# Patient Record
Sex: MALE | Race: WHITE | NOT HISPANIC OR LATINO | Employment: OTHER | URBAN - METROPOLITAN AREA
[De-identification: names, ages, dates, MRNs, and addresses within clinical notes are randomized per-mention and may not be internally consistent; named-entity substitution may affect disease eponyms.]

---

## 2017-01-03 ENCOUNTER — HOSPITAL ENCOUNTER (OUTPATIENT)
Dept: CARDIAC REHAB | Facility: CLINIC | Age: 82
End: 2017-01-03
Attending: FAMILY MEDICINE
Payer: COMMERCIAL

## 2017-01-03 VITALS — HEIGHT: 69 IN | BODY MASS INDEX: 27.91 KG/M2 | WEIGHT: 188.4 LBS

## 2017-01-03 PROCEDURE — 40000575 ZZH STATISTIC OP CARDIAC VISIT #2

## 2017-01-03 PROCEDURE — 93797 PHYS/QHP OP CAR RHAB WO ECG: CPT

## 2017-01-03 PROCEDURE — 93798 PHYS/QHP OP CAR RHAB W/ECG: CPT

## 2017-01-03 PROCEDURE — 40000116 ZZH STATISTIC OP CR VISIT

## 2017-01-03 ASSESSMENT — 6 MINUTE WALK TEST (6MWT)
GENDER SELECTION: MALE
MALE CALC: 1379.97
PREDICTED: 1388.38
TOTAL DISTANCE WALKED (FT): 1015
TOTAL DISTANCE WALKED (FT): 1015
FEMALE CALC: 1099.37
GENDER SELECTION: MALE

## 2017-01-03 NOTE — PROGRESS NOTES
01/03/17 1100   Session   Session Initial Evaluation and Exercise Prescription   Certified through this date 02/01/17   Korey Solitario Pearson  87 year old  I have established, reviewed and made necessary changes to the individualized treatment plan and exercise prescription for this patient.    Physician Name (printed): ________________________   Date: _______  Time: ______    Physician Signature: ___________________________________________    Cardiac Rehab Assessment   Cardiac Rehab Assessment 1/3/17 Patient presents for initial evaluation s/p DESx2. Patient had felt burning in chest when he was admitted to the hospital. EF of >70%. Patient reports that he's been to the ER since DC. He believed to have a hematoma in the incision site. He was reassured that the incision appears to be WNLs. Patient still experiences a little soreness, but tolerated 6MWT today. Patient is hesitant to attend CR due to insurance plan. Patient may have a $30 copay when he attends. Patient plans to attend 6 sessions and then assess how he's feeling.     The patient's history and clinical status including hemodynamics and ECG were evaluated.  The patient was assessed to be stable and appropriate to begin exercise.   The patient's functional capacity and exercise prescription were determined by the completion of the 6 minute walk test.  See results below.  The patient was oriented to the program.  Risk factor profile was completed. Goals and objectives were discussed. CV response was WNL. No symptoms, complaints or pain were reported. Good prognosis for reaching above goals. Skilled therapy is necessary in order to monitor CV response to exercise, to provide education on risk factors and behavior change counseling needed to achieve patient's goals.  Plan to progress to 30-40 minutes of exercise prior to discharge from cardiac rehab.  Initial THR of 20-30 beats above RHR; Effort rating of 4-6.  Initiate muscle conditioning as  appropriate.  Provide risk factor education and behavior change counseling.    General Information   Treatment Diagnosis Stent   Date of Treatment Diagnosis 12/22/16   Significant Past CV History None   Comorbidities None   Other Medical History Hypertension    Past Medical History   Diagnosis Date     Anxiety disorder      Hypothyroid      Peripheral neuropathy (H) 2012     Diverticulosis of colon      Erythrocytosis      Hyperlipidemia      Hypertension      Hearing loss      Vitamin D deficiency      Cancer (H) skin cancer both ears      Lead up symptoms burning in chest    Hospital Location [facility]   Hospital Discharge Date 12/24/16   Signs and Symptoms Post Hospital Discharge None   Outpatient Cardiac Rehab Start Date 01/03/17   Primary Physician Dr. Liberty Calhoun   Primary Physician Follow Up Advised to schedule appointment   Surgeon NA   Surgeon Follow Up NA   Cardiologist Dr. Patel   Cardiologist Follow Up Completed   Ejection Fraction >70%   Risk Stratification Low   Summary of Cath Report   Summary of Cath Report Available   Date Performed 12/22/16   Left Main 20-30%   LAD 75-80%   LCX 30%   RCA <20%   Cath Report Comments 1/3/17 ALFRED to proximal LAD, ALFRED to mid LAD   Living and Work Status    Living Arrangements and Social Status house   Support System Live with an adult   Return to Employment Retired   Occupation auto services   Preventative Medications   CMS recommended medications Ace inhibitors;Antiplatelets;Beta Blocker;Lipid Lowering   Falls Screen   Have you fallen two or more times in the past year? No   Have you fallen and had an injury in the past year? No   Pain   Patient Currently in Pain Denies   Physical Assessments   Incisions WNL   Edema None   Right Lung Sounds not assessed   Left Lung Sounds not assessed   Limitations No limitations   Individualized Treatment Plan   Monitored Sessions Scheduled 6   Monitored Sessions Attended 1   Oxygen   Supplemental Oxygen needed No   Nutrition Management  "- Weight Management   Assessment Initial Assessment   Age 87   Weight 85.458 kg (188 lb 6.4 oz)   Height 1.74 m (5' 8.5\")   BMI (Calculated) 28.29   Initial Rate Your Plate Score. Dietary tool to assess eating patterns. Scores range from 24 to 72. The higher the score the healthier the eating pattern. 52   Nutrition Management - Lipids   Lipids Labs Available   Date 12/23/16   Total Cholesterol 172   Triglycerides 107   HDL 37      Prescribed Lipid Medication Yes   Statin Intensity High Intensity   Nutrition Management - Diabetes   Diabetes No   Nutrition Management Summary   Dietary Recommendations Low Fat;Low Cholesterol;Low Sodium   Stages of Change for Diet Compliance Pre-Contemplation   Interventions Planned Attend Nutrition Education Class(es);Instruct on Label Reading   Nutrition Summary Comments 1/3/17 Patient is trying to watch portion sizes, but is not interested in changing the content of food.   Psychosocial Management   Psychosocial Assessment Initial   Is there history of clinical depression or increased risk of depression? No previous history   Current Level of Stress per Patient Report Mild   Current Coping Skills Has Positive Support System;Is on Medication for Depression/Anxiety   Initial Patient Health Questionnaire -9 Score (PHQ-9) for depression. 5-9 Minimal symptoms, 10-14 Minor depression, 15-19 Major depression, moderately severe, > 20 Major depression, severe  5   Initial [facility] Survey score.  Quality of Life:   If total score > 25 review individual areas where patient rated a 4 or 5.  Consider patients current medical condition and what role that plays on the score.   Adjust treatment protocol to improve areas of concern.  Consider the following:  PHQ9 score, DASI, and re-assessment within the next 30 days to assist with developing treatments.  20   Stages of Change Maintenance   Patient Goal No   Psychosocial Comments 1/3/17 PT is on medication for anxiety, which he feels is " beneficial   Psychosocial Target Outcome Identify absence or presence of depression using valid screening tool;Maximize coping skills;Develop positive support system   Other Core Components - Hypertension   History of or Diagnosis of Hypertension Yes   Currently taking Anti-Hypertensives Yes;Beta blocker;Ace Inhibitor   Other Core Components - Tobacco   History of Tobacco Use Yes   Quit Date or Planned Quit Date 01/01/56   Tobacco Use Status Former (Quit > 6 mo ago)   Tobacco Habit Cigarettes   Tobacco Use per Day (average) 1 PPD   Years of Tobacco Use 10   Stages of Change Maintenance   Other Core Components Summary   Interventions Planned None   Activity/Exercise History   Activity/Exercise Assessment Initial   Activity/Exercise Status prior to event? Sedentary   Number of Days Currently participating in Moderate Physical Activity? 0   Number of Days Currently performing  Aerobic Exercise (including rehab)? 0   Number of Minutes per Session Currently of Aerobic Exercise (average)? 0   Current Stage of Change (Physical Activity) Preparation   Current Stage of Change (Aerobic Exercise) Preparation   Patient Goals Goal #1;Goal #2   Goal #1 Description PT will tolerate walking 15 minutes at a brisk pace without feeling chest pain or fatigue.    Goal #1 Target Date 02/02/17   Goal #2 Description PT will attend CR 2 times per week to gain confidence to safely exercise on his own outside of rehab.    Goal #2 Target Date 02/02/17   Exercise Assessment   6 Minute Walk Predicted - Gender Selection Male   6 Minute Walk Predicted (Male) 1379.97   6 Minute Walk Predicted (Female) 1099.37   Initial 6 Minute Walk Distance (Feet) 1015 ft   Resting HR 52 bpm   Exercise HR 79 bpm   Post Exercise HR 54 bpm   Resting /64 mmHg   Exercise /68 mmHg   Post Exercise /70 mmHg   Effort Rating 6   Current MET Level 2.5   MET Level Goal 3-3.5   ECG Rhythm Sinus bradycardia   Ectopy PVCs   Current Symptoms Denies symptoms    Limitations/Restrictions None   Exercise Prescription   Mode Treadmill;Nustep;Weights   Duration/Time Intermittent bouts;15-30 min   Frequency 2 days/week   THR (85% of age predicted max HR) 113.05   OMNI Effort Rating (0-10 Scale) 4-6/10   Progression Continuous bouts;Progress peak intensity by 1/4 MET per week;Total exercise time of 30-45 minutes   Recommended Home Exercise   Type of Exercise Walking   Frequency (days per week) 2-3   Duration (minutes per session) Intermittent   Effort Rating Recommended 4-6/10   Current Home Exercise   Type of Exercise None   Frequency (days per week) 0   Duration (minutes per session) 0   Follow-up/On-going Support   Provider follow-up needed on the following No follow-up needed   Learning Assessment   Learner Patient   Primary Language English   Preferred Learning Style Listening;Reading   Barriers to Learning Hearing   Patient Education   Education recommended Anatomy and Physiology of the Heart;Blood Pressure;Exercise Principles;Nutrition

## 2017-01-05 ENCOUNTER — HOSPITAL ENCOUNTER (OUTPATIENT)
Dept: CARDIAC REHAB | Facility: CLINIC | Age: 82
End: 2017-01-05
Payer: COMMERCIAL

## 2017-01-05 PROCEDURE — 93798 PHYS/QHP OP CAR RHAB W/ECG: CPT | Performed by: OCCUPATIONAL THERAPIST

## 2017-01-05 PROCEDURE — 40000116 ZZH STATISTIC OP CR VISIT: Performed by: OCCUPATIONAL THERAPIST

## 2017-01-10 ENCOUNTER — OFFICE VISIT (OUTPATIENT)
Dept: FAMILY MEDICINE | Facility: CLINIC | Age: 82
End: 2017-01-10
Payer: COMMERCIAL

## 2017-01-10 VITALS
OXYGEN SATURATION: 94 % | HEART RATE: 104 BPM | DIASTOLIC BLOOD PRESSURE: 76 MMHG | SYSTOLIC BLOOD PRESSURE: 114 MMHG | BODY MASS INDEX: 28.47 KG/M2 | RESPIRATION RATE: 15 BRPM | TEMPERATURE: 96.9 F | WEIGHT: 190 LBS

## 2017-01-10 DIAGNOSIS — G25.3 RESTLESS LEGS SYNDROME WITH NOCTURNAL MYOCLONUS: Chronic | ICD-10-CM

## 2017-01-10 DIAGNOSIS — G47.00 INSOMNIA, UNSPECIFIED TYPE: Primary | Chronic | ICD-10-CM

## 2017-01-10 DIAGNOSIS — G25.81 RESTLESS LEGS SYNDROME WITH NOCTURNAL MYOCLONUS: Chronic | ICD-10-CM

## 2017-01-10 PROBLEM — I73.00 RAYNAUD'S DISEASE WITHOUT GANGRENE: Status: ACTIVE | Noted: 2017-01-10

## 2017-01-10 PROCEDURE — 99214 OFFICE O/P EST MOD 30 MIN: CPT | Performed by: PHYSICIAN ASSISTANT

## 2017-01-10 RX ORDER — TRAZODONE HYDROCHLORIDE 50 MG/1
50 TABLET, FILM COATED ORAL
Qty: 30 TABLET | Refills: 3 | Status: SHIPPED | OUTPATIENT
Start: 2017-01-10 | End: 2017-01-28

## 2017-01-10 NOTE — MR AVS SNAPSHOT
"              After Visit Summary   1/10/2017    Korey Pearson    MRN: [id number]           Patient Information     Date Of Birth          8/31/1929        Visit Information        Provider Department      1/10/2017 10:30 AM Liberty Calhoun PA-C [facility]        Today's Diagnoses     Insomnia, unspecified type    -  1     Restless legs syndrome with nocturnal myoclonus           Care Instructions    Ten basic rules for a good night's sleep   Sleep only as much as you need to feel rested and then get out of bed   Keep a regular sleep schedule   Avoid forcing sleep   Exercise regularly for at least 20 minutes, preferably 4 to 5 hours before bedtime   Avoid caffeinated beverages after lunch   Avoid alcohol near bedtime: no \"night cap\"   Avoid smoking, especially in the evening   Do not go to bed hungry   Adjust bedroom environment   Deal with your worries before bedtime     Melatonin:    Take melatonin dose of 0.3 mg at or soon before bedtime. If  no improvement after a week, double the dose to 0.6 mg.    Stop taking after a few weeks as continuing to take nightly will decrease effectiveness.  Then restart after 1-2 weeks at the initial dose.        Follow-ups after your visit        Your next 10 appointments already scheduled     Jan 12, 2017  1:00 PM   Treatment 60 with Sh Cardiac Rehab 1   [facility] ([facility])    [address]  [facility] [address]   [phone]            Jan 17, 2017  1:00 PM   Treatment 60 with Sh Cardiac Rehab 1   [facility] ([facility])    [address]  [facility] [address]   [phone]            Jan 19, 2017  1:00 PM   Treatment 60 with Sh Cardiac Rehab 1   [facility] ([facility])    [address]  [facility] [address]   [phone]            Jan 24, 2017  1:00 PM "   Treatment 60 with Sh Cardiac Rehab 1   [facility] ([facility])    [address]  [place] [address]   [phone]            Jan 26, 2017  1:00 PM   Treatment 60 with Sh Cardiac Rehab 1   [facility] ([facility])    [address]  [facility] [address]   [phone]            Mar 09, 2017  8:50 AM   LAB with [facility]   [facility] ([facility])    [address]  [facility] [address] [phone]           Patient must bring picture ID.  Patient should be prepared to give a urine specimen  Please do not eat 10-12 hours before your appointment if you are coming in fasting for labs on lipids, cholesterol, or glucose (sugar).  Pregnant women should follow their Care Team instructions. Water with medications is okay. Do not drink coffee or other fluids.   If you have concerns about taking  your medications, please ask at office or if scheduling via [employer], send a message by clicking on Secure Messaging, Message Your Care Team.            Mar 09, 2017  9:45 AM   Return Visit with Ramirez Patel MD   [facility] ([facility])    [address] [place] [address] [address]  [facility] [address] [phone]              Who to contact     If you have questions or need follow up information about today's clinic visit or your schedule please contact [facility] directly at [phone].  Normal or non-critical lab and imaging results will be communicated to you by MyChart, letter or phone within 4 business days after the clinic has received the results. If you do not hear from us within 7 days, please contact the clinic through MyChart or phone. If you have a critical or abnormal lab result, we will notify you by phone as soon as possible.  Submit refill requests through  "MyChart or call your pharmacy and they will forward the refill request to us. Please allow 3 business days for your refill to be completed.          Additional Information About Your Visit        MyChart Information     [employer] lets you send messages to your doctor, view your test results, renew your prescriptions, schedule appointments and more. To sign up, go to [email or web address] . Click on \"Log in\" on the left side of the screen, which will take you to the Welcome page. Then click on \"Sign up Now\" on the right side of the page.     You will be asked to enter the access code listed below, as well as some personal information. Please follow the directions to create your username and password.     Your access code is: [email or web address]  Expires: 3/22/2017  8:41 AM     Your access code will  in 90 days. If you need help or a new code, please call your [facility] clinic or [phone].        Care EveryWhere ID     This is your Care EveryWhere ID. This could be used by other organizations to access your [facility] medical records  [id number]        Your Vitals Were     Pulse Temperature Respirations Pulse Oximetry          104 96.9  F (36.1  C) (Tympanic) 15 94%         Blood Pressure from Last 3 Encounters:   01/10/17 114/76   16 138/70   16 167/91    Weight from Last 3 Encounters:   01/10/17 190 lb (86.183 kg)   17 188 lb 6.4 oz (85.458 kg)   16 193 lb 6.4 oz (87.726 kg)              Today, you had the following     No orders found for display         Today's Medication Changes          These changes are accurate as of: 1/10/17 12:03 PM.  If you have any questions, ask your nurse or doctor.               Start taking these medicines.        Dose/Directions    traZODone 50 MG tablet   Commonly known as:  DESYREL   Used for:  Insomnia, unspecified type   Started by:  Liberty Calhoun PA-C        Dose:  50 mg   Take 1 tablet (50 mg) by mouth nightly as needed for sleep "   Quantity:  30 tablet   Refills:  3            Where to get your medicines      These medications were sent to [place] [id number] - [place], [place] - [address]  [address] [facility] [address]     Phone:  [phone]    - traZODone 50 MG tablet             Primary Care Provider Office Phone # Fax #    Liberty Calhoun PA-C [phone]       [facility] [address]  [facility] [address]        Thank you!     Thank you for choosing [facility]  for your care. Our goal is always to provide you with excellent care. Hearing back from our patients is one way we can continue to improve our services. Please take a few minutes to complete the written survey that you may receive in the mail after your visit with us. Thank you!             Your Updated Medication List - Protect others around you: Learn how to safely use, store and throw away your medicines at www.disposemymeds.org.          This list is accurate as of: 1/10/17 12:03 PM.  Always use your most recent med list.                   Brand Name Dispense Instructions for use    aspirin 81 MG tablet      Take 81 mg by mouth daily       atorvastatin 20 MG tablet    LIPITOR    30 tablet    Take 1 tablet (20 mg) by mouth At Bedtime       camphor-eucalyptus-menthol 4.73-1.2-2.6 % Oint ointment      Apply 0.5 g topically daily as needed for cough       diazepam 5 MG tablet    VALIUM    90 tablet    TAKE ONE TABLET BY MOUTH EVERY 8 HOURS AS NEEDED FOR ANXIETY       levothyroxine 75 MCG tablet    SYNTHROID/LEVOTHROID    90 tablet    Take 1 tablet (75 mcg) by mouth daily       lisinopril 10 MG tablet    PRINIVIL/ZESTRIL    30 tablet    Take 1 tablet (10 mg) by mouth daily       metoprolol 25 MG 24 hr tablet    TOPROL-XL    60 tablet    Take 1 tablet (25 mg) by mouth 2 times daily       NIGHTTIME SLEEP AID 50 MG Caps   Generic drug:  DiphenhydrAMINE HCl (Sleep)      30 capsule    Take 1 capsule by mouth as needed       polyethylene glycol powder    MIRALAX/GLYCOLAX     Take 17 g by mouth daily as needed for constipation       prune juice Liqd      Take 5 mLs by mouth daily       ticagrelor 90 MG tablet    BRILINTA    60 tablet    Take 1 tablet (90 mg) by mouth every 12 hours       traZODone 50 MG tablet    DESYREL    30 tablet    Take 1 tablet (50 mg) by mouth nightly as needed for sleep

## 2017-01-10 NOTE — NURSING NOTE
"Chief Complaint   Patient presents with     Hospital F/U     Insomnia       Initial /76 mmHg  Pulse 104  Temp(Src) 96.9  F (36.1  C) (Tympanic)  Resp 15  Wt 190 lb (86.183 kg)  SpO2 94% Estimated body mass index is 28.47 kg/(m^2) as calculated from the following:    Height as of 1/3/17: 5' 8.5\" (1.74 m).    Weight as of this encounter: 190 lb (86.183 kg).  BP completed using cuff size: regular    "

## 2017-01-10 NOTE — PATIENT INSTRUCTIONS
"Ten basic rules for a good night's sleep   Sleep only as much as you need to feel rested and then get out of bed   Keep a regular sleep schedule   Avoid forcing sleep   Exercise regularly for at least 20 minutes, preferably 4 to 5 hours before bedtime   Avoid caffeinated beverages after lunch   Avoid alcohol near bedtime: no \"night cap\"   Avoid smoking, especially in the evening   Do not go to bed hungry   Adjust bedroom environment   Deal with your worries before bedtime     Melatonin:    Take melatonin dose of 0.3 mg at or soon before bedtime. If  no improvement after a week, double the dose to 0.6 mg.    Stop taking after a few weeks as continuing to take nightly will decrease effectiveness.  Then restart after 1-2 weeks at the initial dose.  "

## 2017-01-10 NOTE — PROGRESS NOTES
SUBJECTIVE:                                                    Korey Pearson is a 87 year old male who presents to clinic today for the following health issues:    Hospital Follow-up Visit:  Hospital/Nursing Home/IP Rehab Facility: [facility]  Date of Admission: 12/20/16  Date of Discharge: 12/24/16  Reason(s) for Admission: pain in chest, burning pain center of chest            Problems taking medications regularly:  None       Medication changes since discharge: brilenta, metoprolol, atorvastatin and lisinopril (refill)       Problems adhering to non-medication therapy:  None    Summary of hospitalization:  [facility] discharge summary reviewed  Diagnostic Tests/Treatments reviewed.  Follow up needed: none  Other Healthcare Providers Involved in Patient s Care:         Specialist appointment - Already saw cardiology  Update since discharge: improved.     Post Discharge Medication Reconciliation: discharge medications reconciled, continue medications without change.  Plan of care communicated with patient     Coding guidelines for this visit:  Type of Medical   Decision Making Face-to-Face Visit       within 7 Days of discharge Face-to-Face Visit        within 14 days of discharge   Moderate Complexity [id number]   High Complexity [id number]        Insomnia     Onset: years, becoming worse    Description:   Time to fall asleep (sleep latency): more than 2 hours  Middle of night awakening:  YES  Early morning awakening:  YES    Progression of Symptoms:  worsening    Accompanying Signs & Symptoms:  Daytime sleepiness/napping: YES  Excessive snoring/apnea: no  Restless legs: YES  Frequent urination: no  Chronic pain:  no   History:  Prior Insomnia: no    Precipitating factors:   New stressful situation: no  Caffeine intake: YES- coffee  OTC decongestants: no  Any new medications: YES- recent change from medications    Alleviating factors:  Self medicating (alcohol, etc.):  no        Therapies Tried and outcome: benadryl (generic version)  Additional complaints: None    HPI additional notes: Clifford presents today with   Chief Complaint   Patient presents with     Hospital F/U     Insomnia     Other     poss. raynauds? Patient's fingers are white and cold, mild numbness-unable to get a very good O2 level due to this   Ongoing problems with sleep.  Referred to see sleep specialist last Jan and did not go.  No improvement with sonata, ambien, ativan.  Currently on valium for anxiety.      Doing well since hospital stay.  Has seen cardiology and has had 2 sessions of cardiac rehab.  Canceled his session today due to the weather but will see them in 2 days.  Unsure if he should still restrict his lifting to less than 10 pounds.     ROS:  C: Negative for fever, chills, recent change in weight  Skin: Negative for worrisome rashes or lesions  ENT: Negative for ear, mouth and throat problems  Resp: Negative for significant cough or SOB  CV: Negative for chest pain or peripheral edema  GI: Negative for nausea, abdominal pain, heartburn, or change in bowel habits  MS: Negative for significant arthralgias or myalgias  NEURO: POSITIVE for rls   PSYCHIATRIC: POSITIVE for anxiety or insomnia. Negative for depressed mood  ROS otherwise negative.    Chart Review:  History   Smoking status     Former Smoker -- 1.50 packs/day for 10 years     Types: Cigarettes     Quit date: 01/01/1957   Smokeless tobacco     Never Used     PHQ-9 SCORE 11/22/2013   Total Score 19     Patient Active Problem List   Diagnosis     Generalized anxiety disorder     Peripheral neuropathy (H)     Acquired hypothyroidism     Hearing loss     Vitamin D deficiency     Hyperlipidemia LDL goal <130 on a statin     Essential hypertension with goal blood pressure less than 140/90     Restless legs syndrome with nocturnal myoclonus     Degenerative arthritis of left knee     Gastroesophageal reflux disease without esophagitis     Flatulence,  eructation, and gas pain     Dizziness     Constipation     Abdominal pain, generalized     Diverticulosis of large intestine without hemorrhage     ACP (advance care planning)     Insomnia     Chest pain     Raynaud's disease without gangrene     Past Surgical History   Procedure Laterality Date     Appendectomy  1941     Cataract iol, rt/lt  1993     Hernia repair       left inguinal hernia 1960's     Abdomen surgery  appy, hernia     Problem list, Medication list, Allergies, Medical/Social/Surg hx reviewed in [facility], updated as appropriate.   OBJECTIVE:                                                    /76 mmHg  Pulse 104  Temp(Src) 96.9  F (36.1  C) (Tympanic)  Resp 15  Wt 190 lb (86.183 kg)  SpO2 94%  Body mass index is 28.47 kg/(m^2).  GENERAL: healthy, alert, in no acute distress  RESP: lungs clear to auscultation - no rales, no rhonchi, no wheezes  CV: regular rate and rhythm, normal S1 S2. No peripheral edema.  Fingers pale and cool to palpation.  SKIN: no suspicious lesions, no rashes  PSYCH:Mental Status Exam  Behavior: cooperative and agitated  Speech: rapid tangential  Mood: anxious  Affect: Appropriate/mood-congruent  Thought Processes: Goal directed  Thought Content: no evidence of suicidal or homicidal ideation and no overt psychosis  Insight: Adequate  Judgment: Fair      Diagnostic test results: none      ASSESSMENT/PLAN:                                                          ICD-10-CM    1. Insomnia, unspecified type G47.00 traZODone (DESYREL) 50 MG tablet   2. Restless legs syndrome with nocturnal myoclonus G25.81     G25.3    Clifford has longstanding history of insomnia and RLS.  He has tried and failed several mediations to treat this, including mirapex which he wanted to try today.  Per chart, he was on this in 2013 with no improvement.  Has also been on many sleep aids without help.  Recommended follow up with sleep specialist but he is unsure what they will be able to do.  Will try  trazodone as he does not seem to have been on that in the past.  Discussed stopping benadryl while on it because that can sometimes make RLS worse.  Will start with 1 pill before bed, increase to 2 after 5-7 days if he does not notice an improvement.  Will not take with his valium.    He has had no episodes of chest pain since his angioplasty.  He will continue cardiac rehab and discuss with them if he is able to increase his activity level as he is unsure how fast he can push himself.    Discussed he likely has raynaud's but it would not have been due to his recent procedure and the medications used for it are also used for blood pressure and as he was just started on a new blood pressure medication, it would not be advisable at this time.    Please see patient instructions for treatment details.    Follow up as needed.    Liberty Calhoun PA-C  [facility]

## 2017-01-17 ENCOUNTER — HOSPITAL ENCOUNTER (OUTPATIENT)
Dept: CARDIAC REHAB | Facility: CLINIC | Age: 82
End: 2017-01-17
Payer: COMMERCIAL

## 2017-01-17 PROCEDURE — 40000116 ZZH STATISTIC OP CR VISIT

## 2017-01-17 PROCEDURE — 93798 PHYS/QHP OP CAR RHAB W/ECG: CPT

## 2017-01-19 ENCOUNTER — HOSPITAL ENCOUNTER (OUTPATIENT)
Dept: CARDIAC REHAB | Facility: CLINIC | Age: 82
End: 2017-01-19
Payer: COMMERCIAL

## 2017-01-19 ENCOUNTER — TELEPHONE (OUTPATIENT)
Dept: CARDIOLOGY | Facility: CLINIC | Age: 82
End: 2017-01-19

## 2017-01-19 DIAGNOSIS — R07.9 CHEST PAIN: Primary | ICD-10-CM

## 2017-01-19 DIAGNOSIS — I20.9 ISCHEMIC CHEST PAIN (H): Primary | ICD-10-CM

## 2017-01-19 PROCEDURE — 93798 PHYS/QHP OP CAR RHAB W/ECG: CPT | Performed by: OCCUPATIONAL THERAPIST

## 2017-01-19 PROCEDURE — 40000116 ZZH STATISTIC OP CR VISIT: Performed by: OCCUPATIONAL THERAPIST

## 2017-01-19 RX ORDER — ISOSORBIDE MONONITRATE 30 MG/1
15 TABLET, EXTENDED RELEASE ORAL DAILY
Qty: 30 TABLET | Refills: 1 | Status: SHIPPED | OUTPATIENT
Start: 2017-01-19 | End: 2017-01-23

## 2017-01-19 NOTE — TELEPHONE ENCOUNTER
Discussed with Juan R from cardiac rehab. Patient has some exertional chest burning reminiscent of before his latest PCI. Reviewed last cardiac catheterization and there is a diagonal that is subtotaled and couldn't be intervened on and the mid-LAD stent is also underexpanded due to technical difficulty.    Would like patient to start IMDUR 15 mg daily in the AM and see Bronwyn sometime in the next 3-5 days.

## 2017-01-19 NOTE — TELEPHONE ENCOUNTER
Spoke to patient about Dr. Patel's recommendation below. Pt verbalized understanding. Imdur prescription sent to pharmacy of choice. Pt has OV with Bronwyn VALDEZ 1/23/17.

## 2017-01-19 NOTE — TELEPHONE ENCOUNTER
Juan R from cardiac rehab called stating patient was having chest burning similar to in December when he had stents placed. Dr. Patel has spoken with cardiac rehab as well.   Will route to Dr. Patel was his recommendations

## 2017-01-23 ENCOUNTER — OFFICE VISIT (OUTPATIENT)
Dept: CARDIOLOGY | Facility: CLINIC | Age: 82
End: 2017-01-23
Payer: COMMERCIAL

## 2017-01-23 VITALS
BODY MASS INDEX: 28.44 KG/M2 | HEIGHT: 69 IN | WEIGHT: 192 LBS | HEART RATE: 64 BPM | DIASTOLIC BLOOD PRESSURE: 52 MMHG | SYSTOLIC BLOOD PRESSURE: 110 MMHG

## 2017-01-23 DIAGNOSIS — I20.9 ISCHEMIC CHEST PAIN (H): ICD-10-CM

## 2017-01-23 DIAGNOSIS — I25.118 CORONARY ARTERY DISEASE OF NATIVE ARTERY OF NATIVE HEART WITH STABLE ANGINA PECTORIS (H): Primary | ICD-10-CM

## 2017-01-23 PROCEDURE — 99214 OFFICE O/P EST MOD 30 MIN: CPT | Performed by: NURSE PRACTITIONER

## 2017-01-23 RX ORDER — ISOSORBIDE MONONITRATE 30 MG/1
30 TABLET, EXTENDED RELEASE ORAL DAILY
Qty: 30 TABLET | Refills: 1 | Status: SHIPPED | OUTPATIENT
Start: 2017-01-23 | End: 2017-06-19

## 2017-01-23 NOTE — MR AVS SNAPSHOT
After Visit Summary   1/23/2017    Korey Pearson    MRN: [id number]           Patient Information     Date Of Birth          8/31/1929        Visit Information        Provider Department      1/23/2017 1:50 PM Bronwyn Lee APRN CNP [facility] AT [place]        Today's Diagnoses     Coronary artery disease of native artery of native heart with stable angina pectoris (H)    -  1     Ischemic chest pain (H)           Care Instructions    Increase Imdur to 30 mg daily (1 tablet daily)  Continue Cardiac Rehab  Call [phone] if recurrent chest discomfort  Will pursue angiogram in the future if symptoms persist.        Follow-ups after your visit        Your next 10 appointments already scheduled     Jan 24, 2017  1:00 PM   Treatment 60 with Sh Cardiac Rehab 1   [facility] ([facility])    [address]  [facility] [address]   [phone]            Jan 26, 2017  1:00 PM   Treatment 60 with Sh Cardiac Rehab 1   [facility] ([facility])    [address]  [facility] [address]   [phone]            Mar 09, 2017  8:50 AM   LAB with [facility]   [facility] ([facility])    [address]  [facility] [address]   [phone]           Patient must bring picture ID.  Patient should be prepared to give a urine specimen  Please do not eat 10-12 hours before your appointment if you are coming in fasting for labs on lipids, cholesterol, or glucose (sugar).  Pregnant women should follow their Care Team instructions. Water with medications is okay. Do not drink coffee or other fluids.   If you have concerns about taking  your medications, please ask at office or if scheduling via Zyngenia, send a message by clicking on Secure Messaging, Message Your Care Team.            Mar 09, 2017  9:45 AM  "  Return Visit with Ramirez Patel MD   [facility] [place] ([facility])    [address] [place] [id number] [phone]              Who to contact     If you have questions or need follow up information about today's clinic visit or your schedule please contact [facility] [place] directly at [phone].  Normal or non-critical lab and imaging results will be communicated to you by Surveypalhart, letter or phone within 4 business days after the clinic has received the results. If you do not hear from us within 7 days, please contact the clinic through Surveypalhart or phone. If you have a critical or abnormal lab result, we will notify you by phone as soon as possible.  Submit refill requests through CrowdRise or call your pharmacy and they will forward the refill request to us. Please allow 3 business days for your refill to be completed.          Additional Information About Your Visit        SurveypalharBATTERIES & BANDS Information     CrowdRise lets you send messages to your doctor, view your test results, renew your prescriptions, schedule appointments and more. To sign up, go to [email or web address] . Click on \"Log in\" on the left side of the screen, which will take you to the Welcome page. Then click on \"Sign up Now\" on the right side of the page.     You will be asked to enter the access code listed below, as well as some personal information. Please follow the directions to create your username and password.     Your access code is: [email or web address]  Expires: 3/22/2017  8:41 AM     Your access code will  in 90 days. If you need help or a new code, please call your [facility] clinic or [phone].        Care EveryWhere ID     This is your Care EveryWhere ID. This could be used by other organizations to access your [facility] medical records  [id number]        Your Vitals Were     Pulse Height BMI (Body Mass Index)             64 1.74 " "m (5' 8.5\") 28.77 kg/m2          Blood Pressure from Last 3 Encounters:   01/23/17 110/52   01/10/17 114/76   12/28/16 138/70    Weight from Last 3 Encounters:   01/23/17 87.091 kg (192 lb)   01/10/17 86.183 kg (190 lb)   01/03/17 85.458 kg (188 lb 6.4 oz)              Today, you had the following     No orders found for display         Today's Medication Changes          These changes are accurate as of: 1/23/17  2:33 PM.  If you have any questions, ask your nurse or doctor.               These medicines have changed or have updated prescriptions.        Dose/Directions    isosorbide mononitrate 30 MG 24 hr tablet   Commonly known as:  IMDUR   This may have changed:  how much to take   Used for:  Coronary artery disease of native artery of native heart with stable angina pectoris (H), Ischemic chest pain (H)   Changed by:  Bronwyn Lee, RADAMES CNP        Dose:  30 mg   Take 1 tablet (30 mg) by mouth daily In the morning   Quantity:  30 tablet   Refills:  1            Where to get your medicines      These medications were sent to [facility] [address] [place] [address] - [address] [facility]  [address] [facility] [address]     Phone:  [phone]    - isosorbide mononitrate 30 MG 24 hr tablet             Primary Care Provider Office Phone # Fax #    Liberty Calhoun PA-C [phone]       [facility] [address] [facility] [address]  [facility] [address]        Thank you!     Thank you for choosing [facility] [place]  for your care. Our goal is always to provide you with excellent care. Hearing back from our patients is one way we can continue to improve our services. Please take a few minutes to complete the written survey that you may receive in the mail after your visit with us. Thank you!             Your Updated Medication List - Protect others around you: Learn how to safely use, store and throw away your medicines at " www.disposemymeds.org.          This list is accurate as of: 1/23/17  2:33 PM.  Always use your most recent med list.                   Brand Name Dispense Instructions for use    aspirin 81 MG tablet      Take 81 mg by mouth daily       atorvastatin 20 MG tablet    LIPITOR    30 tablet    Take 1 tablet (20 mg) by mouth At Bedtime       camphor-eucalyptus-menthol 4.73-1.2-2.6 % Oint ointment      Apply 0.5 g topically daily as needed for cough       diazepam 5 MG tablet    VALIUM    90 tablet    TAKE ONE TABLET BY MOUTH EVERY 8 HOURS AS NEEDED FOR ANXIETY       isosorbide mononitrate 30 MG 24 hr tablet    IMDUR    30 tablet    Take 1 tablet (30 mg) by mouth daily In the morning       levothyroxine 75 MCG tablet    SYNTHROID/LEVOTHROID    90 tablet    Take 1 tablet (75 mcg) by mouth daily       lisinopril 10 MG tablet    PRINIVIL/ZESTRIL    30 tablet    Take 1 tablet (10 mg) by mouth daily       metoprolol 25 MG 24 hr tablet    TOPROL-XL    60 tablet    Take 1 tablet (25 mg) by mouth 2 times daily       NIGHTTIME SLEEP AID 50 MG Caps   Generic drug:  DiphenhydrAMINE HCl (Sleep)     30 capsule    Take 1 capsule by mouth as needed       polyethylene glycol powder    MIRALAX/GLYCOLAX     Take 17 g by mouth daily as needed for constipation       prune juice Liqd      Take 5 mLs by mouth daily       ticagrelor 90 MG tablet    BRILINTA    60 tablet    Take 1 tablet (90 mg) by mouth every 12 hours       traZODone 50 MG tablet    DESYREL    30 tablet    Take 1 tablet (50 mg) by mouth nightly as needed for sleep

## 2017-01-23 NOTE — PROGRESS NOTES
Cardiology Clinic Progress Note  Korey Pearson MRN# [id number]   YOB: 1929 Age: 87 year old     Reason for visit: Chest pain           Assessment and Plan:   1. Coronary artery disease with recurrent chest discomfort    S/p PCI and ALFRED x 2 to LAD. Residual subtotal occlusion of D1.    DAPT for 1 year uninterrupted with Brilinta and ASA    Increase Imdur to 30 mg daily    Continue cardiac rehab    If chest pain recurs then proceed to coronary angiography      2. Hyperlipidemia, LDL goal <70    Continue Lipitor 20 mg daily    Fasting lipids in March      3. Hypertension    Controlled on current medical therapy                   History of Presenting Illness:    Korey Pearson is a very pleasant 87 year old patient of Dr. Patel who presents today for with complaints of chest pain at cardiac rehab. He was started on Imdur 15 mg daily and scheduled for an office visit for further evaluation. He was accompanies by his wife and daughter. He also has a past medical history of hyperlipidemia, hypertension, GERD and hypothyroidism.     The patient presented to the emergency department with complaints of exertional chest burning increased over the past month.  His EKG was not suggestive of ischemia at rest.  He also had serial troponins that were negative ×3.  He underwent a stress echocardiogram that was limited due to severe chest discomfort and borderline EKG changes with a baseline abnormal EKG. Due to the nature of symptoms and his risk factors, it was elected to proceed for coronary angiography.    And underwent coronary angiogram on 12/20/2016 by a right common femoral artery approach.  The LAD had severe disease with an 80% stenosis in the proximal LAD, followed by diffuse disease with a 75% stenosis in the mid LAD.  There was also a subtotal occlusion of the first diagonal.  There was minimal residual disease elsewhere.  An orbital arthrectomy of the proximal and mid LAD lesions was  completed.  He also had successful deployment of drug-eluting stent in the proximal and mid LAD.  The patient was discharged on dual antiplatelet therapy with Brilinta and aspirin.  His prior to admission lisinopril was continued and Lipitor and Toprol-XL were added.    The patient reported that during cardiac rehab he had central to lower chest burning similar to his presentation prior to his LAD stenting. The pain would come on with stairs and with less activity than previously. Dr. Patel spoke to the the Juan R at cardiac rehab and recommended he start Imdur 15 mg daily and return to clinic for an evaluation. He had contracted a virus and was in bed all day Friday and Saturday. His symptoms have improved and took his first dose of Imdur yesterday. He did get a recurrence of his chest discomfort despite the Imdur.     Risks and benefits of left heart catheterization and coronary angiogram were discussed with the patient in detail. Including death, MI, stroke, hematoma, possible urgent bypass surgery for failed PCI, use of stents, increased risk of bleed on thienopyridine agents while on anticoagulants, possible peripheral vascular complications use of FFR in clinical-decision making, arrhythmia, and alternative of medical therapy alone associated with left heart catheterization, left ventriculography, coronary angiography, and possible percutaneous coronary intervention. The risks discussed include risk of heart attack or risk bleeding requiring surgery or transfusion of less than 1%. The risk of stroke or needing emergency surgery is less than 1 in 500. We discussed that contrast is used during the procedure which can potentially damage the kidney. Additionally we discussed the risk of contrast induced allergic reaction, renal dysfunction, and vascular complications. Pros and cons of bare metal vs drug eluting stents was discussed. Patient understands and wishes to proceed with it.          This note was completed  "in part using Dragon voice recognition software. Although reviewed after completion, some word and grammatical errors may occur       Review of Systems:   Review of Systems:  Skin:  Negative     Eyes:  Positive for visual blurring  ENT:  Negative    Respiratory:  Positive for cough  Cardiovascular:    fatigue;Positive for  Gastroenterology: Positive for heartburn;constipation  Genitourinary:  Negative    Musculoskeletal:  Negative    Neurologic:  Positive for headaches;migraine headaches;involuntary movement  Psychiatric:  Positive for sleep disturbances  Heme/Lymph/Imm:  Negative    Endocrine:  Positive for thyroid disorder              Physical Exam:     Vitals: /52 mmHg  Pulse 64  Ht 1.74 m (5' 8.5\")  Wt 87.091 kg (192 lb)  BMI 28.77 kg/m2  Constitutional:  cooperative, alert and oriented, well developed, well nourished, in no acute distress   Hard of hearing    Skin:  warm and dry to the touch, no apparent skin lesions or masses noted        Head:  normocephalic, no masses or lesions        Eyes:  pupils equal and round        ENT:  no pallor or cyanosis, dentition good        Chest:  clear to auscultation;normal symmetry        Cardiac: regular rhythm;normal S1 and S2;no murmurs, gallops or rubs detected                  Abdomen:  abdomen soft        Extremities and Back:  no edema        Neurological:  affect appropriate, oriented to time, person and place                 Medications:     Current Outpatient Prescriptions   Medication Sig Dispense Refill     isosorbide mononitrate (IMDUR) 30 MG 24 hr tablet Take 1 tablet (30 mg) by mouth daily In the morning 30 tablet 1     traZODone (DESYREL) 50 MG tablet Take 1 tablet (50 mg) by mouth nightly as needed for sleep 30 tablet 3     ticagrelor (BRILINTA) 90 MG tablet Take 1 tablet (90 mg) by mouth every 12 hours 60 tablet 11     metoprolol (TOPROL-XL) 25 MG 24 hr tablet Take 1 tablet (25 mg) by mouth 2 times daily 60 tablet 11     lisinopril " (PRINIVIL/ZESTRIL) 10 MG tablet Take 1 tablet (10 mg) by mouth daily 30 tablet 11     atorvastatin (LIPITOR) 20 MG tablet Take 1 tablet (20 mg) by mouth At Bedtime 30 tablet 11     polyethylene glycol (MIRALAX/GLYCOLAX) powder Take 17 g by mouth daily as needed for constipation       camphor-eucalyptus-menthol (VICKS VAPORUB) 4.73-1.2-2.6 % OINT ointment Apply 0.5 g topically daily as needed for cough       diazepam (VALIUM) 5 MG tablet TAKE ONE TABLET BY MOUTH EVERY 8 HOURS AS NEEDED FOR ANXIETY 90 tablet 1     levothyroxine (SYNTHROID, LEVOTHROID) 75 MCG tablet Take 1 tablet (75 mcg) by mouth daily 90 tablet 3     prune juice LIQD Take 5 mLs by mouth daily        aspirin 81 MG tablet Take 81 mg by mouth daily        [DISCONTINUED] isosorbide mononitrate (IMDUR) 30 MG 24 hr tablet Take 0.5 tablets (15 mg) by mouth daily In the morning 30 tablet 1     DiphenhydrAMINE HCl, Sleep, (NIGHTTIME SLEEP AID) 50 MG CAPS Take 1 capsule by mouth as needed  30 capsule            Family History   Problem Relation Age of Onset     CEREBROVASCULAR DISEASE Mother      HEART DISEASE Father      DIABETES No family hx of      Coronary Artery Disease No family hx of      Hypertension No family hx of      Hyperlipidemia No family hx of      Breast Cancer No family hx of      Colon Cancer No family hx of      Prostate Cancer No family hx of      Other Cancer No family hx of      Depression No family hx of      Anxiety Disorder No family hx of      MENTAL ILLNESS No family hx of      Substance Abuse No family hx of      Anesthesia Reaction No family hx of      Asthma No family hx of      OSTEOPOROSIS No family hx of      Genetic Disorder No family hx of      Thyroid Disease No family hx of      Obesity No family hx of      Unknown/Adopted No family hx of        Social History     Social History     Marital Status:      Spouse Name: N/A     Number of Children: N/A     Years of Education: N/A     Occupational History     Not on file.      Social History Main Topics     Smoking status: Former Smoker -- 1.50 packs/day for 10 years     Types: Cigarettes     Quit date: 01/01/1957     Smokeless tobacco: Never Used     Alcohol Use: 0.0 oz/week     0 Standard drinks or equivalent per week      Comment: social     Drug Use: No     Sexual Activity: No     Other Topics Concern     Parent/Sibling W/ Cabg, Mi Or Angioplasty Before 65f 55m? Yes     Special Diet No     Exercise No     Social History Narrative            Past Medical History:     Past Medical History   Diagnosis Date     Anxiety disorder      Hypothyroid      Peripheral neuropathy (H) 2012     Diverticulosis of colon      Erythrocytosis      Hyperlipidemia      Hypertension      Hearing loss      Vitamin D deficiency      Cancer (H) skin cancer both ears              Past Surgical History:     Past Surgical History   Procedure Laterality Date     Appendectomy  1941     Cataract iol, rt/lt  1993     Hernia repair       left inguinal hernia 1960's     Abdomen surgery  appy, hernia              Allergies:   Ropinirole       Data:   All laboratory data reviewed:    LAST CHOLESTEROL:  CHOL      172   12/23/2016  HDL       37   12/23/2016  LDL      114   12/23/2016  LDL    140.0   7/25/2012  TRIG      107   12/23/2016  CHOLHDLRATIO      4.4   8/6/2014    LAST BMP:  NA      134   12/24/2016   POTASSIUM      4.2   12/24/2016  CHLORIDE      101   12/24/2016  OMAR      8.7   12/24/2016  CO2       27   12/24/2016  BUN       14   12/24/2016  BUN     13.3   8/25/2012  CR     0.80   12/24/2016  GLC       94   12/24/2016    LAST CBC:  WBC      8.1   12/27/2016  RBC     4.48   12/27/2016  HGB     14.0   12/27/2016  HCT     38.7   12/27/2016  MCV       86   12/27/2016  MCH     31.3   12/27/2016  MCHC     36.2   12/27/2016  RDW     13.2   12/27/2016  PLT      238   12/27/2016      REDD DESOUZA, APRN, CNP

## 2017-01-23 NOTE — PATIENT INSTRUCTIONS
Increase Imdur to 30 mg daily (1 tablet daily)  Continue Cardiac Rehab  Call [phone] if recurrent chest discomfort  Will pursue angiogram in the future if symptoms persist.

## 2017-01-23 NOTE — Clinical Note
1/23/2017    Liberty Calhoun PA-C  [facility]   [address]  [facility] [address]    RE: Korey Pearson       Dear Colleague,    I had the pleasure of seeing Korey Pearson in the [facility].    Cardiology Clinic Progress Note  Korey Pearson MRN# [id number]   YOB: 1929 Age: 87 year old     Reason for visit: Chest pain           Assessment and Plan:   1. Coronary artery disease with recurrent chest discomfort    S/p PCI and ALFRED x 2 to LAD. Residual subtotal occlusion of D1.    DAPT for 1 year uninterrupted with Brilinta and ASA    Increase Imdur to 30 mg daily    Continue cardiac rehab    If chest pain recurs then proceed to coronary angiography      2. Hyperlipidemia, LDL goal <70    Continue Lipitor 20 mg daily    Fasting lipids in March      3. Hypertension    Controlled on current medical therapy                   History of Presenting Illness:    Korey Pearson is a very pleasant 87 year old patient of Dr. Patel who presents today for with complaints of chest pain at cardiac rehab. He was started on Imdur 15 mg daily and scheduled for an office visit for further evaluation. He was accompanies by his wife and daughter. He also has a past medical history of hyperlipidemia, hypertension, GERD and hypothyroidism.     The patient presented to the emergency department with complaints of exertional chest burning increased over the past month.  His EKG was not suggestive of ischemia at rest.  He also had serial troponins that were negative ×3.  He underwent a stress echocardiogram that was limited due to severe chest discomfort and borderline EKG changes with a baseline abnormal EKG. Due to the nature of symptoms and his risk factors, it was elected to proceed for coronary angiography.    And underwent coronary angiogram on 12/20/2016 by a right common femoral artery approach.  The LAD had  severe disease with an 80% stenosis in the proximal LAD, followed by diffuse disease with a 75% stenosis in the mid LAD.  There was also a subtotal occlusion of the first diagonal.  There was minimal residual disease elsewhere.  An orbital arthrectomy of the proximal and mid LAD lesions was completed.  He also had successful deployment of drug-eluting stent in the proximal and mid LAD.  The patient was discharged on dual antiplatelet therapy with Brilinta and aspirin.  His prior to admission lisinopril was continued and Lipitor and Toprol-XL were added.    The patient reported that during cardiac rehab he had central to lower chest burning similar to his presentation prior to his LAD stenting. The pain would come on with stairs and with less activity than previously. Dr. Patel spoke to the the Juan R at cardiac rehab and recommended he start Imdur 15 mg daily and return to clinic for an evaluation. He had contracted a virus and was in bed all day Friday and Saturday. His symptoms have improved and took his first dose of Imdur yesterday. He did get a recurrence of his chest discomfort despite the Imdur.     Risks and benefits of left heart catheterization and coronary angiogram were discussed with the patient in detail. Including death, MI, stroke, hematoma, possible urgent bypass surgery for failed PCI, use of stents, increased risk of bleed on thienopyridine agents while on anticoagulants, possible peripheral vascular complications use of FFR in clinical-decision making, arrhythmia, and alternative of medical therapy alone associated with left heart catheterization, left ventriculography, coronary angiography, and possible percutaneous coronary intervention. The risks discussed include risk of heart attack or risk bleeding requiring surgery or transfusion of less than 1%. The risk of stroke or needing emergency surgery is less than 1 in 500. We discussed that contrast is used during the procedure which can potentially  "damage the kidney. Additionally we discussed the risk of contrast induced allergic reaction, renal dysfunction, and vascular complications. Pros and cons of bare metal vs drug eluting stents was discussed. Patient understands and wishes to proceed with it.          This note was completed in part using Dragon voice recognition software. Although reviewed after completion, some word and grammatical errors may occur       Review of Systems:   Review of Systems:  Skin:  Negative     Eyes:  Positive for visual blurring  ENT:  Negative    Respiratory:  Positive for cough  Cardiovascular:    fatigue;Positive for  Gastroenterology: Positive for heartburn;constipation  Genitourinary:  Negative    Musculoskeletal:  Negative    Neurologic:  Positive for headaches;migraine headaches;involuntary movement  Psychiatric:  Positive for sleep disturbances  Heme/Lymph/Imm:  Negative    Endocrine:  Positive for thyroid disorder              Physical Exam:     Vitals: /52 mmHg  Pulse 64  Ht 1.74 m (5' 8.5\")  Wt 87.091 kg (192 lb)  BMI 28.77 kg/m2  Constitutional:  cooperative, alert and oriented, well developed, well nourished, in no acute distress   Hard of hearing    Skin:  warm and dry to the touch, no apparent skin lesions or masses noted        Head:  normocephalic, no masses or lesions        Eyes:  pupils equal and round        ENT:  no pallor or cyanosis, dentition good        Chest:  clear to auscultation;normal symmetry        Cardiac: regular rhythm;normal S1 and S2;no murmurs, gallops or rubs detected                  Abdomen:  abdomen soft        Extremities and Back:  no edema        Neurological:  affect appropriate, oriented to time, person and place                 Medications:     Current Outpatient Prescriptions   Medication Sig Dispense Refill     isosorbide mononitrate (IMDUR) 30 MG 24 hr tablet Take 1 tablet (30 mg) by mouth daily In the morning 30 tablet 1     traZODone (DESYREL) 50 MG tablet Take 1 " tablet (50 mg) by mouth nightly as needed for sleep 30 tablet 3     ticagrelor (BRILINTA) 90 MG tablet Take 1 tablet (90 mg) by mouth every 12 hours 60 tablet 11     metoprolol (TOPROL-XL) 25 MG 24 hr tablet Take 1 tablet (25 mg) by mouth 2 times daily 60 tablet 11     lisinopril (PRINIVIL/ZESTRIL) 10 MG tablet Take 1 tablet (10 mg) by mouth daily 30 tablet 11     atorvastatin (LIPITOR) 20 MG tablet Take 1 tablet (20 mg) by mouth At Bedtime 30 tablet 11     polyethylene glycol (MIRALAX/GLYCOLAX) powder Take 17 g by mouth daily as needed for constipation       camphor-eucalyptus-menthol (VICKS VAPORUB) 4.73-1.2-2.6 % OINT ointment Apply 0.5 g topically daily as needed for cough       diazepam (VALIUM) 5 MG tablet TAKE ONE TABLET BY MOUTH EVERY 8 HOURS AS NEEDED FOR ANXIETY 90 tablet 1     levothyroxine (SYNTHROID, LEVOTHROID) 75 MCG tablet Take 1 tablet (75 mcg) by mouth daily 90 tablet 3     prune juice LIQD Take 5 mLs by mouth daily        aspirin 81 MG tablet Take 81 mg by mouth daily        [DISCONTINUED] isosorbide mononitrate (IMDUR) 30 MG 24 hr tablet Take 0.5 tablets (15 mg) by mouth daily In the morning 30 tablet 1     DiphenhydrAMINE HCl, Sleep, (NIGHTTIME SLEEP AID) 50 MG CAPS Take 1 capsule by mouth as needed  30 capsule            Family History   Problem Relation Age of Onset     CEREBROVASCULAR DISEASE Mother      HEART DISEASE Father      DIABETES No family hx of      Coronary Artery Disease No family hx of      Hypertension No family hx of      Hyperlipidemia No family hx of      Breast Cancer No family hx of      Colon Cancer No family hx of      Prostate Cancer No family hx of      Other Cancer No family hx of      Depression No family hx of      Anxiety Disorder No family hx of      MENTAL ILLNESS No family hx of      Substance Abuse No family hx of      Anesthesia Reaction No family hx of      Asthma No family hx of      OSTEOPOROSIS No family hx of      Genetic Disorder No family hx of       Thyroid Disease No family hx of      Obesity No family hx of      Unknown/Adopted No family hx of        Social History     Social History     Marital Status:      Spouse Name: N/A     Number of Children: N/A     Years of Education: N/A     Occupational History     Not on file.     Social History Main Topics     Smoking status: Former Smoker -- 1.50 packs/day for 10 years     Types: Cigarettes     Quit date: 01/01/1957     Smokeless tobacco: Never Used     Alcohol Use: 0.0 oz/week     0 Standard drinks or equivalent per week      Comment: social     Drug Use: No     Sexual Activity: No     Other Topics Concern     Parent/Sibling W/ Cabg, Mi Or Angioplasty Before 65f 55m? Yes     Special Diet No     Exercise No     Social History Narrative            Past Medical History:     Past Medical History   Diagnosis Date     Anxiety disorder      Hypothyroid      Peripheral neuropathy (H) 2012     Diverticulosis of colon      Erythrocytosis      Hyperlipidemia      Hypertension      Hearing loss      Vitamin D deficiency      Cancer (H) skin cancer both ears              Past Surgical History:     Past Surgical History   Procedure Laterality Date     Appendectomy  1941     Cataract iol, rt/lt  1993     Hernia repair       left inguinal hernia 1960's     Abdomen surgery  appy, hernia              Allergies:   Ropinirole       Data:   All laboratory data reviewed:    LAST CHOLESTEROL:  CHOL      172   12/23/2016  HDL       37   12/23/2016  LDL      114   12/23/2016  LDL    140.0   7/25/2012  TRIG      107   12/23/2016  CHOLHDLRATIO      4.4   8/6/2014    LAST BMP:  NA      134   12/24/2016   POTASSIUM      4.2   12/24/2016  CHLORIDE      101   12/24/2016  OMAR      8.7   12/24/2016  CO2       27   12/24/2016  BUN       14   12/24/2016  BUN     13.3   8/25/2012  CR     0.80   12/24/2016  GLC       94   12/24/2016    LAST CBC:  WBC      8.1   12/27/2016  RBC     4.48   12/27/2016  HGB     14.0   12/27/2016  HCT     38.7    12/27/2016  MCV       86   12/27/2016  MCH     31.3   12/27/2016  MCHC     36.2   12/27/2016  RDW     13.2   12/27/2016  PLT      238   12/27/2016    Thank you for allowing me to participate in the care of your patient.    Sincerely,     RADAMES HELTON [facility]

## 2017-01-24 ENCOUNTER — TRANSFERRED RECORDS (OUTPATIENT)
Dept: HEALTH INFORMATION MANAGEMENT | Facility: CLINIC | Age: 82
End: 2017-01-24

## 2017-01-24 ENCOUNTER — OFFICE VISIT (OUTPATIENT)
Dept: FAMILY MEDICINE | Facility: CLINIC | Age: 82
End: 2017-01-24
Payer: COMMERCIAL

## 2017-01-24 VITALS
RESPIRATION RATE: 14 BRPM | OXYGEN SATURATION: 97 % | BODY MASS INDEX: 29.51 KG/M2 | DIASTOLIC BLOOD PRESSURE: 72 MMHG | HEART RATE: 69 BPM | SYSTOLIC BLOOD PRESSURE: 122 MMHG | TEMPERATURE: 98.5 F | WEIGHT: 197 LBS

## 2017-01-24 DIAGNOSIS — H53.9 VISION CHANGES: Primary | ICD-10-CM

## 2017-01-24 PROCEDURE — 99213 OFFICE O/P EST LOW 20 MIN: CPT | Performed by: PHYSICIAN ASSISTANT

## 2017-01-24 NOTE — NURSING NOTE
"Chief Complaint   Patient presents with     Eye Problem       Initial /72 mmHg  Pulse 69  Temp(Src) 98.5  F (36.9  C) (Tympanic)  Resp 14  Wt 197 lb (89.359 kg)  SpO2 97% Estimated body mass index is 29.51 kg/(m^2) as calculated from the following:    Height as of 1/23/17: 5' 8.5\" (1.74 m).    Weight as of this encounter: 197 lb (89.359 kg).  BP completed using cuff size: regular    "

## 2017-01-24 NOTE — MR AVS SNAPSHOT
After Visit Summary   1/24/2017    Korey Pearson    MRN: [id number]           Patient Information     Date Of Birth          8/31/1929        Visit Information        Provider Department      1/24/2017 8:10 AM Liberty Calhoun PA-C [facility]        Today's Diagnoses     Vision changes    -  1       Care Instructions      Retinal Tear  The eye is filled with a gel ( vitreous ) that supports its shape. The retina is the light-sensitive tissue at the back of your eye. It records visual images and sends them to your brain so you can see. Behind the retina is a thin layer of blood vessels that send oxygen to the retina.  With age, the vitreous contracts,  from the retinal tissue. When the vitreous separates it causes  floaters  to appear gradually. (Floaters are small dots or strings that seem to be moving across your field of vision.) Floaters are harmless.  Sometimes, when the vitreous pulls away from the retina it can cause a tear in the retina. If this happens, you will experience a sudden onset of many floaters, which may occur with flashes of light. A retinal tear is painless, but is a serious condition. If not treated, most retinal tears will progress to retinal detachment within days or weeks. Retinal detachment is also painless, but it causes vision loss which is permanent.  Eye surgery is necessary to treat a retinal tear and prevent it from progressing to a retinal detachment. The methods commonly used are laser surgery or freezing. They may be done as outpatient procedures. Healing takes about two weeks.  Home care     Avoid contact sports or strenuous activity before you are treated.    If you have had a detached retina and your vision is reduced, your lifestyle will be affected. Depending on how much vision you have lost you may no longer be able to do some things. If this happens, making some of the following changes may  "help:    Increase the amount of light in your home. This will make it easier for you to see.    Make your home safer by identifying hazards that could cause you to trip and fall.    Ask your family and friends for help.     Talk with other people who have reduced vision. Members of support groups and online forums may have advice that s helpful to you.    Use eye protection when doing activities that may injure your eye (such as using power tools).  Follow-up care  Follow up with your health care provider, or as advised.  When to seek medical advice  Call your health care provider right away if you experience any of these symptoms of a retinal tear:    Sudden onset of new floaters    Flashing lights, usually in the peripheral vision  Call your health care provider right away if you experience any of these symptoms of a retinal detachment:    Sudden blurriness and/or waviness in your vision    Sudden onset of a \"shadow\" or \"curtain\" moving across part of your visual field.    9398-3763 [employer]. [address] [place] [address]. All rights reserved. This information is not intended as a substitute for professional medical care. Always follow your healthcare professional's instructions.              Follow-ups after your visit        Additional Services     OPHTHALMOLOGY ADULT REFERRAL       Your provider has referred you to: N: [facility] [place] [phone] [email or web address]  - Concern for possible partial retinal detachment.  Pt has been seeing blue shapes since Sunday which are different from floaters he has had intertrigo he past.    Please be aware that coverage of these services is subject to the terms and limitations of your health insurance plan.  Call member services at your health plan with any benefit or coverage questions.      Please bring the following with you to your appointment:    (1) Any X-Rays, CTs or MRIs which have been " performed.  Contact the facility where they were done to arrange for  prior to your scheduled appointment.    (2) List of current medications  (3) This referral request   (4) Any documents/labs given to you for this referral                  Your next 10 appointments already scheduled     Mar 09, 2017  8:50 AM   LAB with [facility]   [facility]    [address] [place], [place] [id number]  [facility] [address]   [phone]           Patient must bring picture ID.  Patient should be prepared to give a urine specimen  Please do not eat 10-12 hours before your appointment if you are coming in fasting for labs on lipids, cholesterol, or glucose (sugar).  Pregnant women should follow their Care Team instructions. Water with medications is okay. Do not drink coffee or other fluids.   If you have concerns about taking  your medications, please ask at office or if scheduling via "[employer] send a message by clicking on Secure Messaging, Message Your Care Team.            Mar 09, 2017  9:45 AM   Return Visit with Ramirez Patel MD   [facility]    [address] [place] [id number]   [phone]              Who to contact     If you have questions or need follow up information about today's clinic visit or your schedule please contact [facility] directly at [phone].  Normal or non-critical lab and imaging results will be communicated to you by MyChart, letter or phone within 4 business days after the clinic has received the results. If you do not hear from us within 7 days, please contact the clinic through MyChart or phone. If you have a critical or abnormal lab result, we will notify you by phone as soon as possible.  Submit refill requests through "[employer] or call your pharmacy and they will forward the refill request to us. Please allow 3  "business days for your refill to be completed.          Additional Information About Your Visit        MyChart Information     [employer] lets you send messages to your doctor, view your test results, renew your prescriptions, schedule appointments and more. To sign up, go to [email or web address] . Click on \"Log in\" on the left side of the screen, which will take you to the Welcome page. Then click on \"Sign up Now\" on the right side of the page.     You will be asked to enter the access code listed below, as well as some personal information. Please follow the directions to create your username and password.     Your access code is: [email or web address]  Expires: 3/22/2017  8:41 AM     Your access code will  in 90 days. If you need help or a new code, please call your [place] clinic or [phone].        Care EveryWhere ID     This is your [facility] EveryWhere ID. This could be used by other organizations to access your [place] medical records  [id number]        Your Vitals Were     Pulse Temperature Respirations Pulse Oximetry          69 98.5  F (36.9  C) (Tympanic) 14 97%         Blood Pressure from Last 3 Encounters:   17 122/72   17 110/52   01/10/17 114/76    Weight from Last 3 Encounters:   17 197 lb (89.359 kg)   17 192 lb (87.091 kg)   01/10/17 190 lb (86.183 kg)              We Performed the Following     OPHTHALMOLOGY ADULT REFERRAL        Primary Care Provider Office Phone # Fax #    Liberty Calhoun PA-C [phone]       [facility] [address]  [facility] [address]        Thank you!     Thank you for choosing [facility]  for your care. Our goal is always to provide you with excellent care. Hearing back from our patients is one way we can continue to improve our services. Please take a few minutes to complete the written survey that you may receive in the mail after your visit with us. Thank " you!             Your Updated Medication List - Protect others around you: Learn how to safely use, store and throw away your medicines at www.disposemymeds.org.          This list is accurate as of: 1/24/17 10:43 AM.  Always use your most recent med list.                   Brand Name Dispense Instructions for use    aspirin 81 MG tablet      Take 81 mg by mouth daily       atorvastatin 20 MG tablet    LIPITOR    30 tablet    Take 1 tablet (20 mg) by mouth At Bedtime       camphor-eucalyptus-menthol 4.73-1.2-2.6 % Oint ointment      Apply 0.5 g topically daily as needed for cough       diazepam 5 MG tablet    VALIUM    90 tablet    TAKE ONE TABLET BY MOUTH EVERY 8 HOURS AS NEEDED FOR ANXIETY       isosorbide mononitrate 30 MG 24 hr tablet    IMDUR    30 tablet    Take 1 tablet (30 mg) by mouth daily In the morning       levothyroxine 75 MCG tablet    SYNTHROID/LEVOTHROID    90 tablet    Take 1 tablet (75 mcg) by mouth daily       lisinopril 10 MG tablet    PRINIVIL/ZESTRIL    30 tablet    Take 1 tablet (10 mg) by mouth daily       metoprolol 25 MG 24 hr tablet    TOPROL-XL    60 tablet    Take 1 tablet (25 mg) by mouth 2 times daily       NIGHTTIME SLEEP AID 50 MG Caps   Generic drug:  DiphenhydrAMINE HCl (Sleep)     30 capsule    Take 1 capsule by mouth as needed       polyethylene glycol powder    MIRALAX/GLYCOLAX     Take 17 g by mouth daily as needed for constipation       prune juice Liqd      Take 5 mLs by mouth daily       ticagrelor 90 MG tablet    BRILINTA    60 tablet    Take 1 tablet (90 mg) by mouth every 12 hours       traZODone 50 MG tablet    DESYREL    30 tablet    Take 1 tablet (50 mg) by mouth nightly as needed for sleep

## 2017-01-24 NOTE — PROGRESS NOTES
SUBJECTIVE:                                                    Korey Pearson is a 87 year old male who presents to clinic today for the following health issues:    Eye(s) Problem    Duration: Manohar morning    Description:  Location: bilateral  Pain: YES  Redness: no  Discharge: no    Accompanying signs and symptoms: patient states that he sees a blue light, moves when he moves his eyes, and if he coughs the blue light and headache become worse    History (Trauma, foreign body exposure,): None    Precipitating or alleviating factors (contact use): None    Therapies tried and outcome: None     Additional complaints: None    HPI additional notes: Clifford presents today with   Chief Complaint   Patient presents with     Eye Problem   Blue shadow on the left side of both eyes.  Has had floaters in the past but this but this looks different.  Has never had a history of this in the past.  Has a history of visual auras but these are different.  Has some eye pain but unsure how to describe it.  The blue in his vision has not been getting worse since it started.  He can see it through it, nothing has been black like a curtain fall in the back of the eye so he can't see.    Has also had a bad a cough which makes a temporary pain change in his eyes.  Has also been feeling a little dizzy and nauseous since this started but he has also been sick.  When sitting for a prolonged period of time, can sometimes have numbness in both of his posterior thighs, left worse than right.       ROS:  C: Negative for fever, chills, recent change in weight  Skin: Negative for worrisome rashes or lesions  Eyes:Positive for blurry vision and blue shapes in eye.  ENT: Negative for ear, mouth and throat problems  Resp: POSITIVE for cough non-productive without SOB and wheezing  CV: Negative for chest pain or peripheral edema  GI:POSITIVE for nausea  MS: Negative for significant arthralgias or myalgias  NEURO: POSITIVE for  dizziness/lightheadedness  P: Negative for changes in mood or affect  ROS otherwise negative.    Chart Review:  History   Smoking status     Former Smoker -- 1.50 packs/day for 10 years     Types: Cigarettes     Quit date: 01/01/1957   Smokeless tobacco     Never Used     PHQ-9 SCORE 11/22/2013   Total Score 19     Patient Active Problem List   Diagnosis     Generalized anxiety disorder     Peripheral neuropathy (H)     Acquired hypothyroidism     Hearing loss     Vitamin D deficiency     Hyperlipidemia LDL goal <130 on a statin     Essential hypertension with goal blood pressure less than 140/90     Restless legs syndrome with nocturnal myoclonus     Degenerative arthritis of left knee     Gastroesophageal reflux disease without esophagitis     Flatulence, eructation, and gas pain     Dizziness     Constipation     Abdominal pain, generalized     Diverticulosis of large intestine without hemorrhage     ACP (advance care planning)     Insomnia     Chest pain     Raynaud's disease without gangrene     Past Surgical History   Procedure Laterality Date     Appendectomy  1941     Cataract iol, rt/lt  1993     Hernia repair       left inguinal hernia 1960's     Abdomen surgery  appy, hernia     Problem list, Medication list, Allergies, Medical/Social/Surg hx reviewed in [employer], updated as appropriate.   OBJECTIVE:                                                    /72 mmHg  Pulse 69  Temp(Src) 98.5  F (36.9  C) (Tympanic)  Resp 14  Wt 197 lb (89.359 kg)  SpO2 97%  Body mass index is 29.51 kg/(m^2).  GENERAL: healthy, alert, in no acute distress  EYES: Grossly normal to inspection, EOMI, PERRL. Normal fundoscopic exam.  HENT: Mucous mebranes moist.  RESP: lungs clear to auscultation - no rales, no rhonchi, no wheezes  CV: regular rate and rhythm, normal S1 S2. No peripheral edema.  SKIN: no suspicious lesions, no rashes  PSYCH: Alert and oriented times 3;  Able to articulate logical thoughts. Affect is  normal.    Diagnostic test results: none      ASSESSMENT/PLAN:                                                          ICD-10-CM    1. Vision changes H53.9 OPHTHALMOLOGY ADULT REFERRAL     Concern for retinal tear although does not appear to have a detachment based on symptoms.  No abnormality seen on fundoscopic exam.  Recommend follow up with ophthalmology today or tomorrow.  Referral provided.  Explained to pt to let the clinic know he needs to get in in the the next 48 hours and follow up in ED if symptoms worsen. No sign of infection.    Please see patient instructions for treatment details.    Follow up with specialist as referred.    Liberty Calhoun PA-C  [facility]

## 2017-01-24 NOTE — PATIENT INSTRUCTIONS
Retinal Tear  The eye is filled with a gel ( vitreous ) that supports its shape. The retina is the light-sensitive tissue at the back of your eye. It records visual images and sends them to your brain so you can see. Behind the retina is a thin layer of blood vessels that send oxygen to the retina.  With age, the vitreous contracts,  from the retinal tissue. When the vitreous separates it causes  floaters  to appear gradually. (Floaters are small dots or strings that seem to be moving across your field of vision.) Floaters are harmless.  Sometimes, when the vitreous pulls away from the retina it can cause a tear in the retina. If this happens, you will experience a sudden onset of many floaters, which may occur with flashes of light. A retinal tear is painless, but is a serious condition. If not treated, most retinal tears will progress to retinal detachment within days or weeks. Retinal detachment is also painless, but it causes vision loss which is permanent.  Eye surgery is necessary to treat a retinal tear and prevent it from progressing to a retinal detachment. The methods commonly used are laser surgery or freezing. They may be done as outpatient procedures. Healing takes about two weeks.  Home care     Avoid contact sports or strenuous activity before you are treated.    If you have had a detached retina and your vision is reduced, your lifestyle will be affected. Depending on how much vision you have lost you may no longer be able to do some things. If this happens, making some of the following changes may help:    Increase the amount of light in your home. This will make it easier for you to see.    Make your home safer by identifying hazards that could cause you to trip and fall.    Ask your family and friends for help.     Talk with other people who have reduced vision. Members of support groups and online forums may have advice that s helpful to you.    Use eye protection when doing  "activities that may injure your eye (such as using power tools).  Follow-up care  Follow up with your health care provider, or as advised.  When to seek medical advice  Call your health care provider right away if you experience any of these symptoms of a retinal tear:    Sudden onset of new floaters    Flashing lights, usually in the peripheral vision  Call your health care provider right away if you experience any of these symptoms of a retinal detachment:    Sudden blurriness and/or waviness in your vision    Sudden onset of a \"shadow\" or \"curtain\" moving across part of your visual field.    8104-8055 [employer]. [address] [place] [address], [place] [address]. All rights reserved. This information is not intended as a substitute for professional medical care. Always follow your healthcare professional's instructions.        "

## 2017-01-28 ENCOUNTER — OFFICE VISIT (OUTPATIENT)
Dept: FAMILY MEDICINE | Facility: CLINIC | Age: 82
End: 2017-01-28
Payer: COMMERCIAL

## 2017-01-28 VITALS
WEIGHT: 192 LBS | TEMPERATURE: 97.8 F | SYSTOLIC BLOOD PRESSURE: 120 MMHG | OXYGEN SATURATION: 95 % | DIASTOLIC BLOOD PRESSURE: 60 MMHG | RESPIRATION RATE: 18 BRPM | HEART RATE: 61 BPM | BODY MASS INDEX: 28.77 KG/M2

## 2017-01-28 DIAGNOSIS — J20.9 ACUTE BRONCHITIS, UNSPECIFIED ORGANISM: Primary | ICD-10-CM

## 2017-01-28 PROCEDURE — 99213 OFFICE O/P EST LOW 20 MIN: CPT | Performed by: FAMILY MEDICINE

## 2017-01-28 RX ORDER — CODEINE PHOSPHATE AND GUAIFENESIN 10; 100 MG/5ML; MG/5ML
1 SOLUTION ORAL EVERY 4 HOURS PRN
Qty: 120 ML | Refills: 0 | Status: SHIPPED | OUTPATIENT
Start: 2017-01-28 | End: 2017-03-09

## 2017-01-28 RX ORDER — AZITHROMYCIN 250 MG/1
TABLET, FILM COATED ORAL
Qty: 6 TABLET | Refills: 0 | Status: SHIPPED | OUTPATIENT
Start: 2017-01-28 | End: 2017-02-03

## 2017-01-28 NOTE — PATIENT INSTRUCTIONS
Symptomatic treatment.  Will use saline gargles, tylenol and/or advil. Suck on  lozenges as needed. Push fluids. Salt water nasal spray as needed.  Use expectorant such as Mucinex or Robitussin for cough

## 2017-01-28 NOTE — NURSING NOTE
"Chief Complaint   Patient presents with     Cough       Initial /60 mmHg  Pulse 61  Temp(Src) 97.8  F (36.6  C)  Resp 18  Wt 192 lb (87.091 kg)  SpO2 95% Estimated body mass index is 28.77 kg/(m^2) as calculated from the following:    Height as of 1/23/17: 5' 8.5\" (1.74 m).    Weight as of this encounter: 192 lb (87.091 kg).  BP completed using cuff size: soco Quiroga CMA      "

## 2017-01-28 NOTE — MR AVS SNAPSHOT
After Visit Summary   1/28/2017    Korey Pearson    MRN: [id number]           Patient Information     Date Of Birth          8/31/1929        Visit Information        Provider Department      1/28/2017 9:15 AM Torito Ferguson MD [facility]        Today's Diagnoses     Acute bronchitis, unspecified organism    -  1       Care Instructions    Symptomatic treatment.  Will use saline gargles, tylenol and/or advil. Suck on  lozenges as needed. Push fluids. Salt water nasal spray as needed.  Use expectorant such as Mucinex or Robitussin for cough        Follow-ups after your visit        Your next 10 appointments already scheduled     Jan 28, 2017  9:15 AM   [facility]   [facility]    [address] [facility] [address]  [facility] [address]   [phone]            Mar 09, 2017  8:50 AM   LAB with G[facility]   [facility] ([facility])    [address] [place], [place] [address]  [facility] [id number] [phone]           Patient must bring picture ID.  Patient should be prepared to give a urine specimen  Please do not eat 10-12 hours before your appointment if you are coming in fasting for labs on lipids, cholesterol, or glucose (sugar).  Pregnant women should follow their Care Team instructions. Water with medications is okay. Do not drink coffee or other fluids.   If you have concerns about taking  your medications, please ask at office or if scheduling via [email or web address], send a message by clicking on Secure Messaging, Message Your Care Team.            Mar 09, 2017  9:45 AM   Return Visit with Ramirez Patel MD   [facility] ([facility])    [address] [place], [place] [address]  [facility] [id number] [phone]              Who to contact     If you have questions or need follow up  "information about today's clinic visit or your schedule please contact [facility] directly at [phone].  Normal or non-critical lab and imaging results will be communicated to you by MyChart, letter or phone within 4 business days after the clinic has received the results. If you do not hear from us within 7 days, please contact the clinic through [employer] or phone. If you have a critical or abnormal lab result, we will notify you by phone as soon as possible.  Submit refill requests through [employer] or call your pharmacy and they will forward the refill request to us. Please allow 3 business days for your refill to be completed.          Additional Information About Your Visit        [employer] Active Information     [employer] lets you send messages to your doctor, view your test results, renew your prescriptions, schedule appointments and more. To sign up, go to [email or web address] . Click on \"Log in\" on the left side of the screen, which will take you to the Welcome page. Then click on \"Sign up Now\" on the right side of the page.     You will be asked to enter the access code listed below, as well as some personal information. Please follow the directions to create your username and password.     Your access code is: [email or web address]  Expires: 3/22/2017  8:41 AM     Your access code will  in 90 days. If you need help or a new code, please call your [place] clinic or [phone].        Care EveryWhere ID     This is your Care EveryWhere ID. This could be used by other organizations to access your [place] medical records  [id number]        Your Vitals Were     Pulse Temperature Respirations Pulse Oximetry          61 97.8  F (36.6  C) 18 95%         Blood Pressure from Last 3 Encounters:   17 120/60   17 122/72   17 110/52    Weight from Last 3 Encounters:   17 192 lb (87.091 kg)   17 197 lb (89.359 kg)   17 192 lb (87.091 kg)              Today, " you had the following     No orders found for display         Today's Medication Changes          These changes are accurate as of: 1/28/17  9:13 AM.  If you have any questions, ask your nurse or doctor.               Start taking these medicines.        Dose/Directions    azithromycin 250 MG tablet   Commonly known as:  ZITHROMAX   Used for:  Acute bronchitis, unspecified organism   Started by:  Torito Ferguson MD        Two tablets first day, then one tablet daily for four days.   Quantity:  6 tablet   Refills:  0       guaiFENesin-codeine 100-10 MG/5ML Soln solution   Commonly known as:  ROBITUSSIN AC   Used for:  Acute bronchitis, unspecified organism   Started by:  Torito Ferguson MD        Dose:  1 tsp.   Take 5 mLs by mouth every 4 hours as needed for cough   Quantity:  120 mL   Refills:  0            Where to get your medicines      These medications were sent to [facility] [address] [place] [address] [address] [facility]  [address] [facility] [address]     Phone:  [phone]    - azithromycin 250 MG tablet      Some of these will need a paper prescription and others can be bought over the counter.  Ask your nurse if you have questions.     Bring a paper prescription for each of these medications    - guaiFENesin-codeine 100-10 MG/5ML Soln solution             Primary Care Provider Office Phone # Fax #    Liberty Calhoun PA-C [phone]       [facility] [address] [facility] [address]  [facility] [address]        Thank you!     Thank you for choosing [facility]  for your care. Our goal is always to provide you with excellent care. Hearing back from our patients is one way we can continue to improve our services. Please take a few minutes to complete the written survey that you may receive in the mail after your visit with us. Thank you!             Your Updated Medication List - Protect others around you: Learn how  to safely use, store and throw away your medicines at www.disposemymeds.org.          This list is accurate as of: 1/28/17  9:13 AM.  Always use your most recent med list.                   Brand Name Dispense Instructions for use    aspirin 81 MG tablet      Take 81 mg by mouth daily       atorvastatin 20 MG tablet    LIPITOR    30 tablet    Take 1 tablet (20 mg) by mouth At Bedtime       azithromycin 250 MG tablet    ZITHROMAX    6 tablet    Two tablets first day, then one tablet daily for four days.       camphor-eucalyptus-menthol 4.73-1.2-2.6 % Oint ointment      Apply 0.5 g topically daily as needed for cough       diazepam 5 MG tablet    VALIUM    90 tablet    TAKE ONE TABLET BY MOUTH EVERY 8 HOURS AS NEEDED FOR ANXIETY       guaiFENesin-codeine 100-10 MG/5ML Soln solution    ROBITUSSIN AC    120 mL    Take 5 mLs by mouth every 4 hours as needed for cough       isosorbide mononitrate 30 MG 24 hr tablet    IMDUR    30 tablet    Take 1 tablet (30 mg) by mouth daily In the morning       levothyroxine 75 MCG tablet    SYNTHROID/LEVOTHROID    90 tablet    Take 1 tablet (75 mcg) by mouth daily       lisinopril 10 MG tablet    PRINIVIL/ZESTRIL    30 tablet    Take 1 tablet (10 mg) by mouth daily       metoprolol 25 MG 24 hr tablet    TOPROL-XL    60 tablet    Take 1 tablet (25 mg) by mouth 2 times daily       NIGHTTIME SLEEP AID 50 MG Caps   Generic drug:  DiphenhydrAMINE HCl (Sleep)     30 capsule    Take 1 capsule by mouth as needed       polyethylene glycol powder    MIRALAX/GLYCOLAX     Take 17 g by mouth daily as needed for constipation       prune juice Liqd      Take 5 mLs by mouth daily       ticagrelor 90 MG tablet    BRILINTA    60 tablet    Take 1 tablet (90 mg) by mouth every 12 hours

## 2017-01-28 NOTE — PROGRESS NOTES
SUBJECTIVE:                                                    Korey Pearson is a 87 year old male who presents to clinic today for the following health issues:    There are no preventive care reminders to display for this patient.  Health Maintenance reviewed at today's visit patient asked to schedule/complete:   None, Health Maintenance up to date.      Pt here with his wife  RESPIRATORY SYMPTOMS      Duration: about a week    Description  cough    Severity: moderate    Accompanying signs and symptoms: None    History (predisposing factors):  none    Precipitating or alleviating factors: None    Therapies tried and outcome:  rest and fluids   Harsh cough, worse at night.  Not sleeping well        Problem list and histories reviewed & adjusted, as indicated.  Additional history: as documented    Problem list, Medication list, Allergies, and Medical/Social/Surgical histories reviewed in EPIC and updated as appropriate.    ROS:  CONSTITUTIONAL:NEGATIVE for fever, chills, change in weight  ENT/MOUTH: POSITIVE for nasal congestion and postnasal drainage  RESP:POSITIVE for cough-productive    OBJECTIVE:                                                    /60 mmHg  Pulse 61  Temp(Src) 97.8  F (36.6  C)  Resp 18  Wt 192 lb (87.091 kg)  SpO2 95%  Body mass index is 28.77 kg/(m^2).  GENERAL APPEARANCE: healthy, alert and no distress  HENT: ear canals and TM's normal and nose and mouth without ulcers or lesions  RESP: rhonchi R mid posterior and R lower posterior and expiratory wheezes R mid posterior and R lower posterior         ASSESSMENT/PLAN:                                                        ICD-10-CM    1. Acute bronchitis, unspecified organism J20.9 azithromycin (ZITHROMAX) 250 MG tablet     guaiFENesin-codeine (ROBITUSSIN AC) 100-10 MG/5ML SOLN solution       Follow up with Provider - as needed if not improving   Patient Instructions   Symptomatic treatment.  Will use saline gargles,  tylenol and/or advil. Suck on  lozenges as needed. Push fluids. Salt water nasal spray as needed.  Use expectorant such as Mucinex or Robitussin for cough        Torito Ferguson MD  [facility]

## 2017-02-03 ENCOUNTER — TELEPHONE (OUTPATIENT)
Dept: FAMILY MEDICINE | Facility: CLINIC | Age: 82
End: 2017-02-03

## 2017-02-03 ENCOUNTER — OFFICE VISIT (OUTPATIENT)
Dept: FAMILY MEDICINE | Facility: CLINIC | Age: 82
End: 2017-02-03
Payer: COMMERCIAL

## 2017-02-03 VITALS
DIASTOLIC BLOOD PRESSURE: 60 MMHG | SYSTOLIC BLOOD PRESSURE: 100 MMHG | TEMPERATURE: 97.2 F | HEIGHT: 69 IN | OXYGEN SATURATION: 95 % | BODY MASS INDEX: 28.44 KG/M2 | HEART RATE: 70 BPM | WEIGHT: 192 LBS

## 2017-02-03 DIAGNOSIS — G25.3 RESTLESS LEGS SYNDROME WITH NOCTURNAL MYOCLONUS: Chronic | ICD-10-CM

## 2017-02-03 DIAGNOSIS — G25.81 RESTLESS LEGS SYNDROME WITH NOCTURNAL MYOCLONUS: Chronic | ICD-10-CM

## 2017-02-03 DIAGNOSIS — J22 LOWER RESPIRATORY INFECTION: Primary | ICD-10-CM

## 2017-02-03 PROCEDURE — 99213 OFFICE O/P EST LOW 20 MIN: CPT | Performed by: FAMILY MEDICINE

## 2017-02-03 RX ORDER — PREDNISONE 20 MG/1
20 TABLET ORAL DAILY
Qty: 5 TABLET | Refills: 0 | Status: SHIPPED | OUTPATIENT
Start: 2017-02-03 | End: 2017-03-09

## 2017-02-03 RX ORDER — GABAPENTIN 300 MG/1
600 CAPSULE ORAL AT BEDTIME
Qty: 60 CAPSULE | Refills: 0 | Status: SHIPPED | OUTPATIENT
Start: 2017-02-03 | End: 2017-03-09

## 2017-02-03 NOTE — MR AVS SNAPSHOT
After Visit Summary   2/3/2017    Korey Pearson    MRN: [id number]           Patient Information     Date Of Birth          8/31/1929        Visit Information        Provider Department      2/3/2017 1:15 PM Torito Ferguson MD [facility]        Today's Diagnoses     Lower respiratory infection    -  1     Restless legs syndrome with nocturnal myoclonus           Care Instructions    Symptomatic treatment.  Will use saline gargles, tylenol and/or advil. Suck on  lozenges as needed. Push fluids. Salt water nasal spray as needed.        Follow-ups after your visit        Your next 10 appointments already scheduled     Mar 09, 2017  8:50 AM   LAB with [facility]   [facility] ([facility])    [address] [place] [id number] [phone]           Patient must bring picture ID.  Patient should be prepared to give a urine specimen  Please do not eat 10-12 hours before your appointment if you are coming in fasting for labs on lipids, cholesterol, or glucose (sugar).  Pregnant women should follow their Care Team instructions. Water with medications is okay. Do not drink coffee or other fluids.   If you have concerns about taking  your medications, please ask at office or if scheduling via Music[employer], send a message by clicking on Secure Messaging, Message Your Care Team.            Mar 09, 2017  9:45 AM   Return Visit with Ramirez Patel MD   [facility] ([facility])    [address] [place] [id number] [phone]              Who to contact     If you have questions or need follow up information about today's clinic visit or your schedule please contact [facility] directly at [phone].  Normal or non-critical lab and imaging results will be communicated to you by MyChart, letter or phone  "within 4 business days after the clinic has received the results. If you do not hear from us within 7 days, please contact the clinic through [employer]guest or phone. If you have a critical or abnormal lab result, we will notify you by phone as soon as possible.  Submit refill requests through Topguest or call your pharmacy and they will forward the refill request to us. Please allow 3 business days for your refill to be completed.          Additional Information About Your Visit        Topguest Information     [employer] gives you secure access to your electronic health record. If you see a primary care provider, you can also send messages to your care team and make appointments. If you have questions, please call your primary care clinic.  If you do not have a primary care provider, please call [phone] and they will assist you.        Care EveryWhere ID     This is your Care EveryWhere ID. This could be used by other organizations to access your [place] medical records  [id number]        Your Vitals Were     Pulse Temperature Height BMI (Body Mass Index) Pulse Oximetry       70 97.2  F (36.2  C) (Tympanic) 5' 8.5\" (1.74 m) 28.77 kg/m2 95%        Blood Pressure from Last 3 Encounters:   02/03/17 100/60   01/28/17 120/60   01/24/17 122/72    Weight from Last 3 Encounters:   02/03/17 192 lb (87.091 kg)   01/28/17 192 lb (87.091 kg)   01/24/17 197 lb (89.359 kg)              Today, you had the following     No orders found for display         Today's Medication Changes          These changes are accurate as of: 2/3/17  1:20 PM.  If you have any questions, ask your nurse or doctor.               Start taking these medicines.        Dose/Directions    gabapentin 300 MG capsule   Commonly known as:  NEURONTIN   Used for:  Restless legs syndrome with nocturnal myoclonus   Started by:  Torito Ferguson MD        Dose:  600 mg   Take 2 capsules (600 mg) by mouth At Bedtime   Quantity:  60 capsule   Refills:  0       predniSONE " 20 MG tablet   Commonly known as:  DELTASONE   Used for:  Lower respiratory infection   Started by:  Torito Ferguson MD        Dose:  20 mg   Take 1 tablet (20 mg) by mouth daily   Quantity:  5 tablet   Refills:  0            Where to get your medicines      These medications were sent to [facility] - [place], [place] - [address] [facility]  [address] [facility], [facility] [address]     Phone:  [phone]    - gabapentin 300 MG capsule      Call your pharmacy to confirm that your medication is ready for pickup. It may take up to 24 hours for them to receive the prescription. If the prescription is not ready within 3 business days, please contact your clinic or your provider.     We will let you know when these medications are ready. If you don't hear back within 3 business days, please contact us.    - predniSONE 20 MG tablet             Primary Care Provider Office Phone # Fax #    Liberty Calhoun PA-C [phone]       [facility] [address] [facility] [address]  [facility] [address]        Thank you!     Thank you for choosing [facility]  for your care. Our goal is always to provide you with excellent care. Hearing back from our patients is one way we can continue to improve our services. Please take a few minutes to complete the written survey that you may receive in the mail after your visit with us. Thank you!             Your Updated Medication List - Protect others around you: Learn how to safely use, store and throw away your medicines at www.disposemymeds.org.          This list is accurate as of: 2/3/17  1:20 PM.  Always use your most recent med list.                   Brand Name Dispense Instructions for use    aspirin 81 MG tablet      Take 81 mg by mouth daily       atorvastatin 20 MG tablet    LIPITOR    30 tablet    Take 1 tablet (20 mg) by mouth At Bedtime       camphor-eucalyptus-menthol 4.73-1.2-2.6 % Oint  ointment      Apply 0.5 g topically daily as needed for cough       diazepam 5 MG tablet    VALIUM    90 tablet    TAKE ONE TABLET BY MOUTH EVERY 8 HOURS AS NEEDED FOR ANXIETY       gabapentin 300 MG capsule    NEURONTIN    60 capsule    Take 2 capsules (600 mg) by mouth At Bedtime       guaiFENesin-codeine 100-10 MG/5ML Soln solution    ROBITUSSIN AC    120 mL    Take 5 mLs by mouth every 4 hours as needed for cough       isosorbide mononitrate 30 MG 24 hr tablet    IMDUR    30 tablet    Take 1 tablet (30 mg) by mouth daily In the morning       levothyroxine 75 MCG tablet    SYNTHROID/LEVOTHROID    90 tablet    Take 1 tablet (75 mcg) by mouth daily       lisinopril 10 MG tablet    PRINIVIL/ZESTRIL    30 tablet    Take 1 tablet (10 mg) by mouth daily       metoprolol 25 MG 24 hr tablet    TOPROL-XL    60 tablet    Take 1 tablet (25 mg) by mouth 2 times daily       NIGHTTIME SLEEP AID 50 MG Caps   Generic drug:  DiphenhydrAMINE HCl (Sleep)     30 capsule    Take 1 capsule by mouth as needed       polyethylene glycol powder    MIRALAX/GLYCOLAX     Take 17 g by mouth daily as needed for constipation       predniSONE 20 MG tablet    DELTASONE    5 tablet    Take 1 tablet (20 mg) by mouth daily       prune juice Liqd      Take 5 mLs by mouth daily       ticagrelor 90 MG tablet    BRILINTA    60 tablet    Take 1 tablet (90 mg) by mouth every 12 hours

## 2017-02-03 NOTE — NURSING NOTE
"Chief Complaint   Patient presents with     Cough       Initial /60 mmHg  Pulse 70  Temp(Src) 97.2  F (36.2  C) (Tympanic)  Ht 5' 8.5\" (1.74 m)  Wt 192 lb (87.091 kg)  BMI 28.77 kg/m2  SpO2 95% Estimated body mass index is 28.77 kg/(m^2) as calculated from the following:    Height as of this encounter: 5' 8.5\" (1.74 m).    Weight as of this encounter: 192 lb (87.091 kg).  BP completed using cuff size: large    There are no preventive care reminders to display for this patient.      Ivet Lacey MA 2/03/17          "

## 2017-02-03 NOTE — PATIENT INSTRUCTIONS
Symptomatic treatment.  Will use saline gargles, tylenol and/or advil. Suck on  lozenges as needed. Push fluids. Salt water nasal spray as needed.

## 2017-02-03 NOTE — TELEPHONE ENCOUNTER
Joseluis from [employer] called, patient's wife is at the pharmacy.   She thought the patient was supposed to get an Rx for Abx.   According to OV prednisone and symptomatic treatment was ordered only.   Patient's wife to call the clinic with questions.

## 2017-02-03 NOTE — PROGRESS NOTES
"  SUBJECTIVE:                                                    Korey Pearson is a 87 year old male who presents to clinic today for the following health issues:    Pt here with his wife  RESPIRATORY SYMPTOMS      Duration: Since last visit     Description  Cough, nasal congestion, headache.     Severity: mild    Accompanying signs and symptoms: None     History (predisposing factors):  none    Precipitating or alleviating factors: None    Therapies tried and outcome:  Robitussin AC, helped sometimes but sometimes it didn't. Was seen for this on  128/2017. Having a hard time sleeping at night.      Patient would also like to talk about restless leg at bed time. This is causing patient  To not be able to sleep at night as well.   Pt had been on Gabapentin in past but not for a while  Reacted to Ropinirole when that was tried         Problem list and histories reviewed & adjusted, as indicated.  Additional history: as documented    Problem list, Medication list, Allergies, and Medical/Social/Surgical histories reviewed in EPIC and updated as appropriate.    ROS:  CONSTITUTIONAL:NEGATIVE for fever, chills, change in weight  ENT/MOUTH: NEGATIVE for ear, mouth and throat problems and POSITIVE for postnasal drainage and rhinorrhea-clear  RESP:POSITIVE for cough-non productive  NEURO: NEGATIVE for weakness, dizziness or paresthesias and POSITIVE for restless legs at night    OBJECTIVE:                                                    /60 mmHg  Pulse 70  Temp(Src) 97.2  F (36.2  C) (Tympanic)  Ht 5' 8.5\" (1.74 m)  Wt 192 lb (87.091 kg)  BMI 28.77 kg/m2  SpO2 95%  Body mass index is 28.77 kg/(m^2).  GENERAL APPEARANCE: healthy, alert and no distress  HENT: ear canals and TM's normal and nose and mouth without ulcers or lesions  RESP: lungs clear to auscultation - no rales, rhonchi or wheezes and prolonged expiratory phase  CV: regular rates and rhythm, normal S1 S2, no S3 or S4 and no murmur, click " or rub  NEURO: Normal strength and tone, mentation intact and speech normal         ASSESSMENT/PLAN:                                                        ICD-10-CM    1. Lower respiratory infection J22 predniSONE (DELTASONE) 20 MG tablet   2. Restless legs syndrome with nocturnal myoclonus G25.81 gabapentin (NEURONTIN) 300 MG capsule    G25.3        Follow up with Provider - as needed if not improving   Patient Instructions   Symptomatic treatment.  Will use saline gargles, tylenol and/or advil. Suck on  lozenges as needed. Push fluids. Salt water nasal spray as needed.        Torito Ferguson MD  [facility]

## 2017-02-21 NOTE — PROGRESS NOTES
Cardiac Rehab Discharge Summary    Reason for discharge:    Patient/family request discontinuation of services.    Progress towards goals:  Goals not met.  Barriers to achieving goals:   discharge from facility.    Recommendation(s):    Continued therapy is recommended.  Rationale/Recommendations:  Pt. cancelled all appointments due to an eye problem he was having. Pt. plans to return after his eye issue is solved..

## 2017-03-06 DIAGNOSIS — F41.1 GENERALIZED ANXIETY DISORDER: Chronic | ICD-10-CM

## 2017-03-07 DIAGNOSIS — E78.5 HYPERLIPIDEMIA: Primary | ICD-10-CM

## 2017-03-07 RX ORDER — DIAZEPAM 5 MG
TABLET ORAL
Qty: 90 TABLET | Refills: 3 | Status: SHIPPED | OUTPATIENT
Start: 2017-03-07 | End: 2017-11-14

## 2017-03-07 NOTE — TELEPHONE ENCOUNTER
diazepam (VALIUM) 5 MG   Last Written Prescription Date:  8/4/16  Last Fill Quantity: 90,   # refills: 1  Last Office Visit with [facility], [facility] or [facility] prescribing provider: 2/3/17  Future Office visit:    Next 5 appointments (look out 90 days)     Mar 09, 2017  9:45 AM CST   Return Visit with Ramirez Patel MD   [facility] ([facility]    [address] [place] [id number] [phone]                   Routing refill request to provider for review/approval because:  Drug not on the [facility], [facility] or [facility] refill protocol or controlled substance

## 2017-03-09 ENCOUNTER — OFFICE VISIT (OUTPATIENT)
Dept: CARDIOLOGY | Facility: CLINIC | Age: 82
End: 2017-03-09
Payer: COMMERCIAL

## 2017-03-09 VITALS
HEIGHT: 69 IN | SYSTOLIC BLOOD PRESSURE: 120 MMHG | BODY MASS INDEX: 28.23 KG/M2 | WEIGHT: 190.6 LBS | DIASTOLIC BLOOD PRESSURE: 60 MMHG | HEART RATE: 60 BPM

## 2017-03-09 DIAGNOSIS — I20.0 UNSTABLE ANGINA (H): ICD-10-CM

## 2017-03-09 DIAGNOSIS — G25.81 RESTLESS LEG SYNDROME: ICD-10-CM

## 2017-03-09 DIAGNOSIS — I25.10 CORONARY ARTERY DISEASE DUE TO CALCIFIED CORONARY LESION: Primary | ICD-10-CM

## 2017-03-09 DIAGNOSIS — E78.5 HYPERLIPIDEMIA: ICD-10-CM

## 2017-03-09 DIAGNOSIS — I25.84 CORONARY ARTERY DISEASE DUE TO CALCIFIED CORONARY LESION: Primary | ICD-10-CM

## 2017-03-09 LAB
CHOLEST SERPL-MCNC: 115 MG/DL
HDLC SERPL-MCNC: 38 MG/DL
LDLC SERPL CALC-MCNC: 59 MG/DL
NONHDLC SERPL-MCNC: 77 MG/DL
TRIGL SERPL-MCNC: 92 MG/DL

## 2017-03-09 PROCEDURE — 36415 COLL VENOUS BLD VENIPUNCTURE: CPT | Performed by: NURSE PRACTITIONER

## 2017-03-09 PROCEDURE — 80061 LIPID PANEL: CPT | Performed by: NURSE PRACTITIONER

## 2017-03-09 PROCEDURE — 99213 OFFICE O/P EST LOW 20 MIN: CPT | Performed by: INTERNAL MEDICINE

## 2017-03-09 NOTE — PROGRESS NOTES
"Cardiology Progress Note          Assessment and Plan:     1. Coronary artery disease status post PCI of the LAD, some residual disease and stable angina that is not normally present under regular exertion for him.    Continue Imdur 15 mg daily and dual antiplatelet therapy.    Follow-up in six months, patient prefers to follow with me directly    This note was transcribed using electronic voice recognition software and there may be typographical errors present.                Interval History:   The patient is a very pleasant 87 year old whom I've seen previously in vascular cardiology.  He would like to follow-up with me as his cardiologist.  He had PCI of the LAD after atherectomy in December 2016.  He remains on dual antiplatelet therapy and statin.  He had some chest burning with exertion that has been pretty stable currently.  He does not exert himself to a high degree in the 15 mg of Imdur has been sufficient.                       Review of Systems:   Review of Systems:  Skin:  Negative     Eyes:  Positive for visual blurring;glasses  ENT:  Positive for hearing loss (pt wears hearing aids)  Respiratory:  Negative    Cardiovascular:    Positive for;fatigue  Gastroenterology: Positive for heartburn  Genitourinary:  Negative    Musculoskeletal:  Positive for arthritis  Neurologic:  Negative    Psychiatric:  Positive for sleep disturbances (restless legs)  Heme/Lymph/Imm:  Negative    Endocrine:  Positive for thyroid disorder              Physical Exam:     Vitals: /60  Pulse 60  Ht 1.74 m (5' 8.5\")  Wt 86.5 kg (190 lb 9.6 oz)  BMI 28.56 kg/m2  Constitutional:  cooperative, alert and oriented, well developed, well nourished, in no acute distress   Hard of hearing    Skin:  warm and dry to the touch, no apparent skin lesions or masses noted        Head:  normocephalic, no masses or lesions        Eyes:  pupils equal and round        ENT:  no pallor or cyanosis, dentition good        Neck:        no " JVD    Chest:  clear to auscultation;normal symmetry        Cardiac: regular rhythm;normal S1 and S2;no murmurs, gallops or rubs detected                  Abdomen:      benign    Extremities and Back:  no edema        Neurological:  affect appropriate, oriented to time, person and place;no gross motor deficits                 Medications:     Current Outpatient Prescriptions   Medication Sig Dispense Refill     diazepam (VALIUM) 5 MG tablet TAKE ONE TABLET BY MOUTH EVERY 8 HOURS AS NEEDED FOR ANXIETY 90 tablet 3     isosorbide mononitrate (IMDUR) 30 MG 24 hr tablet Take 1 tablet (30 mg) by mouth daily In the morning (Patient taking differently: Take 15 mg by mouth daily In the morning) 30 tablet 1     ticagrelor (BRILINTA) 90 MG tablet Take 1 tablet (90 mg) by mouth every 12 hours 60 tablet 11     metoprolol (TOPROL-XL) 25 MG 24 hr tablet Take 1 tablet (25 mg) by mouth 2 times daily 60 tablet 11     lisinopril (PRINIVIL/ZESTRIL) 10 MG tablet Take 1 tablet (10 mg) by mouth daily 30 tablet 11     atorvastatin (LIPITOR) 20 MG tablet Take 1 tablet (20 mg) by mouth At Bedtime 30 tablet 11     DiphenhydrAMINE HCl, Sleep, (NIGHTTIME SLEEP AID) 50 MG CAPS Take 1 capsule by mouth as needed  30 capsule      polyethylene glycol (MIRALAX/GLYCOLAX) powder Take 17 g by mouth daily as needed for constipation       levothyroxine (SYNTHROID, LEVOTHROID) 75 MCG tablet Take 1 tablet (75 mcg) by mouth daily 90 tablet 3     prune juice LIQD Take 5 mLs by mouth daily        aspirin 81 MG tablet Take 81 mg by mouth daily                   Data:   All laboratory data reviewed  Results for orders placed or performed in visit on 03/09/17 (from the past 24 hour(s))   Lipid panel reflex to direct LDL   Result Value Ref Range    Cholesterol 115 <200 mg/dL    Triglycerides 92 <150 mg/dL    HDL Cholesterol 38 (L) >39 mg/dL    LDL Cholesterol Calculated 59 <100 mg/dL    Non HDL Cholesterol 77 <130 mg/dL       All laboratory data reviewed  Lab  Results   Component Value Date    CHOL 115 03/09/2017     Lab Results   Component Value Date    HDL 38 03/09/2017     Lab Results   Component Value Date    LDL 59 03/09/2017    .0 07/25/2012     Lab Results   Component Value Date    TRIG 92 03/09/2017     Lab Results   Component Value Date    CHOLHDLRATIO 4.4 08/06/2014     TSH   Date Value Ref Range Status   06/13/2016 2.36 0.40 - 5.00 mU/L Final     Last Basic Metabolic Panel:  Lab Results   Component Value Date     12/24/2016      Lab Results   Component Value Date    POTASSIUM 4.2 12/24/2016     Lab Results   Component Value Date    CHLORIDE 101 12/24/2016     Lab Results   Component Value Date    OMAR 8.7 12/24/2016     Lab Results   Component Value Date    CO2 27 12/24/2016     Lab Results   Component Value Date    BUN 14 12/24/2016    BUN 13.3 08/25/2012     Lab Results   Component Value Date    CR 0.80 12/24/2016     Lab Results   Component Value Date    GLC 94 12/24/2016     Lab Results   Component Value Date    WBC 8.1 12/27/2016     Lab Results   Component Value Date    RBC 4.48 12/27/2016     Lab Results   Component Value Date    HGB 14.0 12/27/2016     Lab Results   Component Value Date    HCT 38.7 12/27/2016     Lab Results   Component Value Date    MCV 86 12/27/2016     Lab Results   Component Value Date    MCH 31.3 12/27/2016     Lab Results   Component Value Date    MCHC 36.2 12/27/2016     Lab Results   Component Value Date    RDW 13.2 12/27/2016     Lab Results   Component Value Date     12/27/2016

## 2017-03-09 NOTE — MR AVS SNAPSHOT
After Visit Summary   3/9/2017    Korey Pearson    MRN: [id number]           Patient Information     Date Of Birth          8/31/1929        Visit Information        Provider Department      3/9/2017 9:45 AM Ramirez Patel MD [facility]        Today's Diagnoses     Coronary artery disease due to calcified coronary lesion    -  1    Unstable angina (H)        Restless leg syndrome          Care Instructions    Try a folded blanket underneath the legs when sleeping and see if that helps the restless legs. Please let me know if the chest burning gets worse in the future.    For now, please stay on the ISOSORBIDE and the BRILINTA.    If you notice that chest burning again, especially with exercise, I would like to know. We may adjust medications or send you for another angiogram. But if the discomfort comes on when you normally wouldn't get it, something may be different and we would like to check it out.    See you in 6 months or so.        Follow-ups after your visit        Additional Services     Follow-Up with Cardiologist                 Future tests that were ordered for you today     Open Future Orders        Priority Expected Expires Ordered    Follow-Up with Cardiologist Routine 9/5/2017 3/9/2018 3/9/2017            Who to contact     If you have questions or need follow up information about today's clinic visit or your schedule please contact [facility] directly at [phone].  Normal or non-critical lab and imaging results will be communicated to you by MyChart, letter or phone within 4 business days after the clinic has received the results. If you do not hear from us within 7 days, please contact the clinic through MyChart or phone. If you have a critical or abnormal lab result, we will notify you by phone as soon as possible.  Submit refill requests through "[employer] or call your pharmacy and  "they will forward the refill request to us. Please allow 3 business days for your refill to be completed.          Additional Information About Your Visit        [employer] Information     [employer] gives you secure access to your electronic health record. If you see a primary care provider, you can also send messages to your care team and make appointments. If you have questions, please call your primary care clinic.  If you do not have a primary care provider, please call [phone] and they will assist you.        Care EveryWhere ID     This is your Care EveryWhere ID. This could be used by other organizations to access your [facility] medical records  [id number]        Your Vitals Were     Pulse Height BMI (Body Mass Index)             60 1.74 m (5' 8.5\") 28.56 kg/m2          Blood Pressure from Last 3 Encounters:   03/09/17 120/60   02/03/17 100/60   01/28/17 120/60    Weight from Last 3 Encounters:   03/09/17 86.5 kg (190 lb 9.6 oz)   02/03/17 87.1 kg (192 lb)   01/28/17 87.1 kg (192 lb)              We Performed the Following     Follow-Up with Cardiologist          Today's Medication Changes          These changes are accurate as of: 3/9/17 10:20 AM.  If you have any questions, ask your nurse or doctor.               These medicines have changed or have updated prescriptions.        Dose/Directions    isosorbide mononitrate 30 MG 24 hr tablet   Commonly known as:  IMDUR   This may have changed:    - how much to take  - additional instructions   Used for:  Coronary artery disease of native artery of native heart with stable angina pectoris (H), Ischemic chest pain (H)        Dose:  30 mg   Take 1 tablet (30 mg) by mouth daily In the morning   Quantity:  30 tablet   Refills:  1         Stop taking these medicines if you haven't already. Please contact your care team if you have questions.     camphor-eucalyptus-menthol 4.73-1.2-2.6 % Oint ointment   Stopped by:  Ramirez Patel MD           gabapentin 300 MG " capsule   Commonly known as:  NEURONTIN   Stopped by:  Ramirez Patel MD           guaiFENesin-codeine 100-10 MG/5ML Soln solution   Commonly known as:  ROBITUSSIN AC   Stopped by:  Ramirez Patel MD           predniSONE 20 MG tablet   Commonly known as:  DELTASONE   Stopped by:  Ramirez Patel MD                    Primary Care Provider Office Phone # Fax #    Liberty Hope Calhoun PA-C [phone]       [facility] [address]  [facility] [address]        Thank you!     Thank you for choosing [facility] [place]  for your care. Our goal is always to provide you with excellent care. Hearing back from our patients is one way we can continue to improve our services. Please take a few minutes to complete the written survey that you may receive in the mail after your visit with us. Thank you!             Your Updated Medication List - Protect others around you: Learn how to safely use, store and throw away your medicines at www.disposemymeds.org.          This list is accurate as of: 3/9/17 10:20 AM.  Always use your most recent med list.                   Brand Name Dispense Instructions for use    aspirin 81 MG tablet      Take 81 mg by mouth daily       atorvastatin 20 MG tablet    LIPITOR    30 tablet    Take 1 tablet (20 mg) by mouth At Bedtime       diazepam 5 MG tablet    VALIUM    90 tablet    TAKE ONE TABLET BY MOUTH EVERY 8 HOURS AS NEEDED FOR ANXIETY       isosorbide mononitrate 30 MG 24 hr tablet    IMDUR    30 tablet    Take 1 tablet (30 mg) by mouth daily In the morning       levothyroxine 75 MCG tablet    SYNTHROID/LEVOTHROID    90 tablet    Take 1 tablet (75 mcg) by mouth daily       lisinopril 10 MG tablet    PRINIVIL/ZESTRIL    30 tablet    Take 1 tablet (10 mg) by mouth daily       metoprolol 25 MG 24 hr tablet    TOPROL-XL    60 tablet    Take 1 tablet (25 mg) by mouth 2 times daily       NIGHTTIME  SLEEP AID 50 MG Caps   Generic drug:  DiphenhydrAMINE HCl (Sleep)     30 capsule    Take 1 capsule by mouth as needed       polyethylene glycol powder    MIRALAX/GLYCOLAX     Take 17 g by mouth daily as needed for constipation       prune juice Liqd      Take 5 mLs by mouth daily       ticagrelor 90 MG tablet    BRILINTA    60 tablet    Take 1 tablet (90 mg) by mouth every 12 hours

## 2017-03-09 NOTE — LETTER
"3/9/2017    Liberty Calhoun PA-C  [facility]  [address]  [place] [address]    RE: Korey Pearson       Dear Colleague,    I had the pleasure of seeing Korey Pearson in the [facility].    Cardiology Progress Note          Assessment and Plan:     1. Coronary artery disease status post PCI of the LAD, some residual disease and stable angina that is not normally present under regular exertion for him.    Continue Imdur 15 mg daily and dual antiplatelet therapy.    Follow-up in six months, patient prefers to follow with me directly    This note was transcribed using electronic voice recognition software and there may be typographical errors present.                Interval History:   The patient is a very pleasant 87 year old whom I've seen previously in vascular cardiology.  He would like to follow-up with me as his cardiologist.  He had PCI of the LAD after atherectomy in December 2016.  He remains on dual antiplatelet therapy and statin.  He had some chest burning with exertion that has been pretty stable currently.  He does not exert himself to a high degree in the 15 mg of Imdur has been sufficient.                       Review of Systems:   Review of Systems:  Skin:  Negative     Eyes:  Positive for visual blurring;glasses  ENT:  Positive for hearing loss (pt wears hearing aids)  Respiratory:  Negative    Cardiovascular:    Positive for;fatigue  Gastroenterology: Positive for heartburn  Genitourinary:  Negative    Musculoskeletal:  Positive for arthritis  Neurologic:  Negative    Psychiatric:  Positive for sleep disturbances (restless legs)  Heme/Lymph/Imm:  Negative    Endocrine:  Positive for thyroid disorder              Physical Exam:     Vitals: /60  Pulse 60  Ht 1.74 m (5' 8.5\")  Wt 86.5 kg (190 lb 9.6 oz)  BMI 28.56 kg/m2  Constitutional:  cooperative, alert and oriented, well developed, well " nourished, in no acute distress   Hard of hearing    Skin:  warm and dry to the touch, no apparent skin lesions or masses noted        Head:  normocephalic, no masses or lesions        Eyes:  pupils equal and round        ENT:  no pallor or cyanosis, dentition good        Neck:        no JVD    Chest:  clear to auscultation;normal symmetry        Cardiac: regular rhythm;normal S1 and S2;no murmurs, gallops or rubs detected                  Abdomen:      benign    Extremities and Back:  no edema        Neurological:  affect appropriate, oriented to time, person and place;no gross motor deficits                 Medications:     Current Outpatient Prescriptions   Medication Sig Dispense Refill     diazepam (VALIUM) 5 MG tablet TAKE ONE TABLET BY MOUTH EVERY 8 HOURS AS NEEDED FOR ANXIETY 90 tablet 3     isosorbide mononitrate (IMDUR) 30 MG 24 hr tablet Take 1 tablet (30 mg) by mouth daily In the morning (Patient taking differently: Take 15 mg by mouth daily In the morning) 30 tablet 1     ticagrelor (BRILINTA) 90 MG tablet Take 1 tablet (90 mg) by mouth every 12 hours 60 tablet 11     metoprolol (TOPROL-XL) 25 MG 24 hr tablet Take 1 tablet (25 mg) by mouth 2 times daily 60 tablet 11     lisinopril (PRINIVIL/ZESTRIL) 10 MG tablet Take 1 tablet (10 mg) by mouth daily 30 tablet 11     atorvastatin (LIPITOR) 20 MG tablet Take 1 tablet (20 mg) by mouth At Bedtime 30 tablet 11     DiphenhydrAMINE HCl, Sleep, (NIGHTTIME SLEEP AID) 50 MG CAPS Take 1 capsule by mouth as needed  30 capsule      polyethylene glycol (MIRALAX/GLYCOLAX) powder Take 17 g by mouth daily as needed for constipation       levothyroxine (SYNTHROID, LEVOTHROID) 75 MCG tablet Take 1 tablet (75 mcg) by mouth daily 90 tablet 3     prune juice LIQD Take 5 mLs by mouth daily        aspirin 81 MG tablet Take 81 mg by mouth daily                   Data:   All laboratory data reviewed  Results for orders placed or performed in visit on 03/09/17 (from the past 24  hour(s))   Lipid panel reflex to direct LDL   Result Value Ref Range    Cholesterol 115 <200 mg/dL    Triglycerides 92 <150 mg/dL    HDL Cholesterol 38 (L) >39 mg/dL    LDL Cholesterol Calculated 59 <100 mg/dL    Non HDL Cholesterol 77 <130 mg/dL       All laboratory data reviewed  Lab Results   Component Value Date    CHOL 115 03/09/2017     Lab Results   Component Value Date    HDL 38 03/09/2017     Lab Results   Component Value Date    LDL 59 03/09/2017    .0 07/25/2012     Lab Results   Component Value Date    TRIG 92 03/09/2017     Lab Results   Component Value Date    CHOLHDLRATIO 4.4 08/06/2014     TSH   Date Value Ref Range Status   06/13/2016 2.36 0.40 - 5.00 mU/L Final     Last Basic Metabolic Panel:  Lab Results   Component Value Date     12/24/2016      Lab Results   Component Value Date    POTASSIUM 4.2 12/24/2016     Lab Results   Component Value Date    CHLORIDE 101 12/24/2016     Lab Results   Component Value Date    OMAR 8.7 12/24/2016     Lab Results   Component Value Date    CO2 27 12/24/2016     Lab Results   Component Value Date    BUN 14 12/24/2016    BUN 13.3 08/25/2012     Lab Results   Component Value Date    CR 0.80 12/24/2016     Lab Results   Component Value Date    GLC 94 12/24/2016     Lab Results   Component Value Date    WBC 8.1 12/27/2016     Lab Results   Component Value Date    RBC 4.48 12/27/2016     Lab Results   Component Value Date    HGB 14.0 12/27/2016     Lab Results   Component Value Date    HCT 38.7 12/27/2016     Lab Results   Component Value Date    MCV 86 12/27/2016     Lab Results   Component Value Date    MCH 31.3 12/27/2016     Lab Results   Component Value Date    MCHC 36.2 12/27/2016     Lab Results   Component Value Date    RDW 13.2 12/27/2016     Lab Results   Component Value Date     12/27/2016     Thank you for allowing me to participate in the care of your patient.    Sincerely,     Ramirez Patel MD     [facility]  [facility]

## 2017-03-09 NOTE — PATIENT INSTRUCTIONS
Try a folded blanket underneath the legs when sleeping and see if that helps the restless legs. Please let me know if the chest burning gets worse in the future.    For now, please stay on the ISOSORBIDE and the BRILINTA.    If you notice that chest burning again, especially with exercise, I would like to know. We may adjust medications or send you for another angiogram. But if the discomfort comes on when you normally wouldn't get it, something may be different and we would like to check it out.    See you in 6 months or so.

## 2017-03-13 ENCOUNTER — TELEPHONE (OUTPATIENT)
Dept: CARDIOLOGY | Facility: CLINIC | Age: 82
End: 2017-03-13

## 2017-06-19 DIAGNOSIS — I25.118 CORONARY ARTERY DISEASE OF NATIVE ARTERY OF NATIVE HEART WITH STABLE ANGINA PECTORIS (H): ICD-10-CM

## 2017-06-19 DIAGNOSIS — I20.9 ISCHEMIC CHEST PAIN (H): ICD-10-CM

## 2017-06-19 RX ORDER — ISOSORBIDE MONONITRATE 30 MG/1
30 TABLET, EXTENDED RELEASE ORAL DAILY
Qty: 30 TABLET | Refills: 11 | Status: SHIPPED | OUTPATIENT
Start: 2017-06-19 | End: 2017-08-10

## 2017-06-29 DIAGNOSIS — I10 BENIGN ESSENTIAL HYPERTENSION: ICD-10-CM

## 2017-06-29 RX ORDER — LISINOPRIL 10 MG/1
10 TABLET ORAL DAILY
Qty: 90 TABLET | Refills: 0 | Status: SHIPPED | OUTPATIENT
Start: 2017-06-29 | End: 2017-08-10

## 2017-08-10 ENCOUNTER — OFFICE VISIT (OUTPATIENT)
Dept: CARDIOLOGY | Facility: CLINIC | Age: 82
End: 2017-08-10
Attending: INTERNAL MEDICINE
Payer: COMMERCIAL

## 2017-08-10 VITALS
HEART RATE: 64 BPM | WEIGHT: 190.9 LBS | SYSTOLIC BLOOD PRESSURE: 133 MMHG | HEIGHT: 69 IN | BODY MASS INDEX: 28.27 KG/M2 | DIASTOLIC BLOOD PRESSURE: 64 MMHG

## 2017-08-10 DIAGNOSIS — I25.84 CORONARY ARTERY DISEASE DUE TO CALCIFIED CORONARY LESION: Primary | ICD-10-CM

## 2017-08-10 DIAGNOSIS — E78.5 HYPERLIPIDEMIA LDL GOAL <130: Chronic | ICD-10-CM

## 2017-08-10 DIAGNOSIS — I25.10 CORONARY ARTERY DISEASE DUE TO CALCIFIED CORONARY LESION: Primary | ICD-10-CM

## 2017-08-10 DIAGNOSIS — I20.0 INTERMEDIATE CORONARY SYNDROME (H): ICD-10-CM

## 2017-08-10 DIAGNOSIS — I10 ESSENTIAL HYPERTENSION WITH GOAL BLOOD PRESSURE LESS THAN 140/90: Chronic | ICD-10-CM

## 2017-08-10 DIAGNOSIS — I10 BENIGN ESSENTIAL HYPERTENSION: ICD-10-CM

## 2017-08-10 DIAGNOSIS — I25.118 CORONARY ARTERY DISEASE OF NATIVE ARTERY OF NATIVE HEART WITH STABLE ANGINA PECTORIS (H): ICD-10-CM

## 2017-08-10 PROCEDURE — 99215 OFFICE O/P EST HI 40 MIN: CPT | Performed by: INTERNAL MEDICINE

## 2017-08-10 RX ORDER — LISINOPRIL 10 MG/1
10 TABLET ORAL DAILY
Qty: 90 TABLET | Refills: 3 | Status: SHIPPED | OUTPATIENT
Start: 2017-08-10 | End: 2018-08-21

## 2017-08-10 RX ORDER — ISOSORBIDE MONONITRATE 60 MG/1
60 TABLET, EXTENDED RELEASE ORAL DAILY
Qty: 90 TABLET | Refills: 3 | Status: SHIPPED | OUTPATIENT
Start: 2017-08-10 | End: 2018-02-28

## 2017-08-10 NOTE — LETTER
8/10/2017    Liberty Calhoun PA-C  [address]  [facility] [address]    RE: Korey Pearson       Dear Colleague,    I had the pleasure of seeing Koreyraymond Jerez Lili in the [facility].    Cardiology Progress Note          Assessment and Plan:     1. Increasing angina despite escalating nitrate dose    Patient has history of PCI to the LAD in December 2016 with occluded diagonal with collateralization.  We discussed that his increasing nitrate requirement is concerning for new obstructive blockage, but could be decreased collateral flow.  I recommended that he have definition of his coronary anatomy with cardiac catheterization.    The patient would like to wait one or two weeks to see if further increasing the nitrate will help his symptoms.  I discussed that this may be a band aid patch given the increase in symptoms recently and that we are likely to still need angiography or percutaneous revascularization in the future.  We compromised and he will schedule his angiogram for 1-2 weeks in the future, and come to the emergency department if abrupt escalation in symptoms or resting symptoms.      This note was transcribed using electronic voice recognition software and there may be typographical errors present.                Interval History:   The patient is a very pleasant 87 year old whom I've seen previously.  He had PCI to the LAD in December 2016 when he presented with chest burning.  He has totally occluded diagonal with collateral flow.  We have been escalating the isosorbide mononitrate dose.  Previously, he had stable angina with 15 mg, but recently it has broken through even 30 mg daily.  I can see on his face, he is concerned that there is further obstruction but he is reluctant to have another angiogram.  We discussed that with his escalating symptoms, I'm concerned for a new significant blockage.  He currently gets chest  "discomfort when he walks, especially after eating likely due to increased blood flow demands.  He also has stopped him from mowing the lawn and doing other exertion.  He does not have any rest pain.                       Review of Systems:   Review of Systems:  Skin:  Positive for     Eyes:  Positive for glasses  ENT:  Positive for hearing loss  Respiratory:  Negative    Cardiovascular:  Negative;chest pain;palpitations;syncope or near-syncope;cyanosis;edema;lightheadedness;dizziness Positive for;fatigue  Gastroenterology: Positive for abdominal pain  Genitourinary:  Negative    Musculoskeletal:  Positive for arthritis  Neurologic:  Negative    Psychiatric:  Positive for sleep disturbances;anxiety  Heme/Lymph/Imm:  Negative    Endocrine:  Positive for thyroid disorder              Physical Exam:     Vitals: /64 (BP Location: Right arm, Cuff Size: Adult Regular)  Pulse 64  Ht 1.74 m (5' 8.5\")  Wt 86.6 kg (190 lb 14.4 oz)  BMI 28.6 kg/m2  Constitutional:  cooperative, alert and oriented, well developed, well nourished, in no acute distress   Hard of hearing    Skin:  warm and dry to the touch, no apparent skin lesions or masses noted        Head:  normocephalic, no masses or lesions        Eyes:  pupils equal and round        ENT:  no pallor or cyanosis, dentition good        Neck:        no JVD    Chest:  clear to auscultation;normal symmetry        Cardiac: regular rhythm;normal S1 and S2;no murmurs, gallops or rubs detected                  Abdomen:      benign    Extremities and Back:  no edema;no deformities, clubbing, cyanosis, erythema observed        Neurological:  affect appropriate, oriented to time, person and place;no gross motor deficits                 Medications:     Current Outpatient Prescriptions   Medication Sig Dispense Refill     isosorbide mononitrate (IMDUR) 60 MG 24 hr tablet Take 1 tablet (60 mg) by mouth daily In the morning 90 tablet 3     lisinopril (PRINIVIL/ZESTRIL) 10 MG " tablet Take 1 tablet (10 mg) by mouth daily 90 tablet 3     diazepam (VALIUM) 5 MG tablet TAKE ONE TABLET BY MOUTH EVERY 8 HOURS AS NEEDED FOR ANXIETY 90 tablet 3     ticagrelor (BRILINTA) 90 MG tablet Take 1 tablet (90 mg) by mouth every 12 hours 60 tablet 11     metoprolol (TOPROL-XL) 25 MG 24 hr tablet Take 1 tablet (25 mg) by mouth 2 times daily 60 tablet 11     atorvastatin (LIPITOR) 20 MG tablet Take 1 tablet (20 mg) by mouth At Bedtime 30 tablet 11     DiphenhydrAMINE HCl, Sleep, (NIGHTTIME SLEEP AID) 50 MG CAPS Take 1 capsule by mouth as needed  30 capsule      polyethylene glycol (MIRALAX/GLYCOLAX) powder Take 17 g by mouth daily as needed for constipation       levothyroxine (SYNTHROID, LEVOTHROID) 75 MCG tablet Take 1 tablet (75 mcg) by mouth daily 90 tablet 3     prune juice LIQD Take 5 mLs by mouth daily        aspirin 81 MG tablet Take 81 mg by mouth daily        [DISCONTINUED] lisinopril (PRINIVIL/ZESTRIL) 10 MG tablet Take 1 tablet (10 mg) by mouth daily 90 tablet 0     [DISCONTINUED] isosorbide mononitrate (IMDUR) 30 MG 24 hr tablet Take 1 tablet (30 mg) by mouth daily In the morning 30 tablet 11                Data:   All laboratory data reviewed  No results found for this or any previous visit (from the past 24 hour(s)).    All laboratory data reviewed  Lab Results   Component Value Date    CHOL 115 03/09/2017     Lab Results   Component Value Date    HDL 38 03/09/2017     Lab Results   Component Value Date    LDL 59 03/09/2017     Lab Results   Component Value Date    TRIG 92 03/09/2017     Lab Results   Component Value Date    CHOLHDLRATIO 4.4 08/06/2014     TSH   Date Value Ref Range Status   06/13/2016 2.36 0.40 - 5.00 mU/L Final     Last Basic Metabolic Panel:  Lab Results   Component Value Date     12/24/2016      Lab Results   Component Value Date    POTASSIUM 4.2 12/24/2016     Lab Results   Component Value Date    CHLORIDE 101 12/24/2016     Lab Results   Component Value Date    OMAR  8.7 12/24/2016     Lab Results   Component Value Date    CO2 27 12/24/2016     Lab Results   Component Value Date    BUN 14 12/24/2016     Lab Results   Component Value Date    CR 0.80 12/24/2016     Lab Results   Component Value Date    GLC 94 12/24/2016     Lab Results   Component Value Date    WBC 8.1 12/27/2016     Lab Results   Component Value Date    RBC 4.48 12/27/2016     Lab Results   Component Value Date    HGB 14.0 12/27/2016     Lab Results   Component Value Date    HCT 38.7 12/27/2016     Lab Results   Component Value Date    MCV 86 12/27/2016     Lab Results   Component Value Date    MCH 31.3 12/27/2016     Lab Results   Component Value Date    MCHC 36.2 12/27/2016     Lab Results   Component Value Date    RDW 13.2 12/27/2016     Lab Results   Component Value Date     12/27/2016     Thank you for allowing me to participate in the care of your patient.    Sincerely,     Ramirez Patel MD     [facility]

## 2017-08-10 NOTE — PROGRESS NOTES
Cardiology Progress Note          Assessment and Plan:     1. Increasing angina despite escalating nitrate dose    Patient has history of PCI to the LAD in December 2016 with occluded diagonal with collateralization.  We discussed that his increasing nitrate requirement is concerning for new obstructive blockage, but could be decreased collateral flow.  I recommended that he have definition of his coronary anatomy with cardiac catheterization.    The patient would like to wait one or two weeks to see if further increasing the nitrate will help his symptoms.  I discussed that this may be a band aid patch given the increase in symptoms recently and that we are likely to still need angiography or percutaneous revascularization in the future.  We compromised and he will schedule his angiogram for 1-2 weeks in the future, and come to the emergency department if abrupt escalation in symptoms or resting symptoms.      This note was transcribed using electronic voice recognition software and there may be typographical errors present.                Interval History:   The patient is a very pleasant 87 year old whom I've seen previously.  He had PCI to the LAD in December 2016 when he presented with chest burning.  He has totally occluded diagonal with collateral flow.  We have been escalating the isosorbide mononitrate dose.  Previously, he had stable angina with 15 mg, but recently it has broken through even 30 mg daily.  I can see on his face, he is concerned that there is further obstruction but he is reluctant to have another angiogram.  We discussed that with his escalating symptoms, I'm concerned for a new significant blockage.  He currently gets chest discomfort when he walks, especially after eating likely due to increased blood flow demands.  He also has stopped him from mowing the lawn and doing other exertion.  He does not have any rest pain.                       Review of Systems:   Review of Systems:  Skin:   "Positive for     Eyes:  Positive for glasses  ENT:  Positive for hearing loss  Respiratory:  Negative    Cardiovascular:  Negative;chest pain;palpitations;syncope or near-syncope;cyanosis;edema;lightheadedness;dizziness Positive for;fatigue  Gastroenterology: Positive for abdominal pain  Genitourinary:  Negative    Musculoskeletal:  Positive for arthritis  Neurologic:  Negative    Psychiatric:  Positive for sleep disturbances;anxiety  Heme/Lymph/Imm:  Negative    Endocrine:  Positive for thyroid disorder              Physical Exam:     Vitals: /64 (BP Location: Right arm, Cuff Size: Adult Regular)  Pulse 64  Ht 1.74 m (5' 8.5\")  Wt 86.6 kg (190 lb 14.4 oz)  BMI 28.6 kg/m2  Constitutional:  cooperative, alert and oriented, well developed, well nourished, in no acute distress   Hard of hearing    Skin:  warm and dry to the touch, no apparent skin lesions or masses noted        Head:  normocephalic, no masses or lesions        Eyes:  pupils equal and round        ENT:  no pallor or cyanosis, dentition good        Neck:        no JVD    Chest:  clear to auscultation;normal symmetry        Cardiac: regular rhythm;normal S1 and S2;no murmurs, gallops or rubs detected                  Abdomen:      benign    Extremities and Back:  no edema;no deformities, clubbing, cyanosis, erythema observed        Neurological:  affect appropriate, oriented to time, person and place;no gross motor deficits                 Medications:     Current Outpatient Prescriptions   Medication Sig Dispense Refill     isosorbide mononitrate (IMDUR) 60 MG 24 hr tablet Take 1 tablet (60 mg) by mouth daily In the morning 90 tablet 3     lisinopril (PRINIVIL/ZESTRIL) 10 MG tablet Take 1 tablet (10 mg) by mouth daily 90 tablet 3     diazepam (VALIUM) 5 MG tablet TAKE ONE TABLET BY MOUTH EVERY 8 HOURS AS NEEDED FOR ANXIETY 90 tablet 3     ticagrelor (BRILINTA) 90 MG tablet Take 1 tablet (90 mg) by mouth every 12 hours 60 tablet 11     " metoprolol (TOPROL-XL) 25 MG 24 hr tablet Take 1 tablet (25 mg) by mouth 2 times daily 60 tablet 11     atorvastatin (LIPITOR) 20 MG tablet Take 1 tablet (20 mg) by mouth At Bedtime 30 tablet 11     DiphenhydrAMINE HCl, Sleep, (NIGHTTIME SLEEP AID) 50 MG CAPS Take 1 capsule by mouth as needed  30 capsule      polyethylene glycol (MIRALAX/GLYCOLAX) powder Take 17 g by mouth daily as needed for constipation       levothyroxine (SYNTHROID, LEVOTHROID) 75 MCG tablet Take 1 tablet (75 mcg) by mouth daily 90 tablet 3     prune juice LIQD Take 5 mLs by mouth daily        aspirin 81 MG tablet Take 81 mg by mouth daily        [DISCONTINUED] lisinopril (PRINIVIL/ZESTRIL) 10 MG tablet Take 1 tablet (10 mg) by mouth daily 90 tablet 0     [DISCONTINUED] isosorbide mononitrate (IMDUR) 30 MG 24 hr tablet Take 1 tablet (30 mg) by mouth daily In the morning 30 tablet 11                Data:   All laboratory data reviewed  No results found for this or any previous visit (from the past 24 hour(s)).    All laboratory data reviewed  Lab Results   Component Value Date    CHOL 115 03/09/2017     Lab Results   Component Value Date    HDL 38 03/09/2017     Lab Results   Component Value Date    LDL 59 03/09/2017     Lab Results   Component Value Date    TRIG 92 03/09/2017     Lab Results   Component Value Date    CHOLHDLRATIO 4.4 08/06/2014     TSH   Date Value Ref Range Status   06/13/2016 2.36 0.40 - 5.00 mU/L Final     Last Basic Metabolic Panel:  Lab Results   Component Value Date     12/24/2016      Lab Results   Component Value Date    POTASSIUM 4.2 12/24/2016     Lab Results   Component Value Date    CHLORIDE 101 12/24/2016     Lab Results   Component Value Date    OMAR 8.7 12/24/2016     Lab Results   Component Value Date    CO2 27 12/24/2016     Lab Results   Component Value Date    BUN 14 12/24/2016     Lab Results   Component Value Date    CR 0.80 12/24/2016     Lab Results   Component Value Date    GLC 94 12/24/2016     Lab  Results   Component Value Date    WBC 8.1 12/27/2016     Lab Results   Component Value Date    RBC 4.48 12/27/2016     Lab Results   Component Value Date    HGB 14.0 12/27/2016     Lab Results   Component Value Date    HCT 38.7 12/27/2016     Lab Results   Component Value Date    MCV 86 12/27/2016     Lab Results   Component Value Date    MCH 31.3 12/27/2016     Lab Results   Component Value Date    MCHC 36.2 12/27/2016     Lab Results   Component Value Date    RDW 13.2 12/27/2016     Lab Results   Component Value Date     12/27/2016

## 2017-08-10 NOTE — MR AVS SNAPSHOT
After Visit Summary   8/10/2017    Korey Pearson    MRN: [id number]           Patient Information     Date Of Birth          8/31/1929        Visit Information        Provider Department      8/10/2017 7:45 AM Ramirez Patel MD [facility]        Today's Diagnoses     Coronary artery disease due to calcified coronary lesion    -  1    Hyperlipidemia LDL goal <130 on a statin        Essential hypertension with goal blood pressure less than 140/90        Coronary artery disease of native artery of native heart with stable angina pectoris (H)        Benign essential hypertension        Intermediate coronary syndrome (H)           Follow-ups after your visit        Additional Services     Follow-Up with Cardiologist                 Future tests that were ordered for you today     Open Future Orders        Priority Expected Expires Ordered    Follow-Up with Cardiologist Routine 11/8/2017 8/10/2018 8/10/2017    Cardiac Cath: Coronary Angiography Adult Order Routine 8/17/2017 8/10/2018 8/10/2017            Who to contact     If you have questions or need follow up information about today's clinic visit or your schedule please contact [facility] directly at [phone].  Normal or non-critical lab and imaging results will be communicated to you by Direct Dermatologyhart, letter or phone within 4 business days after the clinic has received the results. If you do not hear from us within 7 days, please contact the clinic through [email or web address] or phone. If you have a critical or abnormal lab result, we will notify you by phone as soon as possible.  Submit refill requests through [employer] or call your pharmacy and they will forward the refill request to us. Please allow 3 business days for your refill to be completed.          Additional Information About Your Visit        Direct Dermatology[facility] Information     [employer] gives you secure access to your  "electronic health record. If you see a primary care provider, you can also send messages to your care team and make appointments. If you have questions, please call your primary care clinic.  If you do not have a primary care provider, please call [phone] and they will assist you.        Care EveryWhere ID     This is your Care EveryWhere ID. This could be used by other organizations to access your [facility] medical records  [id number]        Your Vitals Were     Pulse Height BMI (Body Mass Index)             64 1.74 m (5' 8.5\") 28.6 kg/m2          Blood Pressure from Last 3 Encounters:   08/10/17 133/64   03/09/17 120/60   02/03/17 100/60    Weight from Last 3 Encounters:   08/10/17 86.6 kg (190 lb 14.4 oz)   03/09/17 86.5 kg (190 lb 9.6 oz)   02/03/17 87.1 kg (192 lb)              We Performed the Following     Follow-Up with Cardiologist          Today's Medication Changes          These changes are accurate as of: 8/10/17  8:30 AM.  If you have any questions, ask your nurse or doctor.               These medicines have changed or have updated prescriptions.        Dose/Directions    isosorbide mononitrate 60 MG 24 hr tablet   Commonly known as:  IMDUR   This may have changed:    - medication strength  - how much to take   Used for:  Coronary artery disease of native artery of native heart with stable angina pectoris (H)   Changed by:  Ramirez Patel MD        Dose:  60 mg   Take 1 tablet (60 mg) by mouth daily In the morning   Quantity:  90 tablet   Refills:  3            Where to get your medicines      These medications were sent to [facility] [address] [place] [address] [address]  [address], [facility] [address]     Phone:  [phone]     isosorbide mononitrate 60 MG 24 hr tablet    lisinopril 10 MG tablet                Primary Care Provider Office Phone # Fax #    Libetry Calhoun PA-C [phone]       [address]  [facility] [address]        Equal Access to Services     ROBY VIERA : Hadii aicha katz janetzaki Cathyali, waaxda luqadaha, qaybta kaalmaitalia stanton. So [facility] [phone].    ATENCIÓN: Si habla español, tiene a albarran disposición servicios gratuitos de asistencia lingüística. Taraame al [phone].    We comply with applicable federal civil rights laws and [place] laws. We do not discriminate on the basis of race, color, national origin, age, disability sex, sexual orientation or gender identity.            Thank you!     Thank you for choosing [facility] [place]  for your care. Our goal is always to provide you with excellent care. Hearing back from our patients is one way we can continue to improve our services. Please take a few minutes to complete the written survey that you may receive in the mail after your visit with us. Thank you!             Your Updated Medication List - Protect others around you: Learn how to safely use, store and throw away your medicines at www.disposemymeds.org.          This list is accurate as of: 8/10/17  8:30 AM.  Always use your most recent med list.                   Brand Name Dispense Instructions for use Diagnosis    aspirin 81 MG tablet      Take 81 mg by mouth daily        atorvastatin 20 MG tablet    LIPITOR    30 tablet    Take 1 tablet (20 mg) by mouth At Bedtime    Unstable angina (H), Hyperlipidemia LDL goal <130       diazepam 5 MG tablet    VALIUM    90 tablet    TAKE ONE TABLET BY MOUTH EVERY 8 HOURS AS NEEDED FOR ANXIETY    Generalized anxiety disorder       isosorbide mononitrate 60 MG 24 hr tablet    IMDUR    90 tablet    Take 1 tablet (60 mg) by mouth daily In the morning    Coronary artery disease of native artery of native heart with stable angina pectoris (H)       levothyroxine 75 MCG tablet    SYNTHROID/LEVOTHROID    90 tablet    Take 1 tablet (75 mcg) by mouth daily    Acquired  hypothyroidism       lisinopril 10 MG tablet    PRINIVIL/ZESTRIL    90 tablet    Take 1 tablet (10 mg) by mouth daily    Benign essential hypertension       metoprolol 25 MG 24 hr tablet    TOPROL-XL    60 tablet    Take 1 tablet (25 mg) by mouth 2 times daily    Unstable angina (H)       NIGHTTIME SLEEP AID 50 MG Caps   Generic drug:  DiphenhydrAMINE HCl (Sleep)     30 capsule    Take 1 capsule by mouth as needed    Insomnia       polyethylene glycol powder    MIRALAX/GLYCOLAX     Take 17 g by mouth daily as needed for constipation        prune juice Liqd      Take 5 mLs by mouth daily        ticagrelor 90 MG tablet    BRILINTA    60 tablet    Take 1 tablet (90 mg) by mouth every 12 hours    Unstable angina (H)

## 2017-08-18 ENCOUNTER — TELEPHONE (OUTPATIENT)
Dept: CARDIOLOGY | Facility: CLINIC | Age: 82
End: 2017-08-18

## 2017-08-18 DIAGNOSIS — I25.10 CAD (CORONARY ARTERY DISEASE): Primary | ICD-10-CM

## 2017-08-18 RX ORDER — ASPIRIN 81 MG/1
81 TABLET ORAL DAILY
Status: CANCELLED | OUTPATIENT
Start: 2017-08-18

## 2017-08-18 RX ORDER — SODIUM CHLORIDE 9 MG/ML
INJECTION, SOLUTION INTRAVENOUS CONTINUOUS
Status: CANCELLED | OUTPATIENT
Start: 2017-08-18

## 2017-08-18 RX ORDER — LIDOCAINE 40 MG/G
CREAM TOPICAL
Status: CANCELLED | OUTPATIENT
Start: 2017-08-18

## 2017-08-18 RX ORDER — POTASSIUM CHLORIDE 1500 MG/1
20 TABLET, EXTENDED RELEASE ORAL
Status: CANCELLED | OUTPATIENT
Start: 2017-08-18

## 2017-08-18 NOTE — TELEPHONE ENCOUNTER
Called patient with Pre cath instructions:     Contrast allergy: no  Anticoagulation: no   Metformin: no  Oral DM meds: no  Insulin: no  Diuretic: no  Use of phosphodiesterase type 5 inhibitor: no  Aspirin: 81 mg day. Pt aware to take Sunday and Monday before coming.    Pt informed to be NPO at midnight  Renal issues no  Pt has transportation and 24 hours post procedure monitoring set up.   Pt aware of no driving for 24 hours post procedure.     Pt aware of arrival time and location. Pt verbalized understanding of instructions.

## 2017-08-18 NOTE — TELEPHONE ENCOUNTER
Called patient to markus pre-cath orders however he did not answer. Left a message to call team 4 with the direct number.

## 2017-08-21 ENCOUNTER — HOSPITAL ENCOUNTER (OUTPATIENT)
Facility: CLINIC | Age: 82
Discharge: HOME | End: 2017-08-21
Attending: INTERNAL MEDICINE | Admitting: INTERNAL MEDICINE
Payer: COMMERCIAL

## 2017-08-21 ENCOUNTER — APPOINTMENT (OUTPATIENT)
Dept: CARDIOLOGY | Facility: CLINIC | Age: 82
End: 2017-08-21
Attending: INTERNAL MEDICINE
Payer: COMMERCIAL

## 2017-08-21 VITALS
TEMPERATURE: 97.9 F | HEART RATE: 56 BPM | OXYGEN SATURATION: 97 % | DIASTOLIC BLOOD PRESSURE: 71 MMHG | SYSTOLIC BLOOD PRESSURE: 130 MMHG | RESPIRATION RATE: 18 BRPM

## 2017-08-21 DIAGNOSIS — E78.5 HYPERLIPIDEMIA LDL GOAL <130: Chronic | ICD-10-CM

## 2017-08-21 DIAGNOSIS — I25.84 CORONARY ARTERY DISEASE DUE TO CALCIFIED CORONARY LESION: ICD-10-CM

## 2017-08-21 DIAGNOSIS — I10 ESSENTIAL HYPERTENSION WITH GOAL BLOOD PRESSURE LESS THAN 140/90: Chronic | ICD-10-CM

## 2017-08-21 DIAGNOSIS — I20.0 INTERMEDIATE CORONARY SYNDROME (H): ICD-10-CM

## 2017-08-21 DIAGNOSIS — I25.118 CORONARY ARTERY DISEASE OF NATIVE ARTERY OF NATIVE HEART WITH STABLE ANGINA PECTORIS (H): ICD-10-CM

## 2017-08-21 DIAGNOSIS — Z98.61 POSTSURGICAL PERCUTANEOUS TRANSLUMINAL CORONARY ANGIOPLASTY STATUS: ICD-10-CM

## 2017-08-21 DIAGNOSIS — I10 BENIGN ESSENTIAL HYPERTENSION: ICD-10-CM

## 2017-08-21 DIAGNOSIS — I25.10 CORONARY ARTERY DISEASE DUE TO CALCIFIED CORONARY LESION: ICD-10-CM

## 2017-08-21 DIAGNOSIS — Z98.61 STATUS POST CORONARY ANGIOPLASTY: ICD-10-CM

## 2017-08-21 LAB
ANION GAP SERPL CALCULATED.3IONS-SCNC: 7 MMOL/L (ref 3–14)
APTT PPP: 34 SEC (ref 22–37)
BUN SERPL-MCNC: 13 MG/DL (ref 7–30)
CALCIUM SERPL-MCNC: 8.8 MG/DL (ref 8.5–10.1)
CHLORIDE SERPL-SCNC: 99 MMOL/L (ref 94–109)
CO2 SERPL-SCNC: 25 MMOL/L (ref 20–32)
CREAT SERPL-MCNC: 0.82 MG/DL (ref 0.66–1.25)
ERYTHROCYTE [DISTWIDTH] IN BLOOD BY AUTOMATED COUNT: 13.9 % (ref 10–15)
GFR SERPL CREATININE-BSD FRML MDRD: 89 ML/MIN/1.7M2
GLUCOSE SERPL-MCNC: 100 MG/DL (ref 70–99)
HCT VFR BLD AUTO: 39.3 % (ref 40–53)
HGB BLD-MCNC: 14.1 G/DL (ref 13.3–17.7)
INR PPP: 0.95 (ref 0.86–1.14)
KCT BLD-ACNC: 248 SEC (ref 105–167)
KCT BLD-ACNC: 291 SEC (ref 105–167)
MCH RBC QN AUTO: 31.1 PG (ref 26.5–33)
MCHC RBC AUTO-ENTMCNC: 35.9 G/DL (ref 31.5–36.5)
MCV RBC AUTO: 87 FL (ref 78–100)
PLATELET # BLD AUTO: 214 10E9/L (ref 150–450)
POTASSIUM SERPL-SCNC: 4.3 MMOL/L (ref 3.4–5.3)
RBC # BLD AUTO: 4.53 10E12/L (ref 4.4–5.9)
SODIUM SERPL-SCNC: 131 MMOL/L (ref 133–144)
WBC # BLD AUTO: 7.9 10E9/L (ref 4–11)

## 2017-08-21 PROCEDURE — 93005 ELECTROCARDIOGRAM TRACING: CPT

## 2017-08-21 PROCEDURE — 40000065 ZZH STATISTIC EKG NON-CHARGEABLE

## 2017-08-21 PROCEDURE — 25000128 H RX IP 250 OP 636: Performed by: INTERNAL MEDICINE

## 2017-08-21 PROCEDURE — 27210795 ZZH PAD DEFIB QUICK CR4

## 2017-08-21 PROCEDURE — 27210946 ZZH KIT HC TOTES DISP CR8

## 2017-08-21 PROCEDURE — 80048 BASIC METABOLIC PNL TOTAL CA: CPT | Performed by: INTERNAL MEDICINE

## 2017-08-21 PROCEDURE — B2111ZZ FLUOROSCOPY OF MULTIPLE CORONARY ARTERIES USING LOW OSMOLAR CONTRAST: ICD-10-PCS | Performed by: INTERNAL MEDICINE

## 2017-08-21 PROCEDURE — 85730 THROMBOPLASTIN TIME PARTIAL: CPT | Performed by: INTERNAL MEDICINE

## 2017-08-21 PROCEDURE — 93454 CORONARY ARTERY ANGIO S&I: CPT | Mod: 26 | Performed by: INTERNAL MEDICINE

## 2017-08-21 PROCEDURE — C1887 CATHETER, GUIDING: HCPCS

## 2017-08-21 PROCEDURE — C1725 CATH, TRANSLUMIN NON-LASER: HCPCS

## 2017-08-21 PROCEDURE — 27211089 ZZH KIT ACIST INJECTOR CR3

## 2017-08-21 PROCEDURE — 92924 PRQ TRLUML C ATHRC 1 ART&/BR: CPT | Mod: LD | Performed by: INTERNAL MEDICINE

## 2017-08-21 PROCEDURE — 93010 ELECTROCARDIOGRAM REPORT: CPT | Performed by: INTERNAL MEDICINE

## 2017-08-21 PROCEDURE — 40000852 ZZH STATISTIC HEART CATH LAB OR EP LAB

## 2017-08-21 PROCEDURE — 92920 PRQ TRLUML C ANGIOP 1ART&/BR: CPT | Mod: LD

## 2017-08-21 PROCEDURE — 27210759 ZZH DEVICE INFLATION CR6

## 2017-08-21 PROCEDURE — C1753 CATH, INTRAVAS ULTRASOUND: HCPCS

## 2017-08-21 PROCEDURE — 99153 MOD SED SAME PHYS/QHP EA: CPT

## 2017-08-21 PROCEDURE — 40000235 ZZH STATISTIC TELEMETRY

## 2017-08-21 PROCEDURE — C1885 CATH, TRANSLUMIN ANGIO LASER: HCPCS

## 2017-08-21 PROCEDURE — 99152 MOD SED SAME PHYS/QHP 5/>YRS: CPT | Performed by: INTERNAL MEDICINE

## 2017-08-21 PROCEDURE — 85347 COAGULATION TIME ACTIVATED: CPT

## 2017-08-21 PROCEDURE — 92978 ENDOLUMINL IVUS OCT C 1ST: CPT

## 2017-08-21 PROCEDURE — C1760 CLOSURE DEV, VASC: HCPCS

## 2017-08-21 PROCEDURE — C1769 GUIDE WIRE: HCPCS

## 2017-08-21 PROCEDURE — 25000125 ZZHC RX 250: Performed by: INTERNAL MEDICINE

## 2017-08-21 PROCEDURE — 93454 CORONARY ARTERY ANGIO S&I: CPT | Mod: XU

## 2017-08-21 PROCEDURE — 02703ZZ DILATION OF CORONARY ARTERY, ONE ARTERY, PERCUTANEOUS APPROACH: ICD-10-PCS | Performed by: INTERNAL MEDICINE

## 2017-08-21 PROCEDURE — 92978 ENDOLUMINL IVUS OCT C 1ST: CPT | Mod: 26 | Performed by: INTERNAL MEDICINE

## 2017-08-21 PROCEDURE — 85610 PROTHROMBIN TIME: CPT | Performed by: INTERNAL MEDICINE

## 2017-08-21 PROCEDURE — 27210787 ZZH MANIFOLD CR2

## 2017-08-21 PROCEDURE — 92924 PRQ TRLUML C ATHRC 1 ART&/BR: CPT

## 2017-08-21 PROCEDURE — 85027 COMPLETE CBC AUTOMATED: CPT | Performed by: INTERNAL MEDICINE

## 2017-08-21 PROCEDURE — B240ZZ3 ULTRASONOGRAPHY OF SINGLE CORONARY ARTERY, INTRAVASCULAR: ICD-10-PCS | Performed by: INTERNAL MEDICINE

## 2017-08-21 PROCEDURE — 99152 MOD SED SAME PHYS/QHP 5/>YRS: CPT

## 2017-08-21 RX ORDER — LIDOCAINE HYDROCHLORIDE 10 MG/ML
1-10 INJECTION, SOLUTION EPIDURAL; INFILTRATION; INTRACAUDAL; PERINEURAL
Status: COMPLETED | OUTPATIENT
Start: 2017-08-21 | End: 2017-08-21

## 2017-08-21 RX ORDER — NICARDIPINE HYDROCHLORIDE 2.5 MG/ML
100 INJECTION INTRAVENOUS
Status: DISCONTINUED | OUTPATIENT
Start: 2017-08-21 | End: 2017-08-21 | Stop reason: HOSPADM

## 2017-08-21 RX ORDER — ONDANSETRON 2 MG/ML
4 INJECTION INTRAMUSCULAR; INTRAVENOUS EVERY 4 HOURS PRN
Status: DISCONTINUED | OUTPATIENT
Start: 2017-08-21 | End: 2017-08-21 | Stop reason: HOSPADM

## 2017-08-21 RX ORDER — NITROGLYCERIN 5 MG/ML
100-200 VIAL (ML) INTRAVENOUS
Status: DISCONTINUED | OUTPATIENT
Start: 2017-08-21 | End: 2017-08-21 | Stop reason: HOSPADM

## 2017-08-21 RX ORDER — NITROGLYCERIN 0.4 MG/1
0.4 TABLET SUBLINGUAL EVERY 5 MIN PRN
Status: DISCONTINUED | OUTPATIENT
Start: 2017-08-21 | End: 2017-08-21 | Stop reason: HOSPADM

## 2017-08-21 RX ORDER — PROTAMINE SULFATE 10 MG/ML
25-100 INJECTION, SOLUTION INTRAVENOUS EVERY 5 MIN PRN
Status: DISCONTINUED | OUTPATIENT
Start: 2017-08-21 | End: 2017-08-21 | Stop reason: HOSPADM

## 2017-08-21 RX ORDER — NALOXONE HYDROCHLORIDE 0.4 MG/ML
0.4 INJECTION, SOLUTION INTRAMUSCULAR; INTRAVENOUS; SUBCUTANEOUS EVERY 5 MIN PRN
Status: DISCONTINUED | OUTPATIENT
Start: 2017-08-21 | End: 2017-08-21 | Stop reason: HOSPADM

## 2017-08-21 RX ORDER — DIPHENHYDRAMINE HYDROCHLORIDE 50 MG/ML
25-50 INJECTION INTRAMUSCULAR; INTRAVENOUS
Status: DISCONTINUED | OUTPATIENT
Start: 2017-08-21 | End: 2017-08-21 | Stop reason: HOSPADM

## 2017-08-21 RX ORDER — SODIUM CHLORIDE 9 MG/ML
INJECTION, SOLUTION INTRAVENOUS CONTINUOUS
Status: DISCONTINUED | OUTPATIENT
Start: 2017-08-21 | End: 2017-08-21 | Stop reason: HOSPADM

## 2017-08-21 RX ORDER — POTASSIUM CHLORIDE 7.45 MG/ML
10 INJECTION INTRAVENOUS
Status: DISCONTINUED | OUTPATIENT
Start: 2017-08-21 | End: 2017-08-21 | Stop reason: HOSPADM

## 2017-08-21 RX ORDER — DOBUTAMINE HYDROCHLORIDE 200 MG/100ML
2-20 INJECTION INTRAVENOUS CONTINUOUS PRN
Status: DISCONTINUED | OUTPATIENT
Start: 2017-08-21 | End: 2017-08-21 | Stop reason: HOSPADM

## 2017-08-21 RX ORDER — BUPIVACAINE HYDROCHLORIDE 2.5 MG/ML
1-10 INJECTION, SOLUTION EPIDURAL; INFILTRATION; INTRACAUDAL
Status: DISCONTINUED | OUTPATIENT
Start: 2017-08-21 | End: 2017-08-21 | Stop reason: HOSPADM

## 2017-08-21 RX ORDER — ACETAMINOPHEN 325 MG/1
325-650 TABLET ORAL EVERY 4 HOURS PRN
Status: DISCONTINUED | OUTPATIENT
Start: 2017-08-21 | End: 2017-08-21 | Stop reason: HOSPADM

## 2017-08-21 RX ORDER — METHYLPREDNISOLONE SODIUM SUCCINATE 125 MG/2ML
125 INJECTION, POWDER, LYOPHILIZED, FOR SOLUTION INTRAMUSCULAR; INTRAVENOUS
Status: DISCONTINUED | OUTPATIENT
Start: 2017-08-21 | End: 2017-08-21 | Stop reason: HOSPADM

## 2017-08-21 RX ORDER — FUROSEMIDE 10 MG/ML
20-100 INJECTION INTRAMUSCULAR; INTRAVENOUS
Status: DISCONTINUED | OUTPATIENT
Start: 2017-08-21 | End: 2017-08-21 | Stop reason: HOSPADM

## 2017-08-21 RX ORDER — CLOPIDOGREL BISULFATE 75 MG/1
75 TABLET ORAL
Status: DISCONTINUED | OUTPATIENT
Start: 2017-08-21 | End: 2017-08-21 | Stop reason: HOSPADM

## 2017-08-21 RX ORDER — NALOXONE HYDROCHLORIDE 0.4 MG/ML
.2-.4 INJECTION, SOLUTION INTRAMUSCULAR; INTRAVENOUS; SUBCUTANEOUS
Status: DISCONTINUED | OUTPATIENT
Start: 2017-08-21 | End: 2017-08-21 | Stop reason: HOSPADM

## 2017-08-21 RX ORDER — FENTANYL CITRATE 50 UG/ML
25-50 INJECTION, SOLUTION INTRAMUSCULAR; INTRAVENOUS
Status: DISCONTINUED | OUTPATIENT
Start: 2017-08-21 | End: 2017-08-21 | Stop reason: HOSPADM

## 2017-08-21 RX ORDER — CLOPIDOGREL BISULFATE 75 MG/1
300-600 TABLET ORAL
Status: DISCONTINUED | OUTPATIENT
Start: 2017-08-21 | End: 2017-08-21 | Stop reason: HOSPADM

## 2017-08-21 RX ORDER — HYDRALAZINE HYDROCHLORIDE 20 MG/ML
10-20 INJECTION INTRAMUSCULAR; INTRAVENOUS
Status: DISCONTINUED | OUTPATIENT
Start: 2017-08-21 | End: 2017-08-21 | Stop reason: HOSPADM

## 2017-08-21 RX ORDER — LIDOCAINE 40 MG/G
CREAM TOPICAL
Status: DISCONTINUED | OUTPATIENT
Start: 2017-08-21 | End: 2017-08-21 | Stop reason: HOSPADM

## 2017-08-21 RX ORDER — HEPARIN SODIUM 1000 [USP'U]/ML
1000-10000 INJECTION, SOLUTION INTRAVENOUS; SUBCUTANEOUS EVERY 5 MIN PRN
Status: DISCONTINUED | OUTPATIENT
Start: 2017-08-21 | End: 2017-08-21 | Stop reason: HOSPADM

## 2017-08-21 RX ORDER — ASPIRIN 81 MG/1
81 TABLET ORAL DAILY
Status: DISCONTINUED | OUTPATIENT
Start: 2017-08-21 | End: 2017-08-21 | Stop reason: HOSPADM

## 2017-08-21 RX ORDER — LORAZEPAM 2 MG/ML
.5-2 INJECTION INTRAMUSCULAR EVERY 4 HOURS PRN
Status: DISCONTINUED | OUTPATIENT
Start: 2017-08-21 | End: 2017-08-21 | Stop reason: HOSPADM

## 2017-08-21 RX ORDER — VERAPAMIL HYDROCHLORIDE 2.5 MG/ML
1-2.5 INJECTION, SOLUTION INTRAVENOUS
Status: DISCONTINUED | OUTPATIENT
Start: 2017-08-21 | End: 2017-08-21 | Stop reason: HOSPADM

## 2017-08-21 RX ORDER — PRASUGREL 10 MG/1
10-60 TABLET, FILM COATED ORAL
Status: DISCONTINUED | OUTPATIENT
Start: 2017-08-21 | End: 2017-08-21 | Stop reason: HOSPADM

## 2017-08-21 RX ORDER — IOPAMIDOL 755 MG/ML
195 INJECTION, SOLUTION INTRAVASCULAR ONCE
Status: COMPLETED | OUTPATIENT
Start: 2017-08-21 | End: 2017-08-21

## 2017-08-21 RX ORDER — ASPIRIN 325 MG
325 TABLET ORAL
Status: DISCONTINUED | OUTPATIENT
Start: 2017-08-21 | End: 2017-08-21 | Stop reason: HOSPADM

## 2017-08-21 RX ORDER — ATROPINE SULFATE 0.1 MG/ML
0.5 INJECTION INTRAVENOUS EVERY 5 MIN PRN
Status: DISCONTINUED | OUTPATIENT
Start: 2017-08-21 | End: 2017-08-21 | Stop reason: HOSPADM

## 2017-08-21 RX ORDER — NALOXONE HYDROCHLORIDE 0.4 MG/ML
.1-.4 INJECTION, SOLUTION INTRAMUSCULAR; INTRAVENOUS; SUBCUTANEOUS
Status: DISCONTINUED | OUTPATIENT
Start: 2017-08-21 | End: 2017-08-21 | Stop reason: HOSPADM

## 2017-08-21 RX ORDER — PROMETHAZINE HYDROCHLORIDE 25 MG/ML
6.25-25 INJECTION, SOLUTION INTRAMUSCULAR; INTRAVENOUS EVERY 4 HOURS PRN
Status: DISCONTINUED | OUTPATIENT
Start: 2017-08-21 | End: 2017-08-21 | Stop reason: HOSPADM

## 2017-08-21 RX ORDER — LIDOCAINE HYDROCHLORIDE 10 MG/ML
30 INJECTION, SOLUTION EPIDURAL; INFILTRATION; INTRACAUDAL; PERINEURAL
Status: DISCONTINUED | OUTPATIENT
Start: 2017-08-21 | End: 2017-08-21 | Stop reason: HOSPADM

## 2017-08-21 RX ORDER — MORPHINE SULFATE 2 MG/ML
1-2 INJECTION, SOLUTION INTRAMUSCULAR; INTRAVENOUS EVERY 5 MIN PRN
Status: DISCONTINUED | OUTPATIENT
Start: 2017-08-21 | End: 2017-08-21 | Stop reason: HOSPADM

## 2017-08-21 RX ORDER — ATROPINE SULFATE 0.1 MG/ML
.5-1 INJECTION INTRAVENOUS
Status: DISCONTINUED | OUTPATIENT
Start: 2017-08-21 | End: 2017-08-21 | Stop reason: HOSPADM

## 2017-08-21 RX ORDER — NITROGLYCERIN 20 MG/100ML
.07-2 INJECTION INTRAVENOUS CONTINUOUS PRN
Status: DISCONTINUED | OUTPATIENT
Start: 2017-08-21 | End: 2017-08-21 | Stop reason: HOSPADM

## 2017-08-21 RX ORDER — POTASSIUM CHLORIDE 1500 MG/1
20 TABLET, EXTENDED RELEASE ORAL
Status: DISCONTINUED | OUTPATIENT
Start: 2017-08-21 | End: 2017-08-21 | Stop reason: HOSPADM

## 2017-08-21 RX ORDER — METOPROLOL TARTRATE 1 MG/ML
5 INJECTION, SOLUTION INTRAVENOUS EVERY 5 MIN PRN
Status: DISCONTINUED | OUTPATIENT
Start: 2017-08-21 | End: 2017-08-21 | Stop reason: HOSPADM

## 2017-08-21 RX ORDER — NIFEDIPINE 10 MG/1
10 CAPSULE ORAL
Status: DISCONTINUED | OUTPATIENT
Start: 2017-08-21 | End: 2017-08-21 | Stop reason: HOSPADM

## 2017-08-21 RX ORDER — PROTAMINE SULFATE 10 MG/ML
1-5 INJECTION, SOLUTION INTRAVENOUS
Status: DISCONTINUED | OUTPATIENT
Start: 2017-08-21 | End: 2017-08-21 | Stop reason: HOSPADM

## 2017-08-21 RX ORDER — DOPAMINE HYDROCHLORIDE 160 MG/100ML
2-20 INJECTION, SOLUTION INTRAVENOUS CONTINUOUS PRN
Status: DISCONTINUED | OUTPATIENT
Start: 2017-08-21 | End: 2017-08-21 | Stop reason: HOSPADM

## 2017-08-21 RX ORDER — SODIUM NITROPRUSSIDE 25 MG/ML
100-200 INJECTION INTRAVENOUS
Status: DISCONTINUED | OUTPATIENT
Start: 2017-08-21 | End: 2017-08-21 | Stop reason: HOSPADM

## 2017-08-21 RX ORDER — ENALAPRILAT 1.25 MG/ML
1.25-2.5 INJECTION INTRAVENOUS
Status: DISCONTINUED | OUTPATIENT
Start: 2017-08-21 | End: 2017-08-21 | Stop reason: HOSPADM

## 2017-08-21 RX ORDER — EPTIFIBATIDE 2 MG/ML
180 INJECTION, SOLUTION INTRAVENOUS EVERY 10 MIN PRN
Status: DISCONTINUED | OUTPATIENT
Start: 2017-08-21 | End: 2017-08-21 | Stop reason: HOSPADM

## 2017-08-21 RX ORDER — NITROGLYCERIN 5 MG/ML
100-500 VIAL (ML) INTRAVENOUS
Status: DISCONTINUED | OUTPATIENT
Start: 2017-08-21 | End: 2017-08-21 | Stop reason: HOSPADM

## 2017-08-21 RX ORDER — HYDROCODONE BITARTRATE AND ACETAMINOPHEN 5; 325 MG/1; MG/1
1-2 TABLET ORAL EVERY 4 HOURS PRN
Status: DISCONTINUED | OUTPATIENT
Start: 2017-08-21 | End: 2017-08-21 | Stop reason: HOSPADM

## 2017-08-21 RX ORDER — FLUMAZENIL 0.1 MG/ML
0.2 INJECTION, SOLUTION INTRAVENOUS
Status: DISCONTINUED | OUTPATIENT
Start: 2017-08-21 | End: 2017-08-21 | Stop reason: HOSPADM

## 2017-08-21 RX ORDER — POTASSIUM CHLORIDE 29.8 MG/ML
20 INJECTION INTRAVENOUS
Status: DISCONTINUED | OUTPATIENT
Start: 2017-08-21 | End: 2017-08-21 | Stop reason: HOSPADM

## 2017-08-21 RX ORDER — ADENOSINE 3 MG/ML
12-12000 INJECTION, SOLUTION INTRAVENOUS
Status: DISCONTINUED | OUTPATIENT
Start: 2017-08-21 | End: 2017-08-21 | Stop reason: HOSPADM

## 2017-08-21 RX ORDER — DEXTROSE MONOHYDRATE 25 G/50ML
12.5-5 INJECTION, SOLUTION INTRAVENOUS EVERY 30 MIN PRN
Status: DISCONTINUED | OUTPATIENT
Start: 2017-08-21 | End: 2017-08-21 | Stop reason: HOSPADM

## 2017-08-21 RX ORDER — ASPIRIN 81 MG/1
81-324 TABLET, CHEWABLE ORAL
Status: DISCONTINUED | OUTPATIENT
Start: 2017-08-21 | End: 2017-08-21 | Stop reason: HOSPADM

## 2017-08-21 RX ORDER — PHENYLEPHRINE HCL IN 0.9% NACL 1 MG/10 ML
20-100 SYRINGE (ML) INTRAVENOUS
Status: DISCONTINUED | OUTPATIENT
Start: 2017-08-21 | End: 2017-08-21 | Stop reason: HOSPADM

## 2017-08-21 RX ADMIN — NITROGLYCERIN 150 MCG: 5 INJECTION, SOLUTION INTRAVENOUS at 12:45

## 2017-08-21 RX ADMIN — FENTANYL CITRATE 175 MCG: 50 INJECTION, SOLUTION INTRAMUSCULAR; INTRAVENOUS at 12:49

## 2017-08-21 RX ADMIN — SODIUM CHLORIDE: 9 INJECTION, SOLUTION INTRAVENOUS at 08:18

## 2017-08-21 RX ADMIN — Medication 7700 UNITS: at 12:13

## 2017-08-21 RX ADMIN — MIDAZOLAM HYDROCHLORIDE 3.5 MG: 1 INJECTION, SOLUTION INTRAMUSCULAR; INTRAVENOUS at 12:48

## 2017-08-21 RX ADMIN — IOPAMIDOL 195 ML: 755 INJECTION, SOLUTION INTRAVASCULAR at 13:15

## 2017-08-21 RX ADMIN — NITROGLYCERIN 250 MCG: 5 INJECTION, SOLUTION INTRAVENOUS at 12:14

## 2017-08-21 RX ADMIN — LIDOCAINE HYDROCHLORIDE 80 MG: 10 INJECTION, SOLUTION EPIDURAL; INFILTRATION; INTRACAUDAL; PERINEURAL at 12:02

## 2017-08-21 RX ADMIN — NITROGLYCERIN 100 MCG: 5 INJECTION, SOLUTION INTRAVENOUS at 12:16

## 2017-08-21 NOTE — DISCHARGE INSTRUCTIONS
Cardiac Angioplasty Discharge Instructions - Femoral    After you go home:      Have an adult stay with you until tomorrow.    Drink extra fluids for 2 days.    You may resume your normal diet.    No smoking       For 24 hours - due to the sedation you received:    Relax and take it easy.    Do NOT make any important or legal decisions.    Do NOT drive or operate machines at home or at work.    Do NOT drink alcohol.    Care of Groin Puncture Site:      For the first 24 hrs - check the puncture site every 1-2 hours while awake.    For 2 days, when you cough, sneeze, laugh or move your bowels, hold your hand over the puncture site and press firmly.    Remove the bandaid after 24 hours. If there is minor oozing, apply another bandaid and remove it after 12 hours.    It is normal to have a small bruise or pea size lump at the site.    You may shower tomorrow. Do NOT take a bath, or use a hot tub or pool for at least 3 days. Do NOT scrub the site. Do not use lotion or powder near the puncture site.     Activity:            For 2 days:    No stooping or squatting    Do NOT do any heavy activity such as exercise, lifting, or straining.     No housework, yard work or any activity that make you sweat    Do NOT lift more than 10 pounds    Bleeding:      If you start bleeding from the site in your groin, lie down flat and press firmly on/above the site for 10 minutes.     Once bleeding stops, lay flat for 2 hours.     Call [facility] as soon as you can.       Call 911 right away if you have heavy bleeding or bleeding that does not stop.      Medicines:      If you are taking antiplatelet medications such as Plavix, Brilinta or Effient, do not stop taking it until you talk to your cardiologist.        Take your medications, including blood thinners, unless your provider tells you not to.     If you have stopped any medicines, check with your provider about when to restart them.    Follow Up Appointments:      Follow up with [facility]  Heart Nurse Practitioner at [facility] of patient preference in 7-10 days.    Cardiac Rehab will contact you for follow up care.    Call the clinic if:      You have increased pain or a large or growing hard lump around the site.    The site is red, swollen, hot or tender.    Blood or fluid is draining from the site.    You have chills or a fever greater than 101 F (38 C).    Your leg turns feels numb, cool or changes color.    You have hives, a rash or unusual itching.    New pain in the back or belly that you cannot control with Tylenol.    Any questions or concerns.          [facility] [place]:    [phone] [facility] (7 days a week)

## 2017-08-21 NOTE — PROGRESS NOTES
0843 Pt denies pain. Wife here with him,  and to stay with him after discharge. Explained procedure and discussed bedrest. Reviewed contrast discharge instruction with pt and wife. Pt took his aspirin this am. Pt cut his imdur 60 mg in half, said the full dose was making him feel dizzy and terrible, so has been cutting it in half and taking 30mg for about 4 days. The increased dose of 60 mg did not help or change his chest pain per pt. Pt is on brillinta.  0904 labs good. Pt quit smoking long ago. Pt has UAD book at home. Pt given mediteranean diet sheet.   1435 VSS. Right groin site soft, no hematoma. At 1415 small spot of blood on angioseal drsg. Pressure held to site 5-10 min. Site marked. Reviewed and given contrast discharge instruction, angioseal brochure, and avs discharge instructions. Hands on with pt and wife on checking groin and how and where to hold pressure. Copies to pt and wife. indicates he understands. Pt has had meal and water.  1539 VSS. Denies pain. Marked blood spot on drsg unchanged. Up to walk at 1520, tolerated well, no dizziness. Voided in restroom. Walked king. Right groin site soft, no hematoma and marked blood spot unchanged after walking as well. Iv d/c'd. Discharged to wife and dgtr via wheel chair.

## 2017-08-21 NOTE — PLAN OF CARE
Pt returned from procedure, resting comfortably with head and RLE flat. R groin site soft, intact with angioseal, dry gauze and tegaderm. Pt reports baseline neuropathy in BLE, skin cool, pulses present. Tolerating juice. Family at bedside.

## 2017-08-21 NOTE — IP AVS SNAPSHOT
MRN:[id number]                      After Visit Summary   8/21/2017    Korey Pearson    MRN: [id number]           Visit Information        Department      8/21/2017  7:23 AM [facility]          Review of your medicines      CONTINUE these medicines which have NOT CHANGED        Dose / Directions    aspirin 81 MG tablet        Dose:  81 mg   Take 81 mg by mouth daily   Refills:  0       atorvastatin 20 MG tablet   Commonly known as:  LIPITOR   Used for:  Unstable angina (H), Hyperlipidemia LDL goal <130        Dose:  20 mg   Take 1 tablet (20 mg) by mouth At Bedtime   Quantity:  30 tablet   Refills:  11       diazepam 5 MG tablet   Commonly known as:  VALIUM   Used for:  Generalized anxiety disorder        TAKE ONE TABLET BY MOUTH EVERY 8 HOURS AS NEEDED FOR ANXIETY   Quantity:  90 tablet   Refills:  3       isosorbide mononitrate 60 MG 24 hr tablet   Commonly known as:  IMDUR   Used for:  Coronary artery disease of native artery of native heart with stable angina pectoris (H)        Dose:  60 mg   Take 1 tablet (60 mg) by mouth daily In the morning   Quantity:  90 tablet   Refills:  3       levothyroxine 75 MCG tablet   Commonly known as:  SYNTHROID/LEVOTHROID   Used for:  Acquired hypothyroidism        Dose:  75 mcg   Take 1 tablet (75 mcg) by mouth daily   Quantity:  90 tablet   Refills:  3       lisinopril 10 MG tablet   Commonly known as:  PRINIVIL/ZESTRIL   Used for:  Benign essential hypertension        Dose:  10 mg   Take 1 tablet (10 mg) by mouth daily   Quantity:  90 tablet   Refills:  3       metoprolol 25 MG 24 hr tablet   Commonly known as:  TOPROL-XL   Used for:  Unstable angina (H)        Dose:  25 mg   Take 1 tablet (25 mg) by mouth 2 times daily   Quantity:  60 tablet   Refills:  11       NIGHTTIME SLEEP AID 50 MG Caps   Used for:  Insomnia   Generic drug:  DiphenhydrAMINE HCl (Sleep)        Dose:  1 capsule   Take 1 capsule by mouth as needed    Quantity:  30 capsule   Refills:  0       polyethylene glycol powder   Commonly known as:  MIRALAX/GLYCOLAX        Dose:  17 g   Take 17 g by mouth daily as needed for constipation   Refills:  0       prune juice Liqd        Dose:  5 mL   Take 5 mLs by mouth daily   Refills:  0       ticagrelor 90 MG tablet   Commonly known as:  BRILINTA   Used for:  Unstable angina (H)        Dose:  90 mg   Take 1 tablet (90 mg) by mouth every 12 hours   Quantity:  60 tablet   Refills:  11                Protect others around you: Learn how to safely use, store and throw away your medicines at www.disposemymeds.org.         Follow-ups after your visit        Additional Services     CARDIAC REHAB REFERRAL       Please be aware that coverage of these services is subject to the terms and limitations of your health insurance plan.  Call member services at your health plan with any benefit or coverage questions.     Order is sent electronically to central rehab scheduling. Call [phone] if you haven't been contacted regarding these appointments within 2 business days of discharge.                  Your next 10 appointments already scheduled     Aug 28, 2017  8:10 AM CDT   Return Visit with RADAMES Burleson CNP   [facility] [place] ([facility])    [address] [place] [id number] [phone]              Future tests that were ordered for you     Echocardiogram       Administration of IV contrast will be tailored to this examination per the appropriate written protocol listed in the Echocardiography department Protocol Book, or by the supervising Cardiologist. This may result in an order change.    Use of contrast is at the discretion of the supervising Cardiologist.                   Care Instructions        After Care Instructions     Discharge Instructions - IF on Metformin (Glucophage or Glucovance) or Metformin containing medications       IF on  Metformin (Glucophage or Glucovance) or Metformin containing medications , schedule a Basic Metabolic Panel at [facility] or Primary Clinic in 48 - 72 hours post procedure and PRIOR TO resuming the Metformin or Metformin containing medications.  Hold Metformin (Glucophage or Glucovance) or Metformin containing medications until after the Basic Metabolic Panel on the 2nd or 3rd day following the procedure.  May resume after blood draw is complete.                  Further instructions from your care team       Cardiac Angioplasty Discharge Instructions - Femoral    After you go home:      Have an adult stay with you until tomorrow.    Drink extra fluids for 2 days.    You may resume your normal diet.    No smoking       For 24 hours - due to the sedation you received:    Relax and take it easy.    Do NOT make any important or legal decisions.    Do NOT drive or operate machines at home or at work.    Do NOT drink alcohol.    Care of Groin Puncture Site:      For the first 24 hrs - check the puncture site every 1-2 hours while awake.    For 2 days, when you cough, sneeze, laugh or move your bowels, hold your hand over the puncture site and press firmly.    Remove the bandaid after 24 hours. If there is minor oozing, apply another bandaid and remove it after 12 hours.    It is normal to have a small bruise or pea size lump at the site.    You may shower tomorrow. Do NOT take a bath, or use a hot tub or pool for at least 3 days. Do NOT scrub the site. Do not use lotion or powder near the puncture site.     Activity:            For 2 days:    No stooping or squatting    Do NOT do any heavy activity such as exercise, lifting, or straining.     No housework, yard work or any activity that make you sweat    Do NOT lift more than 10 pounds    Bleeding:      If you start bleeding from the site in your groin, lie down flat and press firmly on/above the site for 10 minutes.     Once bleeding stops, lay flat for 2 hours.     Call  [facility] as soon as you can.       Call 911 right away if you have heavy bleeding or bleeding that does not stop.      Medicines:      If you are taking antiplatelet medications such as Plavix, Brilinta or Effient, do not stop taking it until you talk to your cardiologist.        Take your medications, including blood thinners, unless your provider tells you not to.     If you have stopped any medicines, check with your provider about when to restart them.    Follow Up Appointments:      Follow up with [facility] Nurse Practitioner at [facility] of patient preference in 7-10 days.    Cardiac Rehab will contact you for follow up care.    Call the clinic if:      You have increased pain or a large or growing hard lump around the site.    The site is red, swollen, hot or tender.    Blood or fluid is draining from the site.    You have chills or a fever greater than 101 F (38 C).    Your leg turns feels numb, cool or changes color.    You have hives, a rash or unusual itching.    New pain in the back or belly that you cannot control with Tylenol.    Any questions or concerns.          [facility] [place]:    [phone] [facility] (7 days a week)         Additional Information About Your Visit        [employer] Information     [employer] gives you secure access to your electronic health record. If you see a primary care provider, you can also send messages to your care team and make appointments. If you have questions, please call your primary care clinic.  If you do not have a primary care provider, please call [phone] and they will assist you.        Care EveryWhere ID     This is your Care EveryWhere ID. This could be used by other organizations to access your [facility] medical records  [id number]        Your Vitals Were     Blood Pressure Pulse Temperature Respirations Pulse Oximetry       109/64 56 97.9  F (36.6  C) (Oral) 16 98%        Primary Care Provider Office Phone # Fax #     Liberty Calhoun PA-C [phone]      Equal Access to Services     ROBY VIERA : Hadii aad ku hadtoyinzaki Johannyct, wanighatda luqlatriceha, qajoseta kaalmada aftab, italia maria rocioelyse rosario. So [facility] [phone].    ATENCIÓN: Si habla español, tiene a albarran disposición servicios gratuitos de asistencia lingüística. Llame al [phone].    We comply with applicable federal civil rights laws and [place] laws. We do not discriminate on the basis of race, color, national origin, age, disability sex, sexual orientation or gender identity.            Thank you!     Thank you for choosing [facility] for your care. Our goal is always to provide you with excellent care. Hearing back from our patients is one way we can continue to improve our services. Please take a few minutes to complete the written survey that you may receive in the mail after you visit with us. Thank you!             Medication List: This is a list of all your medications and when to take them. Check marks below indicate your daily home schedule. Keep this list as a reference.      Medications           Morning Afternoon Evening Bedtime As Needed    aspirin 81 MG tablet   Take 81 mg by mouth daily                                atorvastatin 20 MG tablet   Commonly known as:  LIPITOR   Take 1 tablet (20 mg) by mouth At Bedtime                                diazepam 5 MG tablet   Commonly known as:  VALIUM   TAKE ONE TABLET BY MOUTH EVERY 8 HOURS AS NEEDED FOR ANXIETY                                isosorbide mononitrate 60 MG 24 hr tablet   Commonly known as:  IMDUR   Take 1 tablet (60 mg) by mouth daily In the morning                                levothyroxine 75 MCG tablet   Commonly known as:  SYNTHROID/LEVOTHROID   Take 1 tablet (75 mcg) by mouth daily                                lisinopril 10 MG tablet   Commonly known as:  PRINIVIL/ZESTRIL   Take 1 tablet (10 mg) by mouth daily                                 metoprolol 25 MG 24 hr tablet   Commonly known as:  TOPROL-XL   Take 1 tablet (25 mg) by mouth 2 times daily                                NIGHTTIME SLEEP AID 50 MG Caps   Take 1 capsule by mouth as needed   Generic drug:  DiphenhydrAMINE HCl (Sleep)                                polyethylene glycol powder   Commonly known as:  MIRALAX/GLYCOLAX   Take 17 g by mouth daily as needed for constipation                                prune juice Liqd   Take 5 mLs by mouth daily                                ticagrelor 90 MG tablet   Commonly known as:  BRILINTA   Take 1 tablet (90 mg) by mouth every 12 hours

## 2017-08-21 NOTE — IP AVS SNAPSHOT
[place] [address]    [place] [address]    Phone:  [phone]                                       After Visit Summary   8/21/2017    Korey Pearson    MRN: [id number]           After Visit Summary Signature Page     I have received my discharge instructions, and my questions have been answered. I have discussed any challenges I see with this plan with the nurse or doctor.    ..........................................................................................................................................  Patient/Patient Representative Signature      ..........................................................................................................................................  Patient Representative Print Name and Relationship to Patient    ..................................................               ................................................  Date                                            Time    ..........................................................................................................................................  Reviewed by Signature/Title    ...................................................              ..............................................  Date                                                            Time

## 2017-08-22 ENCOUNTER — DOCUMENTATION ONLY (OUTPATIENT)
Dept: CARDIOLOGY | Facility: CLINIC | Age: 82
End: 2017-08-22

## 2017-08-22 NOTE — PROGRESS NOTES
Follow-up phone call morning after procedure. No new swelling or bleeding at site, no chest pain or discomfort.  Will continue using Brillinta. Aware of follow-up appt.

## 2017-08-23 LAB
INTERPRETATION ECG - MUSE: NORMAL
INTERPRETATION ECG - MUSE: NORMAL

## 2017-08-25 ENCOUNTER — HOSPITAL ENCOUNTER (OUTPATIENT)
Dept: CARDIOLOGY | Facility: CLINIC | Age: 82
Discharge: HOME | End: 2017-08-25
Attending: INTERNAL MEDICINE | Admitting: INTERNAL MEDICINE
Payer: COMMERCIAL

## 2017-08-25 DIAGNOSIS — I25.10 CORONARY ARTERY DISEASE DUE TO CALCIFIED CORONARY LESION: ICD-10-CM

## 2017-08-25 DIAGNOSIS — I25.84 CORONARY ARTERY DISEASE DUE TO CALCIFIED CORONARY LESION: ICD-10-CM

## 2017-08-25 PROCEDURE — 93306 TTE W/DOPPLER COMPLETE: CPT | Mod: 26 | Performed by: INTERNAL MEDICINE

## 2017-08-25 PROCEDURE — 93306 TTE W/DOPPLER COMPLETE: CPT

## 2017-08-28 ENCOUNTER — PRE VISIT (OUTPATIENT)
Dept: CARDIOLOGY | Facility: CLINIC | Age: 82
End: 2017-08-28

## 2017-08-28 ENCOUNTER — OFFICE VISIT (OUTPATIENT)
Dept: CARDIOLOGY | Facility: CLINIC | Age: 82
End: 2017-08-28
Payer: COMMERCIAL

## 2017-08-28 VITALS
BODY MASS INDEX: 28.51 KG/M2 | HEIGHT: 69 IN | WEIGHT: 192.5 LBS | SYSTOLIC BLOOD PRESSURE: 134 MMHG | HEART RATE: 64 BPM | DIASTOLIC BLOOD PRESSURE: 76 MMHG

## 2017-08-28 DIAGNOSIS — E78.5 HYPERLIPIDEMIA LDL GOAL <130: Primary | Chronic | ICD-10-CM

## 2017-08-28 DIAGNOSIS — I10 ESSENTIAL HYPERTENSION WITH GOAL BLOOD PRESSURE LESS THAN 140/90: Chronic | ICD-10-CM

## 2017-08-28 PROCEDURE — 99214 OFFICE O/P EST MOD 30 MIN: CPT | Performed by: NURSE PRACTITIONER

## 2017-08-28 NOTE — LETTER
8/28/2017    Liberty Calhoun PA-C  [address]  [facility] [address]    RE: Korey Jerezannie       Dear Colleague,    I had the pleasure of seeing Korey Pearson in the [facility].    Korey Pearson is an 87-year-old gentleman presenting in clinic today for a post angiogram followup visit.  He has a history of PCI to the LAD in 12/2016.  At that time he was found to have an occluded diagonal with collateralization.  He presented to see Dr. Patel recently, and he was having increased chest discomfort with exertion.  There was concern that this was significant, and he was sent for a coronary angiogram.      This was performed by Dr. 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  This was treated with a laser angioplasty.  He recommended that the patient be continued on Brilinta for additional 6-12 months to be watched closely for restenosis.      He also had an echocardiogram performed on 08/25 showing normal LV size and function with an ejection fraction of 60%.  RV was normal.  There was 1+ aortic regurgitation and a mildly dilated ascending aorta.      In clinic today, Mr. Pearson tells me he has been feeling well.  He has had complete resolution of his chest discomfort symptoms.  He has not yet started cardiac rehab, but is contemplating doing so.  He has been walking and has had no exertional discomfort with that.      In clinic today, blood pressure is 134/76.  Heart rate is 64 and regular.      PHYSICAL EXAMINATION:   GENERAL:  A well-appearing elderly gentleman in no acute distress.   HEENT:  Normocephalic, atraumatic.   LUNGS:  Clear.   CARDIAC:  S1 and S2 with a regular rate and rhythm.  No murmurs, rubs or gallops.   ABDOMEN:  Soft.   EXTREMITIES:  Lower extremities are free of any edema.  Right femoral site is clean, dry and intact with a moderate amount of bruising and a small hematoma.       Outpatient Encounter Prescriptions as of 8/28/2017   Medication Sig Dispense Refill     isosorbide mononitrate (IMDUR) 60 MG 24 hr tablet Take 1 tablet (60 mg) by mouth daily In the morning (Patient taking differently: Take 60 mg by mouth daily Taking 30mg   In the morning) 90 tablet 3     lisinopril (PRINIVIL/ZESTRIL) 10 MG tablet Take 1 tablet (10 mg) by mouth daily 90 tablet 3     diazepam (VALIUM) 5 MG tablet TAKE ONE TABLET BY MOUTH EVERY 8 HOURS AS NEEDED FOR ANXIETY 90 tablet 3     ticagrelor (BRILINTA) 90 MG tablet Take 1 tablet (90 mg) by mouth every 12 hours 60 tablet 11     metoprolol (TOPROL-XL) 25 MG 24 hr tablet Take 1 tablet (25 mg) by mouth 2 times daily 60 tablet 11     atorvastatin (LIPITOR) 20 MG tablet Take 1 tablet (20 mg) by mouth At Bedtime 30 tablet 11     DiphenhydrAMINE HCl, Sleep, (NIGHTTIME SLEEP AID) 50 MG CAPS Take 1 capsule by mouth as needed  30 capsule      polyethylene glycol (MIRALAX/GLYCOLAX) powder Take 17 g by mouth daily as needed for constipation       [DISCONTINUED] levothyroxine (SYNTHROID, LEVOTHROID) 75 MCG tablet Take 1 tablet (75 mcg) by mouth daily 90 tablet 3     prune juice LIQD Take 5 mLs by mouth daily        aspirin 81 MG tablet Take 81 mg by mouth daily        No facility-administered encounter medications on file as of 8/28/2017.      IMPRESSION AND PLAN:  Mr. Pearson is an 87-year-old gentleman who had a PCI to the LAD in 12/2016.  He was seen recently for escalating exertional-type symptoms.  He also had a collateral-dependent diagonal.  He was treated with an escalating dose of isosorbide, but did not have any resolution to his symptoms.  He was taken back to the Cath Lab, where it was found that he had a restenotic lesion in his LAD.  This was treated with an angioplasty.  Please see Dr. Perdomo's note for his full report and details.  At this point in time, he is on a good medication regimen, including isosorbide, lisinopril, Brilinta, Toprol, atorvastatin  and aspirin.  He is much improved.  Echocardiogram showed normal left ventricular ejection fraction.  He will come back to see Dr. Patel in November as previously ordered.     Again, thank you for allowing me to participate in the care of your patient.      Sincerely,    Bernadine Zuniga NP, APRN [facility]

## 2017-08-28 NOTE — PROGRESS NOTES
HISTORY OF PRESENT ILLNESS:  Korey Pearson is an 87-year-old gentleman presenting in clinic today for a post angiogram followup visit.  He has a history of PCI to the LAD in 12/2016.  At that time he was found to have an occluded diagonal with collateralization.  He presented to see Dr. Patel recently, and he was having increased chest discomfort with exertion.  There was concern that this was significant, and he was sent for a coronary angiogram.      This was performed by Dr. 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  This was treated with a laser angioplasty.  He recommended that the patient be continued on Brilinta for additional 6-12 months to be watched closely for restenosis.      He also had an echocardiogram performed on 08/25 showing normal LV size and function with an ejection fraction of 60%.  RV was normal.  There was 1+ aortic regurgitation and a mildly dilated ascending aorta.      In clinic today, Mr. Pearson tells me he has been feeling well.  He has had complete resolution of his chest discomfort symptoms.  He has not yet started cardiac rehab, but is contemplating doing so.  He has been walking and has had no exertional discomfort with that.      In clinic today, blood pressure is 134/76.  Heart rate is 64 and regular.      PHYSICAL EXAMINATION:   GENERAL:  A well-appearing elderly gentleman in no acute distress.   HEENT:  Normocephalic, atraumatic.   LUNGS:  Clear.   CARDIAC:  S1 and S2 with a regular rate and rhythm.  No murmurs, rubs or gallops.   ABDOMEN:  Soft.   EXTREMITIES:  Lower extremities are free of any edema.  Right femoral site is clean, dry and intact with a moderate amount of bruising and a small hematoma.      IMPRESSION AND PLAN:  Mr. Pearson is an 87-year-old gentleman who had a PCI to the LAD in 12/2016.  He was seen recently for escalating exertional-type symptoms.  He also had a collateral-dependent diagonal.  He was treated with an  escalating dose of isosorbide, but did not have any resolution to his symptoms.  He was taken back to the Cath Lab, where it was found that he had a restenotic lesion in his LAD.  This was treated with an angioplasty.  Please see Dr. Perdomo's note for his full report and details.  At this point in time, he is on a good medication regimen, including isosorbide, lisinopril, Brilinta, Toprol, atorvastatin and aspirin.  He is much improved.  Echocardiogram showed normal left ventricular ejection fraction.  He will come back to see Dr. Patel in November as previously ordered.         RADAMES WILSON, ZENA             D: 2017 14:49   T: 2017 17:24   MT: ivy      Name:     PAOLA JI   MRN:      [id number]        Account:      [id number]   :      1929           Service Date: 2017      Document: [id number]

## 2017-08-28 NOTE — PROGRESS NOTES
HPI and Plan:   See dictation    No orders of the defined types were placed in this encounter.      No orders of the defined types were placed in this encounter.      There are no discontinued medications.      Encounter Diagnoses   Name Primary?     Chest pain Yes     Hyperlipidemia LDL goal <130 on a statin      Essential hypertension with goal blood pressure less than 140/90        CURRENT MEDICATIONS:  Current Outpatient Prescriptions   Medication Sig Dispense Refill     isosorbide mononitrate (IMDUR) 60 MG 24 hr tablet Take 1 tablet (60 mg) by mouth daily In the morning (Patient taking differently: Take 60 mg by mouth daily Taking 30mg   In the morning) 90 tablet 3     lisinopril (PRINIVIL/ZESTRIL) 10 MG tablet Take 1 tablet (10 mg) by mouth daily 90 tablet 3     diazepam (VALIUM) 5 MG tablet TAKE ONE TABLET BY MOUTH EVERY 8 HOURS AS NEEDED FOR ANXIETY 90 tablet 3     ticagrelor (BRILINTA) 90 MG tablet Take 1 tablet (90 mg) by mouth every 12 hours 60 tablet 11     metoprolol (TOPROL-XL) 25 MG 24 hr tablet Take 1 tablet (25 mg) by mouth 2 times daily 60 tablet 11     atorvastatin (LIPITOR) 20 MG tablet Take 1 tablet (20 mg) by mouth At Bedtime 30 tablet 11     DiphenhydrAMINE HCl, Sleep, (NIGHTTIME SLEEP AID) 50 MG CAPS Take 1 capsule by mouth as needed  30 capsule      polyethylene glycol (MIRALAX/GLYCOLAX) powder Take 17 g by mouth daily as needed for constipation       levothyroxine (SYNTHROID, LEVOTHROID) 75 MCG tablet Take 1 tablet (75 mcg) by mouth daily 90 tablet 3     prune juice LIQD Take 5 mLs by mouth daily        aspirin 81 MG tablet Take 81 mg by mouth daily          ALLERGIES     Allergies   Allergen Reactions     Ropinirole Anxiety     funny thoughts       PAST MEDICAL HISTORY:  Past Medical History:   Diagnosis Date     Anxiety disorder      Cancer (H) skin cancer both ears     Diverticulosis of colon      Erythrocytosis      Hearing loss      Heart disease      Hyperlipidemia      Hypertension       Hypothyroid      Peripheral neuropathy (H) 2012     Restless leg syndrome      Vitamin D deficiency        PAST SURGICAL HISTORY:  Past Surgical History:   Procedure Laterality Date     ABDOMEN SURGERY  appy, hernia     ANGIOGRAM  08/21/2017     APPENDECTOMY  1941     CARDIAC SURGERY      2 stents  dec 2016     CATARACT IOL, RT/LT  1993     CORONARY ANGIOGRAPHY ADULT ORDER       HERNIA REPAIR      left inguinal hernia 1960's       FAMILY HISTORY:  Family History   Problem Relation Age of Onset     CEREBROVASCULAR DISEASE Mother      HEART DISEASE Father      DIABETES No family hx of      Coronary Artery Disease No family hx of      Hypertension No family hx of      Hyperlipidemia No family hx of      Breast Cancer No family hx of      Colon Cancer No family hx of      Prostate Cancer No family hx of      Other Cancer No family hx of      Depression No family hx of      Anxiety Disorder No family hx of      MENTAL ILLNESS No family hx of      Substance Abuse No family hx of      Anesthesia Reaction No family hx of      Asthma No family hx of      OSTEOPOROSIS No family hx of      Genetic Disorder No family hx of      Thyroid Disease No family hx of      Obesity No family hx of      Unknown/Adopted No family hx of        SOCIAL HISTORY:  Social History     Social History     Marital status:      Spouse name: N/A     Number of children: N/A     Years of education: N/A     Social History Main Topics     Smoking status: Former Smoker     Packs/day: 1.50     Years: 10.00     Types: Cigarettes     Quit date: 1/1/1957     Smokeless tobacco: Never Used     Alcohol use 0.0 oz/week     0 Standard drinks or equivalent per week      Comment: social     Drug use: No     Sexual activity: No     Other Topics Concern     Parent/Sibling W/ Cabg, Mi Or Angioplasty Before 65f 55m? Yes     Special Diet No     Exercise No     Social History Narrative       Review of Systems:  Skin:  Negative       Eyes:  Positive for glasses   "  ENT:  Negative      Respiratory:  Negative       Cardiovascular:    Positive for;lightheadedness;fatigue    Gastroenterology: Negative      Genitourinary:  not assessed      Musculoskeletal:  Positive for   balance off  Neurologic:  Negative      Psychiatric:  Positive for sleep disturbances    Heme/Lymph/Imm:  Negative      Endocrine:  Negative        Physical Exam:  Vitals: /76  Pulse 64  Ht 1.74 m (5' 8.5\")  Wt 87.3 kg (192 lb 8 oz)  BMI 28.84 kg/m2    Constitutional:           Skin:           Head:           Eyes:           ENT:           Neck:           Chest:             Cardiac:                    Abdomen:           Vascular:                                          Extremities and Back:                 Neurological:                 CC  No referring provider defined for this encounter.              "

## 2017-08-28 NOTE — MR AVS SNAPSHOT
"              After Visit Summary   8/28/2017    Korey Pearson    MRN: [id number]           Patient Information     Date Of Birth          8/31/1929        Visit Information        Provider Department      8/28/2017 1:50 PM Bernadine Zuniga APRN CNP [facility] [place]        Today's Diagnoses     Hyperlipidemia LDL goal <130 on a statin    -  1    Essential hypertension with goal blood pressure less than 140/90           Follow-ups after your visit        Who to contact     If you have questions or need follow up information about today's clinic visit or your schedule please contact [facility] [place] directly at [phone].  Normal or non-critical lab and imaging results will be communicated to you by Dial2Dohart, letter or phone within 4 business days after the clinic has received the results. If you do not hear from us within 7 days, please contact the clinic through Dial2Dohart or phone. If you have a critical or abnormal lab result, we will notify you by phone as soon as possible.  Submit refill requests through [employer] or call your pharmacy and they will forward the refill request to us. Please allow 3 business days for your refill to be completed.          Additional Information About Your Visit        MyChart Information     [employer] gives you secure access to your electronic health record. If you see a primary care provider, you can also send messages to your care team and make appointments. If you have questions, please call your primary care clinic.  If you do not have a primary care provider, please call [phone] and they will assist you.        Care EveryWhere ID     This is your Care EveryWhere ID. This could be used by other organizations to access your [place] medical records  [id number]        Your Vitals Were     Pulse Height BMI (Body Mass Index)             64 1.74 m (5' 8.5\") 28.84 kg/m2          Blood " Pressure from Last 3 Encounters:   08/28/17 134/76   08/21/17 130/71   08/10/17 133/64    Weight from Last 3 Encounters:   08/28/17 87.3 kg (192 lb 8 oz)   08/10/17 86.6 kg (190 lb 14.4 oz)   03/09/17 86.5 kg (190 lb 9.6 oz)              Today, you had the following     No orders found for display         Today's Medication Changes          These changes are accurate as of: 8/28/17  2:49 PM.  If you have any questions, ask your nurse or doctor.               These medicines have changed or have updated prescriptions.        Dose/Directions    isosorbide mononitrate 60 MG 24 hr tablet   Commonly known as:  IMDUR   This may have changed:  additional instructions   Used for:  Coronary artery disease of native artery of native heart with stable angina pectoris (H)        Dose:  60 mg   Take 1 tablet (60 mg) by mouth daily In the morning   Quantity:  90 tablet   Refills:  3                Primary Care Provider Office Phone # Fax #    Liberty Calhoun PA-C [phone]       [address]  [facility] [address]        Equal Access to Services     [facility]ANDRA [facility] AH: Hadii aad ku hadasho Sojodieali, waaxda luqadaha, qaybta kaalmada adeegyada, italia rosario. So [facility] [phone].    ATENCIÓN: Si habla español, tiene a albarran disposición servicios gratuitos de asistencia lingüística. Llame al [phone].    We comply with applicable federal civil rights laws and [place] laws. We do not discriminate on the basis of race, color, national origin, age, disability sex, sexual orientation or gender identity.            Thank you!     Thank you for choosing [facility] [place]  for your care. Our goal is always to provide you with excellent care. Hearing back from our patients is one way we can continue to improve our services. Please take a few minutes to complete the written survey that you may receive in the mail after your visit with us.  Thank you!             Your Updated Medication List - Protect others around you: Learn how to safely use, store and throw away your medicines at www.disposemymeds.org.          This list is accurate as of: 8/28/17  2:49 PM.  Always use your most recent med list.                   Brand Name Dispense Instructions for use Diagnosis    aspirin 81 MG tablet      Take 81 mg by mouth daily        atorvastatin 20 MG tablet    LIPITOR    30 tablet    Take 1 tablet (20 mg) by mouth At Bedtime    Unstable angina (H), Hyperlipidemia LDL goal <130       diazepam 5 MG tablet    VALIUM    90 tablet    TAKE ONE TABLET BY MOUTH EVERY 8 HOURS AS NEEDED FOR ANXIETY    Generalized anxiety disorder       isosorbide mononitrate 60 MG 24 hr tablet    IMDUR    90 tablet    Take 1 tablet (60 mg) by mouth daily In the morning    Coronary artery disease of native artery of native heart with stable angina pectoris (H)       levothyroxine 75 MCG tablet    SYNTHROID/LEVOTHROID    90 tablet    Take 1 tablet (75 mcg) by mouth daily    Acquired hypothyroidism       lisinopril 10 MG tablet    PRINIVIL/ZESTRIL    90 tablet    Take 1 tablet (10 mg) by mouth daily    Benign essential hypertension       metoprolol 25 MG 24 hr tablet    TOPROL-XL    60 tablet    Take 1 tablet (25 mg) by mouth 2 times daily    Unstable angina (H)       NIGHTTIME SLEEP AID 50 MG Caps   Generic drug:  DiphenhydrAMINE HCl (Sleep)     30 capsule    Take 1 capsule by mouth as needed    Insomnia       polyethylene glycol powder    MIRALAX/GLYCOLAX     Take 17 g by mouth daily as needed for constipation        prune juice Liqd      Take 5 mLs by mouth daily        ticagrelor 90 MG tablet    BRILINTA    60 tablet    Take 1 tablet (90 mg) by mouth every 12 hours    Unstable angina (H)

## 2017-09-07 DIAGNOSIS — E03.9 ACQUIRED HYPOTHYROIDISM: ICD-10-CM

## 2017-09-07 RX ORDER — LEVOTHYROXINE SODIUM 75 UG/1
TABLET ORAL
Qty: 30 TABLET | Refills: 0 | Status: SHIPPED | OUTPATIENT
Start: 2017-09-07 | End: 2017-10-02

## 2017-09-07 NOTE — TELEPHONE ENCOUNTER
Levothyroxine 75 mcg     Last Written Prescription Date: 6/13/16  Last Quantity: 90, # refills: 3  Last Office Visit with G, P or [facility] prescribing provider: 2/3/17        TSH   Date Value Ref Range Status   06/13/2016 2.36 0.40 - 5.00 mU/L Final

## 2017-10-02 ENCOUNTER — OFFICE VISIT (OUTPATIENT)
Dept: FAMILY MEDICINE | Facility: CLINIC | Age: 82
End: 2017-10-02
Payer: COMMERCIAL

## 2017-10-02 VITALS
WEIGHT: 188 LBS | DIASTOLIC BLOOD PRESSURE: 70 MMHG | RESPIRATION RATE: 16 BRPM | TEMPERATURE: 97 F | SYSTOLIC BLOOD PRESSURE: 130 MMHG | BODY MASS INDEX: 28.17 KG/M2 | HEART RATE: 58 BPM

## 2017-10-02 DIAGNOSIS — F51.01 PRIMARY INSOMNIA: Primary | Chronic | ICD-10-CM

## 2017-10-02 DIAGNOSIS — F41.1 GENERALIZED ANXIETY DISORDER: Chronic | ICD-10-CM

## 2017-10-02 DIAGNOSIS — G25.81 RESTLESS LEGS SYNDROME WITH NOCTURNAL MYOCLONUS: Chronic | ICD-10-CM

## 2017-10-02 DIAGNOSIS — G25.3 RESTLESS LEGS SYNDROME WITH NOCTURNAL MYOCLONUS: Chronic | ICD-10-CM

## 2017-10-02 DIAGNOSIS — E03.9 ACQUIRED HYPOTHYROIDISM: ICD-10-CM

## 2017-10-02 PROCEDURE — 84443 ASSAY THYROID STIM HORMONE: CPT | Performed by: FAMILY MEDICINE

## 2017-10-02 PROCEDURE — 36415 COLL VENOUS BLD VENIPUNCTURE: CPT | Performed by: FAMILY MEDICINE

## 2017-10-02 PROCEDURE — 84439 ASSAY OF FREE THYROXINE: CPT | Performed by: FAMILY MEDICINE

## 2017-10-02 PROCEDURE — 99214 OFFICE O/P EST MOD 30 MIN: CPT | Performed by: FAMILY MEDICINE

## 2017-10-02 RX ORDER — LEVOTHYROXINE SODIUM 75 UG/1
75 TABLET ORAL DAILY
Qty: 90 TABLET | Refills: 1 | Status: SHIPPED | OUTPATIENT
Start: 2017-10-02 | End: 2018-04-06

## 2017-10-02 RX ORDER — LANOLIN ALCOHOL/MO/W.PET/CERES
3 CREAM (GRAM) TOPICAL
COMMUNITY
Start: 2017-10-02 | End: 2017-11-10

## 2017-10-02 NOTE — PROGRESS NOTES
SUBJECTIVE:   Korey Pearson is a 88 year old male who presents to clinic today for the following health issues:      Restless Leg Syndrome      Duration: 8 years    Description (location/character/radiation): nightly    Intensity:  severe    Accompanying signs and symptoms: interferes with sleep    History (similar episodes/previous evaluation): seen neurologist 4 times in the past 5 years    Precipitating or alleviating factors: None    Therapies tried and outcome: gabapentin, diazepam          Insomnia      Duration: 8 years    Description  Frequency of insomnia:  nightly  Time to fall asleep: Sometimes he just sleeps in fits and starts.    Middle of night awakening:  occasionally  Early morning awakening:  occasionally    Accompanying signs and symptoms:  none    History  Similar episodes in past:  YES  Previous evaluation/sleep study:  no     Precipitating or alleviating factors:  New stressful situation: no   Caffeine intake after lunchtime: no   OTC decongestants: no   Any new medications: no     Therapies tried and outcome: The patient has tried Ambien which gave him weird dreams, Sonata which did not work, Benadryl which initially did work but then stopped working and diazepam which hasn't worked at all.as documented        Problem list and histories reviewed & adjusted, as indicated.  Additional history: as documented    Patient Active Problem List   Diagnosis     Generalized anxiety disorder     Peripheral neuropathy (H)     Acquired hypothyroidism     Hearing loss     Vitamin D deficiency     Hyperlipidemia LDL goal <130 on a statin     Essential hypertension with goal blood pressure less than 140/90     Restless legs syndrome with nocturnal myoclonus     Degenerative arthritis of left knee     Gastroesophageal reflux disease without esophagitis     Flatulence, eructation, and gas pain     Dizziness     Constipation     Abdominal pain, generalized     Diverticulosis of large intestine  without hemorrhage     ACP (advance care planning)     Insomnia     Chest pain     Raynaud's disease without gangrene     Past Surgical History:   Procedure Laterality Date     ABDOMEN SURGERY  appy, hernia     ANGIOGRAM  08/21/2017     APPENDECTOMY  1941     CARDIAC SURGERY      2 stents  dec 2016     CATARACT IOL, RT/LT  1993     CORONARY ANGIOGRAPHY ADULT ORDER       HERNIA REPAIR      left inguinal hernia 1960's       Social History   Substance Use Topics     Smoking status: Former Smoker     Packs/day: 1.50     Years: 10.00     Types: Cigarettes     Quit date: 1/1/1957     Smokeless tobacco: Never Used     Alcohol use 0.0 oz/week     0 Standard drinks or equivalent per week      Comment: social     Family History   Problem Relation Age of Onset     CEREBROVASCULAR DISEASE Mother      HEART DISEASE Father      DIABETES No family hx of      Coronary Artery Disease No family hx of      Hypertension No family hx of      Hyperlipidemia No family hx of      Breast Cancer No family hx of      Colon Cancer No family hx of      Prostate Cancer No family hx of      Other Cancer No family hx of      Depression No family hx of      Anxiety Disorder No family hx of      MENTAL ILLNESS No family hx of      Substance Abuse No family hx of      Anesthesia Reaction No family hx of      Asthma No family hx of      OSTEOPOROSIS No family hx of      Genetic Disorder No family hx of      Thyroid Disease No family hx of      Obesity No family hx of      Unknown/Adopted No family hx of              Reviewed and updated as needed [facility]  Meds       Reviewed and updated as needed this visit by Provider         ROS:  Constitutional, HEENT, cardiovascular, pulmonary, gi and gu systems are negative, except as otherwise noted.  MUSCULOSKELETAL: NEGATIVE for significant arthralgias or myalgia and POSITIVE  for restless legs  NEURO: NEGATIVE for weakness, dizziness or paresthesias  PSYCHIATRIC: NEGATIVE for  changes in mood or affect and POSITIVE for anxiety and insomnia       OBJECTIVE:                                                    /70  Pulse 58  Temp 97  F (36.1  C) (Tympanic)  Resp 16  Wt 188 lb (85.3 kg)  BMI 28.17 kg/m2  Body mass index is 28.17 kg/(m^2).  GENERAL APPEARANCE: healthy, alert and no distress         ASSESSMENT/PLAN:                                                        ICD-10-CM    1. Primary insomnia F51.01 melatonin 3 MG tablet   2. Acquired hypothyroidism E03.9 levothyroxine (SYNTHROID/LEVOTHROID) 75 MCG tablet     TSH     T4, free   3. Restless legs syndrome with nocturnal myoclonus G25.81     G25.3    4. Generalized anxiety disorder F41.1        Patient Instructions   The patient will try melatonin 3 mg at bedtime to start.  We also discussed potentially going up to 5 mg if the 3 does not work.  He will let us know.  We will check his thyroid levels today and I refilled his Synthroid for 90 days with 1 refill.      Shantanu Armas MD  [facility]

## 2017-10-02 NOTE — NURSING NOTE
"Chief Complaint   Patient presents with     Sleep Problem       Initial /70  Pulse 58  Temp 97  F (36.1  C) (Tympanic)  Resp 16  Wt 188 lb (85.3 kg)  BMI 28.17 kg/m2 Estimated body mass index is 28.17 kg/(m^2) as calculated from the following:    Height as of 8/28/17: 5' 8.5\" (1.74 m).    Weight as of this encounter: 188 lb (85.3 kg).  Medication Reconciliation: complete     Kimberly Copeland CMA      "

## 2017-10-02 NOTE — MR AVS SNAPSHOT
After Visit Summary   10/2/2017    Korey Pearson    MRN: [id number]           Patient Information     Date Of Birth          8/31/1929        Visit Information        Provider Department      10/2/2017 2:30 PM Shantanu Armas MD [facility]        Today's Diagnoses     Primary insomnia    -  1       Follow-ups after your visit        Your next 10 appointments already scheduled     Nov 10, 2017  1:45 PM CST   Return Visit with Ramirez Patel MD   [facility] [place] [facility]    [address] [place] [id number] [phone]              Who to contact     If you have questions or need follow up information about today's clinic visit or your schedule please contact [facility] directly at [phone].  Normal or non-critical lab and imaging results will be communicated to you by MyChart, letter or phone within 4 business days after the clinic has received the results. If you do not hear from us within 7 days, please contact the clinic through MyChart or phone. If you have a critical or abnormal lab result, we will notify you by phone as soon as possible.  Submit refill requests through [employer] or call your pharmacy and they will forward the refill request to us. Please allow 3 business days for your refill to be completed.          Additional Information About Your Visit        MyChart Information     [employer] gives you secure access to your electronic health record. If you see a primary care provider, you can also send messages to your care team and make appointments. If you have questions, please call your primary care clinic.  If you do not have a primary care provider, please call [phone] and they will assist you.        Care EveryWhere ID     This is your Care EveryWhere ID. This could be used by other organizations to access your [facility]  records  [id number]        Your Vitals Were     Pulse Temperature Respirations BMI (Body Mass Index)          58 97  F (36.1  C) (Tympanic) 16 28.17 kg/m2         Blood Pressure from Last 3 Encounters:   10/02/17 130/70   08/28/17 134/76   08/21/17 130/71    Weight from Last 3 Encounters:   10/02/17 188 lb (85.3 kg)   08/28/17 192 lb 8 oz (87.3 kg)   08/10/17 190 lb 14.4 oz (86.6 kg)              Today, you had the following     No orders found for display         Today's Medication Changes          These changes are accurate as of: 10/2/17  2:40 PM.  If you have any questions, ask your nurse or doctor.               Start taking these medicines.        Dose/Directions    melatonin 3 MG tablet   Used for:  Primary insomnia        Dose:  3 mg   Take 1 tablet (3 mg) by mouth nightly as needed for sleep   Refills:  0         These medicines have changed or have updated prescriptions.        Dose/Directions    isosorbide mononitrate 60 MG 24 hr tablet   Commonly known as:  IMDUR   This may have changed:  additional instructions   Used for:  Coronary artery disease of native artery of native heart with stable angina pectoris (H)        Dose:  60 mg   Take 1 tablet (60 mg) by mouth daily In the morning   Quantity:  90 tablet   Refills:  3            Where to get your medicines      Some of these will need a paper prescription and others can be bought over the counter.  Ask your nurse if you have questions.     You don't need a prescription for these medications     melatonin 3 MG tablet                Primary Care Provider Office Phone # Fax #    Liberty Calhoun PA-C [phone] [address] [facility] [address]  [facility] [address]        Equal Access to Services     [facility] Center[facility]OREY : Hadii aicha Sharpe, wanighatda luqadaha, qaybta kaalmada aftab, italia rosario. So [facility] [phone].    ATENCIÓN: Si habla español, tiene a albarran disposición servicios gratuitos  de asistencia lingüística. Kirill al [phone].    We comply with applicable federal civil rights laws and [place] laws. We do not discriminate on the basis of race, color, national origin, age, disability, sex, sexual orientation, or gender identity.            Thank you!     Thank you for choosing [facility]  for your care. Our goal is always to provide you with excellent care. Hearing back from our patients is one way we can continue to improve our services. Please take a few minutes to complete the written survey that you may receive in the mail after your visit with us. Thank you!             Your Updated Medication List - Protect others around you: Learn how to safely use, store and throw away your medicines at www.disposemymeds.org.          This list is accurate as of: 10/2/17  2:40 PM.  Always use your most recent med list.                   Brand Name Dispense Instructions for use Diagnosis    aspirin 81 MG tablet      Take 81 mg by mouth daily        atorvastatin 20 MG tablet    LIPITOR    30 tablet    Take 1 tablet (20 mg) by mouth At Bedtime    Unstable angina (H), Hyperlipidemia LDL goal <130       diazepam 5 MG tablet    VALIUM    90 tablet    TAKE ONE TABLET BY MOUTH EVERY 8 HOURS AS NEEDED FOR ANXIETY    Generalized anxiety disorder       isosorbide mononitrate 60 MG 24 hr tablet    IMDUR    90 tablet    Take 1 tablet (60 mg) by mouth daily In the morning    Coronary artery disease of native artery of native heart with stable angina pectoris (H)       levothyroxine 75 MCG tablet    SYNTHROID/LEVOTHROID    30 tablet    TAKE ONE TABLET BY MOUTH ONCE DAILY    Acquired hypothyroidism       lisinopril 10 MG tablet    PRINIVIL/ZESTRIL    90 tablet    Take 1 tablet (10 mg) by mouth daily    Benign essential hypertension       melatonin 3 MG tablet      Take 1 tablet (3 mg) by mouth nightly as needed for sleep    Primary insomnia       metoprolol 25 MG 24 hr tablet     TOPROL-XL    60 tablet    Take 1 tablet (25 mg) by mouth 2 times daily    Unstable angina (H)       NIGHTTIME SLEEP AID 50 MG Caps   Generic drug:  DiphenhydrAMINE HCl (Sleep)     30 capsule    Take 1 capsule by mouth as needed    Insomnia       polyethylene glycol powder    MIRALAX/GLYCOLAX     Take 17 g by mouth daily as needed for constipation        prune juice Liqd      Take 5 mLs by mouth daily        ticagrelor 90 MG tablet    BRILINTA    60 tablet    Take 1 tablet (90 mg) by mouth every 12 hours    Unstable angina (H)

## 2017-10-03 LAB
T4 FREE SERPL-MCNC: 1.14 NG/DL (ref 0.76–1.46)
TSH SERPL DL<=0.005 MIU/L-ACNC: 2.65 MU/L (ref 0.4–4)

## 2017-10-03 NOTE — PROGRESS NOTES
Dear Korey,    Your tests were all normal. A copy of your tests are available in My Chart.    We should repeat the tests in one year.    Glad to see you at your appointment.  If you have any questions feel free to call.      Sincerely,      ALISIA Brown.

## 2017-11-10 ENCOUNTER — OFFICE VISIT (OUTPATIENT)
Dept: CARDIOLOGY | Facility: CLINIC | Age: 82
End: 2017-11-10
Attending: INTERNAL MEDICINE
Payer: COMMERCIAL

## 2017-11-10 VITALS
DIASTOLIC BLOOD PRESSURE: 68 MMHG | WEIGHT: 191 LBS | HEIGHT: 69 IN | SYSTOLIC BLOOD PRESSURE: 118 MMHG | HEART RATE: 66 BPM | BODY MASS INDEX: 28.29 KG/M2

## 2017-11-10 DIAGNOSIS — I20.0 INTERMEDIATE CORONARY SYNDROME (H): ICD-10-CM

## 2017-11-10 DIAGNOSIS — I10 ESSENTIAL HYPERTENSION WITH GOAL BLOOD PRESSURE LESS THAN 140/90: Chronic | ICD-10-CM

## 2017-11-10 DIAGNOSIS — I10 BENIGN ESSENTIAL HYPERTENSION: ICD-10-CM

## 2017-11-10 DIAGNOSIS — I25.84 CORONARY ARTERY DISEASE DUE TO CALCIFIED CORONARY LESION: ICD-10-CM

## 2017-11-10 DIAGNOSIS — I25.118 CORONARY ARTERY DISEASE OF NATIVE ARTERY OF NATIVE HEART WITH STABLE ANGINA PECTORIS (H): ICD-10-CM

## 2017-11-10 DIAGNOSIS — E78.5 HYPERLIPIDEMIA LDL GOAL <130: Chronic | ICD-10-CM

## 2017-11-10 DIAGNOSIS — I25.10 CORONARY ARTERY DISEASE DUE TO CALCIFIED CORONARY LESION: ICD-10-CM

## 2017-11-10 PROCEDURE — 99214 OFFICE O/P EST MOD 30 MIN: CPT | Performed by: INTERNAL MEDICINE

## 2017-11-10 NOTE — LETTER
11/10/2017    Liberty Calhoun PA-C  [address]  [facility] [address]    RE: Korey Pearson       Dear Colleague,    I had the pleasure of seeing Korey Solitario [facility] in the [facility].    Cardiology Progress Note          Assessment and Plan:     1. Restless legs, possible venous hypertension contributing    Could consider lower extremity venous competency testing.  In the interim, try magnesium and elevation prior to bedtime.      2. Coronary artery disease status post PCI of the LAD in December 2016 and treatment of his restenotic lesion in August 2017    Likely stay on Brilinta until August 2018, but could change to clopidogrel if cost prohibitive    Could consider streamlining his antihypertensives when he comes back to see me in four months.  Could discontinue the Imdur and increase his lisinopril needed.    Follow-up in four months with either myself or my nurse practitioner    This note was transcribed using electronic voice recognition software and there may be typographical errors present.                Interval History:   The patient is a very pleasant 88 year old who appears much younger than his age.  He has coronary artery disease with PCI to the LAD in December 2016 and restenosis in his previous stent on 8/2017 treated with laser atherectomy.  The patient's chest discomfort completely resolved.  He feels good with normal LV function on follow-up echocardiogram.  The patient currently does not have many complaints.  He wonders how long to stay on the Brilinta.  He has some intermittent dyspnea that may be attributable to the medication.  The Brilinta is expensive for him in the [facility].  His wife is also wondering if they can streamline some of his medications.  He is only taking 30 of the Imdur currently.  He complains that his restless legs are his main symptom.  These symptoms prevents good sleep for him.  They improve  "when he walks around.                       Review of Systems:   Review of Systems:  Skin:  Negative     Eyes:  Positive for glasses  ENT:  Positive for hearing loss  Respiratory:  Positive for dyspnea on exertion  Cardiovascular:    Positive for;lightheadedness (when looking up)  Gastroenterology: Negative    Genitourinary:  not assessed    Musculoskeletal:  Positive for arthritis  Neurologic:  Negative    Psychiatric:  Negative    Heme/Lymph/Imm:  Negative    Endocrine:  Positive for thyroid disorder              Physical Exam:     Vitals: /68  Pulse 66  Ht 1.74 m (5' 8.5\")  Wt 86.6 kg (191 lb)  BMI 28.62 kg/m2  Constitutional:  cooperative, alert and oriented, well developed, well nourished, in no acute distress   Hard of hearing    Skin:  warm and dry to the touch, no apparent skin lesions or masses noted        Head:  normocephalic, no masses or lesions        Eyes:  pupils equal and round        ENT:  no pallor or cyanosis, dentition good        Neck:        no JVD    Chest:  clear to auscultation;normal symmetry        Cardiac: regular rhythm;normal S1 and S2;no murmurs, gallops or rubs detected                  Abdomen:      benign    Extremities and Back:  no edema;no deformities, clubbing, cyanosis, erythema observed        Neurological:  no gross motor deficits;affect appropriate                 Medications:     Current Outpatient Prescriptions   Medication Sig Dispense Refill     levothyroxine (SYNTHROID/LEVOTHROID) 75 MCG tablet Take 1 tablet (75 mcg) by mouth daily 90 tablet 1     isosorbide mononitrate (IMDUR) 60 MG 24 hr tablet Take 1 tablet (60 mg) by mouth daily In the morning (Patient taking differently: Take 60 mg by mouth daily Taking 30mg   In the morning) 90 tablet 3     lisinopril (PRINIVIL/ZESTRIL) 10 MG tablet Take 1 tablet (10 mg) by mouth daily 90 tablet 3     ticagrelor (BRILINTA) 90 MG tablet Take 1 tablet (90 mg) by mouth every 12 hours 60 tablet 11     metoprolol (TOPROL-XL) " 25 MG 24 hr tablet Take 1 tablet (25 mg) by mouth 2 times daily 60 tablet 11     atorvastatin (LIPITOR) 20 MG tablet Take 1 tablet (20 mg) by mouth At Bedtime 30 tablet 11     polyethylene glycol (MIRALAX/GLYCOLAX) powder Take 17 g by mouth daily as needed for constipation       prune juice LIQD Take 5 mLs by mouth daily        aspirin 81 MG tablet Take 81 mg by mouth daily        diazepam (VALIUM) 5 MG tablet TAKE ONE TABLET BY MOUTH EVERY 8 HOURS AS NEEDED FOR ANXIETY 90 tablet 3     DiphenhydrAMINE HCl, Sleep, (NIGHTTIME SLEEP AID) 50 MG CAPS Take 1 capsule by mouth as needed  30 capsule                 Data:   All laboratory data reviewed  No results found for this or any previous visit (from the past 24 hour(s)).    All laboratory data reviewed  Lab Results   Component Value Date    CHOL 115 03/09/2017     Lab Results   Component Value Date    HDL 38 03/09/2017     Lab Results   Component Value Date    LDL 59 03/09/2017     Lab Results   Component Value Date    TRIG 92 03/09/2017     Lab Results   Component Value Date    CHOLHDLRATIO 4.4 08/06/2014     TSH   Date Value Ref Range Status   10/02/2017 2.65 0.40 - 4.00 mU/L Final     Last Basic Metabolic Panel:  Lab Results   Component Value Date     08/21/2017      Lab Results   Component Value Date    POTASSIUM 4.3 08/21/2017     Lab Results   Component Value Date    CHLORIDE 99 08/21/2017     Lab Results   Component Value Date    OMAR 8.8 08/21/2017     Lab Results   Component Value Date    CO2 25 08/21/2017     Lab Results   Component Value Date    BUN 13 08/21/2017     Lab Results   Component Value Date    CR 0.82 08/21/2017     Lab Results   Component Value Date     08/21/2017     Lab Results   Component Value Date    WBC 7.9 08/21/2017     Lab Results   Component Value Date    RBC 4.53 08/21/2017     Lab Results   Component Value Date    HGB 14.1 08/21/2017     Lab Results   Component Value Date    HCT 39.3 08/21/2017     Lab Results   Component  Value Date    MCV 87 08/21/2017     Lab Results   Component Value Date    MCH 31.1 08/21/2017     Lab Results   Component Value Date    MCHC 35.9 08/21/2017     Lab Results   Component Value Date    RDW 13.9 08/21/2017     Lab Results   Component Value Date     08/21/2017     Thank you for allowing me to participate in the care of your patient.    Sincerely,     Ramirez Patel MD     [facility]

## 2017-11-10 NOTE — PROGRESS NOTES
Cardiology Progress Note          Assessment and Plan:     1. Restless legs, possible venous hypertension contributing    Could consider lower extremity venous competency testing.  In the interim, try magnesium and elevation prior to bedtime.      2. Coronary artery disease status post PCI of the LAD in December 2016 and treatment of his restenotic lesion in August 2017    Likely stay on Brilinta until August 2018, but could change to clopidogrel if cost prohibitive    Could consider streamlining his antihypertensives when he comes back to see me in four months.  Could discontinue the Imdur and increase his lisinopril needed.    Follow-up in four months with either myself or my nurse practitioner    This note was transcribed using electronic voice recognition software and there may be typographical errors present.                Interval History:   The patient is a very pleasant 88 year old who appears much younger than his age.  He has coronary artery disease with PCI to the LAD in December 2016 and restenosis in his previous stent on 8/2017 treated with laser atherectomy.  The patient's chest discomfort completely resolved.  He feels good with normal LV function on follow-up echocardiogram.  The patient currently does not have many complaints.  He wonders how long to stay on the Brilinta.  He has some intermittent dyspnea that may be attributable to the medication.  The Brilinta is expensive for him in the [facility].  His wife is also wondering if they can streamline some of his medications.  He is only taking 30 of the Imdur currently.  He complains that his restless legs are his main symptom.  These symptoms prevents good sleep for him.  They improve when he walks around.                       Review of Systems:   Review of Systems:  Skin:  Negative     Eyes:  Positive for glasses  ENT:  Positive for hearing loss  Respiratory:  Positive for dyspnea on exertion  Cardiovascular:    Positive for;lightheadedness  "(when looking up)  Gastroenterology: Negative    Genitourinary:  not assessed    Musculoskeletal:  Positive for arthritis  Neurologic:  Negative    Psychiatric:  Negative    Heme/Lymph/Imm:  Negative    Endocrine:  Positive for thyroid disorder              Physical Exam:     Vitals: /68  Pulse 66  Ht 1.74 m (5' 8.5\")  Wt 86.6 kg (191 lb)  BMI 28.62 kg/m2  Constitutional:  cooperative, alert and oriented, well developed, well nourished, in no acute distress   Hard of hearing    Skin:  warm and dry to the touch, no apparent skin lesions or masses noted        Head:  normocephalic, no masses or lesions        Eyes:  pupils equal and round        ENT:  no pallor or cyanosis, dentition good        Neck:        no JVD    Chest:  clear to auscultation;normal symmetry        Cardiac: regular rhythm;normal S1 and S2;no murmurs, gallops or rubs detected                  Abdomen:      benign    Extremities and Back:  no edema;no deformities, clubbing, cyanosis, erythema observed        Neurological:  no gross motor deficits;affect appropriate                 Medications:     Current Outpatient Prescriptions   Medication Sig Dispense Refill     levothyroxine (SYNTHROID/LEVOTHROID) 75 MCG tablet Take 1 tablet (75 mcg) by mouth daily 90 tablet 1     isosorbide mononitrate (IMDUR) 60 MG 24 hr tablet Take 1 tablet (60 mg) by mouth daily In the morning (Patient taking differently: Take 60 mg by mouth daily Taking 30mg   In the morning) 90 tablet 3     lisinopril (PRINIVIL/ZESTRIL) 10 MG tablet Take 1 tablet (10 mg) by mouth daily 90 tablet 3     ticagrelor (BRILINTA) 90 MG tablet Take 1 tablet (90 mg) by mouth every 12 hours 60 tablet 11     metoprolol (TOPROL-XL) 25 MG 24 hr tablet Take 1 tablet (25 mg) by mouth 2 times daily 60 tablet 11     atorvastatin (LIPITOR) 20 MG tablet Take 1 tablet (20 mg) by mouth At Bedtime 30 tablet 11     polyethylene glycol (MIRALAX/GLYCOLAX) powder Take 17 g by mouth daily as needed for " constipation       prune juice LIQD Take 5 mLs by mouth daily        aspirin 81 MG tablet Take 81 mg by mouth daily        diazepam (VALIUM) 5 MG tablet TAKE ONE TABLET BY MOUTH EVERY 8 HOURS AS NEEDED FOR ANXIETY 90 tablet 3     DiphenhydrAMINE HCl, Sleep, (NIGHTTIME SLEEP AID) 50 MG CAPS Take 1 capsule by mouth as needed  30 capsule                 Data:   All laboratory data reviewed  No results found for this or any previous visit (from the past 24 hour(s)).    All laboratory data reviewed  Lab Results   Component Value Date    CHOL 115 03/09/2017     Lab Results   Component Value Date    HDL 38 03/09/2017     Lab Results   Component Value Date    LDL 59 03/09/2017     Lab Results   Component Value Date    TRIG 92 03/09/2017     Lab Results   Component Value Date    CHOLHDLRATIO 4.4 08/06/2014     TSH   Date Value Ref Range Status   10/02/2017 2.65 0.40 - 4.00 mU/L Final     Last Basic Metabolic Panel:  Lab Results   Component Value Date     08/21/2017      Lab Results   Component Value Date    POTASSIUM 4.3 08/21/2017     Lab Results   Component Value Date    CHLORIDE 99 08/21/2017     Lab Results   Component Value Date    OMAR 8.8 08/21/2017     Lab Results   Component Value Date    CO2 25 08/21/2017     Lab Results   Component Value Date    BUN 13 08/21/2017     Lab Results   Component Value Date    CR 0.82 08/21/2017     Lab Results   Component Value Date     08/21/2017     Lab Results   Component Value Date    WBC 7.9 08/21/2017     Lab Results   Component Value Date    RBC 4.53 08/21/2017     Lab Results   Component Value Date    HGB 14.1 08/21/2017     Lab Results   Component Value Date    HCT 39.3 08/21/2017     Lab Results   Component Value Date    MCV 87 08/21/2017     Lab Results   Component Value Date    MCH 31.1 08/21/2017     Lab Results   Component Value Date    MCHC 35.9 08/21/2017     Lab Results   Component Value Date    RDW 13.9 08/21/2017     Lab Results   Component Value Date      08/21/2017

## 2017-11-10 NOTE — MR AVS SNAPSHOT
After Visit Summary   11/10/2017    Korey Pearson    MRN: [id number]           Patient Information     Date Of Birth          8/31/1929        Visit Information        Provider Department      11/10/2017 1:45 PM Ramirez Patel MD [facility]        Today's Diagnoses     Coronary artery disease due to calcified coronary lesion        Hyperlipidemia LDL goal <130 on a statin        Essential hypertension with goal blood pressure less than 140/90        Coronary artery disease of native artery of native heart with stable angina pectoris (H)        Benign essential hypertension        Intermediate coronary syndrome (H)           Follow-ups after your visit        Additional Services     Follow-Up with Cardiologist                 Future tests that were ordered for you today     Open Future Orders        Priority Expected Expires Ordered    Follow-Up with Cardiologist Routine 2/8/2018 11/10/2018 11/10/2017            Who to contact     If you have questions or need follow up information about today's clinic visit or your schedule please contact [facility] directly at [phone].  Normal or non-critical lab and imaging results will be communicated to you by [employer], letter or phone within 4 business days after the clinic has received the results. If you do not hear from us within 7 days, please contact the clinic through Hote[employer] or phone. If you have a critical or abnormal lab result, we will notify you by phone as soon as possible.  Submit refill requests through [employer] or call your pharmacy and they will forward the refill request to us. Please allow 3 business days for your refill to be completed.          Additional Information About Your Visit        PlayCanv[employer]art Information     [employer] gives you secure access to your electronic health record. If you see a primary care provider, you can also send messages to  "your care team and make appointments. If you have questions, please call your primary care clinic.  If you do not have a primary care provider, please call [phone] and they will assist you.        Care EveryWhere ID     This is your Care EveryWhere ID. This could be used by other organizations to access your [place] medical records  [id number]        Your Vitals Were     Pulse Height BMI (Body Mass Index)             66 1.74 m (5' 8.5\") 28.62 kg/m2          Blood Pressure from Last 3 Encounters:   11/10/17 118/68   10/02/17 130/70   08/28/17 134/76    Weight from Last 3 Encounters:   11/10/17 86.6 kg (191 lb)   10/02/17 85.3 kg (188 lb)   08/28/17 87.3 kg (192 lb 8 oz)              We Performed the Following     Follow-Up with Cardiologist          Today's Medication Changes          These changes are accurate as of: 11/10/17  2:23 PM.  If you have any questions, ask your nurse or doctor.               These medicines have changed or have updated prescriptions.        Dose/Directions    isosorbide mononitrate 60 MG 24 hr tablet   Commonly known as:  IMDUR   This may have changed:  additional instructions   Used for:  Coronary artery disease of native artery of native heart with stable angina pectoris (H)        Dose:  60 mg   Take 1 tablet (60 mg) by mouth daily In the morning   Quantity:  90 tablet   Refills:  3                Primary Care Provider Office Phone # Fax #    Liberty Calhoun PA-C [phone]       [address] [address]        Equal Access to Services     [facility] AH: Hadii aad ku hadasho Soomaali, waaxda luqadaha, qaybta kaalmada aftab, italia rosario. So [facility] [phone].    ATENCIÓN: Si habla español, tiene a albarran disposición servicios gratuitos de asistencia lingüística. Llame al [phone].    We comply with applicable federal civil rights laws and [place] laws. We do not discriminate on the basis of " race, color, national origin, age, disability, sex, sexual orientation, or gender identity.            Thank you!     Thank you for choosing [facility]  for your care. Our goal is always to provide you with excellent care. Hearing back from our patients is one way we can continue to improve our services. Please take a few minutes to complete the written survey that you may receive in the mail after your visit with us. Thank you!             Your Updated Medication List - Protect others around you: Learn how to safely use, store and throw away your medicines at www.disposemymeds.org.          This list is accurate as of: 11/10/17  2:23 PM.  Always use your most recent med list.                   Brand Name Dispense Instructions for use Diagnosis    aspirin 81 MG tablet      Take 81 mg by mouth daily        atorvastatin 20 MG tablet    LIPITOR    30 tablet    Take 1 tablet (20 mg) by mouth At Bedtime    Unstable angina (H), Hyperlipidemia LDL goal <130       diazepam 5 MG tablet    VALIUM    90 tablet    TAKE ONE TABLET BY MOUTH EVERY 8 HOURS AS NEEDED FOR ANXIETY    Generalized anxiety disorder       isosorbide mononitrate 60 MG 24 hr tablet    IMDUR    90 tablet    Take 1 tablet (60 mg) by mouth daily In the morning    Coronary artery disease of native artery of native heart with stable angina pectoris (H)       levothyroxine 75 MCG tablet    SYNTHROID/LEVOTHROID    90 tablet    Take 1 tablet (75 mcg) by mouth daily    Acquired hypothyroidism       lisinopril 10 MG tablet    PRINIVIL/ZESTRIL    90 tablet    Take 1 tablet (10 mg) by mouth daily    Benign essential hypertension       metoprolol 25 MG 24 hr tablet    TOPROL-XL    60 tablet    Take 1 tablet (25 mg) by mouth 2 times daily    Unstable angina (H)       NIGHTTIME SLEEP AID 50 MG Caps   Generic drug:  DiphenhydrAMINE HCl (Sleep)     30 capsule    Take 1 capsule by mouth as needed    Insomnia       polyethylene glycol  powder    MIRALAX/GLYCOLAX     Take 17 g by mouth daily as needed for constipation        prune juice Liqd      Take 5 mLs by mouth daily        ticagrelor 90 MG tablet    BRILINTA    60 tablet    Take 1 tablet (90 mg) by mouth every 12 hours    Unstable angina (H)

## 2017-11-14 DIAGNOSIS — F41.1 GENERALIZED ANXIETY DISORDER: Chronic | ICD-10-CM

## 2017-11-15 RX ORDER — DIAZEPAM 5 MG
TABLET ORAL
Qty: 90 TABLET | Refills: 3 | Status: SHIPPED | OUTPATIENT
Start: 2017-11-15 | End: 2018-05-22

## 2017-11-15 NOTE — TELEPHONE ENCOUNTER
Controlled Substance Refill Request for Valium    last OV 10-2-17 with Dr. Shantanu Armas ; last OV with listed PCP 1-24-17    Last refill 3-7-17 #90 with 3 rfs    Problem List Complete:  Yes    On valium prn for anxiety and insomnia  Patient is followed by ALEJANDRA BRAY for ongoing prescription of anxiety medication.  All refills should be approved by this provider, or covering partner.    Medication(s): Valium 5 mg.   Maximum quantity per month: 90 per 3 months  Clinic visit frequency required: Q 6 months     Controlled substance agreement on file: No    Last [facility] website verification:11-15-17 no concerns [email or web address]

## 2018-01-19 DIAGNOSIS — E78.5 HYPERLIPIDEMIA LDL GOAL <130: ICD-10-CM

## 2018-01-19 DIAGNOSIS — I20.0 UNSTABLE ANGINA (H): ICD-10-CM

## 2018-01-19 RX ORDER — ATORVASTATIN CALCIUM 20 MG/1
20 TABLET, FILM COATED ORAL AT BEDTIME
Qty: 90 TABLET | Refills: 3 | Status: SHIPPED | OUTPATIENT
Start: 2018-01-19 | End: 2018-01-20

## 2018-01-19 RX ORDER — METOPROLOL SUCCINATE 25 MG/1
25 TABLET, EXTENDED RELEASE ORAL 2 TIMES DAILY
Qty: 180 TABLET | Refills: 3 | Status: SHIPPED | OUTPATIENT
Start: 2018-01-19 | End: 2018-02-28

## 2018-01-20 DIAGNOSIS — I20.0 UNSTABLE ANGINA (H): ICD-10-CM

## 2018-01-20 DIAGNOSIS — E78.5 HYPERLIPIDEMIA LDL GOAL <130: ICD-10-CM

## 2018-01-20 NOTE — TELEPHONE ENCOUNTER
"Requested Prescriptions   Pending Prescriptions Disp Refills     atorvastatin (LIPITOR) 20 MG tablet  Last Written Prescription Date:  01/19/2018  Last Fill Quantity: 90,  # refills: 3   Last Office Visit with [facility], [facility] or [facility] prescribing provider:  10/02/2017   Future Office Visit:    Next 5 appointments (look out 90 days)     Feb 28, 2018  3:45 PM CST   Return Visit with Ramirez Patel MD   [facility]    [address] [place] [id number] [phone]                  90 tablet 3     Sig: Take 1 tablet (20 mg) by mouth At Bedtime    Statins Protocol Passed    1/20/2018 10:09 AM       Passed - LDL on file in past 12 months    Recent Labs   Lab Test  03/09/17   0839   LDL  59            Passed - No abnormal creatine kinase in past 12 months    No lab results found.         Passed - Recent or future visit with authorizing provider    Patient had office visit in the last year or has a visit in the next 30 days with authorizing provider.  See \"Patient Info\" tab in inbasket, or \"Choose Columns\" in Meds & Orders section of the refill encounter.            Passed - Patient is age 18 or older          "

## 2018-01-22 RX ORDER — ATORVASTATIN CALCIUM 20 MG/1
20 TABLET, FILM COATED ORAL AT BEDTIME
Qty: 90 TABLET | Refills: 0 | Status: SHIPPED | OUTPATIENT
Start: 2018-01-22 | End: 2019-02-25

## 2018-01-22 NOTE — TELEPHONE ENCOUNTER
Medication is being filled for 1 time refill only due to:  patient needs labs in March, will see cards in February   Marcie Kelly RN- Triage [employer]

## 2018-02-13 ENCOUNTER — TRANSFERRED RECORDS (OUTPATIENT)
Dept: HEALTH INFORMATION MANAGEMENT | Facility: CLINIC | Age: 83
End: 2018-02-13

## 2018-02-13 ENCOUNTER — TELEPHONE (OUTPATIENT)
Dept: CARDIOLOGY | Facility: CLINIC | Age: 83
End: 2018-02-13

## 2018-02-13 DIAGNOSIS — I25.118 CORONARY ARTERY DISEASE OF NATIVE ARTERY OF NATIVE HEART WITH STABLE ANGINA PECTORIS (H): ICD-10-CM

## 2018-02-13 NOTE — TELEPHONE ENCOUNTER
Pts wife called stating that patients BP have been running low and they are wondering if he is on too much medication. Pt stated that he was not feeling dizzy but extremely tired. Pt does have OV with Dr. Patel 2/28/18     BP today before meds 125/60, HR 58   2/12/18 after meds 97/51, HR 58  2/11/18 after meds 88/39. HR 57       OV 11/10/17 with Dr. Patel   2. Coronary artery disease status post PCI of the LAD in December 2016 and treatment of his restenotic lesion in August 2017    Likely stay on Brilinta until August 2018, but could change to clopidogrel if cost prohibitive    Could consider streamlining his antihypertensives when he comes back to see me in four months.  Could discontinue the Imdur and increase his lisinopril needed.    Will route to Bronwyn VALDEZ as Dr. Patel is out of the clinic to see if she would recommend any changes prior to OV 2/28/18

## 2018-02-13 NOTE — TELEPHONE ENCOUNTER
Spoke to patients wife and informed her of Bronwyn's recommendations below. Pt was taking 30 mg of Imdur daily but will have pt hold Imdur and follow up at OV 2/28/18. No further questions or concerns.

## 2018-02-28 ENCOUNTER — OFFICE VISIT (OUTPATIENT)
Dept: CARDIOLOGY | Facility: CLINIC | Age: 83
End: 2018-02-28
Attending: INTERNAL MEDICINE
Payer: COMMERCIAL

## 2018-02-28 VITALS
HEIGHT: 69 IN | HEART RATE: 63 BPM | DIASTOLIC BLOOD PRESSURE: 67 MMHG | BODY MASS INDEX: 29.04 KG/M2 | WEIGHT: 196.1 LBS | SYSTOLIC BLOOD PRESSURE: 128 MMHG

## 2018-02-28 DIAGNOSIS — I20.0 UNSTABLE ANGINA (H): ICD-10-CM

## 2018-02-28 DIAGNOSIS — I25.84 CORONARY ARTERY DISEASE DUE TO CALCIFIED CORONARY LESION: ICD-10-CM

## 2018-02-28 DIAGNOSIS — E78.5 HYPERLIPIDEMIA LDL GOAL <130: Chronic | ICD-10-CM

## 2018-02-28 DIAGNOSIS — I10 ESSENTIAL HYPERTENSION WITH GOAL BLOOD PRESSURE LESS THAN 140/90: Chronic | ICD-10-CM

## 2018-02-28 DIAGNOSIS — I25.10 CORONARY ARTERY DISEASE DUE TO CALCIFIED CORONARY LESION: ICD-10-CM

## 2018-02-28 DIAGNOSIS — I20.0 INTERMEDIATE CORONARY SYNDROME (H): ICD-10-CM

## 2018-02-28 DIAGNOSIS — I25.118 CORONARY ARTERY DISEASE OF NATIVE ARTERY OF NATIVE HEART WITH STABLE ANGINA PECTORIS (H): ICD-10-CM

## 2018-02-28 DIAGNOSIS — I10 BENIGN ESSENTIAL HYPERTENSION: ICD-10-CM

## 2018-02-28 PROCEDURE — 99214 OFFICE O/P EST MOD 30 MIN: CPT | Performed by: INTERNAL MEDICINE

## 2018-02-28 RX ORDER — CLOPIDOGREL BISULFATE 75 MG/1
75 TABLET ORAL DAILY
Qty: 90 TABLET | Refills: 3 | Status: SHIPPED | OUTPATIENT
Start: 2018-02-28 | End: 2018-08-21

## 2018-02-28 RX ORDER — METOPROLOL SUCCINATE 25 MG/1
12.5 TABLET, EXTENDED RELEASE ORAL 2 TIMES DAILY
Qty: 45 TABLET | Refills: 3 | Status: SHIPPED | OUTPATIENT
Start: 2018-02-28 | End: 2018-03-02

## 2018-02-28 RX ORDER — ISOSORBIDE MONONITRATE 30 MG/1
30 TABLET, EXTENDED RELEASE ORAL DAILY
Qty: 90 TABLET | Refills: 3 | Status: SHIPPED | OUTPATIENT
Start: 2018-02-28 | End: 2018-08-08

## 2018-02-28 NOTE — PATIENT INSTRUCTIONS
1. You may stop the Brilinta (expensive one) and change it to CLOPIDOGREL which doesn't cause shortness of breath and is only once per day and a lot cheaper.    2. I think the METOPROLOL is causing the white hands and lightheadedness when standing, so we will reduce the dose to just 12.5mg once per day. That's half a pill.    3. I sent in a new, smaller dose of the ISOSORBIDE so you don't have to cut that one in half in the future. Your new dose is a 30mg pill so you don't have to cut a 60mg pill in half.    Please send us a [employer] message in 2 weeks or so and let us know how you are feeling.    Come back in 2 months to see Bronwyn and adjust medications if needed.

## 2018-02-28 NOTE — MR AVS SNAPSHOT
After Visit Summary   2/28/2018    Korey Pearson    MRN: [id number]           Patient Information     Date Of Birth          8/31/1929        Visit Information        Provider Department      2/28/2018 3:45 PM Ramirez Patel MD [facility]        Today's Diagnoses     Coronary artery disease due to calcified coronary lesion        Hyperlipidemia LDL goal <130 on a statin        Essential hypertension with goal blood pressure less than 140/90        Coronary artery disease of native artery of native heart with stable angina pectoris (H)        Benign essential hypertension        Intermediate coronary syndrome (H)        Unstable angina (H)          Care Instructions    1. You may stop the Brilinta (expensive one) and change it to CLOPIDOGREL which doesn't cause shortness of breath and is only once per day and a lot cheaper.    2. I think the METOPROLOL is causing the white hands and lightheadedness when standing, so we will reduce the dose to just 12.5mg once per day. That's half a pill.    3. I sent in a new, smaller dose of the ISOSORBIDE so you don't have to cut that one in half in the future. Your new dose is a 30mg pill so you don't have to cut a 60mg pill in half.    Please send us a Foody message in 2 weeks or so and let us know how you are feeling.    Come back in 2 months to see Bronwyn and adjust medications if needed.              Follow-ups after your visit        Additional Services     Follow-Up with Cardiac Advanced Practice Provider                 Future tests that were ordered for you today     Open Future Orders        Priority Expected Expires Ordered    Follow-Up with Cardiac Advanced Practice Provider Routine 4/30/2018 2/28/2019 2/28/2018            Who to contact     If you have questions or need follow up information about today's clinic visit or your schedule please contact [facility]   MARTHA  "directly at [phone].  Normal or non-critical lab and imaging results will be communicated to you by BubbleGabhart, letter or phone within 4 business days after the clinic has received the results. If you do not hear from us within 7 days, please contact the clinic through [employer] or phone. If you have a critical or abnormal lab result, we will notify you by phone as soon as possible.  Submit refill requests through [employer] or call your pharmacy and they will forward the refill request to us. Please allow 3 business days for your refill to be completed.          Additional Information About Your Visit        BubbleGabharGigsTime Information     [employer] gives you secure access to your electronic health record. If you see a primary care provider, you can also send messages to your care team and make appointments. If you have questions, please call your primary care clinic.  If you do not have a primary care provider, please call [phone] and they will assist you.        Care EveryWhere ID     This is your Care EveryWhere ID. This could be used by other organizations to access your [facility] medical records  [id number]        Your Vitals Were     Pulse Height BMI (Body Mass Index)             63 1.74 m (5' 8.5\") 29.38 kg/m2          Blood Pressure from Last 3 Encounters:   02/28/18 128/67   11/10/17 118/68   10/02/17 130/70    Weight from Last 3 Encounters:   02/28/18 89 kg (196 lb 1.6 oz)   11/10/17 86.6 kg (191 lb)   10/02/17 85.3 kg (188 lb)              We Performed the Following     Follow-Up with Cardiologist          Today's Medication Changes          These changes are accurate as of 2/28/18  4:02 PM.  If you have any questions, ask your nurse or doctor.               Start taking these medicines.        Dose/Directions    clopidogrel 75 MG tablet   Commonly known as:  PLAVIX   Used for:  Coronary artery disease due to calcified coronary lesion   Started by:  Ramirez Patel MD        Dose:  75 mg   Take 1 tablet " (75 mg) by mouth daily   Quantity:  90 tablet   Refills:  3         These medicines have changed or have updated prescriptions.        Dose/Directions    isosorbide mononitrate 30 MG 24 hr tablet   Commonly known as:  IMDUR   This may have changed:    - medication strength  - how much to take   Used for:  Coronary artery disease of native artery of native heart with stable angina pectoris (H)   Changed by:  Ramirez Patel MD        Dose:  30 mg   Take 1 tablet (30 mg) by mouth daily In the morning   Quantity:  90 tablet   Refills:  3       metoprolol succinate 25 MG 24 hr tablet   Commonly known as:  TOPROL-XL   This may have changed:  how much to take   Used for:  Unstable angina (H)   Changed by:  Ramirez Patel MD        Dose:  12.5 mg   Take 0.5 tablets (12.5 mg) by mouth 2 times daily   Quantity:  45 tablet   Refills:  3         Stop taking these medicines if you haven't already. Please contact your care team if you have questions.     ticagrelor 90 MG tablet   Commonly known as:  BRILINTA   Stopped by:  Ramirez Patel MD                Where to get your medicines      These medications were sent to [facility] [address] [place] [address]  [address] [facility] [address]     Phone:  [phone]     clopidogrel 75 MG tablet    isosorbide mononitrate 30 MG 24 hr tablet    metoprolol succinate 25 MG 24 hr tablet                Primary Care Provider Office Phone # Fax #    Liberty Calhoun PA-C [phone]       [address] [facility] [address]  [facility] [address]        Equal Access to Services     ALEKSANDRA VIERA : Hadii aicha ku hadasho Soomaali, waaxda luqadaha, qaybta kaalmada adeegyada, italia rosario. So [facility] [phone].    ATENCIÓN: Si habla español, tiene a albarran disposición servicios gratuitos de asistencia lingüística. Kirill al [phone].    We comply with applicable federal civil rights laws and  [place] laws. We do not discriminate on the basis of race, color, national origin, age, disability, sex, sexual orientation, or gender identity.            Thank you!     Thank you for choosing [facility]  for your care. Our goal is always to provide you with excellent care. Hearing back from our patients is one way we can continue to improve our services. Please take a few minutes to complete the written survey that you may receive in the mail after your visit with us. Thank you!             Your Updated Medication List - Protect others around you: Learn how to safely use, store and throw away your medicines at www.disposemymeds.org.          This list is accurate as of 2/28/18  4:02 PM.  Always use your most recent med list.                   Brand Name Dispense Instructions for use Diagnosis    aspirin 81 MG tablet      Take 81 mg by mouth daily        atorvastatin 20 MG tablet    LIPITOR    90 tablet    Take 1 tablet (20 mg) by mouth At Bedtime    Unstable angina (H), Hyperlipidemia LDL goal <130       clopidogrel 75 MG tablet    PLAVIX    90 tablet    Take 1 tablet (75 mg) by mouth daily    Coronary artery disease due to calcified coronary lesion       diazepam 5 MG tablet    VALIUM    90 tablet    TAKE ONE TABLET BY MOUTH EVERY 8 HOURS AS NEEDED FOR ANXIETY    Generalized anxiety disorder       isosorbide mononitrate 30 MG 24 hr tablet    IMDUR    90 tablet    Take 1 tablet (30 mg) by mouth daily In the morning    Coronary artery disease of native artery of native heart with stable angina pectoris (H)       levothyroxine 75 MCG tablet    SYNTHROID/LEVOTHROID    90 tablet    Take 1 tablet (75 mcg) by mouth daily    Acquired hypothyroidism       lisinopril 10 MG tablet    PRINIVIL/ZESTRIL    90 tablet    Take 1 tablet (10 mg) by mouth daily    Benign essential hypertension       metoprolol succinate 25 MG 24 hr tablet    TOPROL-XL    45 tablet    Take 0.5 tablets (12.5  mg) by mouth 2 times daily    Unstable angina (H)       NIGHTTIME SLEEP AID 50 MG Caps   Generic drug:  DiphenhydrAMINE HCl (Sleep)     30 capsule    Take 1 capsule by mouth as needed    Insomnia       polyethylene glycol powder    MIRALAX/GLYCOLAX     Take 17 g by mouth daily as needed for constipation        prune juice Liqd      Take 5 mLs by mouth daily

## 2018-02-28 NOTE — LETTER
2/28/2018    Liberty Calhoun PA-C  [address]  [facility] [address]    RE: Korey Pearson       Dear Colleague,    I had the pleasure of seeing Korey Pearson in the [facility].    Cardiology Progress Note          Assessment and Plan:     1. Fatigue and dyspnea, possibly related to beta-blocker and Brilinta    Decrease his Toprol dose from 25 milligrams twice daily down to 12.5 mg just once per day to see if improvement in the fatigue.    Switch Brilinta to clopidogrel to see if improvement in the breathing.      2. Coronary artery disease status post PCI of the LAD in December 2016 and August 2017    Continue dual antiplatelet therapy, blood pressure control and statin.    Okay to continue at the lower isosorbide dose of 30 mg daily    Follow-up in 2 months with Bronwyn Lee to review symptoms on lower dose of Toprol and after switching Brilinta to clopidogrel.    This note was transcribed using electronic voice recognition software and there may be typographical errors present.                Interval History:   The patient is a very pleasant 88 year old whom I have been following for coronary artery disease status post PCI of the LAD in 2016 and restenosis of that stent treated with laser atherectomy on 8/2017.  He has had resolution of his chest discomfort.  The Brilinta is very expensive for him.  He has fatigue and some lightheadedness when he stands up quickly.  His Imdur has been decreased but he still has fatigue.  He is somewhat bradycardic on his Toprol 25 mg twice per day.  He has a sensation of dyspnea and inability to take a deep breath that may be related to the Brilinta.                     Review of Systems:   Review of Systems:  Skin:  Positive for bruising   Eyes:  Positive for glasses  ENT:  Positive for hearing loss;nasal congestion;postnasal drainage  Respiratory:  Positive for dyspnea on exertion  Cardiovascular:   "  Positive for;lightheadedness (when looking up)  Gastroenterology: Negative    Genitourinary:  not assessed    Musculoskeletal:  Negative    Neurologic:  Positive for numbness or tingling of feet  Psychiatric:  Positive for anxiety;depression  Heme/Lymph/Imm:  Positive for allergies  Endocrine:  Positive for thyroid disorder              Physical Exam:     Vitals: /67  Pulse 63  Ht 1.74 m (5' 8.5\")  Wt 89 kg (196 lb 1.6 oz)  BMI 29.38 kg/m2  Constitutional:  cooperative, alert and oriented, well developed, well nourished, in no acute distress   Hard of hearing    Skin:  warm and dry to the touch, no apparent skin lesions or masses noted        Head:  normocephalic, no masses or lesions        Eyes:  pupils equal and round        ENT:  no pallor or cyanosis, dentition good        Neck:  JVP normal        Chest:  clear to auscultation;normal symmetry        Cardiac: regular rhythm;normal S1 and S2;no murmurs, gallops or rubs detected                  Abdomen:      benign    Extremities and Back:  no edema;no deformities, clubbing, cyanosis, erythema observed        Neurological:  no gross motor deficits;affect appropriate                 Medications:     Current Outpatient Prescriptions   Medication Sig Dispense Refill     isosorbide mononitrate (IMDUR) 30 MG 24 hr tablet Take 1 tablet (30 mg) by mouth daily In the morning 90 tablet 3     clopidogrel (PLAVIX) 75 MG tablet Take 1 tablet (75 mg) by mouth daily 90 tablet 3     metoprolol succinate (TOPROL-XL) 25 MG 24 hr tablet Take 0.5 tablets (12.5 mg) by mouth 2 times daily 45 tablet 3     atorvastatin (LIPITOR) 20 MG tablet Take 1 tablet (20 mg) by mouth At Bedtime 90 tablet 0     diazepam (VALIUM) 5 MG tablet TAKE ONE TABLET BY MOUTH EVERY 8 HOURS AS NEEDED FOR ANXIETY 90 tablet 3     levothyroxine (SYNTHROID/LEVOTHROID) 75 MCG tablet Take 1 tablet (75 mcg) by mouth daily 90 tablet 1     lisinopril (PRINIVIL/ZESTRIL) 10 MG tablet Take 1 tablet (10 mg) by " mouth daily 90 tablet 3     DiphenhydrAMINE HCl, Sleep, (NIGHTTIME SLEEP AID) 50 MG CAPS Take 1 capsule by mouth as needed  30 capsule      polyethylene glycol (MIRALAX/GLYCOLAX) powder Take 17 g by mouth daily as needed for constipation       prune juice LIQD Take 5 mLs by mouth daily        aspirin 81 MG tablet Take 81 mg by mouth daily        [DISCONTINUED] metoprolol succinate (TOPROL-XL) 25 MG 24 hr tablet Take 1 tablet (25 mg) by mouth 2 times daily 180 tablet 3     [DISCONTINUED] isosorbide mononitrate (IMDUR) 60 MG 24 hr tablet Take 1 tablet (60 mg) by mouth daily In the morning (Patient taking differently: Take 60 mg by mouth daily Taking 30mg   In the morning) 90 tablet 3                Data:   All laboratory data reviewed  No results found for this or any previous visit (from the past 24 hour(s)).    All laboratory data reviewed  Lab Results   Component Value Date    CHOL 115 03/09/2017     Lab Results   Component Value Date    HDL 38 03/09/2017     Lab Results   Component Value Date    LDL 59 03/09/2017     Lab Results   Component Value Date    TRIG 92 03/09/2017     Lab Results   Component Value Date    CHOLHDLRATIO 4.4 08/06/2014     TSH   Date Value Ref Range Status   10/02/2017 2.65 0.40 - 4.00 mU/L Final     Last Basic Metabolic Panel:  Lab Results   Component Value Date     08/21/2017      Lab Results   Component Value Date    POTASSIUM 4.3 08/21/2017     Lab Results   Component Value Date    CHLORIDE 99 08/21/2017     Lab Results   Component Value Date    OMAR 8.8 08/21/2017     Lab Results   Component Value Date    CO2 25 08/21/2017     Lab Results   Component Value Date    BUN 13 08/21/2017     Lab Results   Component Value Date    CR 0.82 08/21/2017     Lab Results   Component Value Date     08/21/2017     Lab Results   Component Value Date    WBC 7.9 08/21/2017     Lab Results   Component Value Date    RBC 4.53 08/21/2017     Lab Results   Component Value Date    HGB 14.1  08/21/2017     Lab Results   Component Value Date    HCT 39.3 08/21/2017     Lab Results   Component Value Date    MCV 87 08/21/2017     Lab Results   Component Value Date    MCH 31.1 08/21/2017     Lab Results   Component Value Date    MCHC 35.9 08/21/2017     Lab Results   Component Value Date    RDW 13.9 08/21/2017     Lab Results   Component Value Date     08/21/2017                 Thank you for allowing me to participate in the care of your patient.      Sincerely,     Ramirez Patel MD     [facility]    cc:   Ramirez Patel MD  [address] [facility] [address]  [place] [address]

## 2018-02-28 NOTE — PROGRESS NOTES
Cardiology Progress Note          Assessment and Plan:     1. Fatigue and dyspnea, possibly related to beta-blocker and Brilinta    Decrease his Toprol dose from 25 milligrams twice daily down to 12.5 mg just once per day to see if improvement in the fatigue.    Switch Brilinta to clopidogrel to see if improvement in the breathing.      2. Coronary artery disease status post PCI of the LAD in December 2016 and August 2017    Continue dual antiplatelet therapy, blood pressure control and statin.    Okay to continue at the lower isosorbide dose of 30 mg daily    Follow-up in 2 months with Bronwyn Lee to review symptoms on lower dose of Toprol and after switching Brilinta to clopidogrel.    This note was transcribed using electronic voice recognition software and there may be typographical errors present.                Interval History:   The patient is a very pleasant 88 year old whom I have been following for coronary artery disease status post PCI of the LAD in 2016 and restenosis of that stent treated with laser atherectomy on 8/2017.  He has had resolution of his chest discomfort.  The Brilinta is very expensive for him.  He has fatigue and some lightheadedness when he stands up quickly.  His Imdur has been decreased but he still has fatigue.  He is somewhat bradycardic on his Toprol 25 mg twice per day.  He has a sensation of dyspnea and inability to take a deep breath that may be related to the Brilinta.                     Review of Systems:   Review of Systems:  Skin:  Positive for bruising   Eyes:  Positive for glasses  ENT:  Positive for hearing loss;nasal congestion;postnasal drainage  Respiratory:  Positive for dyspnea on exertion  Cardiovascular:    Positive for;lightheadedness (when looking up)  Gastroenterology: Negative    Genitourinary:  not assessed    Musculoskeletal:  Negative    Neurologic:  Positive for numbness or tingling of feet  Psychiatric:  Positive for anxiety;depression  Heme/Lymph/Imm:   "Positive for allergies  Endocrine:  Positive for thyroid disorder              Physical Exam:     Vitals: /67  Pulse 63  Ht 1.74 m (5' 8.5\")  Wt 89 kg (196 lb 1.6 oz)  BMI 29.38 kg/m2  Constitutional:  cooperative, alert and oriented, well developed, well nourished, in no acute distress   Hard of hearing    Skin:  warm and dry to the touch, no apparent skin lesions or masses noted        Head:  normocephalic, no masses or lesions        Eyes:  pupils equal and round        ENT:  no pallor or cyanosis, dentition good        Neck:  JVP normal        Chest:  clear to auscultation;normal symmetry        Cardiac: regular rhythm;normal S1 and S2;no murmurs, gallops or rubs detected                  Abdomen:      benign    Extremities and Back:  no edema;no deformities, clubbing, cyanosis, erythema observed        Neurological:  no gross motor deficits;affect appropriate                 Medications:     Current Outpatient Prescriptions   Medication Sig Dispense Refill     isosorbide mononitrate (IMDUR) 30 MG 24 hr tablet Take 1 tablet (30 mg) by mouth daily In the morning 90 tablet 3     clopidogrel (PLAVIX) 75 MG tablet Take 1 tablet (75 mg) by mouth daily 90 tablet 3     metoprolol succinate (TOPROL-XL) 25 MG 24 hr tablet Take 0.5 tablets (12.5 mg) by mouth 2 times daily 45 tablet 3     atorvastatin (LIPITOR) 20 MG tablet Take 1 tablet (20 mg) by mouth At Bedtime 90 tablet 0     diazepam (VALIUM) 5 MG tablet TAKE ONE TABLET BY MOUTH EVERY 8 HOURS AS NEEDED FOR ANXIETY 90 tablet 3     levothyroxine (SYNTHROID/LEVOTHROID) 75 MCG tablet Take 1 tablet (75 mcg) by mouth daily 90 tablet 1     lisinopril (PRINIVIL/ZESTRIL) 10 MG tablet Take 1 tablet (10 mg) by mouth daily 90 tablet 3     DiphenhydrAMINE HCl, Sleep, (NIGHTTIME SLEEP AID) 50 MG CAPS Take 1 capsule by mouth as needed  30 capsule      polyethylene glycol (MIRALAX/GLYCOLAX) powder Take 17 g by mouth daily as needed for constipation       prune juice LIQD " Take 5 mLs by mouth daily        aspirin 81 MG tablet Take 81 mg by mouth daily        [DISCONTINUED] metoprolol succinate (TOPROL-XL) 25 MG 24 hr tablet Take 1 tablet (25 mg) by mouth 2 times daily 180 tablet 3     [DISCONTINUED] isosorbide mononitrate (IMDUR) 60 MG 24 hr tablet Take 1 tablet (60 mg) by mouth daily In the morning (Patient taking differently: Take 60 mg by mouth daily Taking 30mg   In the morning) 90 tablet 3                Data:   All laboratory data reviewed  No results found for this or any previous visit (from the past 24 hour(s)).    All laboratory data reviewed  Lab Results   Component Value Date    CHOL 115 03/09/2017     Lab Results   Component Value Date    HDL 38 03/09/2017     Lab Results   Component Value Date    LDL 59 03/09/2017     Lab Results   Component Value Date    TRIG 92 03/09/2017     Lab Results   Component Value Date    CHOLHDLRATIO 4.4 08/06/2014     TSH   Date Value Ref Range Status   10/02/2017 2.65 0.40 - 4.00 mU/L Final     Last Basic Metabolic Panel:  Lab Results   Component Value Date     08/21/2017      Lab Results   Component Value Date    POTASSIUM 4.3 08/21/2017     Lab Results   Component Value Date    CHLORIDE 99 08/21/2017     Lab Results   Component Value Date    OMAR 8.8 08/21/2017     Lab Results   Component Value Date    CO2 25 08/21/2017     Lab Results   Component Value Date    BUN 13 08/21/2017     Lab Results   Component Value Date    CR 0.82 08/21/2017     Lab Results   Component Value Date     08/21/2017     Lab Results   Component Value Date    WBC 7.9 08/21/2017     Lab Results   Component Value Date    RBC 4.53 08/21/2017     Lab Results   Component Value Date    HGB 14.1 08/21/2017     Lab Results   Component Value Date    HCT 39.3 08/21/2017     Lab Results   Component Value Date    MCV 87 08/21/2017     Lab Results   Component Value Date    MCH 31.1 08/21/2017     Lab Results   Component Value Date    MCHC 35.9 08/21/2017     Lab  Results   Component Value Date    RDW 13.9 08/21/2017     Lab Results   Component Value Date     08/21/2017

## 2018-02-28 NOTE — LETTER
2/28/2018    Liberty Calhoun PA-C  [address]  [facility] [address]    RE: Korey Pearson       Dear Colleague,    I had the pleasure of seeing Korey Pearson in the [facility].    Cardiology Progress Note          Assessment and Plan:     1. Fatigue and dyspnea, possibly related to beta-blocker and Brilinta    Decrease his Toprol dose from 25 milligrams twice daily down to 12.5 mg just once per day to see if improvement in the fatigue.    Switch Brilinta to clopidogrel to see if improvement in the breathing.      2. Coronary artery disease status post PCI of the LAD in December 2016 and August 2017    Continue dual antiplatelet therapy, blood pressure control and statin.    Okay to continue at the lower isosorbide dose of 30 mg daily    Follow-up in 2 months with Bronwyn Lee to review symptoms on lower dose of Toprol and after switching Brilinta to clopidogrel.    This note was transcribed using electronic voice recognition software and there may be typographical errors present.                Interval History:   The patient is a very pleasant 88 year old whom I have been following for coronary artery disease status post PCI of the LAD in 2016 and restenosis of that stent treated with laser atherectomy on 8/2017.  He has had resolution of his chest discomfort.  The Brilinta is very expensive for him.  He has fatigue and some lightheadedness when he stands up quickly.  His Imdur has been decreased but he still has fatigue.  He is somewhat bradycardic on his Toprol 25 mg twice per day.  He has a sensation of dyspnea and inability to take a deep breath that may be related to the Brilinta.                     Review of Systems:   Review of Systems:  Skin:  Positive for bruising   Eyes:  Positive for glasses  ENT:  Positive for hearing loss;nasal congestion;postnasal drainage  Respiratory:  Positive for dyspnea on exertion  Cardiovascular:   "  Positive for;lightheadedness (when looking up)  Gastroenterology: Negative    Genitourinary:  not assessed    Musculoskeletal:  Negative    Neurologic:  Positive for numbness or tingling of feet  Psychiatric:  Positive for anxiety;depression  Heme/Lymph/Imm:  Positive for allergies  Endocrine:  Positive for thyroid disorder              Physical Exam:     Vitals: /67  Pulse 63  Ht 1.74 m (5' 8.5\")  Wt 89 kg (196 lb 1.6 oz)  BMI 29.38 kg/m2  Constitutional:  cooperative, alert and oriented, well developed, well nourished, in no acute distress   Hard of hearing    Skin:  warm and dry to the touch, no apparent skin lesions or masses noted        Head:  normocephalic, no masses or lesions        Eyes:  pupils equal and round        ENT:  no pallor or cyanosis, dentition good        Neck:  JVP normal        Chest:  clear to auscultation;normal symmetry        Cardiac: regular rhythm;normal S1 and S2;no murmurs, gallops or rubs detected                  Abdomen:      benign    Extremities and Back:  no edema;no deformities, clubbing, cyanosis, erythema observed        Neurological:  no gross motor deficits;affect appropriate                 Medications:     Current Outpatient Prescriptions   Medication Sig Dispense Refill     isosorbide mononitrate (IMDUR) 30 MG 24 hr tablet Take 1 tablet (30 mg) by mouth daily In the morning 90 tablet 3     clopidogrel (PLAVIX) 75 MG tablet Take 1 tablet (75 mg) by mouth daily 90 tablet 3     metoprolol succinate (TOPROL-XL) 25 MG 24 hr tablet Take 0.5 tablets (12.5 mg) by mouth 2 times daily 45 tablet 3     atorvastatin (LIPITOR) 20 MG tablet Take 1 tablet (20 mg) by mouth At Bedtime 90 tablet 0     diazepam (VALIUM) 5 MG tablet TAKE ONE TABLET BY MOUTH EVERY 8 HOURS AS NEEDED FOR ANXIETY 90 tablet 3     levothyroxine (SYNTHROID/LEVOTHROID) 75 MCG tablet Take 1 tablet (75 mcg) by mouth daily 90 tablet 1     lisinopril (PRINIVIL/ZESTRIL) 10 MG tablet Take 1 tablet (10 mg) by " mouth daily 90 tablet 3     DiphenhydrAMINE HCl, Sleep, (NIGHTTIME SLEEP AID) 50 MG CAPS Take 1 capsule by mouth as needed  30 capsule      polyethylene glycol (MIRALAX/GLYCOLAX) powder Take 17 g by mouth daily as needed for constipation       prune juice LIQD Take 5 mLs by mouth daily        aspirin 81 MG tablet Take 81 mg by mouth daily        [DISCONTINUED] metoprolol succinate (TOPROL-XL) 25 MG 24 hr tablet Take 1 tablet (25 mg) by mouth 2 times daily 180 tablet 3     [DISCONTINUED] isosorbide mononitrate (IMDUR) 60 MG 24 hr tablet Take 1 tablet (60 mg) by mouth daily In the morning (Patient taking differently: Take 60 mg by mouth daily Taking 30mg   In the morning) 90 tablet 3                Data:   All laboratory data reviewed  No results found for this or any previous visit (from the past 24 hour(s)).    All laboratory data reviewed  Lab Results   Component Value Date    CHOL 115 03/09/2017     Lab Results   Component Value Date    HDL 38 03/09/2017     Lab Results   Component Value Date    LDL 59 03/09/2017     Lab Results   Component Value Date    TRIG 92 03/09/2017     Lab Results   Component Value Date    CHOLHDLRATIO 4.4 08/06/2014     TSH   Date Value Ref Range Status   10/02/2017 2.65 0.40 - 4.00 mU/L Final     Last Basic Metabolic Panel:  Lab Results   Component Value Date     08/21/2017      Lab Results   Component Value Date    POTASSIUM 4.3 08/21/2017     Lab Results   Component Value Date    CHLORIDE 99 08/21/2017     Lab Results   Component Value Date    OMAR 8.8 08/21/2017     Lab Results   Component Value Date    CO2 25 08/21/2017     Lab Results   Component Value Date    BUN 13 08/21/2017     Lab Results   Component Value Date    CR 0.82 08/21/2017     Lab Results   Component Value Date     08/21/2017     Lab Results   Component Value Date    WBC 7.9 08/21/2017     Lab Results   Component Value Date    RBC 4.53 08/21/2017     Lab Results   Component Value Date    HGB 14.1  08/21/2017     Lab Results   Component Value Date    HCT 39.3 08/21/2017     Lab Results   Component Value Date    MCV 87 08/21/2017     Lab Results   Component Value Date    MCH 31.1 08/21/2017     Lab Results   Component Value Date    MCHC 35.9 08/21/2017     Lab Results   Component Value Date    RDW 13.9 08/21/2017     Lab Results   Component Value Date     08/21/2017       Thank you for allowing me to participate in the care of your patient.    Sincerely,     Ramirez Patel MD     [facility]

## 2018-03-01 ENCOUNTER — MYC MEDICAL ADVICE (OUTPATIENT)
Dept: CARDIOLOGY | Facility: CLINIC | Age: 83
End: 2018-03-01

## 2018-03-01 DIAGNOSIS — I20.0 UNSTABLE ANGINA (H): ICD-10-CM

## 2018-03-02 RX ORDER — METOPROLOL SUCCINATE 25 MG/1
12.5 TABLET, EXTENDED RELEASE ORAL DAILY
Qty: 1 TABLET | Refills: 0 | COMMUNITY
Start: 2018-03-02 | End: 2018-08-20

## 2018-03-21 ENCOUNTER — OFFICE VISIT (OUTPATIENT)
Dept: FAMILY MEDICINE | Facility: CLINIC | Age: 83
End: 2018-03-21
Payer: COMMERCIAL

## 2018-03-21 VITALS
WEIGHT: 199 LBS | BODY MASS INDEX: 29.82 KG/M2 | DIASTOLIC BLOOD PRESSURE: 70 MMHG | HEART RATE: 64 BPM | RESPIRATION RATE: 16 BRPM | SYSTOLIC BLOOD PRESSURE: 120 MMHG

## 2018-03-21 DIAGNOSIS — F51.01 PRIMARY INSOMNIA: Chronic | ICD-10-CM

## 2018-03-21 DIAGNOSIS — G25.81 RESTLESS LEGS SYNDROME (RLS): Primary | ICD-10-CM

## 2018-03-21 PROCEDURE — 99214 OFFICE O/P EST MOD 30 MIN: CPT | Performed by: FAMILY MEDICINE

## 2018-03-21 NOTE — MR AVS SNAPSHOT
After Visit Summary   3/21/2018    Korey Pearson    MRN: [id number]           Patient Information     Date Of Birth          8/31/1929        Visit Information        Provider Department      3/21/2018 3:30 PM Shantanu Armas MD [facility]        Today's Diagnoses     Restless legs syndrome (RLS)    -  1    Primary insomnia          Care Instructions    We had a discussion about the use of melatonin.  He thought this was for his restless legs.He will try 5 mg of melatonin.  I discussed with him the fact that this is actually for his insomnia.  We also discussed getting a second opinion from [facility] neurology about his restless legs if the melatonin does not work.  He will try the melatonin for sleep for the next 3 weeks or so and then let us know.  He has been to [facility] many years ago.  I think it was almost 5.  We do have records in the chart of having tried ropinirole, gabapentin and diazepam.  None of these medications had any effect on his restless legs.          Follow-ups after your visit        Your next 10 appointments already scheduled     May 14, 2018 10:30 AM CDT   Return Visit with RADAMES Rivero CNP   [facility] ([facility])    [address] [place] [id number] [phone] OPT 2              Who to contact     If you have questions or need follow up information about today's clinic visit or your schedule please contact [facility] directly at [phone].  Normal or non-critical lab and imaging results will be communicated to you by MyChart, letter or phone within 4 business days after the clinic has received the results. If you do not hear from us within 7 days, please contact the clinic through MyChart or phone. If you have a critical or abnormal lab result, we will notify you by phone as  soon as possible.  Submit refill requests through [employer] or call your pharmacy and they will forward the refill request to us. Please allow 3 business days for your refill to be completed.          Additional Information About Your Visit        miDrivehart Information     [employer] gives you secure access to your electronic health record. If you see a primary care provider, you can also send messages to your care team and make appointments. If you have questions, please call your primary care clinic.  If you do not have a primary care provider, please call [phone] and they will assist you.        Care EveryWhere ID     This is your Care EveryWhere ID. This could be used by other organizations to access your [facility] medical records  [id number]        Your Vitals Were     Pulse Respirations BMI (Body Mass Index)             64 16 29.82 kg/m2          Blood Pressure from Last 3 Encounters:   03/21/18 120/70   02/28/18 128/67   11/10/17 118/68    Weight from Last 3 Encounters:   03/21/18 199 lb (90.3 kg)   02/28/18 196 lb 1.6 oz (89 kg)   11/10/17 191 lb (86.6 kg)              Today, you had the following     No orders found for display       Primary Care Provider Office Phone # Fax #    Liberty Calhoun PA-C [phone]       [address] [address]        Equal Access to Services     ROBY VIERA : Hadii aad ku hadasho Soomaali, waaxda luqadaha, qaybta kaalmada adeegyada, italia coon . So [facility] [phone].    ATENCIÓN: Si habla español, tiene a albarran disposición servicios gratuitos de asistencia lingüística. Kirill al [phone].    We comply with applicable federal civil rights laws and [place] laws. We do not discriminate on the basis of race, color, national origin, age, disability, sex, sexual orientation, or gender identity.            Thank you!     Thank you for choosing [facility]  for your care.  Our goal is always to provide you with excellent care. Hearing back from our patients is one way we can continue to improve our services. Please take a few minutes to complete the written survey that you may receive in the mail after your visit with us. Thank you!             Your Updated Medication List - Protect others around you: Learn how to safely use, store and throw away your medicines at www.disposemymeds.org.          This list is accurate as of 3/21/18  4:16 PM.  Always use your most recent med list.                   Brand Name Dispense Instructions for use Diagnosis    aspirin 81 MG tablet      Take 81 mg by mouth daily        atorvastatin 20 MG tablet    LIPITOR    90 tablet    Take 1 tablet (20 mg) by mouth At Bedtime    Unstable angina (H), Hyperlipidemia LDL goal <130       clopidogrel 75 MG tablet    PLAVIX    90 tablet    Take 1 tablet (75 mg) by mouth daily    Coronary artery disease due to calcified coronary lesion       diazepam 5 MG tablet    VALIUM    90 tablet    TAKE ONE TABLET BY MOUTH EVERY 8 HOURS AS NEEDED FOR ANXIETY    Generalized anxiety disorder       isosorbide mononitrate 30 MG 24 hr tablet    IMDUR    90 tablet    Take 1 tablet (30 mg) by mouth daily In the morning    Coronary artery disease of native artery of native heart with stable angina pectoris (H)       levothyroxine 75 MCG tablet    SYNTHROID/LEVOTHROID    90 tablet    Take 1 tablet (75 mcg) by mouth daily    Acquired hypothyroidism       lisinopril 10 MG tablet    PRINIVIL/ZESTRIL    90 tablet    Take 1 tablet (10 mg) by mouth daily    Benign essential hypertension       metoprolol succinate 25 MG 24 hr tablet    TOPROL-XL    1 tablet    Take 0.5 tablets (12.5 mg) by mouth daily    Unstable angina (H)       NIGHTTIME SLEEP AID 50 MG Caps   Generic drug:  DiphenhydrAMINE HCl (Sleep)     30 capsule    Take 1 capsule by mouth as needed    Insomnia       polyethylene glycol powder    MIRALAX/GLYCOLAX     Take 17 g by mouth  daily as needed for constipation        prune juice Liqd      Take 5 mLs by mouth daily

## 2018-03-21 NOTE — NURSING NOTE
"Chief Complaint   Patient presents with     Musculoskeletal Problem     restless legs       Initial /70 (Cuff Size: Adult Regular)  Pulse 64  Resp 16  Wt 199 lb (90.3 kg)  BMI 29.82 kg/m2 Estimated body mass index is 29.82 kg/(m^2) as calculated from the following:    Height as of 2/28/18: 5' 8.5\" (1.74 m).    Weight as of this encounter: 199 lb (90.3 kg).  Medication Reconciliation: complete     Kimberly Copeland CMA      "

## 2018-03-21 NOTE — PROGRESS NOTES
SUBJECTIVE:   Korey Pearson is a 88 year old male who presents to clinic today for the following health issues:      Musculoskeletal problem/pain      Duration: Many years    Description  Location: restless bilateral  legs    Intensity:  moderate, severe    Accompanying signs and symptoms: numbness and tingling    History  Previous similar problem: no   Previous evaluation:  none    Precipitating or alleviating factors:  Trauma or overuse: no   Aggravating factors include: none    Therapies tried and outcome: Melatonin, gabapentin- not effective      Insomnia      Duration: years    Description  Frequency of insomnia:  several times a week  Time to fall asleep: 30 minutes  Middle of night awakening:  occasionally  Early morning awakening:  occasionally    Accompanying signs and symptoms:  restless legs    History  Similar episodes in past:  YES  Previous evaluation/sleep study:  no     Precipitating or alleviating factors:  New stressful situation: no   Caffeine intake after lunchtime: no   OTC decongestants: no   Any new medications: no     Therapies tried and outcome: 3 mg melatonin which did not work      Problem list and histories reviewed & adjusted, as indicated.  Additional history: as documented    Patient Active Problem List   Diagnosis     Generalized anxiety disorder     Peripheral neuropathy     Acquired hypothyroidism     Hearing loss     Vitamin D deficiency     Hyperlipidemia LDL goal <130 on a statin     Essential hypertension with goal blood pressure less than 140/90     Restless legs syndrome with nocturnal myoclonus     Degenerative arthritis of left knee     Gastroesophageal reflux disease without esophagitis     Flatulence, eructation, and gas pain     Dizziness     Constipation     Abdominal pain, generalized     Diverticulosis of large intestine without hemorrhage     ACP (advance care planning)     Insomnia     Chest pain     Raynaud's disease without gangrene     Past Surgical  History:   Procedure Laterality Date     ABDOMEN SURGERY  appy, hernia     ANGIOGRAM  08/21/2017     APPENDECTOMY  1941     CARDIAC SURGERY      2 stents  dec 2016     CATARACT IOL, RT/LT  1993     CORONARY ANGIOGRAPHY ADULT ORDER       HERNIA REPAIR      left inguinal hernia 1960's       Social History   Substance Use Topics     Smoking status: Former Smoker     Packs/day: 1.50     Years: 10.00     Types: Cigarettes     Quit date: 1/1/1957     Smokeless tobacco: Never Used     Alcohol use 0.0 oz/week     0 Standard drinks or equivalent per week      Comment: social     Family History   Problem Relation Age of Onset     CEREBROVASCULAR DISEASE Mother      HEART DISEASE Father      DIABETES No family hx of      Coronary Artery Disease No family hx of      Hypertension No family hx of      Hyperlipidemia No family hx of      Breast Cancer No family hx of      Colon Cancer No family hx of      Prostate Cancer No family hx of      Other Cancer No family hx of      Depression No family hx of      Anxiety Disorder No family hx of      MENTAL ILLNESS No family hx of      Substance Abuse No family hx of      Anesthesia Reaction No family hx of      Asthma No family hx of      OSTEOPOROSIS No family hx of      Genetic Disorder No family hx of      Thyroid Disease No family hx of      Obesity No family hx of      Unknown/Adopted No family hx of            Reviewed and updated as needed this visit by clinical staff  Tobacco  Allergies  Meds  Med Hx  Surg Hx  Fam Hx  Soc Hx      Reviewed and updated as needed this visit by Provider         ROS:  Constitutional, HEENT, cardiovascular, pulmonary, gi and gu systems are negative, except as otherwise noted.    OBJECTIVE:                                                    /70 (Cuff Size: Adult Regular)  Pulse 64  Resp 16  Wt 199 lb (90.3 kg)  BMI 29.82 kg/m2  Body mass index is 29.82 kg/(m^2).  GENERAL APPEARANCE: healthy, alert and no distress          ASSESSMENT/PLAN:                                                        ICD-10-CM    1. Restless legs syndrome (RLS) G25.81    2. Primary insomnia F51.01        Patient Instructions   We had a discussion about the use of melatonin.  He thought this was for his restless legs.He will try 5 mg of melatonin.  I discussed with him the fact that this is actually for his insomnia.  We also discussed getting a second opinion from [facility] neurology about his restless legs if the melatonin does not work.  He will try the melatonin for sleep for the next 3 weeks or so and then let us know.  He has been to [facility] many years ago.  I think it was almost 5.  We do have records in the chart of having tried ropinirole, gabapentin and diazepam.  None of these medications had any effect on his restless legs.      Shantanu Armas MD  [facility]

## 2018-03-21 NOTE — PATIENT INSTRUCTIONS
We had a discussion about the use of melatonin.  He thought this was for his restless legs.He will try 5 mg of melatonin.  I discussed with him the fact that this is actually for his insomnia.  We also discussed getting a second opinion from [facility] neurology about his restless legs if the melatonin does not work.  He will try the melatonin for sleep for the next 3 weeks or so and then let us know.  He has been to [facility] many years ago.  I think it was almost 5.  We do have records in the chart of having tried ropinirole, gabapentin and diazepam.  None of these medications had any effect on his restless legs.

## 2018-05-14 ENCOUNTER — OFFICE VISIT (OUTPATIENT)
Dept: CARDIOLOGY | Facility: CLINIC | Age: 83
End: 2018-05-14
Attending: INTERNAL MEDICINE
Payer: COMMERCIAL

## 2018-05-14 VITALS
DIASTOLIC BLOOD PRESSURE: 60 MMHG | OXYGEN SATURATION: 96 % | SYSTOLIC BLOOD PRESSURE: 128 MMHG | BODY MASS INDEX: 29.18 KG/M2 | HEART RATE: 66 BPM | WEIGHT: 197 LBS | HEIGHT: 69 IN

## 2018-05-14 DIAGNOSIS — E78.5 HYPERLIPIDEMIA LDL GOAL <130: Chronic | ICD-10-CM

## 2018-05-14 DIAGNOSIS — I10 ESSENTIAL HYPERTENSION WITH GOAL BLOOD PRESSURE LESS THAN 140/90: Chronic | ICD-10-CM

## 2018-05-14 DIAGNOSIS — I25.84 CORONARY ARTERY DISEASE DUE TO CALCIFIED CORONARY LESION: ICD-10-CM

## 2018-05-14 DIAGNOSIS — I25.10 CORONARY ARTERY DISEASE DUE TO CALCIFIED CORONARY LESION: ICD-10-CM

## 2018-05-14 PROCEDURE — 99214 OFFICE O/P EST MOD 30 MIN: CPT | Performed by: NURSE PRACTITIONER

## 2018-05-14 NOTE — PROGRESS NOTES
Cardiology Clinic Progress Note  Korey Pearson MRN# [id number]   YOB: 1929 Age: 88 year old     Reason for visit: Chest pain           Assessment and Plan:   1. Coronary artery disease with recurrent chest discomfort    S/p PCI and ALFRED x 2 to LAD. Residual subtotal occlusion of D1 in December of 2016    Status post in-stent restenosis of LAD stenting treated with laser atherectomy in August 2017    Transition from Brilinta to Plavix due to cost and dyspnea     No recurrent anginal symptoms    Follow-up in August to discuss discontinuation of Plavix    2. Hyperlipidemia, LDL goal <70    Last LDL 59 3/2017    Continue Lipitor 20 mg daily      3. Hypertension    Controlled on current medical therapy                   History of Presenting Illness:    Korey Pearson is a very pleasant 88 year old patient of Dr. Patel who presents today for a follow up after medication titration. He is accompanied by his wife. He also has a past medical history of CAD s/p PCI of the LAD in 12/2016 and in-stent restenosis treated with laser atherectomy in 8/2017, hyperlipidemia, hypertension, GERD and hypothyroidism.     At his last visit with Dr. Patel in February 2018 he had complaints of fatigue and dyspnea on exertion that was thought possibly secondary to beta blockade as well as Brilinta.  His Toprol was decreased from 25 mg twice daily to 12.5 mg daily.  His Brilinta was also transitioned to Plavix due to cost and dyspnea.    Today in clinic he states that he has had no change in symptoms with the medication changes as above.  His major complaint today is restless legs that keep him up at night.  He states he takes a over-the-counter sleeping tablet and he feels like his fatigue is related to sleeping tablet and would like to sleep from his restless legs.  He denies any anginal type symptoms including chest burning with minimal exertion that was his initial presenting symptom.  He does not have any  "lower extremity edema, varicosities venous stasis changes that would indicate venous insufficiency.  He is likely in the seek a second opinion of a different neurologist in order for treatment options.  If in the future he would like to have a venous insufficiency study to see if is contributing to his restless legs, would be happy to facility.          This note was completed in part using Dragon voice recognition software. Although reviewed after completion, some word and grammatical errors may occur       Review of Systems:   Review of Systems:  Skin:  Positive for bruising   Eyes:  Positive for glasses  ENT:  Positive for hearing loss;nasal congestion;postnasal drainage  Respiratory:  Positive for dyspnea on exertion  Cardiovascular:  Negative;chest pain;palpitations;syncope or near-syncope;cyanosis;edema;lightheadedness;dizziness Positive for;lightheadedness  Gastroenterology: Negative abdominal pain  Genitourinary:  Negative    Musculoskeletal:  Negative arthritis  Neurologic:  Positive for numbness or tingling of feet  Psychiatric:  Positive for anxiety;depression  Heme/Lymph/Imm:  Positive for allergies  Endocrine:  Positive for thyroid disorder              Physical Exam:     Vitals: /60 (BP Location: Right arm, Patient Position: Chair, Cuff Size: Adult Regular)  Pulse 66  Ht 1.74 m (5' 8.5\")  Wt 89.4 kg (197 lb)  SpO2 96%  BMI 29.52 kg/m2  Constitutional:  cooperative, alert and oriented, well developed, well nourished, in no acute distress   Hard of hearing    Skin:  warm and dry to the touch, no apparent skin lesions or masses noted        Head:  normocephalic, no masses or lesions        Eyes:  pupils equal and round        ENT:  no pallor or cyanosis, dentition good        Chest:  clear to auscultation;normal symmetry        Cardiac: regular rhythm;normal S1 and S2;no murmurs, gallops or rubs detected                  Abdomen:  abdomen soft   benign    Extremities and Back:  no edema;no " deformities, clubbing, cyanosis, erythema observed        Neurological:  no gross motor deficits;affect appropriate                 Medications:     Current Outpatient Prescriptions   Medication Sig Dispense Refill     aspirin 81 MG tablet Take 81 mg by mouth daily        atorvastatin (LIPITOR) 20 MG tablet Take 1 tablet (20 mg) by mouth At Bedtime 90 tablet 0     clopidogrel (PLAVIX) 75 MG tablet Take 1 tablet (75 mg) by mouth daily 90 tablet 3     diazepam (VALIUM) 5 MG tablet TAKE ONE TABLET BY MOUTH EVERY 8 HOURS AS NEEDED FOR ANXIETY 90 tablet 3     DiphenhydrAMINE HCl, Sleep, (NIGHTTIME SLEEP AID) 50 MG CAPS Take 1 capsule by mouth as needed  30 capsule      isosorbide mononitrate (IMDUR) 30 MG 24 hr tablet Take 1 tablet (30 mg) by mouth daily In the morning 90 tablet 3     levothyroxine (SYNTHROID/LEVOTHROID) 75 MCG tablet TAKE ONE TABLET BY MOUTH ONCE DAILY 90 tablet 2     lisinopril (PRINIVIL/ZESTRIL) 10 MG tablet Take 1 tablet (10 mg) by mouth daily 90 tablet 3     metoprolol succinate (TOPROL-XL) 25 MG 24 hr tablet Take 0.5 tablets (12.5 mg) by mouth daily 1 tablet 0     polyethylene glycol (MIRALAX/GLYCOLAX) powder Take 17 g by mouth daily as needed for constipation       prune juice LIQD Take 5 mLs by mouth daily              Family History   Problem Relation Age of Onset     CEREBROVASCULAR DISEASE Mother      HEART DISEASE Father      DIABETES No family hx of      Coronary Artery Disease No family hx of      Hypertension No family hx of      Hyperlipidemia No family hx of      Breast Cancer No family hx of      Colon Cancer No family hx of      Prostate Cancer No family hx of      Other Cancer No family hx of      Depression No family hx of      Anxiety Disorder No family hx of      MENTAL ILLNESS No family hx of      Substance Abuse No family hx of      Anesthesia Reaction No family hx of      Asthma No family hx of      OSTEOPOROSIS No family hx of      Genetic Disorder No family hx of      Thyroid  Disease No family hx of      Obesity No family hx of      Unknown/Adopted No family hx of        Social History     Social History     Marital status:      Spouse name: N/A     Number of children: N/A     Years of education: N/A     Occupational History     Not on file.     Social History Main Topics     Smoking status: Former Smoker     Packs/day: 1.50     Years: 10.00     Types: Cigarettes     Quit date: 1/1/1957     Smokeless tobacco: Never Used     Alcohol use 0.0 oz/week     0 Standard drinks or equivalent per week      Comment: 1 beer per week     Drug use: No     Sexual activity: No     Other Topics Concern     Parent/Sibling W/ Cabg, Mi Or Angioplasty Before 65f 55m? Yes     Special Diet No     Exercise No     Social History Narrative            Past Medical History:     Past Medical History:   Diagnosis Date     Anxiety disorder      Cancer (H) skin cancer both ears     Coronary artery disease      Diverticulosis of colon      Erythrocytosis      Hearing loss      Hyperlipidemia      Hypertension      Hypothyroid      Peripheral neuropathy 2012     Restless leg syndrome      Vitamin D deficiency               Past Surgical History:     Past Surgical History:   Procedure Laterality Date     ABDOMEN SURGERY  appy, hernia     ANGIOGRAM  08/21/2017     APPENDECTOMY  1941     CARDIAC SURGERY      2 stents  dec 2016     CATARACT IOL, RT/LT  1993     CORONARY ANGIOGRAPHY ADULT ORDER       HERNIA REPAIR      left inguinal hernia 1960's              Allergies:   Ropinirole       Data:   All laboratory data reviewed:    Last Basic Metabolic Panel:  Lab Results   Component Value Date     08/21/2017      Lab Results   Component Value Date    POTASSIUM 4.3 08/21/2017     Lab Results   Component Value Date    CHLORIDE 99 08/21/2017     Lab Results   Component Value Date    OMAR 8.8 08/21/2017     Lab Results   Component Value Date    CO2 25 08/21/2017     Lab Results   Component Value Date    BUN 13  08/21/2017     Lab Results   Component Value Date    CR 0.82 08/21/2017     Lab Results   Component Value Date     08/21/2017     Lab Results   Component Value Date    CHOL 115 03/09/2017     Lab Results   Component Value Date    HDL 38 03/09/2017     Lab Results   Component Value Date    LDL 59 03/09/2017     Lab Results   Component Value Date    TRIG 92 03/09/2017     Lab Results   Component Value Date    CHOLHDLRATIO 4.4 08/06/2014     RADAMES HELTON, CNP

## 2018-05-14 NOTE — MR AVS SNAPSHOT
After Visit Summary   5/14/2018    Korey Pearson    MRN: [id number]           Patient Information     Date Of Birth          8/31/1929        Visit Information        Provider Department      5/14/2018 1:00 PM Bronwyn Lee APRN CNP [facility]        Today's Diagnoses     Coronary artery disease due to calcified coronary lesion        Hyperlipidemia LDL goal <130 on a statin        Essential hypertension with goal blood pressure less than 140/90          Care Instructions    Today's Recommendations    1. No changes in medications  2. Continue all other medications without changes.  3. Please follow up with Bronwyn Lee in August.    Please send a [employer] message or call [phone] with questions or concerns.     Scheduling number [phone].            Follow-ups after your visit        Additional Services     Follow-Up with Cardiac Advanced Practice Provider                 Future tests that were ordered for you today     Open Future Orders        Priority Expected Expires Ordered    Follow-Up with Cardiac Advanced Practice Provider Routine 8/12/2018 5/14/2019 5/14/2018            Who to contact     If you have questions or need follow up information about today's clinic visit or your schedule please contact [facility] directly at [phone].  Normal or non-critical lab and imaging results will be communicated to you by [employer], letter or phone within 4 business days after the clinic has received the results. If you do not hear from us within 7 days, please contact the clinic through [employer] or phone. If you have a critical or abnormal lab result, we will notify you by phone as soon as possible.  Submit refill requests through [employer] or call your pharmacy and they will forward the refill request to us. Please allow 3 business days for your refill to be completed.          Additional Information About  "Your Visit        MyChart Information     [employer] gives you secure access to your electronic health record. If you see a primary care provider, you can also send messages to your care team and make appointments. If you have questions, please call your primary care clinic.  If you do not have a primary care provider, please call [phone] and they will assist you.        Care EveryWhere ID     This is your Care EveryWhere ID. This could be used by other organizations to access your [place] medical records  [id number]        Your Vitals Were     Pulse Height Pulse Oximetry BMI (Body Mass Index)          66 1.74 m (5' 8.5\") 96% 29.52 kg/m2         Blood Pressure from Last 3 Encounters:   05/14/18 128/60   03/21/18 120/70   02/28/18 128/67    Weight from Last 3 Encounters:   05/14/18 89.4 kg (197 lb)   03/21/18 90.3 kg (199 lb)   02/28/18 89 kg (196 lb 1.6 oz)              We Performed the Following     Follow-Up with Cardiac Advanced Practice Provider        Primary Care Provider Office Phone # Fax #    Liberty Calhoun PA-C [phone]       [address] [facility] [place] [place] [address]        Equal Access to Services     ROBY VIERA : Hadii aad ku hadasho Soomaali, waaxda luqadaha, qaybta kaalmada adeegyada, waxay idiin hayrobinn dena rosario. So [facility] [phone].    ATENCIÓN: Si habla español, tiene a albarran disposición servicios gratuitos de asistencia lingüística. Llame al [phone].    We comply with applicable federal civil rights laws and [place] laws. We do not discriminate on the basis of race, color, national origin, age, disability, sex, sexual orientation, or gender identity.            Thank you!     Thank you for choosing [facility]  for your care. Our goal is always to provide you with excellent care. Hearing back from our patients is one way we can continue to improve our services. Please take a few minutes to complete " the written survey that you may receive in the mail after your visit with us. Thank you!             Your Updated Medication List - Protect others around you: Learn how to safely use, store and throw away your medicines at [email or web address].          This list is accurate as of 5/14/18  1:18 PM.  Always use your most recent med list.                   Brand Name Dispense Instructions for use Diagnosis    aspirin 81 MG tablet      Take 81 mg by mouth daily        atorvastatin 20 MG tablet    LIPITOR    90 tablet    Take 1 tablet (20 mg) by mouth At Bedtime    Unstable angina (H), Hyperlipidemia LDL goal <130       clopidogrel 75 MG tablet    PLAVIX    90 tablet    Take 1 tablet (75 mg) by mouth daily    Coronary artery disease due to calcified coronary lesion       diazepam 5 MG tablet    VALIUM    90 tablet    TAKE ONE TABLET BY MOUTH EVERY 8 HOURS AS NEEDED FOR ANXIETY    Generalized anxiety disorder       isosorbide mononitrate 30 MG 24 hr tablet    IMDUR    90 tablet    Take 1 tablet (30 mg) by mouth daily In the morning    Coronary artery disease of native artery of native heart with stable angina pectoris (H)       levothyroxine 75 MCG tablet    SYNTHROID/LEVOTHROID    90 tablet    TAKE ONE TABLET BY MOUTH ONCE DAILY    Acquired hypothyroidism       lisinopril 10 MG tablet    PRINIVIL/ZESTRIL    90 tablet    Take 1 tablet (10 mg) by mouth daily    Benign essential hypertension       metoprolol succinate 25 MG 24 hr tablet    TOPROL-XL    1 tablet    Take 0.5 tablets (12.5 mg) by mouth daily    Unstable angina (H)       NIGHTTIME SLEEP AID 50 MG Caps   Generic drug:  DiphenhydrAMINE HCl (Sleep)     30 capsule    Take 1 capsule by mouth as needed    Insomnia       polyethylene glycol powder    MIRALAX/GLYCOLAX     Take 17 g by mouth daily as needed for constipation        prune juice Liqd      Take 5 mLs by mouth daily

## 2018-05-14 NOTE — LETTER
5/14/2018    Liberty Calhoun PA-C  [address]  [facility] [address]    RE: Korey Pearson       Dear Colleague,    I had the pleasure of seeing Korey Pearson in the [facility].    Cardiology Clinic Progress Note  Korey Pearson MRN# [id number]   YOB: 1929 Age: 88 year old     Reason for visit: Chest pain           Assessment and Plan:   1. Coronary artery disease with recurrent chest discomfort    S/p PCI and ALFRED x 2 to LAD. Residual subtotal occlusion of D1 in December of 2016    Status post in-stent restenosis of LAD stenting treated with laser atherectomy in August 2017    Transition from Brilinta to Plavix due to cost and dyspnea     No recurrent anginal symptoms    Follow-up in August to discuss discontinuation of Plavix    2. Hyperlipidemia, LDL goal <70    Last LDL 59 3/2017    Continue Lipitor 20 mg daily      3. Hypertension    Controlled on current medical therapy                   History of Presenting Illness:    Korey Pearson is a very pleasant 88 year old patient of Dr. Patel who presents today for a follow up after medication titration. He is accompanied by his wife. He also has a past medical history of CAD s/p PCI of the LAD in 12/2016 and in-stent restenosis treated with laser atherectomy in 8/2017, hyperlipidemia, hypertension, GERD and hypothyroidism.     At his last visit with Dr. Patel in February 2018 he had complaints of fatigue and dyspnea on exertion that was thought possibly secondary to beta blockade as well as Brilinta.  His Toprol was decreased from 25 mg twice daily to 12.5 mg daily.  His Brilinta was also transitioned to Plavix due to cost and dyspnea.    Today in clinic he states that he has had no change in symptoms with the medication changes as above.  His major complaint today is restless legs that keep him up at night.  He states he takes a over-the-counter  "sleeping tablet and he feels like his fatigue is related to sleeping tablet and would like to sleep from his restless legs.  He denies any anginal type symptoms including chest burning with minimal exertion that was his initial presenting symptom.  He does not have any lower extremity edema, varicosities venous stasis changes that would indicate venous insufficiency.  He is likely in the seek a second opinion of a different neurologist in order for treatment options.  If in the future he would like to have a venous insufficiency study to see if is contributing to his restless legs, would be happy to facility.          This note was completed in part using Dragon voice recognition software. Although reviewed after completion, some word and grammatical errors may occur       Review of Systems:   Review of Systems:  Skin:  Positive for bruising   Eyes:  Positive for glasses  ENT:  Positive for hearing loss;nasal congestion;postnasal drainage  Respiratory:  Positive for dyspnea on exertion  Cardiovascular:  Negative;chest pain;palpitations;syncope or near-syncope;cyanosis;edema;lightheadedness;dizziness Positive for;lightheadedness  Gastroenterology: Negative abdominal pain  Genitourinary:  Negative    Musculoskeletal:  Negative arthritis  Neurologic:  Positive for numbness or tingling of feet  Psychiatric:  Positive for anxiety;depression  Heme/Lymph/Imm:  Positive for allergies  Endocrine:  Positive for thyroid disorder              Physical Exam:     Vitals: /60 (BP Location: Right arm, Patient Position: Chair, Cuff Size: Adult Regular)  Pulse 66  Ht 1.74 m (5' 8.5\")  Wt 89.4 kg (197 lb)  SpO2 96%  BMI 29.52 kg/m2  Constitutional:  cooperative, alert and oriented, well developed, well nourished, in no acute distress   Hard of hearing    Skin:  warm and dry to the touch, no apparent skin lesions or masses noted        Head:  normocephalic, no masses or lesions        Eyes:  pupils equal and round    "     ENT:  no pallor or cyanosis, dentition good        Chest:  clear to auscultation;normal symmetry        Cardiac: regular rhythm;normal S1 and S2;no murmurs, gallops or rubs detected                  Abdomen:  abdomen soft   benign    Extremities and Back:  no edema;no deformities, clubbing, cyanosis, erythema observed        Neurological:  no gross motor deficits;affect appropriate                 Medications:     Current Outpatient Prescriptions   Medication Sig Dispense Refill     aspirin 81 MG tablet Take 81 mg by mouth daily        atorvastatin (LIPITOR) 20 MG tablet Take 1 tablet (20 mg) by mouth At Bedtime 90 tablet 0     clopidogrel (PLAVIX) 75 MG tablet Take 1 tablet (75 mg) by mouth daily 90 tablet 3     diazepam (VALIUM) 5 MG tablet TAKE ONE TABLET BY MOUTH EVERY 8 HOURS AS NEEDED FOR ANXIETY 90 tablet 3     DiphenhydrAMINE HCl, Sleep, (NIGHTTIME SLEEP AID) 50 MG CAPS Take 1 capsule by mouth as needed  30 capsule      isosorbide mononitrate (IMDUR) 30 MG 24 hr tablet Take 1 tablet (30 mg) by mouth daily In the morning 90 tablet 3     levothyroxine (SYNTHROID/LEVOTHROID) 75 MCG tablet TAKE ONE TABLET BY MOUTH ONCE DAILY 90 tablet 2     lisinopril (PRINIVIL/ZESTRIL) 10 MG tablet Take 1 tablet (10 mg) by mouth daily 90 tablet 3     metoprolol succinate (TOPROL-XL) 25 MG 24 hr tablet Take 0.5 tablets (12.5 mg) by mouth daily 1 tablet 0     polyethylene glycol (MIRALAX/GLYCOLAX) powder Take 17 g by mouth daily as needed for constipation       prune juice LIQD Take 5 mLs by mouth daily              Family History   Problem Relation Age of Onset     CEREBROVASCULAR DISEASE Mother      HEART DISEASE Father      DIABETES No family hx of      Coronary Artery Disease No family hx of      Hypertension No family hx of      Hyperlipidemia No family hx of      Breast Cancer No family hx of      Colon Cancer No family hx of      Prostate Cancer No family hx of      Other Cancer No family hx of      Depression No  family hx of      Anxiety Disorder No family hx of      MENTAL ILLNESS No family hx of      Substance Abuse No family hx of      Anesthesia Reaction No family hx of      Asthma No family hx of      OSTEOPOROSIS No family hx of      Genetic Disorder No family hx of      Thyroid Disease No family hx of      Obesity No family hx of      Unknown/Adopted No family hx of        Social History     Social History     Marital status:      Spouse name: N/A     Number of children: N/A     Years of education: N/A     Occupational History     Not on file.     Social History Main Topics     Smoking status: Former Smoker     Packs/day: 1.50     Years: 10.00     Types: Cigarettes     Quit date: 1/1/1957     Smokeless tobacco: Never Used     Alcohol use 0.0 oz/week     0 Standard drinks or equivalent per week      Comment: 1 beer per week     Drug use: No     Sexual activity: No     Other Topics Concern     Parent/Sibling W/ Cabg, Mi Or Angioplasty Before 65f 55m? Yes     Special Diet No     Exercise No     Social History Narrative            Past Medical History:     Past Medical History:   Diagnosis Date     Anxiety disorder      Cancer (H) skin cancer both ears     Coronary artery disease      Diverticulosis of colon      Erythrocytosis      Hearing loss      Hyperlipidemia      Hypertension      Hypothyroid      Peripheral neuropathy 2012     Restless leg syndrome      Vitamin D deficiency               Past Surgical History:     Past Surgical History:   Procedure Laterality Date     ABDOMEN SURGERY  appy, hernia     ANGIOGRAM  08/21/2017     APPENDECTOMY  1941     CARDIAC SURGERY      2 stents  dec 2016     CATARACT IOL, RT/LT  1993     CORONARY ANGIOGRAPHY ADULT ORDER       HERNIA REPAIR      left inguinal hernia 1960's              Allergies:   Ropinirole       Data:   All laboratory data reviewed:    Last Basic Metabolic Panel:  Lab Results   Component Value Date     08/21/2017      Lab Results   Component Value  Date    POTASSIUM 4.3 08/21/2017     Lab Results   Component Value Date    CHLORIDE 99 08/21/2017     Lab Results   Component Value Date    OMAR 8.8 08/21/2017     Lab Results   Component Value Date    CO2 25 08/21/2017     Lab Results   Component Value Date    BUN 13 08/21/2017     Lab Results   Component Value Date    CR 0.82 08/21/2017     Lab Results   Component Value Date     08/21/2017     Lab Results   Component Value Date    CHOL 115 03/09/2017     Lab Results   Component Value Date    HDL 38 03/09/2017     Lab Results   Component Value Date    LDL 59 03/09/2017     Lab Results   Component Value Date    TRIG 92 03/09/2017     Lab Results   Component Value Date    CHOLHDLRATIO 4.4 08/06/2014     RADAMES HELTON, CNP        Thank you for allowing me to participate in the care of your patient.      Sincerely,     RADAMES HELTON CNP     [facility]    cc:   Ramirez Patel MD  [address]  [place] [address]

## 2018-05-14 NOTE — LETTER
5/14/2018    Liberty Calhoun PA-C  [address]  [facility] [address]    RE: Korey Pearson       Dear Colleague,    I had the pleasure of seeing Korey Pearson in the [facility].    Cardiology Clinic Progress Note  Korey Pearson MRN# [id number]   YOB: 1929 Age: 88 year old     Reason for visit: Chest pain           Assessment and Plan:   1. Coronary artery disease with recurrent chest discomfort    S/p PCI and ALFRED x 2 to LAD. Residual subtotal occlusion of D1 in December of 2016    Status post in-stent restenosis of LAD stenting treated with laser atherectomy in August 2017    Transition from Brilinta to Plavix due to cost and dyspnea     No recurrent anginal symptoms    Follow-up in August to discuss discontinuation of Plavix    2. Hyperlipidemia, LDL goal <70    Last LDL 59 3/2017    Continue Lipitor 20 mg daily      3. Hypertension    Controlled on current medical therapy                   History of Presenting Illness:    Korey Pearson is a very pleasant 88 year old patient of Dr. Patel who presents today for a follow up after medication titration. He is accompanied by his wife. He also has a past medical history of CAD s/p PCI of the LAD in 12/2016 and in-stent restenosis treated with laser atherectomy in 8/2017, hyperlipidemia, hypertension, GERD and hypothyroidism.     At his last visit with Dr. Patel in February 2018 he had complaints of fatigue and dyspnea on exertion that was thought possibly secondary to beta blockade as well as Brilinta.  His Toprol was decreased from 25 mg twice daily to 12.5 mg daily.  His Brilinta was also transitioned to Plavix due to cost and dyspnea.    Today in clinic he states that he has had no change in symptoms with the medication changes as above.  His major complaint today is restless legs that keep him up at night.  He states he takes a over-the-counter  "sleeping tablet and he feels like his fatigue is related to sleeping tablet and would like to sleep from his restless legs.  He denies any anginal type symptoms including chest burning with minimal exertion that was his initial presenting symptom.  He does not have any lower extremity edema, varicosities venous stasis changes that would indicate venous insufficiency.  He is likely in the seek a second opinion of a different neurologist in order for treatment options.  If in the future he would like to have a venous insufficiency study to see if is contributing to his restless legs, would be happy to facility.          This note was completed in part using Dragon voice recognition software. Although reviewed after completion, some word and grammatical errors may occur       Review of Systems:   Review of Systems:  Skin:  Positive for bruising   Eyes:  Positive for glasses  ENT:  Positive for hearing loss;nasal congestion;postnasal drainage  Respiratory:  Positive for dyspnea on exertion  Cardiovascular:  Negative;chest pain;palpitations;syncope or near-syncope;cyanosis;edema;lightheadedness;dizziness Positive for;lightheadedness  Gastroenterology: Negative abdominal pain  Genitourinary:  Negative    Musculoskeletal:  Negative arthritis  Neurologic:  Positive for numbness or tingling of feet  Psychiatric:  Positive for anxiety;depression  Heme/Lymph/Imm:  Positive for allergies  Endocrine:  Positive for thyroid disorder              Physical Exam:     Vitals: /60 (BP Location: Right arm, Patient Position: Chair, Cuff Size: Adult Regular)  Pulse 66  Ht 1.74 m (5' 8.5\")  Wt 89.4 kg (197 lb)  SpO2 96%  BMI 29.52 kg/m2  Constitutional:  cooperative, alert and oriented, well developed, well nourished, in no acute distress   Hard of hearing    Skin:  warm and dry to the touch, no apparent skin lesions or masses noted        Head:  normocephalic, no masses or lesions        Eyes:  pupils equal and round    "     ENT:  no pallor or cyanosis, dentition good        Chest:  clear to auscultation;normal symmetry        Cardiac: regular rhythm;normal S1 and S2;no murmurs, gallops or rubs detected                  Abdomen:  abdomen soft   benign    Extremities and Back:  no edema;no deformities, clubbing, cyanosis, erythema observed        Neurological:  no gross motor deficits;affect appropriate                 Medications:     Current Outpatient Prescriptions   Medication Sig Dispense Refill     aspirin 81 MG tablet Take 81 mg by mouth daily        atorvastatin (LIPITOR) 20 MG tablet Take 1 tablet (20 mg) by mouth At Bedtime 90 tablet 0     clopidogrel (PLAVIX) 75 MG tablet Take 1 tablet (75 mg) by mouth daily 90 tablet 3     diazepam (VALIUM) 5 MG tablet TAKE ONE TABLET BY MOUTH EVERY 8 HOURS AS NEEDED FOR ANXIETY 90 tablet 3     DiphenhydrAMINE HCl, Sleep, (NIGHTTIME SLEEP AID) 50 MG CAPS Take 1 capsule by mouth as needed  30 capsule      isosorbide mononitrate (IMDUR) 30 MG 24 hr tablet Take 1 tablet (30 mg) by mouth daily In the morning 90 tablet 3     levothyroxine (SYNTHROID/LEVOTHROID) 75 MCG tablet TAKE ONE TABLET BY MOUTH ONCE DAILY 90 tablet 2     lisinopril (PRINIVIL/ZESTRIL) 10 MG tablet Take 1 tablet (10 mg) by mouth daily 90 tablet 3     metoprolol succinate (TOPROL-XL) 25 MG 24 hr tablet Take 0.5 tablets (12.5 mg) by mouth daily 1 tablet 0     polyethylene glycol (MIRALAX/GLYCOLAX) powder Take 17 g by mouth daily as needed for constipation       prune juice LIQD Take 5 mLs by mouth daily              Family History   Problem Relation Age of Onset     CEREBROVASCULAR DISEASE Mother      HEART DISEASE Father      DIABETES No family hx of      Coronary Artery Disease No family hx of      Hypertension No family hx of      Hyperlipidemia No family hx of      Breast Cancer No family hx of      Colon Cancer No family hx of      Prostate Cancer No family hx of      Other Cancer No family hx of      Depression No  family hx of      Anxiety Disorder No family hx of      MENTAL ILLNESS No family hx of      Substance Abuse No family hx of      Anesthesia Reaction No family hx of      Asthma No family hx of      OSTEOPOROSIS No family hx of      Genetic Disorder No family hx of      Thyroid Disease No family hx of      Obesity No family hx of      Unknown/Adopted No family hx of        Social History     Social History     Marital status:      Spouse name: N/A     Number of children: N/A     Years of education: N/A     Occupational History     Not on file.     Social History Main Topics     Smoking status: Former Smoker     Packs/day: 1.50     Years: 10.00     Types: Cigarettes     Quit date: 1/1/1957     Smokeless tobacco: Never Used     Alcohol use 0.0 oz/week     0 Standard drinks or equivalent per week      Comment: 1 beer per week     Drug use: No     Sexual activity: No     Other Topics Concern     Parent/Sibling W/ Cabg, Mi Or Angioplasty Before 65f 55m? Yes     Special Diet No     Exercise No     Social History Narrative            Past Medical History:     Past Medical History:   Diagnosis Date     Anxiety disorder      Cancer (H) skin cancer both ears     Coronary artery disease      Diverticulosis of colon      Erythrocytosis      Hearing loss      Hyperlipidemia      Hypertension      Hypothyroid      Peripheral neuropathy 2012     Restless leg syndrome      Vitamin D deficiency               Past Surgical History:     Past Surgical History:   Procedure Laterality Date     ABDOMEN SURGERY  appy, hernia     ANGIOGRAM  08/21/2017     APPENDECTOMY  1941     CARDIAC SURGERY      2 stents  dec 2016     CATARACT IOL, RT/LT  1993     CORONARY ANGIOGRAPHY ADULT ORDER       HERNIA REPAIR      left inguinal hernia 1960's              Allergies:   Ropinirole       Data:   All laboratory data reviewed:    Last Basic Metabolic Panel:  Lab Results   Component Value Date     08/21/2017      Lab Results   Component Value  Date    POTASSIUM 4.3 08/21/2017     Lab Results   Component Value Date    CHLORIDE 99 08/21/2017     Lab Results   Component Value Date    OMAR 8.8 08/21/2017     Lab Results   Component Value Date    CO2 25 08/21/2017     Lab Results   Component Value Date    BUN 13 08/21/2017     Lab Results   Component Value Date    CR 0.82 08/21/2017     Lab Results   Component Value Date     08/21/2017     Lab Results   Component Value Date    CHOL 115 03/09/2017     Lab Results   Component Value Date    HDL 38 03/09/2017     Lab Results   Component Value Date    LDL 59 03/09/2017     Lab Results   Component Value Date    TRIG 92 03/09/2017     Lab Results   Component Value Date    CHOLHDLRATIO 4.4 08/06/2014     RADAMES HELTON, CNP    Thank you for allowing me to participate in the care of your patient.  Sincerely,     RADAMES HELTON CNP     [facility]

## 2018-05-14 NOTE — PATIENT INSTRUCTIONS
Today's Recommendations    1. No changes in medications  2. Continue all other medications without changes.  3. Please follow up with Bronwyn Lee in August.    Please send a Back& message or call [phone] with questions or concerns.     Scheduling number [phone].

## 2018-05-23 ENCOUNTER — TELEPHONE (OUTPATIENT)
Dept: FAMILY MEDICINE | Facility: CLINIC | Age: 83
End: 2018-05-23

## 2018-05-23 DIAGNOSIS — F41.1 GENERALIZED ANXIETY DISORDER: Chronic | ICD-10-CM

## 2018-05-23 NOTE — TELEPHONE ENCOUNTER
PA Initiation    Medication: Valium  Insurance Company: [employer] - Phone [phone] Fax [phone]  Pharmacy Filling the Rx: [facility] [address] [place] [address]  Filling Pharmacy Phone: [phone]  Filling Pharmacy Fax:    Start Date: 5/23/2018    Central Prior Authorization Team   Phone: [phone]      Manually faxed prior auth form request to [employer].

## 2018-05-23 NOTE — TELEPHONE ENCOUNTER
diazepam (VALIUM) 5 MG tablet  TAKE ONE TABLET BY MOUTH EVERY 8 HOURS AS NEEDED FOR ANXIETY, Disp-90 tablet, R-3, Local Print     Prior authorization needed. The request for authorization will be sent electronically when the order is signed.   Payer: [employer]   Remove

## 2018-05-25 RX ORDER — DIAZEPAM 5 MG
TABLET ORAL
Qty: 90 TABLET | Refills: 3 | Status: SHIPPED | OUTPATIENT
Start: 2018-05-25 | End: 2018-07-03

## 2018-05-25 NOTE — TELEPHONE ENCOUNTER
Message relayed to patient and Rx faxed to [facility]. Fax: [phone].    Princess KAYLAN Palma CMA

## 2018-05-25 NOTE — TELEPHONE ENCOUNTER
Prior Authorization Not Needed per Insurance    Medication: Valium PA Not Needed  Insurance Company: [employer] - Phone [phone] Fax [phone]  Pharm[facility]illing the Rx: [facility] - [address]  Pharmacy Notified: Yes- per pharmacy patient already picked up and paid for the medication, it is currently coming up refill too soon until 6/19/18, no PA needed at this time.

## 2018-06-09 ENCOUNTER — TRANSFERRED RECORDS (OUTPATIENT)
Dept: HEALTH INFORMATION MANAGEMENT | Facility: CLINIC | Age: 83
End: 2018-06-09

## 2018-06-14 ENCOUNTER — TELEPHONE (OUTPATIENT)
Dept: CARDIOLOGY | Facility: CLINIC | Age: 83
End: 2018-06-14

## 2018-06-14 NOTE — TELEPHONE ENCOUNTER
Pts wife called stating that the patient may need to have a tooth pulled and she was wondering if patient will need to hold any of his medications. Writer informed patients wife that the 2 medications she should discuss with the dentist would be the ASA and Plavix. We would like him to stay on them if possible, especially the ASA. Pts wife stated that she will discuss with the dentist. If he is requiring patient to hold the plavix she will call team 4 back. No further questions or concerns.

## 2018-06-21 ENCOUNTER — TELEPHONE (OUTPATIENT)
Dept: FAMILY MEDICINE | Facility: CLINIC | Age: 83
End: 2018-06-21

## 2018-06-21 NOTE — TELEPHONE ENCOUNTER
Date of receipt at location: 6/21/18  [facility] or [facility]? [facility]  Type of form: Health Care directive   Checked over by facilitator? Yes  All pages present? Yes  Any obvious gaps in documentation? No

## 2018-06-25 ENCOUNTER — DOCUMENTATION ONLY (OUTPATIENT)
Dept: OTHER | Facility: CLINIC | Age: 83
End: 2018-06-25

## 2018-06-25 DIAGNOSIS — Z71.89 ACP (ADVANCE CARE PLANNING): Chronic | ICD-10-CM

## 2018-07-03 ENCOUNTER — OFFICE VISIT (OUTPATIENT)
Dept: FAMILY MEDICINE | Facility: CLINIC | Age: 83
End: 2018-07-03
Payer: COMMERCIAL

## 2018-07-03 VITALS
OXYGEN SATURATION: 93 % | SYSTOLIC BLOOD PRESSURE: 130 MMHG | TEMPERATURE: 98.4 F | RESPIRATION RATE: 12 BRPM | DIASTOLIC BLOOD PRESSURE: 60 MMHG | BODY MASS INDEX: 27.85 KG/M2 | WEIGHT: 188 LBS | HEIGHT: 69 IN | HEART RATE: 74 BPM

## 2018-07-03 DIAGNOSIS — I10 BENIGN ESSENTIAL HYPERTENSION: ICD-10-CM

## 2018-07-03 DIAGNOSIS — E78.00 HYPERCHOLESTEROLEMIA: ICD-10-CM

## 2018-07-03 DIAGNOSIS — R10.84 ABDOMINAL PAIN, GENERALIZED: Primary | ICD-10-CM

## 2018-07-03 DIAGNOSIS — R19.4 CHANGE IN BOWEL HABITS: ICD-10-CM

## 2018-07-03 DIAGNOSIS — Z86.0100 HISTORY OF COLONIC POLYPS: ICD-10-CM

## 2018-07-03 DIAGNOSIS — E66.3 OVERWEIGHT (BMI 25.0-29.9): ICD-10-CM

## 2018-07-03 DIAGNOSIS — F41.1 GENERALIZED ANXIETY DISORDER: Chronic | ICD-10-CM

## 2018-07-03 DIAGNOSIS — I77.810 THORACIC AORTIC ECTASIA (H): ICD-10-CM

## 2018-07-03 LAB
ALT SERPL W P-5'-P-CCNC: 28 U/L (ref 0–70)
CHOLEST SERPL-MCNC: 109 MG/DL
HDLC SERPL-MCNC: 42 MG/DL
LDLC SERPL CALC-MCNC: 54 MG/DL
NONHDLC SERPL-MCNC: 67 MG/DL
TRIGL SERPL-MCNC: 66 MG/DL

## 2018-07-03 PROCEDURE — 84460 ALANINE AMINO (ALT) (SGPT): CPT | Performed by: FAMILY MEDICINE

## 2018-07-03 PROCEDURE — 80061 LIPID PANEL: CPT | Performed by: FAMILY MEDICINE

## 2018-07-03 PROCEDURE — 99214 OFFICE O/P EST MOD 30 MIN: CPT | Performed by: FAMILY MEDICINE

## 2018-07-03 PROCEDURE — 36415 COLL VENOUS BLD VENIPUNCTURE: CPT | Performed by: FAMILY MEDICINE

## 2018-07-03 ASSESSMENT — ANXIETY QUESTIONNAIRES
5. BEING SO RESTLESS THAT IT IS HARD TO SIT STILL: MORE THAN HALF THE DAYS
7. FEELING AFRAID AS IF SOMETHING AWFUL MIGHT HAPPEN: NEARLY EVERY DAY
1. FEELING NERVOUS, ANXIOUS, OR ON EDGE: NEARLY EVERY DAY
GAD7 TOTAL SCORE: 18
3. WORRYING TOO MUCH ABOUT DIFFERENT THINGS: NEARLY EVERY DAY
6. BECOMING EASILY ANNOYED OR IRRITABLE: SEVERAL DAYS
2. NOT BEING ABLE TO STOP OR CONTROL WORRYING: NEARLY EVERY DAY
IF YOU CHECKED OFF ANY PROBLEMS ON THIS QUESTIONNAIRE, HOW DIFFICULT HAVE THESE PROBLEMS MADE IT FOR YOU TO DO YOUR WORK, TAKE CARE OF THINGS AT HOME, OR GET ALONG WITH OTHER PEOPLE: SOMEWHAT DIFFICULT

## 2018-07-03 ASSESSMENT — PATIENT HEALTH QUESTIONNAIRE - PHQ9: 5. POOR APPETITE OR OVEREATING: NEARLY EVERY DAY

## 2018-07-03 NOTE — PROGRESS NOTES
"  SUBJECTIVE:   Korey Pearson is a 88 year old male who presents to clinic today for the following health issues:    Abdominal Pain-Periumbilical       Duration: x 2 weeks    Description (location/character/radiation): Mid Abdomen Pain    Has had a hx of recurrent abdominal pain, constipation ;excess gas    Diverticulosis per colonoscopies     Colon polyps since 1990s with last one found in1-15at last scope       Associated flank pain: None    Intensity:  moderate    Accompanying signs and symptoms:        Fever/Chills: no        Gas/Bloating: YES       Nausea/vomitting: YES       Diarrhea: YES-\"gooey\" stools , small amt , pc with cramps up to 4-6x a d       Dysuria or Hematuria: no     History (previous similar pain/trauma/previous testing): None    Precipitating or alleviating factors:       Pain worse with eating/BM/urination: None       Pain relieved by BM: no     Therapies tried and outcome: Milk of Magnesia      .OVERWEIGHT    -BMI=28.3  --sedentary life style   -comorbid hi LDL , HTN ,CAD    Thoracic Aortic Ectasia       Duration: since 1990s     Description (location/character/radiation): above     Intensity:  mild    Accompanying signs and symptoms: 0    History (similar episodes/previous evaluation): None    Precipitating or alleviating factors: None    Therapies tried and outcome: f.u per Card      Hyperlipidemia:LDL Follow-Up      Rate your low fat/cholesterol diet?: good    Taking statin?  Yes, no muscle aches from 20mgm atorvastatin    Other lipid medications/supplements?:  none    Hypertension Follow-up      Outpatient blood pressures are not being checked.   Here < 140/80    Low Salt Diet: no added salt    Vascular Disease Follow-up:  Coronary Artery Disease (CAD)      Chest pain or pressure, left side neck or arm pain: No    Shortness of breath/increased sweats/nausea with exertion: No    Pain in calves walking 1-2 blocks: No    Worsened or new symptoms since last visit: " "No    Nitroglycerin use: no    Daily aspirin use: Yes    Last stents in 12-16    Anxiety Follow-Up    Status since last visit: No change    Other associated symptoms:None    Complicating factors:   Significant life event: No   Current substance abuse: None  Depression symptoms: No  VISHAL-7 SCORE 7/3/2018   Total Score 18       VISHAL-7    Problem list and histories reviewed & adjusted, as indicated.  Additional history: as documented    Labs reviewed in EPIC    Reviewed and updated as needed this visit by clinical staff  Tobacco  Allergies  Meds  Problems  Med Hx  Surg Hx  Fam Hx  Soc Hx        Reviewed and updated as needed this visit by Provider  Allergies  Meds  Problems         ROS:  CONSTITUTIONAL: NEGATIVE for fever, chills, change in weight  INTEGUMENTARY/SKIN: NEGATIVE for worrisome rashes, moles or lesions  EYES: NEGATIVE for vision changes or irritation  ENT/MOUTH: NEGATIVE for ear, mouth and throat problems  RESP: NEGATIVE for significant cough or SOB  BREAST: NEGATIVE for masses, tenderness or discharge  CV: NEGATIVE for chest pain, palpitations or peripheral edema  GI: NEGATIVE for nausea, abdominal pain, heartburn, or change in bowel habits, POSITIVE for , abdominal pain periumbilical, diarrhea, gas or bloating and Hx colon polyps  : NEGATIVE for frequency, dysuria, or hematuria  MUSCULOSKELETAL: NEGATIVE for significant arthralgias or myalgia  NEURO: NEGATIVE for weakness, dizziness or paresthesias  ENDOCRINE: NEGATIVE for temperature intolerance, skin/hair changes  HEME: NEGATIVE for bleeding problems  PSYCHIATRIC: NEGATIVE for changes in mood or affect    OBJECTIVE:     /60  Pulse 74  Temp 98.4  F (36.9  C) (Tympanic)  Resp 12  Ht 5' 8.5\" (1.74 m)  Wt 188 lb (85.3 kg)  SpO2 93%  BMI 28.17 kg/m2  Body mass index is 28.17 kg/(m^2).  GENERAL: healthy, alert, no distress, over weight and elderly  EYES: Eyes grossly normal to inspection, PERRL and conjunctivae and sclerae " normal  NECK: no adenopathy, no asymmetry, masses, or scars and thyroid normal to palpation  RESP: lungs clear to auscultation - no rales, rhonchi or wheezes  CV: regular rate and rhythm, normal S1 S2, no S3 or S4, no murmur, click or rub, no peripheral edema and peripheral pulses strong  ABDOMEN: soft, nontender, no hepatosplenomegaly, no masses and bowel sounds normal-no rebound   MS: no gross musculoskeletal defects noted, no edema  SKIN: no suspicious lesions or rashes  NEURO: Normal strength and tone, mentation intact and speech normal  PSYCH: mentation appears normal, affect normal/bright  LYMPH: no cervical, supraclavicular, axillary, or inguinal adenopathy    Diagnostic Test Results:  Results for orders placed or performed in visit on 07/03/18   Lipid panel reflex to direct LDL Fasting   Result Value Ref Range    Cholesterol 109 <200 mg/dL    Triglycerides 66 <150 mg/dL    HDL Cholesterol 42 >39 mg/dL    LDL Cholesterol Calculated 54 <100 mg/dL    Non HDL Cholesterol 67 <130 mg/dL   ALT   Result Value Ref Range    ALT 28 0 - 70 U/L       ASSESSMENT/PLAN:               ICD-10-CM    1. Abdominal pain, generalized-periumbilical-since mid June , 2018 R10.84    2. Change in bowel habits since mid June , 2018 R19.4 GASTROENTEROLOGY ADULT REF CONSULT ONLY   3. History of colonic polyps since 1995 w one/ colonoscopy 1-15 Z86.010 GASTROENTEROLOGY ADULT REF CONSULT ONLY   4. Thoracic aortic ectasia (H)  I77.810    5. Hypercholesterolemia E78.00 Lipid panel reflex to direct LDL Fasting     ALT   6. Benign essential hypertension I10    7. Overweight (BMI 25.0-29.9) E66.3    8. Generalized anxiety disorder F41.1        Patient Instructions   1. Shingrex is a 2 shot series that prevents shingles 97% of the time, as opposed to the old shingles shot that only prevented it at 40-50%  It costs less for medicare at a pharmacy  You should get it starting at 50 yrs old     2.  Weight Loss Tips  1. Do not eat after 6 hrs before  your expected bedtime  2. Have your heaviest meal for breakfast, a slightly lighter meal at lunch and a snack 6 hrs before bed  3. No sugar/calorie drinks except milk ie no fruit juice, pop, alcohol.  4. Drink milk 30min before meals to decrease your hunger. Also it is excellent as part of your last meal of the day snack  5. Drink lots of water  6. Increase fiber in diet: all bran cereal, salads, popcorn etc  7. Have only one small serving of fruit a day about 1/2 cup (as this is high in sugar)  8. EXERCISE is the bottom line. Without it, you will gain weight even on a low calorie diet. Best if done 2-3X a day as can    Being overweight contributes to high blood pressure and high cholesterol, both of which cause heart attacks, strokes and kidney failure, prediabetes and diabetes, arthritis, and liver disease       given options  Re colonoscopy or GI eval and would like GI eval 1st     Frieda Khan MD  [facility]    . Weight Loss Tips  1. Do not eat after 6 hrs before your expected bedtime  2. Have your heaviest meal for breakfast, a slightly lighter meal at lunch and a snack 6 hrs before bed  3. No sugar/calorie drinks except milk ie no fruit juice, pop, alcohol.  4. Drink milk 30min before meals to decrease your hunger. Also it is excellent as part of your last meal of the day snack  5. Drink lots of water  6. Increase fiber in diet: all bran cereal, salads, popcorn etc  7. Have only one small serving of fruit a day about 1/2 cup (as this is high in sugar)  8. EXERCISE is the bottom line. Without it, you will gain weight even on a low calorie diet. Best if done 2-3X a day as can    Being overweight contributes to high blood pressure and high cholesterol, both of which cause heart attacks, strokes and kidney failure, prediabetes and diabetes, arthritis, and liver disease     Weight management plan: Discussed healthy diet and exercise guidelines and patient will follow up in 3 months  in clinic to re-evaluate.    Frieda Khan MD

## 2018-07-03 NOTE — PATIENT INSTRUCTIONS
1. Shingrex is a 2 shot series that prevents shingles 97% of the time, as opposed to the old shingles shot that only prevented it at 40-50%  It costs less for medicare at a pharmacy  You should get it starting at 50 yrs old     2.  Weight Loss Tips  1. Do not eat after 6 hrs before your expected bedtime  2. Have your heaviest meal for breakfast, a slightly lighter meal at lunch and a snack 6 hrs before bed  3. No sugar/calorie drinks except milk ie no fruit juice, pop, alcohol.  4. Drink milk 30min before meals to decrease your hunger. Also it is excellent as part of your last meal of the day snack  5. Drink lots of water  6. Increase fiber in diet: all bran cereal, salads, popcorn etc  7. Have only one small serving of fruit a day about 1/2 cup (as this is high in sugar)  8. EXERCISE is the bottom line. Without it, you will gain weight even on a low calorie diet. Best if done 2-3X a day as can    Being overweight contributes to high blood pressure and high cholesterol, both of which cause heart attacks, strokes and kidney failure, prediabetes and diabetes, arthritis, and liver disease

## 2018-07-03 NOTE — NURSING NOTE
"Chief Complaint   Patient presents with     Abdominal Pain     /60  Pulse 74  Temp 98.4  F (36.9  C) (Tympanic)  Resp 12  Ht 5' 8.5\" (1.74 m)  Wt 188 lb (85.3 kg)  SpO2 93%  BMI 28.17 kg/m2 Estimated body mass index is 28.17 kg/(m^2) as calculated from the following:    Height as of this encounter: 5' 8.5\" (1.74 m).    Weight as of this encounter: 188 lb (85.3 kg).  BP completed using cuff size: jonna Lovell CMA    There are no preventive care reminders to display for this patient.  Health Maintenance reviewed at today's visit patient asked to schedule/complete:   None, Health Maintenance up to date.    "

## 2018-07-03 NOTE — LETTER
July 5, 2018      Korey Solitario Pearson  [address] [facility] [address]        Dear ,    We are writing to inform you of your test results.    They are all normal     THE FOLLOWING ARE EXPLANATIONS OF SOME OF OUR LAB TESTS     YOU DID NOT NECESSARILY HAVE ALL OF THESE DONE     Hgb is the blood iron level   WBC means White Blood Cells   Platelets are small blood cells that help with forming the blood clots along with other blood factors.   Electrolytes are Sodium, Potassium, Calcium, Magnesium, Phosphorus.   Liver tests are: AST, ALT, Bilirubin, Alkaline Phosphatase.   Kidney tests are Creatinine, GFR.   HDL Cholesterol - is the good cholesterol and it is good to have it high.   LDL cholesterol is the bad cholesterol and it is good to have it low.   It is recommended to have LDL less than 130 for people with hypertension and to have it less than 100 for people with heart disease, diabetes and chronic kidney disease.   Triglycerides are another type of lipid that can cause heart disease, like the cholesterol and should be kept low   Thyroid tests are TSH, T4, T3   Glucose is sugar.   A1c is a test that gives us an idea about how well was controlled the diabetes for the last 3 months.   PSA stands for Prostate Specific Antigen and it can be elevated with prostate cancer or prostate inflammation.     Please continue on the same medications     Looks good !!!!   Be sure that [facility] writes back to me after they see you   I am hoping that the bowel problems just all go away     Resulted Orders   Lipid panel reflex to direct LDL Fasting   Result Value Ref Range    Cholesterol 109 <200 mg/dL    Triglycerides 66 <150 mg/dL      Comment:      Non Fasting    HDL Cholesterol 42 >39 mg/dL    LDL Cholesterol Calculated 54 <100 mg/dL      Comment:      Desirable:       <100 mg/dl    Non HDL Cholesterol 67 <130 mg/dL   ALT   Result Value Ref Range    ALT 28 0 - 70 U/L       If you have any questions or  concerns, please call the clinic at the number listed above.       Sincerely,        Frieda Khan MD

## 2018-07-04 PROBLEM — E66.3 OVERWEIGHT (BMI 25.0-29.9): Status: ACTIVE | Noted: 2018-07-04

## 2018-07-04 PROBLEM — R19.4 CHANGE IN BOWEL HABITS: Status: ACTIVE | Noted: 2018-07-04

## 2018-07-04 PROBLEM — I77.810 THORACIC AORTIC ECTASIA (H): Status: ACTIVE | Noted: 2018-07-04

## 2018-07-04 ASSESSMENT — ANXIETY QUESTIONNAIRES: GAD7 TOTAL SCORE: 18

## 2018-07-05 NOTE — PROGRESS NOTES
.  Please see attached lab results  They are all normal     THE FOLLOWING ARE EXPLANATIONS OF SOME OF OUR LAB TESTS     YOU DID NOT NECESSARILY HAVE ALL OF THESE DONE     Hgb is the blood iron level  WBC means White Blood Cells  Platelets are small blood cells that help with forming the blood clots along with other blood factors.  Electrolytes are Sodium, Potassium, Calcium, Magnesium, Phosphorus.  Liver tests are: AST, ALT, Bilirubin, Alkaline Phosphatase.  Kidney tests are Creatinine, GFR.  HDL Cholesterol - is the good cholesterol and it is good to have it high.  LDL cholesterol is the bad cholesterol and it is good to have it low.  It is recommended to have LDL less than 130 for people with hypertension and to have it less than 100 for people with heart disease, diabetes and chronic kidney disease.  Triglycerides are another type of lipid that can cause heart disease, like the cholesterol and should be kept low   Thyroid tests are TSH, T4, T3  Glucose is sugar.  A1c is a test that gives us an idea about how well was controlled the diabetes for the last 3 months.   PSA stands for Prostate Specific Antigen and it can be elevated with prostate cancer or prostate inflammation.    Please continue on the same medications    Looks good !!!!  Be sure that [facility] writes back to me after they see you  I am hoping that the bowel problems just all go away

## 2018-07-08 ENCOUNTER — HOSPITAL ENCOUNTER (EMERGENCY)
Facility: CLINIC | Age: 83
Discharge: HOME | End: 2018-07-08
Attending: EMERGENCY MEDICINE | Admitting: EMERGENCY MEDICINE
Payer: COMMERCIAL

## 2018-07-08 ENCOUNTER — APPOINTMENT (OUTPATIENT)
Dept: GENERAL RADIOLOGY | Facility: CLINIC | Age: 83
End: 2018-07-08
Attending: EMERGENCY MEDICINE
Payer: COMMERCIAL

## 2018-07-08 VITALS
TEMPERATURE: 97.9 F | RESPIRATION RATE: 16 BRPM | BODY MASS INDEX: 29.6 KG/M2 | HEART RATE: 60 BPM | SYSTOLIC BLOOD PRESSURE: 104 MMHG | OXYGEN SATURATION: 97 % | WEIGHT: 188.6 LBS | HEIGHT: 67 IN | DIASTOLIC BLOOD PRESSURE: 62 MMHG

## 2018-07-08 DIAGNOSIS — K59.00 CONSTIPATION, UNSPECIFIED CONSTIPATION TYPE: ICD-10-CM

## 2018-07-08 PROCEDURE — 99283 EMERGENCY DEPT VISIT LOW MDM: CPT

## 2018-07-08 PROCEDURE — 25000132 ZZH RX MED GY IP 250 OP 250 PS 637: Performed by: EMERGENCY MEDICINE

## 2018-07-08 PROCEDURE — 74019 RADEX ABDOMEN 2 VIEWS: CPT

## 2018-07-08 RX ORDER — MAGNESIUM CARB/ALUMINUM HYDROX 105-160MG
296 TABLET,CHEWABLE ORAL ONCE
Status: COMPLETED | OUTPATIENT
Start: 2018-07-08 | End: 2018-07-08

## 2018-07-08 RX ORDER — MAGNESIUM CARB/ALUMINUM HYDROX 105-160MG
TABLET,CHEWABLE ORAL
Qty: 2 BOTTLE | Refills: 1 | Status: SHIPPED | OUTPATIENT
Start: 2018-07-08 | End: 2018-09-11

## 2018-07-08 RX ORDER — ASPIRIN 81 MG
100 TABLET, DELAYED RELEASE (ENTERIC COATED) ORAL DAILY
Qty: 60 TABLET | Refills: 1 | Status: SHIPPED | OUTPATIENT
Start: 2018-07-08 | End: 2018-10-22

## 2018-07-08 RX ADMIN — MAGESIUM CITRATE 296 ML: 1.75 LIQUID ORAL at 07:01

## 2018-07-08 RX ADMIN — DOCUSATE SODIUM 286 ML: 10 LIQUID ORAL at 07:01

## 2018-07-08 ASSESSMENT — ENCOUNTER SYMPTOMS
CONSTIPATION: 1
NAUSEA: 1
ABDOMINAL PAIN: 1

## 2018-07-08 NOTE — ED PROVIDER NOTES
History     Chief Complaint:  Constipation    HPI   Korey Pearson is a 88 year old male with a medical history of diverticulosis, colon polyps, and chronic constipation who presents with constipation. The patient reports that three days ago he became constipated with subsequent episodic nausea and abdominal pain, prompting his presentation here by spouse. He states that he has not produced a bowel since onset. He feels like his bowels are stuck somewhere with gas build up but has continued to pass flatulence. He describes his constant pain as located diffusely across his abdomen with increased discomfort to left lower quadrant and gas build up produces discomfort to this generalized region. Of note, he mentioned that he took a small portion of milk of magnesium yesterday to help pass his bowels but notes usually developing abdominal cramps with use.       Allergies:  Ropinirole    Medications:    Aspirin  Lipitor  Plavix  Valium  Diphenhydramine  IMDUR  Synthroid  Toprol  Miralax  Prune Juice    Past Medical History:    Overweight (BMI 25.0-29.9)  Change in bowel habits since mid June , 2018  Thoracic aortic ectasia  History of colonic polyps since 1995  Hypercholesterolemia  Raynaud's disease without gangrene  Chest pain  Insomnia  ACP  Abdominal pain, generalized  Diverticulosis of large intestine without hemorrhage  Dizziness  Constipation  Flatulence, eructation, and gas pain  Gastroesophageal reflux disease without esophagitis  Restless legs syndrome with nocturnal myoclonus  Degenerative arthritis of left knee  Hearing loss  Vitamin D deficiency  Peripheral neuropathy  Acquired hypothyroidism  Generalized anxiety disorder  CAD  Cancer  Hypertension  Hyperlipidemia  Erythrocytosis    Past Surgical History:    Abdomen Surgery  Angiogram  Appendectomy  Cardiac Surgery (2 Stents, Dec 2016)  Cataract IOL, Bilateral  Coronary Angiography Adult Order  Left Inguinal Hernia Repair    Family History:   "  Mother: CVD  Father: Heart Disease    Social History:  Marital Status:     Tobacco Status: Former 1.5 PPD Smoker of 10 Years, Quit in 1957  Alcohol Use: 0-1 Standard drinks or equivalent per week  Presents With: Spouse         Review of Systems   Gastrointestinal: Positive for abdominal pain, constipation and nausea.   All other systems reviewed and are negative.    Physical Exam     Patient Vitals for the past 24 hrs:   BP Temp Temp src Heart Rate Resp SpO2 Height Weight   07/08/18 0637 156/69 97.9  F (36.6  C) Oral 75 20 97 % 1.702 m (5' 7\") 85.5 kg (188 lb 9.6 oz)       Physical Exam  General/Appearance: appears stated age, well-groomed, appears comfortable  Eyes: EOMI, no scleral injection, no icterus  ENT: MMM  Neck: supple, nl ROM, no stiffness  Cardiovascular: RRR, nl S1S2, no m/r/g, 2+ pulses in all 4 extremities, cap refill <2sec  Respiratory: CTAB, good air movement throughout, no wheezes/rhonchi/rales, no increased WOB, no retractions  Back: no lesions  GI: abd soft, non-distended, nttp,  no HSM, no rebound, no guarding, nl BS  MSK: BARDALES, good tone, no bony abnormality  Skin: warm and well-perfused, no rash, no edema, no ecchymosis, nl turgor  Neuro: GCS 15, alert and oriented, no gross focal neuro deficits  Psych: interacts appropriately  Heme: no petechia, no purpura, no active bleeding    Emergency Department Course     Imaging:  Radiographic findings were communicated with the patient who voiced understanding of the findings.  X-ray Abdomen, 2 views (Flat & Upright):  IMPRESSION: There appears to be an air-fluid level in the stomach. No  evidence of bowel dilatation. No definite free air. No suspicious  abdominal calcifications.   Result per radiology.     Interventions:  0701: Magnesium Citrate Solution 296 ml Oral  0701: Pink Lady Enema 286 ml Rectally    Emergency Department Course:  Past medical records, nursing notes, and vitals reviewed.  0640: I performed an exam of the patient and " obtained history, as documented above.   The patient was sent for a Abdomen X-ray while in the emergency department, findings above.  0858: I rechecked the patient. The patient is feeling better.   0906: I rechecked the patient. Findings and plan explained to the Patient and spouse. Patient discharged home with instructions regarding supportive care, medications, and reasons to return. The importance of close follow-up was reviewed.     Impression & Plan      Medical Decision Making:  Korey Pearson is a 88 year old male who presents with diffuse abdominal discomfort and no bowel movement in 4-5 days.  His abdominal exam is fairly benign without any tenderness to palpation, increased firmness.  He has been afebrile.  KUB upright does not show evidence of bowel obstruction.  His history is consistent with constipation.  After pink lady enema and oral magnesium citrate he did have a significant bowel movement.  He feels slightly better and feels comfortable with home management.  I am going to have him take magnesium citrate twice daily until he has a large bowel movement and then stop it at that point.  Additionally we will start him on Colace as a general stool softener.    Diagnosis:    ICD-10-CM    1. Constipation, unspecified constipation type K59.00        Disposition:  discharged to home with spouse    Discharge Medications:  New Prescriptions    DOCUSATE SODIUM (COLACE) 100 MG TABLET    Take 100 mg by mouth daily    MAGNESIUM CITRATE 1.745 GM/30ML SOLUTION    Drink one bottle twice daily until you have a large bowel movement. Then stop this medication.         Angel Ross  7/8/2018    EMERGENCY DEPARTMENT  I, Angel Ross, am serving as a scribe at 6:40 AM on 7/8/2018 to document services personally performed by Ana Paula Mckay MD based on my observations and the provider's statements to me.         Ana Paula Mckay MD  07/08/18 0881

## 2018-07-08 NOTE — ED AVS SNAPSHOT
Emergency Department    [address] [facility] [address]    Phone:  [phone]    Fax:  [phone]                                       Korey Pearson   MRN: [id number]    Department:   Emergency Department   Date of Visit:  7/8/2018           Patient Information     Date Of Birth          8/31/1929        Your diagnoses for this visit were:     Constipation, unspecified constipation type        You were seen by Ana Paula Mckay MD.      Follow-up Information     Follow up with Liberty Calhoun PA-C.    Specialty:  Physician Assistant    Why:  As needed, If symptoms worsen    Contact information:    [address]  [facility] [address] [phone]          Follow up with  Emergency Department.    Specialty:  EMERGENCY MEDICINE    Why:  As needed    Contact information:    [address] [facility] [address] [phone]        Discharge Instructions         Constipation (Adult)  Constipation means that you have bowel movements that are less frequent than usual. Stools often become very hard and difficult to pass.  Constipation is very common. At some point in life it affects almost everyone. Since everyone's bowel habits are different, what is constipation to one person may not be to another. Your healthcare provider may do tests to diagnose constipation. It depends on what he or she finds when evaluating you.    Symptoms of constipation include:    Abdominal pain    Bloating    Vomiting    Painful bowel movements    Itching, swelling, bleeding, or pain around the anus  Causes  Constipation can have many causes. These include:    Diet low in fiber    Too much dairy    Not drinking enough liquids    Lack of exercise or physical activity. This is especially true for older adults.    Changes in lifestyle or daily routine, including pregnancy, aging, work, and travel    Frequent use or misuse of laxatives    Ignoring the urge  to have a bowel movement or delaying it until later    Medicines, such as certain prescription pain medicines, iron supplements, antacids, certain antidepressants, and calcium supplements    Diseases like irritable bowel syndrome, bowel obstructions, stroke, diabetes, thyroid disease, Parkinson disease, hemorrhoids, and colon cancer  Complications  Potential complications of constipation can include:    Hemorrhoids    Rectal bleeding from hemorrhoids or anal fissures (skin tears)    Hernias    Dependency on laxatives    Chronic constipation    Fecal impaction    Bowel obstruction or perforation  Home care  All treatment should be done after talking with your healthcare provider. This is especially true if you have another medical problems, are taking prescription medicines, or are an older adult. Treatment most often involves lifestyle changes. You may also need medicines. Your healthcare provider will tell you which will work best for you. Follow the advice below to help avoid this problem in the future.  Lifestyle changes  These lifestyle changes can help prevent constipation:    Diet. Eat a high-fiber diet, with fresh fruit and vegetables, and reduce dairy intake, meats, and processed foods    Fluids. It's important to get enough fluids each day. Drink plenty of water when you eat more fiber. If you are on diet that limits the amount of fluid you can have, talk about this with your healthcare provider.    Regular exercise. Check with your healthcare provider first.  Medicines  Take any medicines as directed. Some laxatives are safe to use only every now and then. Others can be taken on a regular basis. Talk with your doctor or pharmacist if you have questions.  Prescription pain medicines can cause constipation. If you are taking this kind of medicine, ask your healthcare provider if you should also take a stool softener.  Medicines you may take to treat constipation include:    Fiber supplements    Stool  softeners    Laxatives    Enemas    Rectal suppositories  Follow-up care  Follow up with your healthcare provider if symptoms don't get better in the next few days. You may need to have more tests or see a specialist.  Call 911  Call 911 if any of these occur:    Trouble breathing    Stiff, rigid abdomen that is severely painful to touch    Confusion    Fainting or loss of consciousness    Rapid heart rate    Chest pain  When to seek medical advice  Call your healthcare provider right away if any of these occur:    Fever of 100.4 F (38 C) or higher, or as directed by your healthcare provider    Failure to resume normal bowel movements    Pain in your abdomen or back gets worse    Nausea or vomiting    Swelling in your abdomen    Blood in the stool    Black, tarry stool    Involuntary weight loss    Weakness  Date Last Reviewed: 12/30/2015 2000-2017 [employer]. [address] [place] [address]. All rights reserved. This information is not intended as a substitute for professional medical care. Always follow your healthcare professional's instructions.          Treating Constipation    Constipation is a common and often uncomfortable problem. Constipation means you have bowel movements fewer than 3 times per week, or strain to pass hard, dry stool. It can last a short time. Or it can be a problem that never seems to go away. The good news is that it can often be treated and controlled.  Eat more fiber  One of the best ways to help treat constipation is to increase your fiber intake. You can do this either through diet or by using fiber supplements. Fiber (in whole grains, fruits, and vegetables) adds bulk and absorbs water to soften the stool. This helps the stool pass through the colon more easily. When you increase your fiber intake, do it slowly to avoid side effects such as bloating. Also increase the amount of water that you drink. Eating more of the following foods can add fiber to  your diet.    High-fiber cereals    Whole grains, bran, and brown rice    Vegetables such as carrots, broccoli, and greens    Fresh fruits (especially apples, pears, and dried fruits like raisins and apricots)    Nuts and legumes (especially beans such as lentils, kidney beans, and lima beans)  Get physically active  Exercise helps improve the working of your colon which helps ease constipation. Try to get some physical activity every day. If you haven t been active for a while, talk to your healthcare provider before starting again.  Laxatives  Your healthcare provider may suggest an over-the-counter product to help ease your constipation. He or she may suggest the use of bulk-forming agents or laxatives. The use of laxatives, if used as directed, is common and safe. Follow directions carefully when using them. See your healthcare provider for new-onset constipation, or long-term constipation, to rule out other causes such as medicines or thyroid disease.  Date Last Reviewed: 7/1/2016 2000-2017 [employer]. [address] [place] [address]. All rights reserved. This information is not intended as a substitute for professional medical care. Always follow your healthcare professional's instructions.          Your next 10 appointments already scheduled     Aug 20, 2018 11:00 AM CDT   Return Visit with RADAMES Rivero CNP   [facility] ([facility])    [address] [place] [address] [phone] OPT 2              24 Hour Appointment Hotline       To make an appointment at any [facility], call [phone]). If you don't have a family doctor or clinic, we will help you find one. [facility] are conveniently located to serve the needs of you and your family.             Review of your medicines      START taking        Dose / Directions Last dose taken    docusate sodium 100 MG tablet    Commonly known as:  COLACE   Dose:  100 mg   Quantity:  60 tablet        Take 100 mg by mouth daily   Refills:  1        magnesium citrate 1.745 GM/30ML solution   Quantity:  2 Bottle        Drink one bottle twice daily until you have a large bowel movement. Then stop this medication.   Refills:  1          Our records show that you are taking the medicines listed below. If these are incorrect, please call your family doctor or clinic.        Dose / Directions Last dose taken    aspirin 81 MG tablet   Dose:  81 mg        Take 81 mg by mouth daily   Refills:  0        atorvastatin 20 MG tablet   Commonly known as:  LIPITOR   Dose:  20 mg   Quantity:  90 tablet        Take 1 tablet (20 mg) by mouth At Bedtime   Refills:  0        clopidogrel 75 MG tablet   Commonly known as:  PLAVIX   Dose:  75 mg   Quantity:  90 tablet        Take 1 tablet (75 mg) by mouth daily   Refills:  3        diazepam 5 MG tablet   Commonly known as:  VALIUM   Quantity:  90 tablet        TAKE ONE TABLET BY MOUTH EVERY 8 HOURS AS NEEDED FOR ANXIETY   Refills:  3        isosorbide mononitrate 30 MG 24 hr tablet   Commonly known as:  IMDUR   Dose:  30 mg   Quantity:  90 tablet        Take 1 tablet (30 mg) by mouth daily In the morning   Refills:  3        levothyroxine 75 MCG tablet   Commonly known as:  SYNTHROID/LEVOTHROID   Quantity:  90 tablet        TAKE ONE TABLET BY MOUTH ONCE DAILY   Refills:  2        lisinopril 10 MG tablet   Commonly known as:  PRINIVIL/ZESTRIL   Dose:  10 mg   Quantity:  90 tablet        Take 1 tablet (10 mg) by mouth daily   Refills:  3        metoprolol succinate 25 MG 24 hr tablet   Commonly known as:  TOPROL-XL   Dose:  12.5 mg   Quantity:  1 tablet        Take 0.5 tablets (12.5 mg) by mouth daily   Refills:  0        NIGHTTIME SLEEP AID 50 MG Caps   Dose:  1 capsule   Quantity:  30 capsule   Generic drug:  DiphenhydrAMINE HCl (Sleep)        Take 1 capsule by mouth as needed   Refills:  0        polyethylene  glycol powder   Commonly known as:  MIRALAX/GLYCOLAX   Dose:  17 g        Take 17 g by mouth daily as needed for constipation   Refills:  0        prune juice Liqd   Dose:  5 mL        Take 5 mLs by mouth daily   Refills:  0                Prescriptions were sent or printed at these locations (2 Prescriptions)                   Other Prescriptions                Printed at Department/Unit printer (2 of 2)         docusate sodium (COLACE) 100 MG tablet               magnesium citrate 1.745 GM/30ML solution                Procedures and tests performed during your visit     Abdomen XR, 2 vw, flat and upright      Orders Needing Specimen Collection     None      Pending Results     Date and Time Order Name Status Description    7/8/2018 0813 Abdomen XR, 2 vw, flat and upright Preliminary             Pending Culture Results     No orders found from 7/6/2018 to 7/9/2018.            Pending Results Instructions     If you had any lab results that were not finalized at the time of your Discharge, you can call the ED Lab Result RN at [phone]. You will be contacted by this team for any positive Lab results or changes in treatment. The nurses are available 7 days a week from 10A to 6:30P.  You can leave a message 24 hours per day and they will return your call.        Test Results From Your Hospital Stay        7/8/2018  8:39 AM      Narrative     ABDOMEN TWO VIEWS  7/8/2018 8:28 AM     HISTORY: Abdominal pain with nausea.     COMPARISON: X-ray from 7/29/2015.        Impression     IMPRESSION: There appears to be an air-fluid level in the stomach. No  evidence of bowel dilatation. No definite free air. No suspicious  abdominal calcifications.                 Clinical Quality Measure: Blood Pressure Screening     Your blood pressure was checked while you were in the emergency department today. The last reading we obtained was  BP: 156/69 . Please read the guidelines below about what these numbers mean and what you should  do about them.  If your systolic blood pressure (the top number) is less than 120 and your diastolic blood pressure (the bottom number) is less than 80, then your blood pressure is normal. There is nothing more that you need to do about it.  If your systolic blood pressure (the top number) is 120-139 or your diastolic blood pressure (the bottom number) is 80-89, your blood pressure may be higher than it should be. You should have your blood pressure rechecked within a year by a primary care provider.  If your systolic blood pressure (the top number) is 140 or greater or your diastolic blood pressure (the bottom number) is 90 or greater, you may have high blood pressure. High blood pressure is treatable, but if left untreated over time it can put you at risk for heart attack, stroke, or kidney failure. You should have your blood pressure rechecked by a primary care provider within the next 4 weeks.  If your provider in the emergency department today gave you specific instructions to follow-up with your doctor or provider even sooner than that, you should follow that instruction and not wait for up to 4 weeks for your follow-up visit.        Thank you for choosing [facility]       Thank you for choosing [facility] for your care. Our goal is always to provide you with excellent care. Hearing back from our patients is one way we can continue to improve our services. Please take a few minutes to complete the written survey that you may receive in the mail after you visit with us. Thank you!        [employer] Information     [facility] gives you secure access to your electronic health record. If you see a primary care provider, you can also send messages to your care team and make appointments. If you have questions, please call your primary care clinic.  If you do not have a primary care provider, please call [phone] and they will assist you.        Care EveryWhere ID     This is your Care EveryWhere ID. This could be used by  other organizations to access your [facility] medical records  [id number]        Equal Access to Services     ROBY VIERA : Jordana Sharpe, monica khoury, italia weinstein. So [facility] [phone].    ATENCIÓN: Si habla español, tiene a albarran disposición servicios gratuitos de asistencia lingüística. Llame al [phone].    We comply with applicable federal civil rights laws and [place] laws. We do not discriminate on the basis of race, color, national origin, age, disability, sex, sexual orientation, or gender identity.            After Visit Summary       This is your record. Keep this with you and show to your community pharmacist(s) and doctor(s) at your next visit.

## 2018-07-08 NOTE — DISCHARGE INSTRUCTIONS
Constipation (Adult)  Constipation means that you have bowel movements that are less frequent than usual. Stools often become very hard and difficult to pass.  Constipation is very common. At some point in life it affects almost everyone. Since everyone's bowel habits are different, what is constipation to one person may not be to another. Your healthcare provider may do tests to diagnose constipation. It depends on what he or she finds when evaluating you.    Symptoms of constipation include:    Abdominal pain    Bloating    Vomiting    Painful bowel movements    Itching, swelling, bleeding, or pain around the anus  Causes  Constipation can have many causes. These include:    Diet low in fiber    Too much dairy    Not drinking enough liquids    Lack of exercise or physical activity. This is especially true for older adults.    Changes in lifestyle or daily routine, including pregnancy, aging, work, and travel    Frequent use or misuse of laxatives    Ignoring the urge to have a bowel movement or delaying it until later    Medicines, such as certain prescription pain medicines, iron supplements, antacids, certain antidepressants, and calcium supplements    Diseases like irritable bowel syndrome, bowel obstructions, stroke, diabetes, thyroid disease, Parkinson disease, hemorrhoids, and colon cancer  Complications  Potential complications of constipation can include:    Hemorrhoids    Rectal bleeding from hemorrhoids or anal fissures (skin tears)    Hernias    Dependency on laxatives    Chronic constipation    Fecal impaction    Bowel obstruction or perforation  Home care  All treatment should be done after talking with your healthcare provider. This is especially true if you have another medical problems, are taking prescription medicines, or are an older adult. Treatment most often involves lifestyle changes. You may also need medicines. Your healthcare provider will tell you which will work best for you. Follow  the advice below to help avoid this problem in the future.  Lifestyle changes  These lifestyle changes can help prevent constipation:    Diet. Eat a high-fiber diet, with fresh fruit and vegetables, and reduce dairy intake, meats, and processed foods    Fluids. It's important to get enough fluids each day. Drink plenty of water when you eat more fiber. If you are on diet that limits the amount of fluid you can have, talk about this with your healthcare provider.    Regular exercise. Check with your healthcare provider first.  Medicines  Take any medicines as directed. Some laxatives are safe to use only every now and then. Others can be taken on a regular basis. Talk with your doctor or pharmacist if you have questions.  Prescription pain medicines can cause constipation. If you are taking this kind of medicine, ask your healthcare provider if you should also take a stool softener.  Medicines you may take to treat constipation include:    Fiber supplements    Stool softeners    Laxatives    Enemas    Rectal suppositories  Follow-up care  Follow up with your healthcare provider if symptoms don't get better in the next few days. You may need to have more tests or see a specialist.  Call 911  Call 911 if any of these occur:    Trouble breathing    Stiff, rigid abdomen that is severely painful to touch    Confusion    Fainting or loss of consciousness    Rapid heart rate    Chest pain  When to seek medical advice  Call your healthcare provider right away if any of these occur:    Fever of 100.4 F (38 C) or higher, or as directed by your healthcare provider    Failure to resume normal bowel movements    Pain in your abdomen or back gets worse    Nausea or vomiting    Swelling in your abdomen    Blood in the stool    Black, tarry stool    Involuntary weight loss    Weakness  Date Last Reviewed: 12/30/2015 2000-2017 [employer]. [address] [place] [address], [place] [address]. All rights reserved. This  information is not intended as a substitute for professional medical care. Always follow your healthcare professional's instructions.          Treating Constipation    Constipation is a common and often uncomfortable problem. Constipation means you have bowel movements fewer than 3 times per week, or strain to pass hard, dry stool. It can last a short time. Or it can be a problem that never seems to go away. The good news is that it can often be treated and controlled.  Eat more fiber  One of the best ways to help treat constipation is to increase your fiber intake. You can do this either through diet or by using fiber supplements. Fiber (in whole grains, fruits, and vegetables) adds bulk and absorbs water to soften the stool. This helps the stool pass through the colon more easily. When you increase your fiber intake, do it slowly to avoid side effects such as bloating. Also increase the amount of water that you drink. Eating more of the following foods can add fiber to your diet.    High-fiber cereals    Whole grains, bran, and brown rice    Vegetables such as carrots, broccoli, and greens    Fresh fruits (especially apples, pears, and dried fruits like raisins and apricots)    Nuts and legumes (especially beans such as lentils, kidney beans, and lima beans)  Get physically active  Exercise helps improve the working of your colon which helps ease constipation. Try to get some physical activity every day. If you haven t been active for a while, talk to your healthcare provider before starting again.  Laxatives  Your healthcare provider may suggest an over-the-counter product to help ease your constipation. He or she may suggest the use of bulk-forming agents or laxatives. The use of laxatives, if used as directed, is common and safe. Follow directions carefully when using them. See your healthcare provider for new-onset constipation, or long-term constipation, to rule out other causes such as medicines or thyroid  disease.  Date Last Reviewed: 7/1/2016 2000-2017 [employer]. [address] [place] [address], [place] [address]. All rights reserved. This information is not intended as a substitute for professional medical care. Always follow your healthcare professional's instructions.

## 2018-07-08 NOTE — ED AVS SNAPSHOT
Emergency Department    [address] [place] [address]    Phone:  [phone]    Fax:  [phone]                                       Korey Pearson   MRN: [id number]    Department:   Emergency Department   Date of Visit:  7/8/2018           After Visit Summary Signature Page     I have received my discharge instructions, and my questions have been answered. I have discussed any challenges I see with this plan with the nurse or doctor.    ..........................................................................................................................................  Patient/Patient Representative Signature      ..........................................................................................................................................  Patient Representative Print Name and Relationship to Patient    ..................................................               ................................................  Date                                            Time    ..........................................................................................................................................  Reviewed by Signature/Title    ...................................................              ..............................................  Date                                                            Time

## 2018-07-10 ENCOUNTER — TRANSFERRED RECORDS (OUTPATIENT)
Dept: HEALTH INFORMATION MANAGEMENT | Facility: CLINIC | Age: 83
End: 2018-07-10

## 2018-08-08 DIAGNOSIS — I25.118 CORONARY ARTERY DISEASE OF NATIVE ARTERY OF NATIVE HEART WITH STABLE ANGINA PECTORIS (H): ICD-10-CM

## 2018-08-08 RX ORDER — ISOSORBIDE MONONITRATE 30 MG/1
30 TABLET, EXTENDED RELEASE ORAL DAILY
Qty: 15 TABLET | Refills: 0 | Status: SHIPPED | OUTPATIENT
Start: 2018-08-08 | End: 2018-08-20

## 2018-08-20 ENCOUNTER — OFFICE VISIT (OUTPATIENT)
Dept: CARDIOLOGY | Facility: CLINIC | Age: 83
End: 2018-08-20
Attending: NURSE PRACTITIONER
Payer: COMMERCIAL

## 2018-08-20 VITALS
SYSTOLIC BLOOD PRESSURE: 110 MMHG | HEART RATE: 60 BPM | DIASTOLIC BLOOD PRESSURE: 62 MMHG | HEIGHT: 69 IN | WEIGHT: 189 LBS | BODY MASS INDEX: 27.99 KG/M2

## 2018-08-20 DIAGNOSIS — I25.84 CORONARY ARTERY DISEASE DUE TO CALCIFIED CORONARY LESION: ICD-10-CM

## 2018-08-20 DIAGNOSIS — E78.5 HYPERLIPIDEMIA LDL GOAL <130: Chronic | ICD-10-CM

## 2018-08-20 DIAGNOSIS — I25.10 CORONARY ARTERY DISEASE DUE TO CALCIFIED CORONARY LESION: ICD-10-CM

## 2018-08-20 DIAGNOSIS — I20.0 UNSTABLE ANGINA (H): ICD-10-CM

## 2018-08-20 DIAGNOSIS — I25.118 CORONARY ARTERY DISEASE OF NATIVE ARTERY OF NATIVE HEART WITH STABLE ANGINA PECTORIS (H): ICD-10-CM

## 2018-08-20 DIAGNOSIS — I10 ESSENTIAL HYPERTENSION WITH GOAL BLOOD PRESSURE LESS THAN 140/90: Chronic | ICD-10-CM

## 2018-08-20 PROCEDURE — 99214 OFFICE O/P EST MOD 30 MIN: CPT | Performed by: NURSE PRACTITIONER

## 2018-08-20 RX ORDER — ISOSORBIDE MONONITRATE 30 MG/1
30 TABLET, EXTENDED RELEASE ORAL DAILY
Qty: 90 TABLET | Refills: 3 | Status: SHIPPED | OUTPATIENT
Start: 2018-08-20 | End: 2019-08-21

## 2018-08-20 RX ORDER — METOPROLOL SUCCINATE 25 MG/1
12.5 TABLET, EXTENDED RELEASE ORAL DAILY
Qty: 90 TABLET | Refills: 3 | Status: SHIPPED | OUTPATIENT
Start: 2018-08-20 | End: 2020-03-16

## 2018-08-20 NOTE — PROGRESS NOTES
Cardiology Clinic Progress Note  Korey Pearson MRN# [id number]   YOB: 1929 Age: 88 year old     Reason for visit: Follow up           Assessment and Plan:   1. Coronary artery disease    S/p PCI and ALFRED x 2 to LAD. Residual subtotal occlusion of D1 in December of 2016    Status post in-stent restenosis of LAD stenting treated with laser atherectomy in August 2017    No recurrent anginal symptoms    Will discuss with Dr. Patel regarding discontinuation of Plavix    Follow up in 1 year or sooner if needed    2. Hyperlipidemia, LDL goal <70    Last fasting lipids 7/3/18: total cholesterol 109, HDL 42, LDL 54, Trigs 66    Continue Lipitor 20 mg daily      3. Hypertension    Controlled on current medical therapy                   History of Presenting Illness:    Korey Pearson is a very pleasant 88 year old patient of Dr. Patel who presents today for a follow up after medication titration. He is accompanied by his wife. He also has a past medical history of CAD s/p PCI of the LAD in 12/2016 and in-stent restenosis treated with laser atherectomy in 8/2017, hyperlipidemia, hypertension, GERD and hypothyroidism.     At his visit with Dr. Patel in February 2018 he had complaints of fatigue and dyspnea on exertion that was thought possibly secondary to beta blockade as well as Brilinta.  His Toprol was decreased from 25 mg twice daily to 12.5 mg daily.  His Brilinta was also transitioned to Plavix due to cost and dyspnea.    Today in clinic he states that he has had no change in symptoms with the medication changes as above.  He continues to have restless legs that keep him up at night.  He states he takes a over-the-counter sleeping tablet and he feels like his fatigue is related to sleeping tablet and would like to sleep from his restless legs. Due to history of peripheral neuropathy he has balance issues. He denies shortness of breath, chest pain, lower extremity edema, PND, orthopena.       "           Review of Systems:   Review of Systems:  Skin:  Negative     Eyes:  Positive for glasses (right red eye from rubbing too hard)  ENT:  Positive for hearing loss;nasal congestion;postnasal drainage  Respiratory:  Positive for dyspnea on exertion  Cardiovascular:    Positive for  Gastroenterology: Negative    Genitourinary:  not assessed    Musculoskeletal:  Positive for arthritis  Neurologic:  Positive for numbness or tingling of feet (restless legs)  Psychiatric:  Positive for anxiety;depression  Heme/Lymph/Imm:  Positive for allergies  Endocrine:  Positive for thyroid disorder              Physical Exam:     Vitals: /62  Pulse 60  Ht 1.74 m (5' 8.5\")  Wt 85.7 kg (189 lb)  BMI 28.32 kg/m2  Constitutional:  cooperative, alert and oriented, well developed, well nourished, in no acute distress   Hard of hearing    Skin:  warm and dry to the touch, no apparent skin lesions or masses noted        Head:  normocephalic, no masses or lesions        Eyes:  pupils equal and round        ENT:  no pallor or cyanosis, dentition good        Chest:  clear to auscultation;normal symmetry        Cardiac: regular rhythm;normal S1 and S2;no murmurs, gallops or rubs detected                  Abdomen:  abdomen soft   benign    Extremities and Back:  no edema;no deformities, clubbing, cyanosis, erythema observed        Neurological:  no gross motor deficits;affect appropriate                 Medications:     Current Outpatient Prescriptions   Medication Sig Dispense Refill     aspirin 81 MG tablet Take 81 mg by mouth daily        atorvastatin (LIPITOR) 20 MG tablet Take 1 tablet (20 mg) by mouth At Bedtime 90 tablet 0     clopidogrel (PLAVIX) 75 MG tablet Take 1 tablet (75 mg) by mouth daily 90 tablet 3     diazepam (VALIUM) 5 MG tablet TAKE ONE TABLET BY MOUTH EVERY 8 HOURS AS NEEDED FOR ANXIETY 90 tablet 3     DiphenhydrAMINE HCl, Sleep, (NIGHTTIME SLEEP AID) 50 MG CAPS Take 1 capsule by mouth as needed  30 capsule  "     docusate sodium (COLACE) 100 MG tablet Take 100 mg by mouth daily 60 tablet 1     isosorbide mononitrate (IMDUR) 30 MG 24 hr tablet Take 1 tablet (30 mg) by mouth daily In the morning 90 tablet 3     levothyroxine (SYNTHROID/LEVOTHROID) 75 MCG tablet TAKE ONE TABLET BY MOUTH ONCE DAILY 90 tablet 2     lisinopril (PRINIVIL/ZESTRIL) 10 MG tablet Take 1 tablet (10 mg) by mouth daily 90 tablet 3     magnesium citrate 1.745 GM/30ML solution Drink one bottle twice daily until you have a large bowel movement. Then stop this medication. 2 Bottle 1     metoprolol succinate (TOPROL-XL) 25 MG 24 hr tablet Take 0.5 tablets (12.5 mg) by mouth daily 90 tablet 3     polyethylene glycol (MIRALAX/GLYCOLAX) powder Take 17 g by mouth daily as needed for constipation       prune juice LIQD Take 5 mLs by mouth daily        [DISCONTINUED] isosorbide mononitrate (IMDUR) 30 MG 24 hr tablet Take 1 tablet (30 mg) by mouth daily In the morning 15 tablet 0     [DISCONTINUED] metoprolol succinate (TOPROL-XL) 25 MG 24 hr tablet Take 0.5 tablets (12.5 mg) by mouth daily 1 tablet 0           Family History   Problem Relation Age of Onset     Cerebrovascular Disease Mother      HEART DISEASE Father      Diabetes No family hx of      Coronary Artery Disease No family hx of      Hypertension No family hx of      Hyperlipidemia No family hx of      Breast Cancer No family hx of      Colon Cancer No family hx of      Prostate Cancer No family hx of      Other Cancer No family hx of      Depression No family hx of      Anxiety Disorder No family hx of      Mental Illness No family hx of      Substance Abuse No family hx of      Anesthesia Reaction No family hx of      Asthma No family hx of      Osteoperosis No family hx of      Genetic Disorder No family hx of      Thyroid Disease No family hx of      Obesity No family hx of      Unknown/Adopted No family hx of        Social History     Social History     Marital status:      Spouse name:  N/A     Number of children: N/A     Years of education: N/A     Occupational History     Not on file.     Social History Main Topics     Smoking status: Former Smoker     Packs/day: 1.50     Years: 10.00     Types: Cigarettes     Quit date: 1/1/1957     Smokeless tobacco: Never Used     Alcohol use 0.0 oz/week     0 Standard drinks or equivalent per week      Comment: 1 beer per week     Drug use: No     Sexual activity: No     Other Topics Concern     Parent/Sibling W/ Cabg, Mi Or Angioplasty Before 65f 55m? Yes     Special Diet No     Exercise No     Social History Narrative            Past Medical History:     Past Medical History:   Diagnosis Date     Anxiety disorder      Cancer (H) skin cancer both ears     Coronary artery disease      Diverticulosis of colon      Erythrocytosis      Hearing loss      Hyperlipidemia      Hypertension      Hypothyroid      Peripheral neuropathy 2012     Restless leg syndrome      Vitamin D deficiency               Past Surgical History:     Past Surgical History:   Procedure Laterality Date     ABDOMEN SURGERY  appy, hernia     ANGIOGRAM  08/21/2017     APPENDECTOMY  1941     CARDIAC SURGERY      2 stents  dec 2016     CATARACT IOL, RT/LT  1993     CORONARY ANGIOGRAPHY ADULT ORDER       HERNIA REPAIR      left inguinal hernia 1960's              Allergies:   Ropinirole       Data:   All laboratory data reviewed:    Lab Results   Component Value Date    CHOL 109 07/03/2018     Lab Results   Component Value Date    HDL 42 07/03/2018     Lab Results   Component Value Date    LDL 54 07/03/2018     Lab Results   Component Value Date    TRIG 66 07/03/2018     Lab Results   Component Value Date    CHOLHDLRATIO 4.4 08/06/2014     Last Comprehensive Metabolic Panel:  Sodium   Date Value Ref Range Status   08/21/2017 131 (L) 133 - 144 mmol/L Final     Potassium   Date Value Ref Range Status   08/21/2017 4.3 3.4 - 5.3 mmol/L Final     Chloride   Date Value Ref Range Status   08/21/2017 99  94 - 109 mmol/L Final     Carbon Dioxide   Date Value Ref Range Status   08/21/2017 25 20 - 32 mmol/L Final     Anion Gap   Date Value Ref Range Status   08/21/2017 7 3 - 14 mmol/L Final     Glucose   Date Value Ref Range Status   08/21/2017 100 (H) 70 - 99 mg/dL Final     Urea Nitrogen   Date Value Ref Range Status   08/21/2017 13 7 - 30 mg/dL Final     Creatinine   Date Value Ref Range Status   08/21/2017 0.82 0.66 - 1.25 mg/dL Final     GFR Estimate   Date Value Ref Range Status   08/21/2017 89 >60 mL/min/1.7m2 Final     Comment:     Non  GFR Calc     Calcium   Date Value Ref Range Status   08/21/2017 8.8 8.5 - 10.1 mg/dL Final

## 2018-08-20 NOTE — MR AVS SNAPSHOT
After Visit Summary   8/20/2018    Korey Pearson    MRN: [id number]           Patient Information     Date Of Birth          8/31/1929        Visit Information        Provider Department      8/20/2018 11:00 AM Bronwyn Lee APRN CNP [facility]   [place]        Today's Diagnoses     Coronary artery disease due to calcified coronary lesion        Hyperlipidemia LDL goal <130 on a statin        Essential hypertension with goal blood pressure less than 140/90        Coronary artery disease of native artery of native heart with stable angina pectoris (H)        Unstable angina (H)          Care Instructions    Today's Recommendations    1. Will discuss with Dr. Patel regarding Clopidogrel discontinuation  2. Continue all other medications without changes.  3. Please follow up with Dr. Patel in 1 year.    Please send a "[employer] message or call [phone] with questions or concerns.     Scheduling number [phone].            Follow-ups after your visit        Additional Services     Follow-Up with Cardiologist                 Future tests that were ordered for you today     Open Future Orders        Priority Expected Expires Ordered    Basic metabolic panel Routine 8/20/2019 8/21/2019 8/20/2018    Lipid Profile Routine 8/20/2019 8/20/2019 8/20/2018    Follow-Up with Cardiologist Routine 8/20/2019 8/21/2019 8/20/2018            Who to contact     If you have questions or need follow up information about today's clinic visit or your schedule please contact [facility] directly at [phone].  Normal or non-critical lab and imaging results will be communicated to you by MyChart, letter or phone within 4 business days after the clinic has received the results. If you do not hear from us within 7 days, please contact the clinic through [employer] or phone. If you have a critical or abnormal lab result, we will notify you by phone as  "soon as possible.  Submit refill requests through [employer] or call your pharmacy and they will forward the refill request to us. Please allow 3 business days for your refill to be completed.          Additional Information About Your Visit        [employer] Information     [employer] gives you secure access to your electronic health record. If you see a primary care provider, you can also send messages to your care team and make appointments. If you have questions, please call your primary care clinic.  If you do not have a primary care provider, please call [phone] and they will assist you.        Care EveryWhere ID     This is your Care EveryWhere ID. This could be used by other organizations to access your [facility] medical records  [id number]        Your Vitals Were     Pulse Height BMI (Body Mass Index)             60 1.74 m (5' 8.5\") 28.32 kg/m2          Blood Pressure from Last 3 Encounters:   08/20/18 110/62   07/08/18 104/62   07/03/18 130/60    Weight from Last 3 Encounters:   08/20/18 85.7 kg (189 lb)   07/08/18 85.5 kg (188 lb 9.6 oz)   07/03/18 85.3 kg (188 lb)              We Performed the Following     Follow-Up with Cardiac Advanced Practice Provider          Where to get your medicines      These medications were sent to [facility] [address] [place] [address] [address] [facility]  [address] [facility] [address]     Phone:  [phone]     isosorbide mononitrate 30 MG 24 hr tablet    metoprolol succinate 25 MG 24 hr tablet          Primary Care Provider Office Phone # Fax #    Liberty Calhoun PA-C [phone]       [address] [facility] [address]  [facility] [address]        Equal Access to Services     ROBY VIERA : Hadii aicha gonzaleso Sojodieali, waaxda luqadaha, qaybta kaalmada adeegyada, italia rosario. So [facility] [phone].    ATENCIÓN: Si habla español, tiene a albarran disposición servicios gratuitos de asistencia lingüística. " Kirill wynne [phone].    We comply with applicable federal civil rights laws and [place] laws. We do not discriminate on the basis of race, color, national origin, age, disability, sex, sexual orientation, or gender identity.            Thank you!     Thank you for choosing [facility]  for your care. Our goal is always to provide you with excellent care. Hearing back from our patients is one way we can continue to improve our services. Please take a few minutes to complete the written survey that you may receive in the mail after your visit with us. Thank you!             Your Updated Medication List - Protect others around you: Learn how to safely use, store and throw away your medicines at www.disposemymeds.org.          This list is accurate as of 8/20/18 11:15 AM.  Always use your most recent med list.                   Brand Name Dispense Instructions for use Diagnosis    aspirin 81 MG tablet      Take 81 mg by mouth daily        atorvastatin 20 MG tablet    LIPITOR    90 tablet    Take 1 tablet (20 mg) by mouth At Bedtime    Unstable angina (H), Hyperlipidemia LDL goal <130       clopidogrel 75 MG tablet    PLAVIX    90 tablet    Take 1 tablet (75 mg) by mouth daily    Coronary artery disease due to calcified coronary lesion       diazepam 5 MG tablet    VALIUM    90 tablet    TAKE ONE TABLET BY MOUTH EVERY 8 HOURS AS NEEDED FOR ANXIETY    Generalized anxiety disorder       docusate sodium 100 MG tablet    COLACE    60 tablet    Take 100 mg by mouth daily        isosorbide mononitrate 30 MG 24 hr tablet    IMDUR    90 tablet    Take 1 tablet (30 mg) by mouth daily In the morning    Coronary artery disease of native artery of native heart with stable angina pectoris (H)       levothyroxine 75 MCG tablet    SYNTHROID/LEVOTHROID    90 tablet    TAKE ONE TABLET BY MOUTH ONCE DAILY    Acquired hypothyroidism       lisinopril 10 MG tablet    PRINIVIL/ZESTRIL    90 tablet     Take 1 tablet (10 mg) by mouth daily    Benign essential hypertension       magnesium citrate 1.745 GM/30ML solution     2 Bottle    Drink one bottle twice daily until you have a large bowel movement. Then stop this medication.        metoprolol succinate 25 MG 24 hr tablet    TOPROL-XL    90 tablet    Take 0.5 tablets (12.5 mg) by mouth daily    Unstable angina (H)       NIGHTTIME SLEEP AID 50 MG Caps   Generic drug:  DiphenhydrAMINE HCl (Sleep)     30 capsule    Take 1 capsule by mouth as needed    Insomnia       polyethylene glycol powder    MIRALAX/GLYCOLAX     Take 17 g by mouth daily as needed for constipation        prune juice Liqd      Take 5 mLs by mouth daily

## 2018-08-20 NOTE — PATIENT INSTRUCTIONS
Today's Recommendations    1. Will discuss with Dr. Patel regarding Clopidogrel discontinuation  2. Continue all other medications without changes.  3. Please follow up with Dr. Patel in 1 year.    Please send a [employer] message or call [phone] with questions or concerns.     Scheduling number [phone].

## 2018-08-20 NOTE — LETTER
8/20/2018    Liberty Calhoun PA-C  [address]  [facility] [address]    RE: Korey Pearson       Dear Colleague,    I had the pleasure of seeing Korey Pearson in the [facility].    Cardiology Clinic Progress Note  Korey Pearson MRN# [id number]   YOB: 1929 Age: 88 year old     Reason for visit: Follow up           Assessment and Plan:   1. Coronary artery disease    S/p PCI and ALFRED x 2 to LAD. Residual subtotal occlusion of D1 in December of 2016    Status post in-stent restenosis of LAD stenting treated with laser atherectomy in August 2017    No recurrent anginal symptoms    Will discuss with Dr. Patel regarding discontinuation of Plavix    Follow up in 1 year or sooner if needed    2. Hyperlipidemia, LDL goal <70    Last fasting lipids 7/3/18: total cholesterol 109, HDL 42, LDL 54, Trigs 66    Continue Lipitor 20 mg daily      3. Hypertension    Controlled on current medical therapy                   History of Presenting Illness:    Korey Pearson is a very pleasant 88 year old patient of Dr. Patel who presents today for a follow up after medication titration. He is accompanied by his wife. He also has a past medical history of CAD s/p PCI of the LAD in 12/2016 and in-stent restenosis treated with laser atherectomy in 8/2017, hyperlipidemia, hypertension, GERD and hypothyroidism.     At his visit with Dr. Patel in February 2018 he had complaints of fatigue and dyspnea on exertion that was thought possibly secondary to beta blockade as well as Brilinta.  His Toprol was decreased from 25 mg twice daily to 12.5 mg daily.  His Brilinta was also transitioned to Plavix due to cost and dyspnea.    Today in clinic he states that he has had no change in symptoms with the medication changes as above.  He continues to have restless legs that keep him up at night.  He states he takes a over-the-counter sleeping  "tablet and he feels like his fatigue is related to sleeping tablet and would like to sleep from his restless legs. Due to history of peripheral neuropathy he has balance issues. He denies shortness of breath, chest pain, lower extremity edema, PND, orthopena.                 Review of Systems:   Review of Systems:  Skin:  Negative     Eyes:  Positive for glasses (right red eye from rubbing too hard)  ENT:  Positive for hearing loss;nasal congestion;postnasal drainage  Respiratory:  Positive for dyspnea on exertion  Cardiovascular:    Positive for  Gastroenterology: Negative    Genitourinary:  not assessed    Musculoskeletal:  Positive for arthritis  Neurologic:  Positive for numbness or tingling of feet (restless legs)  Psychiatric:  Positive for anxiety;depression  Heme/Lymph/Imm:  Positive for allergies  Endocrine:  Positive for thyroid disorder              Physical Exam:     Vitals: /62  Pulse 60  Ht 1.74 m (5' 8.5\")  Wt 85.7 kg (189 lb)  BMI 28.32 kg/m2  Constitutional:  cooperative, alert and oriented, well developed, well nourished, in no acute distress   Hard of hearing    Skin:  warm and dry to the touch, no apparent skin lesions or masses noted        Head:  normocephalic, no masses or lesions        Eyes:  pupils equal and round        ENT:  no pallor or cyanosis, dentition good        Chest:  clear to auscultation;normal symmetry        Cardiac: regular rhythm;normal S1 and S2;no murmurs, gallops or rubs detected                  Abdomen:  abdomen soft   benign    Extremities and Back:  no edema;no deformities, clubbing, cyanosis, erythema observed        Neurological:  no gross motor deficits;affect appropriate                 Medications:     Current Outpatient Prescriptions   Medication Sig Dispense Refill     aspirin 81 MG tablet Take 81 mg by mouth daily        atorvastatin (LIPITOR) 20 MG tablet Take 1 tablet (20 mg) by mouth At Bedtime 90 tablet 0     clopidogrel (PLAVIX) 75 MG tablet " Take 1 tablet (75 mg) by mouth daily 90 tablet 3     diazepam (VALIUM) 5 MG tablet TAKE ONE TABLET BY MOUTH EVERY 8 HOURS AS NEEDED FOR ANXIETY 90 tablet 3     DiphenhydrAMINE HCl, Sleep, (NIGHTTIME SLEEP AID) 50 MG CAPS Take 1 capsule by mouth as needed  30 capsule      docusate sodium (COLACE) 100 MG tablet Take 100 mg by mouth daily 60 tablet 1     isosorbide mononitrate (IMDUR) 30 MG 24 hr tablet Take 1 tablet (30 mg) by mouth daily In the morning 90 tablet 3     levothyroxine (SYNTHROID/LEVOTHROID) 75 MCG tablet TAKE ONE TABLET BY MOUTH ONCE DAILY 90 tablet 2     lisinopril (PRINIVIL/ZESTRIL) 10 MG tablet Take 1 tablet (10 mg) by mouth daily 90 tablet 3     magnesium citrate 1.745 GM/30ML solution Drink one bottle twice daily until you have a large bowel movement. Then stop this medication. 2 Bottle 1     metoprolol succinate (TOPROL-XL) 25 MG 24 hr tablet Take 0.5 tablets (12.5 mg) by mouth daily 90 tablet 3     polyethylene glycol (MIRALAX/GLYCOLAX) powder Take 17 g by mouth daily as needed for constipation       prune juice LIQD Take 5 mLs by mouth daily        [DISCONTINUED] isosorbide mononitrate (IMDUR) 30 MG 24 hr tablet Take 1 tablet (30 mg) by mouth daily In the morning 15 tablet 0     [DISCONTINUED] metoprolol succinate (TOPROL-XL) 25 MG 24 hr tablet Take 0.5 tablets (12.5 mg) by mouth daily 1 tablet 0           Family History   Problem Relation Age of Onset     Cerebrovascular Disease Mother      HEART DISEASE Father      Diabetes No family hx of      Coronary Artery Disease No family hx of      Hypertension No family hx of      Hyperlipidemia No family hx of      Breast Cancer No family hx of      Colon Cancer No family hx of      Prostate Cancer No family hx of      Other Cancer No family hx of      Depression No family hx of      Anxiety Disorder No family hx of      Mental Illness No family hx of      Substance Abuse No family hx of      Anesthesia Reaction No family hx of      Asthma No family  hx of      Osteoperosis No family hx of      Genetic Disorder No family hx of      Thyroid Disease No family hx of      Obesity No family hx of      Unknown/Adopted No family hx of        Social History     Social History     Marital status:      Spouse name: N/A     Number of children: N/A     Years of education: N/A     Occupational History     Not on file.     Social History Main Topics     Smoking status: Former Smoker     Packs/day: 1.50     Years: 10.00     Types: Cigarettes     Quit date: 1/1/1957     Smokeless tobacco: Never Used     Alcohol use 0.0 oz/week     0 Standard drinks or equivalent per week      Comment: 1 beer per week     Drug use: No     Sexual activity: No     Other Topics Concern     Parent/Sibling W/ Cabg, Mi Or Angioplasty Before 65f 55m? Yes     Special Diet No     Exercise No     Social History Narrative            Past Medical History:     Past Medical History:   Diagnosis Date     Anxiety disorder      Cancer (H) skin cancer both ears     Coronary artery disease      Diverticulosis of colon      Erythrocytosis      Hearing loss      Hyperlipidemia      Hypertension      Hypothyroid      Peripheral neuropathy 2012     Restless leg syndrome      Vitamin D deficiency               Past Surgical History:     Past Surgical History:   Procedure Laterality Date     ABDOMEN SURGERY  appy, hernia     ANGIOGRAM  08/21/2017     APPENDECTOMY  1941     CARDIAC SURGERY      2 stents  dec 2016     CATARACT IOL, RT/LT  1993     CORONARY ANGIOGRAPHY ADULT ORDER       HERNIA REPAIR      left inguinal hernia 1960's              Allergies:   Ropinirole       Data:   All laboratory data reviewed:    Lab Results   Component Value Date    CHOL 109 07/03/2018     Lab Results   Component Value Date    HDL 42 07/03/2018     Lab Results   Component Value Date    LDL 54 07/03/2018     Lab Results   Component Value Date    TRIG 66 07/03/2018     Lab Results   Component Value Date    CHOLHDLRATIO 4.4  08/06/2014     Last Comprehensive Metabolic Panel:  Sodium   Date Value Ref Range Status   08/21/2017 131 (L) 133 - 144 mmol/L Final     Potassium   Date Value Ref Range Status   08/21/2017 4.3 3.4 - 5.3 mmol/L Final     Chloride   Date Value Ref Range Status   08/21/2017 99 94 - 109 mmol/L Final     Carbon Dioxide   Date Value Ref Range Status   08/21/2017 25 20 - 32 mmol/L Final     Anion Gap   Date Value Ref Range Status   08/21/2017 7 3 - 14 mmol/L Final     Glucose   Date Value Ref Range Status   08/21/2017 100 (H) 70 - 99 mg/dL Final     Urea Nitrogen   Date Value Ref Range Status   08/21/2017 13 7 - 30 mg/dL Final     Creatinine   Date Value Ref Range Status   08/21/2017 0.82 0.66 - 1.25 mg/dL Final     GFR Estimate   Date Value Ref Range Status   08/21/2017 89 >60 mL/min/1.7m2 Final     Comment:     Non  GFR Calc     Calcium   Date Value Ref Range Status   08/21/2017 8.8 8.5 - 10.1 mg/dL Final       Thank you for allowing me to participate in the care of your patient.    Sincerely,     RADAMES HELTON [facility]

## 2018-08-21 ENCOUNTER — TELEPHONE (OUTPATIENT)
Dept: CARDIOLOGY | Facility: CLINIC | Age: 83
End: 2018-08-21

## 2018-08-21 DIAGNOSIS — I10 BENIGN ESSENTIAL HYPERTENSION: ICD-10-CM

## 2018-08-21 RX ORDER — LISINOPRIL 10 MG/1
10 TABLET ORAL DAILY
Qty: 90 TABLET | Refills: 3 | Status: SHIPPED | OUTPATIENT
Start: 2018-08-21 | End: 2019-08-21

## 2018-08-21 NOTE — TELEPHONE ENCOUNTER
RN called patient and reviewed with both patient and patient's wife that Bronwyn reviewed patient's Plavix with Dr. Patel and Dr. Patel is ok with patient stopping this medication. Patient and patient's wife acknowledged understanding and agreed with plan.       Please let Clifford know that Dr. Patel said it was ok to stop Plavix.     Thanks     Bronwyn

## 2018-08-31 ENCOUNTER — OFFICE VISIT (OUTPATIENT)
Dept: FAMILY MEDICINE | Facility: CLINIC | Age: 83
End: 2018-08-31
Payer: COMMERCIAL

## 2018-08-31 VITALS
SYSTOLIC BLOOD PRESSURE: 130 MMHG | BODY MASS INDEX: 27.99 KG/M2 | HEART RATE: 70 BPM | HEIGHT: 69 IN | TEMPERATURE: 97.8 F | OXYGEN SATURATION: 97 % | DIASTOLIC BLOOD PRESSURE: 70 MMHG | WEIGHT: 189 LBS | RESPIRATION RATE: 16 BRPM

## 2018-08-31 DIAGNOSIS — F41.9 ANXIETY: ICD-10-CM

## 2018-08-31 DIAGNOSIS — G47.00 INSOMNIA, UNSPECIFIED TYPE: Primary | ICD-10-CM

## 2018-08-31 DIAGNOSIS — F33.8 SEASONAL AFFECTIVE DISORDER (H): ICD-10-CM

## 2018-08-31 DIAGNOSIS — F33.1 MODERATE EPISODE OF RECURRENT MAJOR DEPRESSIVE DISORDER (H): ICD-10-CM

## 2018-08-31 PROCEDURE — 99207 C PAF COMPLETED  NO CHARGE: CPT | Performed by: FAMILY MEDICINE

## 2018-08-31 PROCEDURE — 99214 OFFICE O/P EST MOD 30 MIN: CPT | Performed by: FAMILY MEDICINE

## 2018-08-31 RX ORDER — CITALOPRAM HYDROBROMIDE 20 MG/1
20 TABLET ORAL DAILY
Qty: 30 TABLET | Refills: 0 | Status: SHIPPED | OUTPATIENT
Start: 2018-08-31 | End: 2018-10-22

## 2018-08-31 ASSESSMENT — ANXIETY QUESTIONNAIRES
3. WORRYING TOO MUCH ABOUT DIFFERENT THINGS: NEARLY EVERY DAY
6. BECOMING EASILY ANNOYED OR IRRITABLE: NEARLY EVERY DAY
IF YOU CHECKED OFF ANY PROBLEMS ON THIS QUESTIONNAIRE, HOW DIFFICULT HAVE THESE PROBLEMS MADE IT FOR YOU TO DO YOUR WORK, TAKE CARE OF THINGS AT HOME, OR GET ALONG WITH OTHER PEOPLE: NOT DIFFICULT AT ALL
7. FEELING AFRAID AS IF SOMETHING AWFUL MIGHT HAPPEN: NEARLY EVERY DAY
2. NOT BEING ABLE TO STOP OR CONTROL WORRYING: NEARLY EVERY DAY
1. FEELING NERVOUS, ANXIOUS, OR ON EDGE: NEARLY EVERY DAY
GAD7 TOTAL SCORE: 19
5. BEING SO RESTLESS THAT IT IS HARD TO SIT STILL: SEVERAL DAYS

## 2018-08-31 ASSESSMENT — PATIENT HEALTH QUESTIONNAIRE - PHQ9: 5. POOR APPETITE OR OVEREATING: NEARLY EVERY DAY

## 2018-08-31 NOTE — NURSING NOTE
"Chief Complaint   Patient presents with     Sleep Problem     /70  Pulse 70  Temp 97.8  F (36.6  C) (Tympanic)  Resp 16  Ht 5' 8.5\" (1.74 m)  Wt 189 lb (85.7 kg)  SpO2 97%  BMI 28.32 kg/m2 Estimated body mass index is 28.32 kg/(m^2) as calculated from the following:    Height as of this encounter: 5' 8.5\" (1.74 m).    Weight as of this encounter: 189 lb (85.7 kg).  BP completed using cuff size: jonna Lovell CMA    There are no preventive care reminders to display for this patient.  Health Maintenance reviewed at today's visit patient asked to schedule/complete:   None, Health Maintenance up to date.    "

## 2018-08-31 NOTE — LETTER
My Depression Action Plan  Name: Korey Pearson   Date of Birth 8/31/1929  Date: 8/31/2018    My doctor: Liberty Calhoun   My clinic: [address] [address]  [phone]          GREEN    ZONE   Good Control    What it looks like:     Things are going generally well. You have normal up s and down s. You may even feel depressed from time to time, but bad moods usually last less than a day.   What you need to do:  1. Continue to care for yourself (see self care plan)  2. Check your depression survival kit and update it as needed  3. Follow your physician s recommendations including any medication.  4. Do not stop taking medication unless you consult with your physician first.           YELLOW         ZONE Getting Worse    What it looks like:     Depression is starting to interfere with your life.     It may be hard to get out of bed; you may be starting to isolate yourself from others.    Symptoms of depression are starting to last most all day and this has happened for several days.     You may have suicidal thoughts but they are not constant.   What you need to do:     1. Call your care team, your response to treatment will improve if you keep your care team informed of your progress. Yellow periods are signs an adjustment may need to be made.     2. Continue your self-care, even if you have to fake it!    3. Talk to someone in your support network    4. Open up your depression survival kit           RED    ZONE Medical Alert - Get Help    What it looks like:     Depression is seriously interfering with your life.     You may experience these or other symptoms: You can t get out of bed most days, can t work or engage in other necessary activities, you have trouble taking care of basic hygiene, or basic responsibilities, thoughts of suicide or death that will not go away, self-injurious behavior.      What you need to do:  1. Call your care team and request a same-day appointment. If they are not available (weekends or after hours) call your local crisis line, emergency room or 911.            Depression Self Care Plan / Survival Kit    Self-Care for Depression  Here s the deal. Your body and mind are really not as separate as most people think.  What you do and think affects how you feel and how you feel influences what you do and think. This means if you do things that people who feel good do, it will help you feel better.  Sometimes this is all it takes.  There is also a place for medication and therapy depending on how severe your depression is, so be sure to consult with your medical provider and/ or Behavioral Health Consultant if your symptoms are worsening or not improving.     In order to better manage my stress, I will:    Exercise  Get some form of exercise, every day. This will help reduce pain and release endorphins, the  feel good  chemicals in your brain. This is almost as good as taking antidepressants!  This is not the same as joining a gym and then never going! (they count on that by the way ) It can be as simple as just going for a walk or doing some gardening, anything that will get you moving.      Hygiene   Maintain good hygiene (Get out of bed in the morning, Make your bed, Brush your teeth, Take a shower, and Get dressed like you were going to work, even if you are unemployed).  If your clothes don't fit try to get ones that do.    Diet  I will strive to eat foods that are good for me, drink plenty of water, and avoid excessive sugar, caffeine, alcohol, and other mood-altering substances.  Some foods that are helpful in depression are: complex carbohydrates, B vitamins, flaxseed, fish or fish oil, fresh fruits and vegetables.    Psychotherapy  I agree to participate in Individual Therapy (if recommended).    Medication  If prescribed medications, I agree to take them.  Missing doses can  result in serious side effects.  I understand that drinking alcohol, or other illicit drug use, may cause potential side effects.  I will not stop my medication abruptly without first discussing it with my provider.    Staying Connected With Others  I will stay in touch with my friends, family members, and my primary care provider/team.    Use your imagination  Be creative.  We all have a creative side; it doesn t matter if it s oil painting, sand castles, or mud pies! This will also kick up the endorphins.    Witness Beauty  (AKA stop and smell the roses) Take a look outside, even in mid-winter. Notice colors, textures. Watch the squirrels and birds.     Service to others  Be of service to others.  There is always someone else in need.  By helping others we can  get out of ourselves  and remember the really important things.  This also provides opportunities for practicing all the other parts of the program.    Humor  Laugh and be silly!  Adjust your TV habits for less news and crime-drama and more comedy.    Control your stress  Try breathing deep, massage therapy, biofeedback, and meditation. Find time to relax each day.     My support system    Clinic Contact:  Phone number:    Contact 1:  Phone number:    Contact 2:  Phone number:    Oriental orthodox/:  Phone number:    Therapist:  Phone number:    Local crisis center:    Phone number:    Other community support:  Phone number:

## 2018-08-31 NOTE — PROGRESS NOTES
SUBJECTIVE:   Korey Pearson is a 89 year old male who presents to clinic today for the following health issues:    Insomnia      Duration: x 8 years    Description  Frequency of insomnia:  nightly  Time to fall asleep: Doesn't fall asleep  Middle of night awakening:  nightly  Early morning awakening:  nightly    Accompanying signs and symptoms:  restless legs    History  Similar episodes in past:  YES  Previous evaluation/sleep study:  YES    Precipitating or alleviating factors:  New stressful situation: no   Caffeine intake after lunchtime: no   OTC decongestants: YES  Any new medications: no     Therapies tried and outcome: Benadryl 25mgm-  not effective    Trazodone in  1-17 ? Any help     Valium 5mgm     Depression and Anxiety Follow-Up    Status since last visit: No change-uncontrolled     Other associated symptoms:None    Complicating factors:     Significant life event: No     Current substance abuse: None    On valium 5mgm for anxiety & sleep     Meds: 0     PHQ-9 8/31/2018   Total Score 21   Q9: Suicide Ideation Not at all     VISHAL-7 SCORE 7/3/2018 8/31/2018   Total Score 18 19       PHQ-9  English  PHQ-9   Any Language  VISHAL-7  Suicide Assessment Five-step Evaluation and Treatment (SAFE-T)    Problem list and histories reviewed & adjusted, as indicated.  Additional history: as documented    Labs reviewed in EPIC    Reviewed and updated as needed this visit by clinical staff  Tobacco  Allergies  Meds  Problems       Reviewed and updated as needed this visit by Provider  Allergies  Meds  Problems         ROS:  CONSTITUTIONAL: NEGATIVE for fever, chills, change in weight  INTEGUMENTARY/SKIN: NEGATIVE for worrisome rashes, moles or lesions  EYES: NEGATIVE for vision changes or irritation  ENT/MOUTH: NEGATIVE for ear, mouth and throat problems  RESP: NEGATIVE for significant cough or SOB  BREAST: NEGATIVE for masses, tenderness or discharge  CV: NEGATIVE for chest pain, palpitations or  "peripheral edema  GI: NEGATIVE for nausea, abdominal pain, heartburn, or change in bowel habits  : NEGATIVE for frequency, dysuria, or hematuria  MUSCULOSKELETAL: NEGATIVE for significant arthralgias or myalgia  NEURO: NEGATIVE for weakness, dizziness or paresthesias  ENDOCRINE: NEGATIVE for temperature intolerance, skin/hair changes  HEME: NEGATIVE for bleeding problems  PSYCHIATRIC: POSITIVE for, anxiety, depressed mood, HX depression, fatigue and insomnia     OBJECTIVE:     /70  Pulse 70  Temp 97.8  F (36.6  C) (Tympanic)  Resp 16  Ht 5' 8.5\" (1.74 m)  Wt 189 lb (85.7 kg)  SpO2 97%  BMI 28.32 kg/m2  Body mass index is 28.32 kg/(m^2).  GENERAL: healthy, alert and no distress  EYES: Eyes grossly normal to inspection, PERRL and conjunctivae and sclerae normal  RESP: lungs clear to auscultation - no rales, rhonchi or wheezes  CV: regular rate and rhythm, normal S1 S2, no S3 or S4, no murmur, click or rub, no peripheral edema and peripheral pulses strong  MS: no gross musculoskeletal defects noted, no edema  SKIN: no suspicious lesions or rashes  NEURO: Normal strength and tone, mentation intact and speech normal  PSYCH: mentation appears normal, affect normal/bright, anxious, fatigued, judgement and insight intact and appearance well groomed    Diagnostic Test Results:  none     ASSESSMENT/PLAN:               ICD-10-CM    1. Insomnia, unspecified type since 81y/o  G47.00    2. Moderate episode of recurrent major depressive disorder (H)-issue of starting meds  F33.1 citalopram (CELEXA) 20 MG tablet   3. Seasonal affective disorder (H) F33.9    4. Anxiety F41.9 citalopram (CELEXA) 20 MG tablet       Patient Instructions   1. Shingrex is a 2 shot series that prevents shingles 97% of the time, as opposed to the old shingles shot that only prevented it at 40-50%  It costs less for medicare at a pharmacy  You should get it starting at 50 yrs old     2. Take benadryl and go up to 50mgm a nite    3. You can " take melatonin --as much as you need to sleep     4. Start citalopram taking 1/2 the 1st 2-4 days then go to a whole the reason to start small is because of the anxiety reaction you may have     5. See me next week       DISCUSSION     Pt with insomnia who has restless legs and depression -0for yrs -that has been untreated.. The depression could be the cause of the insomnia   He has been on valium 5mgm , tho he is 89y/o and needs to with draw from this   Will treat the depression and insomnia as above   May need fur there Rx for the restless legs    Frieda Khan MD  [facility]

## 2018-08-31 NOTE — PATIENT INSTRUCTIONS
1. Shingrex is a 2 shot series that prevents shingles 97% of the time, as opposed to the old shingles shot that only prevented it at 40-50%  It costs less for medicare at a pharmacy  You should get it starting at 50 yrs old     2. Take benadryl and go up to 50mgm a nite    3. You can take melatonin --as much as you need to sleep     4. Start citalopram taking 1/2 the 1st 2-4 days then go to a whole the reason to start small is because of the anxiety reaction you may have     5. See me next week

## 2018-08-31 NOTE — MR AVS SNAPSHOT
After Visit Summary   8/31/2018    Korey Pearson    MRN: [id number]           Patient Information     Date Of Birth          8/31/1929        Visit Information        Provider Department      8/31/2018 10:00 AM Frieda Khan MD [facility]        Today's Diagnoses     Insomnia, unspecified type since 79y/o     -  1    Moderate episode of recurrent major depressive disorder (H)        Seasonal affective disorder (H)        Anxiety          Care Instructions    1. Shingrex is a 2 shot series that prevents shingles 97% of the time, as opposed to the old shingles shot that only prevented it at 40-50%  It costs less for medicare at a pharmacy  You should get it starting at 50 yrs old     2. Take benadryl and go up to 50mgm a nite    3. You can take melatonin --as much as you need to sleep     4. Start citalopram taking 1/2 the 1st 2-4 days then go to a whole the reason to start small is because of the anxiety reaction you may have     5. See me next week               Follow-ups after your visit        Follow-up notes from your care team     Return in about 4 days (around 9/4/2018) for Routine Visit.      Who to contact     If you have questions or need follow up information about today's clinic visit or your schedule please contact [facility] directly at [phone].  Normal or non-critical lab and imaging results will be communicated to you by MyChart, letter or phone within 4 business days after the clinic has received the results. If you do not hear from us within 7 days, please contact the clinic through MyChart or phone. If you have a critical or abnormal lab result, we will notify you by phone as soon as possible.  Submit refill requests through [employer] or call your pharmacy and they will forward the refill request to us. Please allow 3 business days for your refill to be completed.          Additional Information  "About Your Visit        [facility] Information     Science gives you secure access to your electronic health record. If you see a primary care provider, you can also send messages to your care team and make appointments. If you have questions, please call your primary care clinic.  If you do not have a primary care provider, please call [phone] and they will assist you.        Care EveryWhere ID     This is your Care EveryWhere ID. This could be used by other organizations to access your [place] medical records  [id number]        Your Vitals Were     Pulse Temperature Respirations Height Pulse Oximetry BMI (Body Mass Index)    70 97.8  F (36.6  C) (Tympanic) 16 5' 8.5\" (1.74 m) 97% 28.32 kg/m2       Blood Pressure from Last 3 Encounters:   08/31/18 130/70   08/20/18 110/62   07/08/18 104/62    Weight from Last 3 Encounters:   08/31/18 189 lb (85.7 kg)   08/20/18 189 lb (85.7 kg)   07/08/18 188 lb 9.6 oz (85.5 kg)              We Performed the Following     DEPRESSION ACTION PLAN (DAP)     PAF COMPLETED          Today's Medication Changes          These changes are accurate as of 8/31/18 10:42 AM.  If you have any questions, ask your nurse or doctor.               Start taking these medicines.        Dose/Directions    citalopram 20 MG tablet   Commonly known as:  celeXA   Used for:  Moderate episode of recurrent major depressive disorder (H), Anxiety   Started by:  Frieda Khan MD        Dose:  20 mg   Take 1 tablet (20 mg) by mouth daily   Quantity:  30 tablet   Refills:  0            Where to get your medicines      Some of these will need a paper prescription and others can be bought over the counter.  Ask your nurse if you have questions.     Bring a paper prescription for each of these medications     citalopram 20 MG tablet                Primary Care Provider Office Phone # Fax #    Liberty Calhoun PA-C [phone] [address] " [address]        Equal Access to Services     [facility]: Hadii aicha katz abdoul Sharpe, waaxda luqadaha, qaybta kaalmada aftab, italia rosario. So [facility] [phone].    ATENCIÓN: Si habla español, tiene a albarran disposición servicios gratuitos de asistencia lingüística. Llame al [phone].    We comply with applicable federal civil rights laws and [place] laws. We do not discriminate on the basis of race, color, national origin, age, disability, sex, sexual orientation, or gender identity.            Thank you!     Thank you for choosing [facility]  for your care. Our goal is always to provide you with excellent care. Hearing back from our patients is one way we can continue to improve our services. Please take a few minutes to complete the written survey that you may receive in the mail after your visit with us. Thank you!             Your Updated Medication List - Protect others around you: Learn how to safely use, store and throw away your medicines at www.disposemymeds.org.          This list is accurate as of 8/31/18 10:42 AM.  Always use your most recent med list.                   Brand Name Dispense Instructions for use Diagnosis    aspirin 81 MG tablet      Take 81 mg by mouth daily        atorvastatin 20 MG tablet    LIPITOR    90 tablet    Take 1 tablet (20 mg) by mouth At Bedtime    Unstable angina (H), Hyperlipidemia LDL goal <130       citalopram 20 MG tablet    celeXA    30 tablet    Take 1 tablet (20 mg) by mouth daily    Moderate episode of recurrent major depressive disorder (H), Anxiety       diazepam 5 MG tablet    VALIUM    90 tablet    TAKE ONE TABLET BY MOUTH EVERY 8 HOURS AS NEEDED FOR ANXIETY    Generalized anxiety disorder       docusate sodium 100 MG tablet    COLACE    60 tablet    Take 100 mg by mouth daily        isosorbide mononitrate 30 MG 24 hr tablet    IMDUR    90 tablet    Take 1 tablet (30 mg) by mouth daily In the  morning    Coronary artery disease of native artery of native heart with stable angina pectoris (H)       levothyroxine 75 MCG tablet    SYNTHROID/LEVOTHROID    90 tablet    TAKE ONE TABLET BY MOUTH ONCE DAILY    Acquired hypothyroidism       lisinopril 10 MG tablet    PRINIVIL/ZESTRIL    90 tablet    Take 1 tablet (10 mg) by mouth daily    Benign essential hypertension       magnesium citrate 1.745 GM/30ML solution     2 Bottle    Drink one bottle twice daily until you have a large bowel movement. Then stop this medication.        metoprolol succinate 25 MG 24 hr tablet    TOPROL-XL    90 tablet    Take 0.5 tablets (12.5 mg) by mouth daily    Unstable angina (H)       NIGHTTIME SLEEP AID 50 MG Caps   Generic drug:  DiphenhydrAMINE HCl (Sleep)     30 capsule    Take 1 capsule by mouth as needed    Insomnia       polyethylene glycol powder    MIRALAX/GLYCOLAX     Take 17 g by mouth daily as needed for constipation        prune juice Liqd      Take 5 mLs by mouth daily

## 2018-09-01 ASSESSMENT — PATIENT HEALTH QUESTIONNAIRE - PHQ9: SUM OF ALL RESPONSES TO PHQ QUESTIONS 1-9: 21

## 2018-09-01 ASSESSMENT — ANXIETY QUESTIONNAIRES: GAD7 TOTAL SCORE: 19

## 2018-09-05 ENCOUNTER — APPOINTMENT (OUTPATIENT)
Dept: GENERAL RADIOLOGY | Facility: CLINIC | Age: 83
End: 2018-09-05
Attending: EMERGENCY MEDICINE
Payer: COMMERCIAL

## 2018-09-05 ENCOUNTER — HOSPITAL ENCOUNTER (EMERGENCY)
Facility: CLINIC | Age: 83
Discharge: HOME | End: 2018-09-05
Attending: EMERGENCY MEDICINE | Admitting: EMERGENCY MEDICINE
Payer: COMMERCIAL

## 2018-09-05 VITALS
TEMPERATURE: 97.9 F | DIASTOLIC BLOOD PRESSURE: 71 MMHG | SYSTOLIC BLOOD PRESSURE: 146 MMHG | RESPIRATION RATE: 18 BRPM | HEIGHT: 69 IN | OXYGEN SATURATION: 97 % | BODY MASS INDEX: 27.4 KG/M2 | WEIGHT: 185 LBS

## 2018-09-05 DIAGNOSIS — R33.9 URINARY RETENTION: ICD-10-CM

## 2018-09-05 LAB
ALBUMIN UR-MCNC: NEGATIVE MG/DL
APPEARANCE UR: CLEAR
BILIRUB UR QL STRIP: NEGATIVE
COLOR UR AUTO: YELLOW
GLUCOSE UR STRIP-MCNC: NEGATIVE MG/DL
HGB UR QL STRIP: NEGATIVE
KETONES UR STRIP-MCNC: NEGATIVE MG/DL
LEUKOCYTE ESTERASE UR QL STRIP: NEGATIVE
NITRATE UR QL: NEGATIVE
PH UR STRIP: 6 PH (ref 5–7)
RBC #/AREA URNS AUTO: <1 /HPF (ref 0–2)
SOURCE: ABNORMAL
SP GR UR STRIP: 1.02 (ref 1–1.03)
UROBILINOGEN UR STRIP-MCNC: 2 MG/DL (ref 0–2)
WBC #/AREA URNS AUTO: 6 /HPF (ref 0–5)

## 2018-09-05 PROCEDURE — 81001 URINALYSIS AUTO W/SCOPE: CPT | Performed by: EMERGENCY MEDICINE

## 2018-09-05 PROCEDURE — 99285 EMERGENCY DEPT VISIT HI MDM: CPT | Mod: 25

## 2018-09-05 PROCEDURE — 51702 INSERT TEMP BLADDER CATH: CPT

## 2018-09-05 PROCEDURE — 51798 US URINE CAPACITY MEASURE: CPT

## 2018-09-05 PROCEDURE — 73523 X-RAY EXAM HIPS BI 5/> VIEWS: CPT

## 2018-09-05 PROCEDURE — 72100 X-RAY EXAM L-S SPINE 2/3 VWS: CPT

## 2018-09-05 RX ORDER — TAMSULOSIN HYDROCHLORIDE 0.4 MG/1
0.4 CAPSULE ORAL DAILY
Qty: 10 CAPSULE | Refills: 0 | Status: SHIPPED | OUTPATIENT
Start: 2018-09-05 | End: 2019-03-06

## 2018-09-05 ASSESSMENT — ENCOUNTER SYMPTOMS
FEVER: 0
NUMBNESS: 0
DYSURIA: 1
HEMATURIA: 0
CHILLS: 0
BACK PAIN: 1
VOMITING: 0

## 2018-09-05 NOTE — ED NOTES
Extensive catheter care teaching done with pt and wife. Educated on how to change from leg bag to larger bag and how to empty. Pt and wife verbalized understanding and questions answered.

## 2018-09-05 NOTE — DISCHARGE INSTRUCTIONS
Urinary Retention (Male)  Urinary retention is the medical term for difficulty or inability to pass urine, even though your bladder is full.  Causes  The most common cause of urinary retention in men is the bladder outlet being blocked. This can be due to an enlarged prostate gland or a bladder infection. Certain medicines can also cause this problem. This condition is more likely to occur as men get older.    Symptoms  Common symptoms of urinary retention include:    Pain (not experienced by everyone)    Frequent urination    Feeling that the bladder is still full after urinating    Incontinence (not being able to control the release of urine)    Swollen abdomen  Treatment  This condition is treated by inserting a tube (catheter) into the bladder to drain the urine. This provides immediate relief. The catheter may need to stay in place for a few days. The catheter has a balloon on the tip, which is inflated after insertion. This prevents the catheter from falling out.  Home care    If you were given antibiotics, take them until they are used up, or your healthcare provider tells you to stop. It is important to finish the antibiotics even though you feel better. This is to make sure your infection has cleared.    If a catheter was left in place, it is important to keep bacteria from getting into the collection bag. Don't disconnect the catheter from the collection bag.    Use a leg band to secure the drainage tube, so it does not pull on the catheter. Drain the collection bag when it becomes full using the drain spout at the bottom of the bag.    Don't pull on or try to remove your catheter. This will injure your urethra. The catheter must be removed by a healthcare provider.  Follow-up care  Follow up with your healthcare provider, or as advised.  If a catheter was left in place, it can usually be removed within 3 to 7 days. Some conditions require the catheter to stay in longer. Your healthcare provider will  tell you when to return to have the catheter removed.  When to seek medical advice  Call your healthcare provider right away if any of these occur:    Fever of 100.4 F (38 C) or higher, or as directed by your healthcare provider    Bladder or lower-abdominal pain or fullness    Abdominal swelling, nausea, vomiting, or back pain    Blood or urine leakage around the catheter    Bloody urine coming from the catheter (if a new symptom)    Weakness, dizziness, or fainting    Confusion or change in usual level of alertness    If a catheter was left in place, return if:  ? Catheter falls out  ? Catheter stops draining for 6 hours  Date Last Reviewed: 10/1/2017    [id number] [employer]. [address] [place] [address], [place] [address]. All rights reserved. This information is not intended as a substitute for professional medical care. Always follow your healthcare professional's instructions.          Macdonald Catheter Care    A Macdonald catheter is a rubber tube that is placed through the urethra (opening where urine comes out) and into the bladder. This helps drain urine from the bladder. There is a small balloon on the end of the tube that is inflated after insertion. This keeps the catheter from sliding out of the bladder.  A Macdonald catheter is used to treat urinary retention (unable to pass urine). It is also used when there is incontinence (loss of bladder control).  Home care    Finish taking any prescribed antibiotic even if you are feeling better before then.    It is important to keep bacteria from getting into the collection bag. Do not disconnect the catheter from the collection bag.    Use a leg band to secure the drainage tube, so it does not pull on the catheter. Drain the collection bag when it becomes full using the drain spout at the bottom of the bag.    Do not try to pull or remove your catheter. This will injure your urethra. It must be removed by your healthcare provider or nurse.  Follow-up  care  Follow up with your healthcare provider as advised for repeat urine testing and catheter removal or replacement.  When to seek medical advice  Call your healthcare provider right away if any of these occur:    Fever of 100.4 F (38 C) or higher, or as directed by your healthcare provider    Bladder pain or fullness    Abdominal swelling, nausea or vomiting, or back pain    Blood or urine leakage around the catheter    Bloody urine coming from the catheter (if a new symptom)    Catheter falls out    Catheter stops draining for 6 hours    Weakness, dizziness, or fainting  Date Last Reviewed: 10/1/2016    4838-3311 [employer]. [address] [place] [address]. All rights reserved. This information is not intended as a substitute for professional medical care. Always follow your healthcare professional's instructions.          Macdonald Catheter Care    A Macdonald catheter is a rubber tube that is placed through the urethra (opening where urine comes out) and into the bladder. This helps drain urine from the bladder. There is a small balloon on the end of the tube that is inflated after insertion. This keeps the catheter from sliding out of the bladder.  A Macdonald catheter is used to treat urinary retention (unable to pass urine). It is also used when there is incontinence (loss of bladder control).  Home care    Finish taking any prescribed antibiotic even if you are feeling better before then.    It is important to keep bacteria from getting into the collection bag. Do not disconnect the catheter from the collection bag.    Use a leg band to secure the drainage tube, so it does not pull on the catheter. Drain the collection bag when it becomes full using the drain spout at the bottom of the bag.    Do not try to pull or remove your catheter. This will injure your urethra. It must be removed by your healthcare provider or nurse.  Follow-up care  Follow up with your healthcare provider as advised for  repeat urine testing and catheter removal or replacement.  When to seek medical advice  Call your healthcare provider right away if any of these occur:    Fever of 100.4 F (38 C) or higher, or as directed by your healthcare provider    Bladder pain or fullness    Abdominal swelling, nausea or vomiting, or back pain    Blood or urine leakage around the catheter    Bloody urine coming from the catheter (if a new symptom)    Catheter falls out    Catheter stops draining for 6 hours    Weakness, dizziness, or fainting  Date Last Reviewed: 10/1/2016    [id number] [employer]. [address] [place] [address]. All rights reserved. This information is not intended as a substitute for professional medical care. Always follow your healthcare professional's instructions.

## 2018-09-05 NOTE — ED AVS SNAPSHOT
Emergency Department    [address] [place] [id number]    Phone:  [phone]    Fax:  [phone]                                       Korey Pearson   MRN: [id number]    Department:   Emergency Department   Date of Visit:  9/5/2018           After Visit Summary Signature Page     I have received my discharge instructions, and my questions have been answered. I have discussed any challenges I see with this plan with the nurse or doctor.    ..........................................................................................................................................  Patient/Patient Representative Signature      ..........................................................................................................................................  Patient Representative Print Name and Relationship to Patient    ..................................................               ................................................  Date                                            Time    ..........................................................................................................................................  Reviewed by Signature/Title    ...................................................              ..............................................  Date                                                            Time          22EPIC Rev 08/18

## 2018-09-05 NOTE — ED PROVIDER NOTES
"  History     Chief Complaint:  Dysuria & Decreased Urine Output    The history is provided by the patient.      Korey Pearson is a 89 year old male with a history of thoracic aortic ectasia and peripheral neuropathy who presents for evaluation of dysuria and decreased urine output. The patient reports that he sustained a fall in the last few days while at his care facility in which he fell forwards and somewhat onto his left shoulder. He states that his hands took the brunt of the fall and denies hitting his head or spine/back with this fall. Since the fall, the patient states that he has experienced \"pressure\" in his pelvic area, dysuria, and low middle back pain which is exacerbated with ambulation. He also states that he has not urinated in the last 12 hours, however does state that he has been drinking less to avoid subsequent dysuria. Patient denies new numbness or tingling but does endorse chronic numbness in his legs bilaterally in the last several years that extends from his toes up to his calves. Patient denies hematuria, fever, chills, vomiting, or other complaint.     Allergies:  Ropinirole      Medications:    Aspirin 81 mg  Lipitor  Celexa  Valium  Colace  Levothyroxine  Imdur  Lisinopril  Metoprolol  Miralax    Past Medical History:    Seasonal affective disorder  Anxiety  Thoracic aortic ectasia  Hypercholesterolemia  Insomnia  ACP  Diverticulosis of large intestine w/o hemorrhage  Flatulence, eructation, and gas pain  GERD w/o esophagitis  Degenerative arthritis of left knee  RLS w/ nocturnal myoclonus  Vitamin D deficiency  Peripheral neuropathy  Acquired hypothyroidism  Generalized anxiety disorder  Erythrocytosis  Hyperlipidemia    Past Surgical History:    Abdomen surgery  Angiogram  Appendectomy  Cardiac stent placement  Cataract  Hernia repair    Family History:    CVD  Heart disease    Social History:  Presents with spouse   Tobacco use: Former smoker (1.5 ppd for 10 years, quit " "1/1/1957)  Alcohol use: Yes (1 beer / week)  PCP: Liberty Calhoun    Marital Status:       Review of Systems   Constitutional: Negative for chills and fever.   Gastrointestinal: Negative for vomiting.   Genitourinary: Positive for decreased urine volume and dysuria. Negative for hematuria.   Musculoskeletal: Positive for back pain.   Neurological: Negative for numbness.   All other systems reviewed and are negative.    Physical Exam     Patient Vitals for the past 24 hrs:   BP Temp Temp src Heart Rate Resp SpO2 Height Weight   09/05/18 1201 146/71 97.9  F (36.6  C) Oral 64 18 97 % 1.753 m (5' 9\") 83.9 kg (185 lb)        Physical Exam    Physical Exam   Constitutional:  Patient is oriented to person, place, and time. They appear well-developed and well-nourished. Mild distress secondary to urinary retention.   HENT:   Mouth/Throat:   Oropharynx is clear and moist.   Eyes:    Conjunctivae normal and EOM are normal. Pupils are equal, round, and reactive to light.   Neck:    Normal range of motion.   Cardiovascular: Normal rate, regular rhythm and normal heart sounds.  Exam reveals no gallop and no friction rub.  No murmur heard.  Pulmonary/Chest:  Effort normal and breath sounds normal. Patient has no wheezes. Patient has no rales.   Abdominal:   Soft. Bowel sounds are normal. Patient exhibits no mass. There is no tenderness. There is no rebound and no guarding.   Musculoskeletal:  Normal range of motion. Patient exhibits no edema. No tenderness to palpation over the lateral hips. No pain when putting pressure on the pelvis. He allows internal and external rotation of his hips. There was mild suprapubic tenderness. There is mild low back pain, tenderness to palpation w/o bruising, ecchymosis, or bony stepoffs.  Neurological:   Patient is alert and oriented to person, place, and time. Patient has normal strength. No cranial nerve deficit or sensory deficit. GCS 15  Skin:   Skin is warm and dry. No " rash noted. No erythema.   Psychiatric:   Patient has a normal mood and affect. Patient's behavior is normal. Judgment and thought content normal.      Emergency Department Course     Imaging:  Radiographic findings were communicated with the patient and family who voiced understanding of the findings.    XR Pelvis with hip, bilateral, 2 view:  IMPRESSION: No acute osseous abnormality.    XR Lumbar spine, 2-3 views:  IMPRESSION: No acute osseous abnormality.    Imaging independently reviewed and agree with radiologist interpretation.      Laboratory:  UA with micro: WBC 6 (H) o/w negative     Emergency Department Course:  Past medical records, nursing notes, and vitals reviewed.  1155: I performed an exam of the patient and obtained history, as documented above.    The patient was sent for a x-ray while in the emergency department, findings above.  The patient provided a urine sample here in the emergency department. This was sent for laboratory testing, findings above.    1538: I rechecked the patient. Findings and plan explained to the Patient and spouse. Patient discharged home with instructions regarding supportive care, medications, and reasons to return. The importance of close follow-up was reviewed.       Impression & Plan      Medical Decision Making:  Korey Pearson is a 89 year old male presenting with concerns for urinary retention. On presentation, a bladder scan was performed that showed only 49 cc's of urine. He was able to urinate. There is no evidence of infection. He did fall and seemed to have symptoms occur after he fell in his low back and pubic area. Therefore x-rays were performed and there is no evidence of hip or pelvic fracture. Low back is normal. He has now been in the emergency department for 3.5 hours so a repeat bladder scan was performed after he tried to go to the bathroom again. Although he states he normally can go to the bathroom better when he sits down, he did not  want to sit down on the seat so was not able to urinate very much. Bladder scan then showed 350 cc's of urine so I discussed with him a stokes catheter for his urinary retention. Because he has suprapubic discomfort, he most likely has BPH. I am recommending a catheter so he doesn't come back in a more emergent condition. The patient did agree to this. Patient was educated regarding leg bag. I will refer him to Dr. Cotton as his pervious urologist recently retired. I will also place him on Flomax.    Diagnosis:    ICD-10-CM   1. Urinary retention R33.9       Disposition:  Discharged to home with plan as outlined.     Discharge Medications:  Discharge Medication List as of 9/5/2018  4:05 PM      START taking these medications    Details   tamsulosin (FLOMAX) 0.4 MG capsule Take 1 capsule (0.4 mg) by mouth daily for 10 doses, Disp-10 capsule, R-0, Local Print               Scribe Disclosure:  I, Joey Villarreal, am serving as a scribe at 1:12 PM on 9/5/2018 to document services personally performed by Drea Smith MD based on my observations and the provider's statements to me.   9/5/2018    EMERGENCY DEPARTMENT     Drea Smith MD  09/08/18 8531

## 2018-09-05 NOTE — ED AVS SNAPSHOT
Emergency Department    [address] [facility] [address]    Phone:  [phone]    Fax:  [phone]                                       Korey Pearson   MRN: [id number]    Department:   Emergency Department   Date of Visit:  9/5/2018           Patient Information     Date Of Birth          8/31/1929        Your diagnoses for this visit were:     Urinary retention        You were seen by Drea Smith MD.      Follow-up Information     Follow up with Hardik Cotton MD In 2 days.    Specialty:  Urology    Contact information:    [facility]  [address] [facility] [address]  [place] [address] [phone]          Discharge Instructions         Urinary Retention (Male)  Urinary retention is the medical term for difficulty or inability to pass urine, even though your bladder is full.  Causes  The most common cause of urinary retention in men is the bladder outlet being blocked. This can be due to an enlarged prostate gland or a bladder infection. Certain medicines can also cause this problem. This condition is more likely to occur as men get older.    Symptoms  Common symptoms of urinary retention include:    Pain (not experienced by everyone)    Frequent urination    Feeling that the bladder is still full after urinating    Incontinence (not being able to control the release of urine)    Swollen abdomen  Treatment  This condition is treated by inserting a tube (catheter) into the bladder to drain the urine. This provides immediate relief. The catheter may need to stay in place for a few days. The catheter has a balloon on the tip, which is inflated after insertion. This prevents the catheter from falling out.  Home care    If you were given antibiotics, take them until they are used up, or your healthcare provider tells you to stop. It is important to finish the antibiotics even though you feel better. This is to make sure your infection has cleared.    If a  catheter was left in place, it is important to keep bacteria from getting into the collection bag. Don't disconnect the catheter from the collection bag.    Use a leg band to secure the drainage tube, so it does not pull on the catheter. Drain the collection bag when it becomes full using the drain spout at the bottom of the bag.    Don't pull on or try to remove your catheter. This will injure your urethra. The catheter must be removed by a healthcare provider.  Follow-up care  Follow up with your healthcare provider, or as advised.  If a catheter was left in place, it can usually be removed within 3 to 7 days. Some conditions require the catheter to stay in longer. Your healthcare provider will tell you when to return to have the catheter removed.  When to seek medical advice  Call your healthcare provider right away if any of these occur:    Fever of 100.4 F (38 C) or higher, or as directed by your healthcare provider    Bladder or lower-abdominal pain or fullness    Abdominal swelling, nausea, vomiting, or back pain    Blood or urine leakage around the catheter    Bloody urine coming from the catheter (if a new symptom)    Weakness, dizziness, or fainting    Confusion or change in usual level of alertness    If a catheter was left in place, return if:  ? Catheter falls out  ? Catheter stops draining for 6 hours  Date Last Reviewed: 10/1/2017    2357-5219 [employer]. [address] [address]. All rights reserved. This information is not intended as a substitute for professional medical care. Always follow your healthcare professional's instructions.          Macdonald Catheter Care    A Macdonald catheter is a rubber tube that is placed through the urethra (opening where urine comes out) and into the bladder. This helps drain urine from the bladder. There is a small balloon on the end of the tube that is inflated after insertion. This keeps the catheter from sliding out of the bladder.  A  Macdonald catheter is used to treat urinary retention (unable to pass urine). It is also used when there is incontinence (loss of bladder control).  Home care    Finish taking any prescribed antibiotic even if you are feeling better before then.    It is important to keep bacteria from getting into the collection bag. Do not disconnect the catheter from the collection bag.    Use a leg band to secure the drainage tube, so it does not pull on the catheter. Drain the collection bag when it becomes full using the drain spout at the bottom of the bag.    Do not try to pull or remove your catheter. This will injure your urethra. It must be removed by your healthcare provider or nurse.  Follow-up care  Follow up with your healthcare provider as advised for repeat urine testing and catheter removal or replacement.  When to seek medical advice  Call your healthcare provider right away if any of these occur:    Fever of 100.4 F (38 C) or higher, or as directed by your healthcare provider    Bladder pain or fullness    Abdominal swelling, nausea or vomiting, or back pain    Blood or urine leakage around the catheter    Bloody urine coming from the catheter (if a new symptom)    Catheter falls out    Catheter stops draining for 6 hours    Weakness, dizziness, or fainting  Date Last Reviewed: 10/1/2016    2540-6248 [employer]. [address] [place] [address]. All rights reserved. This information is not intended as a substitute for professional medical care. Always follow your healthcare professional's instructions.          Macdonald Catheter Care    A Macdonald catheter is a rubber tube that is placed through the urethra (opening where urine comes out) and into the bladder. This helps drain urine from the bladder. There is a small balloon on the end of the tube that is inflated after insertion. This keeps the catheter from sliding out of the bladder.  A Macdonald catheter is used to treat urinary retention (unable to  pass urine). It is also used when there is incontinence (loss of bladder control).  Home care    Finish taking any prescribed antibiotic even if you are feeling better before then.    It is important to keep bacteria from getting into the collection bag. Do not disconnect the catheter from the collection bag.    Use a leg band to secure the drainage tube, so it does not pull on the catheter. Drain the collection bag when it becomes full using the drain spout at the bottom of the bag.    Do not try to pull or remove your catheter. This will injure your urethra. It must be removed by your healthcare provider or nurse.  Follow-up care  Follow up with your healthcare provider as advised for repeat urine testing and catheter removal or replacement.  When to seek medical advice  Call your healthcare provider right away if any of these occur:    Fever of 100.4 F (38 C) or higher, or as directed by your healthcare provider    Bladder pain or fullness    Abdominal swelling, nausea or vomiting, or back pain    Blood or urine leakage around the catheter    Bloody urine coming from the catheter (if a new symptom)    Catheter falls out    Catheter stops draining for 6 hours    Weakness, dizziness, or fainting  Date Last Reviewed: 10/1/2016    5612-3237 [employer]. [address] [place] [address]. All rights reserved. This information is not intended as a substitute for professional medical care. Always follow your healthcare professional's instructions.          Your next 10 appointments already scheduled     Sep 06, 2018 10:40 AM CDT   SHORT with Frieda Khan MD   [facility]    [address] [place] [id number] [phone]              24 Hour Appointment Hotline       To make an appointment at any [facility], call [phone] ([phone]). If you don't have a family doctor  or clinic, we will help you find one. [facility] clinics are conveniently located to serve the needs of you and your family.             Review of your medicines      START taking        Dose / Directions Last dose taken    tamsulosin 0.4 MG capsule   Commonly known as:  FLOMAX   Dose:  0.4 mg   Quantity:  10 capsule        Take 1 capsule (0.4 mg) by mouth daily for 10 doses   Refills:  0          Our records show that you are taking the medicines listed below. If these are incorrect, please call your family doctor or clinic.        Dose / Directions Last dose taken    aspirin 81 MG tablet   Dose:  81 mg        Take 81 mg by mouth daily   Refills:  0        atorvastatin 20 MG tablet   Commonly known as:  LIPITOR   Dose:  20 mg   Quantity:  90 tablet        Take 1 tablet (20 mg) by mouth At Bedtime   Refills:  0        citalopram 20 MG tablet   Commonly known as:  celeXA   Dose:  20 mg   Quantity:  30 tablet        Take 1 tablet (20 mg) by mouth daily   Refills:  0        diazepam 5 MG tablet   Commonly known as:  VALIUM   Quantity:  90 tablet        TAKE ONE TABLET BY MOUTH EVERY 8 HOURS AS NEEDED FOR ANXIETY   Refills:  3        docusate sodium 100 MG tablet   Commonly known as:  COLACE   Dose:  100 mg   Quantity:  60 tablet        Take 100 mg by mouth daily   Refills:  1        isosorbide mononitrate 30 MG 24 hr tablet   Commonly known as:  IMDUR   Dose:  30 mg   Quantity:  90 tablet        Take 1 tablet (30 mg) by mouth daily In the morning   Refills:  3        levothyroxine 75 MCG tablet   Commonly known as:  SYNTHROID/LEVOTHROID   Quantity:  90 tablet        TAKE ONE TABLET BY MOUTH ONCE DAILY   Refills:  2        lisinopril 10 MG tablet   Commonly known as:  PRINIVIL/ZESTRIL   Dose:  10 mg   Quantity:  90 tablet        Take 1 tablet (10 mg) by mouth daily   Refills:  3        magnesium citrate 1.745 GM/30ML solution   Quantity:  2 Bottle        Drink one bottle twice daily until you have a large bowel movement.  Then stop this medication.   Refills:  1        metoprolol succinate 25 MG 24 hr tablet   Commonly known as:  TOPROL-XL   Dose:  12.5 mg   Quantity:  90 tablet        Take 0.5 tablets (12.5 mg) by mouth daily   Refills:  3        NIGHTTIME SLEEP AID 50 MG Caps   Dose:  1 capsule   Quantity:  30 capsule   Generic drug:  DiphenhydrAMINE HCl (Sleep)        Take 1 capsule by mouth as needed   Refills:  0        polyethylene glycol powder   Commonly known as:  MIRALAX/GLYCOLAX   Dose:  17 g        Take 17 g by mouth daily as needed for constipation   Refills:  0        prune juice Liqd   Dose:  5 mL        Take 5 mLs by mouth daily   Refills:  0                Prescriptions were sent or printed at these locations (1 Prescription)                   Other Prescriptions                Printed at Department/Unit printer (1 of 1)         tamsulosin (FLOMAX) 0.4 MG capsule                Procedures and tests performed during your visit     Lumbar spine XR, 2-3 views    UA with Microscopic    XR Pelvis and Hip Bilateral 2 Views      Orders Needing Specimen Collection     None      Pending Results     No orders found from 9/3/2018 to 9/6/2018.            Pending Culture Results     No orders found from 9/3/2018 to 9/6/2018.            Pending Results Instructions     If you had any lab results that were not finalized at the time of your Discharge, you can call the ED Lab Result RN at [phone]. You will be contacted by this team for any positive Lab results or changes in treatment. The nurses are available 7 days a week from 10A to 6:30P.  You can leave a message 24 hours per day and they will return your call.        Test Results From Your Hospital Stay        9/5/2018 12:43 PM      Component Results     Component Value Ref Range & Units Status    Color Urine Yellow  Final    Appearance Urine Clear  Final    Glucose Urine Negative NEG^Negative mg/dL Final    Bilirubin Urine Negative NEG^Negative Final    Ketones Urine  Negative NEG^Negative mg/dL Final    Specific Gravity Urine 1.016 1.003 - 1.035 Final    Blood Urine Negative NEG^Negative Final    pH Urine 6.0 5.0 - 7.0 pH Final    Protein Albumin Urine Negative NEG^Negative mg/dL Final    Urobilinogen mg/dL 2.0 0.0 - 2.0 mg/dL Final    Nitrite Urine Negative NEG^Negative Final    Leukocyte Esterase Urine Negative NEG^Negative Final    Source Midstream Urine  Final    WBC Urine 6 (H) 0 - 5 /HPF Final    RBC Urine <1 0 - 2 /HPF Final         9/5/2018  1:56 PM      Narrative     XR PELVIS AND HIP BILATERAL 2 VIEWS 9/5/2018 1:35 PM    HISTORY: Pain.    COMPARISON: None.    FINDINGS: Joint spaces are preserved. No fracture or malalignment.  Osseous structures appear normal.        Impression     IMPRESSION: No acute osseous abnormality.    VESNA FERRIS MD         9/5/2018  1:55 PM      Narrative     XR LUMBAR SPINE 2-3 VIEWS 9/5/2018 1:34 PM    HISTORY: Back pain after fall.    COMPARISON: None.    FINDINGS: Lumbar alignment is anatomic. Mild loss of disc space in the  upper lumbar spine. Vertebral body heights are maintained. Moderate  multilevel marginal osteophyte formation. Dense vascular  calcification.        Impression     IMPRESSION: No acute osseous abnormality.    VESNA FERRIS MD                Clinical Quality Measure: Blood Pressure Screening     Your blood pressure was checked while you were in the emergency department today. The last reading we obtained was  BP: 146/71 . Please read the guidelines below about what these numbers mean and what you should do about them.  If your systolic blood pressure (the top number) is less than 120 and your diastolic blood pressure (the bottom number) is less than 80, then your blood pressure is normal. There is nothing more that you need to do about it.  If your systolic blood pressure (the top number) is 120-139 or your diastolic blood pressure (the bottom number) is 80-89, your blood pressure may be higher than it should be. You  should have your blood pressure rechecked within a year by a primary care provider.  If your systolic blood pressure (the top number) is 140 or greater or your diastolic blood pressure (the bottom number) is 90 or greater, you may have high blood pressure. High blood pressure is treatable, but if left untreated over time it can put you at risk for heart attack, stroke, or kidney failure. You should have your blood pressure rechecked by a primary care provider within the next 4 weeks.  If your provider in the emergency department today gave you specific instructions to follow-up with your doctor or provider even sooner than that, you should follow that instruction and not wait for up to 4 weeks for your follow-up visit.        Thank you for choosing [facility]       Thank you for choosing [facility] for your care. Our goal is always to provide you with excellent care. Hearing back from our patients is one way we can continue to improve our services. Please take a few minutes to complete the written survey that you may receive in the mail after you visit with us. Thank you!        [employer] Information     [employer] gives you secure access to your electronic health record. If you see a primary care provider, you can also send messages to your care team and make appointments. If you have questions, please call your primary care clinic.  If you do not have a primary care provider, please call [phone] and they will assist you.        Care EveryWhere ID     This is your Care EveryWhere ID. This could be used by other organizations to access your [facility] medical records  [id number]        Equal Access to Services     ROBY VIERA : Hadii aicha Sharpe, wanighatda peng, qaybta sergioalmaitalia stanton . So [facility] [phone].    ATENCIÓN: Si habla español, tiene a albarran disposición servicios gratuitos de asistencia lingüística. Llame al [phone].    We comply with applicable  federal civil rights laws and [place] laws. We do not discriminate on the basis of race, color, national origin, age, disability, sex, sexual orientation, or gender identity.            After Visit Summary       This is your record. Keep this with you and show to your community pharmacist(s) and doctor(s) at your next visit.

## 2018-09-07 ENCOUNTER — TRANSFERRED RECORDS (OUTPATIENT)
Dept: HEALTH INFORMATION MANAGEMENT | Facility: CLINIC | Age: 83
End: 2018-09-07

## 2018-09-11 ENCOUNTER — OFFICE VISIT (OUTPATIENT)
Dept: FAMILY MEDICINE | Facility: CLINIC | Age: 83
End: 2018-09-11
Payer: COMMERCIAL

## 2018-09-11 VITALS
HEIGHT: 69 IN | RESPIRATION RATE: 12 BRPM | SYSTOLIC BLOOD PRESSURE: 110 MMHG | OXYGEN SATURATION: 95 % | TEMPERATURE: 97.6 F | HEART RATE: 68 BPM | BODY MASS INDEX: 27.99 KG/M2 | DIASTOLIC BLOOD PRESSURE: 60 MMHG | WEIGHT: 189 LBS

## 2018-09-11 DIAGNOSIS — F33.1 MODERATE EPISODE OF RECURRENT MAJOR DEPRESSIVE DISORDER (H): Primary | ICD-10-CM

## 2018-09-11 DIAGNOSIS — N40.1 BENIGN PROSTATIC HYPERPLASIA WITH INCOMPLETE BLADDER EMPTYING: ICD-10-CM

## 2018-09-11 DIAGNOSIS — K59.00 CONSTIPATION, UNSPECIFIED CONSTIPATION TYPE: ICD-10-CM

## 2018-09-11 DIAGNOSIS — F41.9 ANXIETY: ICD-10-CM

## 2018-09-11 DIAGNOSIS — G25.3 RESTLESS LEGS SYNDROME WITH NOCTURNAL MYOCLONUS: Chronic | ICD-10-CM

## 2018-09-11 DIAGNOSIS — R39.14 BENIGN PROSTATIC HYPERPLASIA WITH INCOMPLETE BLADDER EMPTYING: ICD-10-CM

## 2018-09-11 DIAGNOSIS — G47.00 INSOMNIA, UNSPECIFIED TYPE: Chronic | ICD-10-CM

## 2018-09-11 DIAGNOSIS — G25.81 RESTLESS LEGS SYNDROME WITH NOCTURNAL MYOCLONUS: Chronic | ICD-10-CM

## 2018-09-11 DIAGNOSIS — E03.9 ACQUIRED HYPOTHYROIDISM: ICD-10-CM

## 2018-09-11 PROCEDURE — 84153 ASSAY OF PSA TOTAL: CPT | Performed by: FAMILY MEDICINE

## 2018-09-11 PROCEDURE — 99214 OFFICE O/P EST MOD 30 MIN: CPT | Performed by: FAMILY MEDICINE

## 2018-09-11 PROCEDURE — 36415 COLL VENOUS BLD VENIPUNCTURE: CPT | Performed by: FAMILY MEDICINE

## 2018-09-11 PROCEDURE — 84443 ASSAY THYROID STIM HORMONE: CPT | Performed by: FAMILY MEDICINE

## 2018-09-11 RX ORDER — TRAZODONE HYDROCHLORIDE 50 MG/1
50 TABLET, FILM COATED ORAL
Qty: 30 TABLET | Refills: 0 | Status: SHIPPED | OUTPATIENT
Start: 2018-09-11 | End: 2018-10-22

## 2018-09-11 ASSESSMENT — ANXIETY QUESTIONNAIRES
3. WORRYING TOO MUCH ABOUT DIFFERENT THINGS: MORE THAN HALF THE DAYS
5. BEING SO RESTLESS THAT IT IS HARD TO SIT STILL: SEVERAL DAYS
GAD7 TOTAL SCORE: 10
IF YOU CHECKED OFF ANY PROBLEMS ON THIS QUESTIONNAIRE, HOW DIFFICULT HAVE THESE PROBLEMS MADE IT FOR YOU TO DO YOUR WORK, TAKE CARE OF THINGS AT HOME, OR GET ALONG WITH OTHER PEOPLE: NOT DIFFICULT AT ALL
2. NOT BEING ABLE TO STOP OR CONTROL WORRYING: MORE THAN HALF THE DAYS
1. FEELING NERVOUS, ANXIOUS, OR ON EDGE: MORE THAN HALF THE DAYS
7. FEELING AFRAID AS IF SOMETHING AWFUL MIGHT HAPPEN: NOT AT ALL
6. BECOMING EASILY ANNOYED OR IRRITABLE: SEVERAL DAYS

## 2018-09-11 ASSESSMENT — PATIENT HEALTH QUESTIONNAIRE - PHQ9: 5. POOR APPETITE OR OVEREATING: MORE THAN HALF THE DAYS

## 2018-09-11 NOTE — PATIENT INSTRUCTIONS
1. miralax   Go down from 3 to 2 times a day as you are having stools3-4 times a day   Take as much as you need each day     2. You saw Dr Reynaga on 9-7-18 and again in a mo     3. For the restless legs take every bedtime take:  A. Melatonin--continue to increase them to max of 10 pills   B. Take 2 tabs of tylenol ES ie 1000mgm   C. Benadryl ( diphenhydramine ) 25 mgm   D. One tab of trazodone 50mgm / tab

## 2018-09-11 NOTE — MR AVS SNAPSHOT
After Visit Summary   9/11/2018    Korey Pearson    MRN: [id number]           Patient Information     Date Of Birth          8/31/1929        Visit Information        Provider Department      9/11/2018 11:00 AM Frieda Khan MD [facility]        Today's Diagnoses     Moderate episode of recurrent major depressive disorder (H)    -  1    Insomnia, unspecified type        Restless legs syndrome with nocturnal myoclonus        Benign prostatic hyperplasia with incomplete bladder emptying        Acquired hypothyroidism          Care Instructions    1. miralax   Go down from 3 to 2 times a day as you are having stools3-4 times a day   Take as much as you need each day     2. You saw Dr Reynaga on 9-7-18 and again in a mo     3. For the restless legs take every bedtime take:  A. Melatonin--continue to increase them to max of 10 pills   B. Take 2 tabs of tylenol ES ie 1000mgm   C. Benadryl ( diphenhydramine ) 25 mgm   D. One tab of trazodone 50mgm / tab             Follow-ups after your visit        Follow-up notes from your care team     Return in about 2 weeks (around 9/25/2018), or insomnia .      Who to contact     If you have questions or need follow up information about today's clinic visit or your schedule please contact [facility] directly at [phone].  Normal or non-critical lab and imaging results will be communicated to you by MyChart, letter or phone within 4 business days after the clinic has received the results. If you do not hear from us within 7 days, please contact the clinic through MyChart or phone. If you have a critical or abnormal lab result, we will notify you by phone as soon as possible.  Submit refill requests through [employer] or call your pharmacy and they will forward the refill request to us. Please allow 3 business days for your refill to be completed.          Additional Information About  "Your Visit        [address] [employer] Information     [employer] gives you secure access to your electronic health record. If you see a primary care provider, you can also send messages to your care team and make appointments. If you have questions, please call your primary care clinic.  If you do not have a primary care provider, please call [phone] and they will assist you.        Care EveryWhere ID     This is your Care EveryWhere ID. This could be used by other organizations to access your [facility] medical records  [id number]        Your Vitals Were     Pulse Temperature Respirations Height Pulse Oximetry BMI (Body Mass Index)    68 97.6  F (36.4  C) (Tympanic) 12 5' 9\" (1.753 m) 95% 27.91 kg/m2       Blood Pressure from Last 3 Encounters:   09/11/18 110/60   09/05/18 146/71   08/31/18 130/70    Weight from Last 3 Encounters:   09/11/18 189 lb (85.7 kg)   09/05/18 185 lb (83.9 kg)   08/31/18 189 lb (85.7 kg)              We Performed the Following     Prostate spec antigen screen     TSH WITH FREE T4 REFLEX          Today's Medication Changes          These changes are accurate as of 9/11/18 11:29 AM.  If you have any questions, ask your nurse or doctor.               Start taking these medicines.        Dose/Directions    traZODone 50 MG tablet   Commonly known as:  DESYREL   Used for:  Insomnia, unspecified type   Started by:  Frieda Khan MD        Dose:  50 mg   Take 1 tablet (50 mg) by mouth nightly as needed for sleep   Quantity:  30 tablet   Refills:  0         Stop taking these medicines if you haven't already. Please contact your care team if you have questions.     magnesium citrate 1.745 GM/30ML solution   Stopped by:  Frieda Khan MD                Where to get your medicines      Some of these will need a paper prescription and others can be bought over the counter.  Ask your nurse if you have questions.     Bring a paper prescription for each of these medications     " traZODone 50 MG tablet                Primary Care Provider Office Phone # Fax #    Liberty Calhoun PA-C [phone]       [address] [facility] [address]  [facility] [address]        Equal Access to Services     ROBY VIERA : Hadii aad ku hadasho Soomaali, waaxda luqadaha, qaybta kaalmada adeegyada, waxay idiin hayaan adeeg kharash la'clyde rosario. So [facility] [phone].    ATENCIÓN: Si habla español, tiene a albarran disposición servicios gratuitos de asistencia lingüística. Llame al [phone].    We comply with applicable federal civil rights laws and [place] laws. We do not discriminate on the basis of race, color, national origin, age, disability, sex, sexual orientation, or gender identity.            Thank you!     Thank you for choosing [facility]  for your care. Our goal is always to provide you with excellent care. Hearing back from our patients is one way we can continue to improve our services. Please take a few minutes to complete the written survey that you may receive in the mail after your visit with us. Thank you!             Your Updated Medication List - Protect others around you: Learn how to safely use, store and throw away your medicines at www.disposemymeds.org.          This list is accurate as of 9/11/18 11:29 AM.  Always use your most recent med list.                   Brand Name Dispense Instructions for use Diagnosis    aspirin 81 MG tablet      Take 81 mg by mouth daily        atorvastatin 20 MG tablet    LIPITOR    90 tablet    Take 1 tablet (20 mg) by mouth At Bedtime    Unstable angina (H), Hyperlipidemia LDL goal <130       citalopram 20 MG tablet    celeXA    30 tablet    Take 1 tablet (20 mg) by mouth daily    Moderate episode of recurrent major depressive disorder (H), Anxiety       diazepam 5 MG tablet    VALIUM    90 tablet    TAKE ONE TABLET BY MOUTH EVERY 8 HOURS AS NEEDED FOR ANXIETY    Generalized anxiety disorder       docusate  sodium 100 MG tablet    COLACE    60 tablet    Take 100 mg by mouth daily        isosorbide mononitrate 30 MG 24 hr tablet    IMDUR    90 tablet    Take 1 tablet (30 mg) by mouth daily In the morning    Coronary artery disease of native artery of native heart with stable angina pectoris (H)       levothyroxine 75 MCG tablet    SYNTHROID/LEVOTHROID    90 tablet    TAKE ONE TABLET BY MOUTH ONCE DAILY    Acquired hypothyroidism       lisinopril 10 MG tablet    PRINIVIL/ZESTRIL    90 tablet    Take 1 tablet (10 mg) by mouth daily    Benign essential hypertension       metoprolol succinate 25 MG 24 hr tablet    TOPROL-XL    90 tablet    Take 0.5 tablets (12.5 mg) by mouth daily    Unstable angina (H)       NIGHTTIME SLEEP AID 50 MG Caps   Generic drug:  DiphenhydrAMINE HCl (Sleep)     30 capsule    Take 1 capsule by mouth as needed    Insomnia       polyethylene glycol powder    MIRALAX/GLYCOLAX     Take 17 g by mouth daily as needed for constipation        prune juice Liqd      Take 5 mLs by mouth daily        tamsulosin 0.4 MG capsule    FLOMAX    10 capsule    Take 1 capsule (0.4 mg) by mouth daily for 10 doses        traZODone 50 MG tablet    DESYREL    30 tablet    Take 1 tablet (50 mg) by mouth nightly as needed for sleep    Insomnia, unspecified type

## 2018-09-11 NOTE — NURSING NOTE
"Chief Complaint   Patient presents with     Hospital F/U     URI     Recheck Medication     /60  Pulse 68  Temp 97.6  F (36.4  C) (Tympanic)  Resp 12  Ht 5' 9\" (1.753 m)  Wt 189 lb (85.7 kg)  SpO2 95%  BMI 27.91 kg/m2 Estimated body mass index is 27.91 kg/(m^2) as calculated from the following:    Height as of this encounter: 5' 9\" (1.753 m).    Weight as of this encounter: 189 lb (85.7 kg).  BP completed using cuff size: regular   Vania Lovell CMA    Health Maintenance Due   Topic Date Due     INFLUENZA VACCINE (1) 09/01/2018     TSH W/ FREE T4 REFLEX Q1 YEAR  10/02/2018     Health Maintenance reviewed at today's visit patient asked to schedule/complete:   Immunizations:  Patient agrees to schedule    "

## 2018-09-11 NOTE — PROGRESS NOTES
SUBJECTIVE:   Korey Pearson is a 89 year old male who presents to clinic today for the following health issues:    ED/UC Followup:    Facility:  [facility]  Date of visit: 09/05/18  Reason for visit: Urine Retention  Current Status: Feeling Better     Genitourinary symptoms:Urinary Retention       Duration: 09/03/18 9-5-18 catheter-indwelling per ED     Removed by urol 2 d ago and no problem     Description:  retention    Intensity:  moderate    Accompanying signs and symptoms (fever/discharge/nausea/vomiting/back or abdominal pain):  None    History (frequent UTI's/kidney stones/prostate problems): None  Sexually active: no     Precipitating or alleviating factors: urol felt it might be caused by his constipation     Therapies tried and outcome: Flomax sincxe ED   Outcome: Relief    .  Depression and Anxiety Follow-Up      Status since last visit: No change-uncontrolled     Other associated symptoms:None    Complicating factors:     Significant life event: No     Current substance abuse: None    On valium 5mgm for anxiety & sleep up to qid --contraindicated for age    Meds: 8-31-18 started on celexa 20mgm & thinks he's still taking it     Scores do look better    PHQ-9 8/31/2018   Total Score 21   Q9: Suicide Ideation Not at all     VISHAL-7 SCORE 7/3/2018 8/31/2018   Total Score 18 19       PHQ-9  English=17  PHQ-9   Any Language  VISHAL-7 =10  Suicide Assessment Five-step Evaluation and Treatment (SAFE-T)    Insomnia/Restless Legs       Duration: x 8 years    Description  Frequency of insomnia:  nightly  Time to fall asleep: Doesn't fall asleep  Middle of night awakening:  nightly  Early morning awakening:  nightly    Accompanying signs and symptoms:  restless legs    History  Similar episodes in past:  YES  Previous evaluation/sleep study:  YES    Precipitating or alleviating factors:  New stressful situation: no   Caffeine intake after lunchtime: no   OTC decongestants: YES  Any new medications: no      Therapies tried and outcome: Benadryl 25mgm-  not effective    Trazodone in  1-17 ? Any help  -cannot recall     celexa started 8-31-18    Melatonin --3 tabs without help    Valium 5mgm up to qid                  OVERWEIGHT      -BMI=28.3    --sedentary life style     -comorbid hi LDL , HTN ,CAD      Constipation      Duration: many yrs but worse for 2     Description:       Frequency of bowel movements: 2-3 days       Consistency of stool: hard    Intensity:  moderate    Accompanying signs and symptoms: ? Urin retention?       Abdominal pain: no        Rectal pain: no        Blood in stool: no        Nausea/vomitting: no     History:        Similar problems in past: YES    Precipitating or alleviating factors: using miralax bid and colace --> BM 2-4 / d        Medications worsening symptoms: no     Therapies tried and outcome: above       Chronic laxative use: no     Hypothyroidism Follow-up      Since last visit, patient describes the following symptoms: Weight stable, no hair loss, no skin changes, no constipation, no loose stools      Problem list and histories reviewed & adjusted, as indicated.  Additional history: as documented    Labs reviewed in EPIC    Reviewed and updated as needed this visit by clinical staff  Tobacco  Allergies  Meds  Problems  Med Hx  Surg Hx  Fam Hx  Soc Hx        Reviewed and updated as needed this visit by Provider  Allergies  Meds  Problems         ROS:  CONSTITUTIONAL: NEGATIVE for fever, chills, change in weight  INTEGUMENTARY/SKIN: NEGATIVE for worrisome rashes, moles or lesions  EYES: NEGATIVE for vision changes or irritation  ENT/MOUTH: NEGATIVE for ear, mouth and throat problems  RESP: NEGATIVE for significant cough or SOB  BREAST: NEGATIVE for masses, tenderness or discharge  CV: NEGATIVE for chest pain, palpitations or peripheral edema  GI: NEGATIVE for nausea, abdominal pain, heartburn, or change in bowel habits  : NEGATIVE for frequency, dysuria, or  "hematuria  MUSCULOSKELETAL: NEGATIVE for significant arthralgias or myalgia  NEURO: NEGATIVE for weakness, dizziness or paresthesias  ENDOCRINE: NEGATIVE for temperature intolerance, skin/hair changes  HEME: NEGATIVE for bleeding problems  PSYCHIATRIC: POSITIVE for, anxiety, concentration difficulty, depressed mood, HX anxiety, HX depression, fatigue, impaired memory and insomnia & restless legs     OBJECTIVE:     /60  Pulse 68  Temp 97.6  F (36.4  C) (Tympanic)  Resp 12  Ht 5' 9\" (1.753 m)  Wt 189 lb (85.7 kg)  SpO2 95%  BMI 27.91 kg/m2  Body mass index is 27.91 kg/(m^2).  GENERAL: healthy, alert and no distress  EYES: Eyes grossly normal to inspection, PERRL and conjunctivae and sclerae normal  RESP: lungs clear to auscultation - no rales, rhonchi or wheezes  CV: regular rate and rhythm, normal S1 S2, no S3 or S4, no murmur, click or rub, no peripheral edema and peripheral pulses strong  MS: no gross musculoskeletal defects noted, no edema  SKIN: no suspicious lesions or rashes  NEURO: Normal strength and tone, mentation intact and speech normal  PSYCH: mentation appears normal, concentration poor, tangential, affect normal/bright, anxious and appearance well groomed    Diagnostic Test Results:  Results for orders placed or performed in visit on 09/11/18   TSH WITH FREE T4 REFLEX   Result Value Ref Range    TSH 2.70 0.40 - 4.00 mU/L   Prostate spec antigen screen   Result Value Ref Range    PSA 0.80 0 - 4 ug/L       ASSESSMENT/PLAN:               ICD-10-CM    1. Moderate episode of recurrent major depressive disorder (H) F33.1    2. Anxiety F41.9    3. Insomnia, unspecified type G47.00 traZODone (DESYREL) 50 MG tablet   4. 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 Benign prostatic hyperplasia with incomplete bladder emptying N40.1 Prostate spec antigen screen    R39.14    6. Constipation, unspecified constipation type K59.00    7. Acquired hypothyroidism E03.9 TSH WITH " FREE T4 REFLEX       Patient Instructions   1. miralax   Go down from 3 to 2 times a day as you are having stools3-4 times a day   Take as much as you need each day     2. You saw Dr Reynaga on 9-7-18 and again in a mo     3. For the restless legs take every bedtime take:  A. Melatonin--continue to increase them to max of 10 pills   B. Take 2 tabs of tylenol ES ie 1000mgm   C. Benadryl ( diphenhydramine ) 25 mgm   D. One tab of trazodone 50mgm / tab         Frieda Khan MD  [facility]

## 2018-09-11 NOTE — LETTER
[facility]  [address]  [facility] [address]  [phone]                                                                                                         Korey Pearson  [address]  [facility] [address]    September 13, 2018      Dear Korey,    They are all normal     THE FOLLOWING ARE EXPLANATIONS OF SOME OF OUR LAB TESTS     YOU DID NOT NECESSARILY HAVE ALL OF THESE DONE     Hgb is the blood iron level   WBC means White Blood Cells   Platelets are small blood cells that help with forming the blood clots along with other blood factors.   Electrolytes are Sodium, Potassium, Calcium, Magnesium, Phosphorus.   Liver tests are: AST, ALT, Bilirubin, Alkaline Phosphatase.   Kidney tests are Creatinine, GFR.   HDL Cholesterol - is the good cholesterol and it is good to have it high.   LDL cholesterol is the bad cholesterol and it is good to have it low.   It is recommended to have LDL less than 130 for people with hypertension and to have it less than 100 for people with heart disease, diabetes and chronic kidney disease.   Triglycerides are another type of lipid that can cause heart disease, like the cholesterol and should be kept low   Thyroid tests are TSH, T4, T3   Glucose is sugar.   A1c is a test that gives us an idea about how well was controlled the diabetes for the last 3 months.   PSA stands for Prostate Specific Antigen and it can be elevated with prostate cancer or prostate inflammation.     Please continue on the same medications     Thank you for choosing [facility].  We appreciate the opportunity to serve you and look forward to supporting your healthcare needs in the future.    If you have any questions or concerns, please call me or my staff at [phone].      Sincerely,    Frieda Khan MD

## 2018-09-12 LAB
PSA SERPL-ACNC: 0.8 UG/L (ref 0–4)
TSH SERPL DL<=0.005 MIU/L-ACNC: 2.7 MU/L (ref 0.4–4)

## 2018-09-12 ASSESSMENT — ANXIETY QUESTIONNAIRES: GAD7 TOTAL SCORE: 10

## 2018-09-12 ASSESSMENT — PATIENT HEALTH QUESTIONNAIRE - PHQ9: SUM OF ALL RESPONSES TO PHQ QUESTIONS 1-9: 17

## 2018-09-26 ENCOUNTER — TELEPHONE (OUTPATIENT)
Dept: FAMILY MEDICINE | Facility: CLINIC | Age: 83
End: 2018-09-26

## 2018-09-26 NOTE — TELEPHONE ENCOUNTER
Date of receipt at location: 9/26/18  [facility]  Type of form: POLST  Checked over by facilitator? Yes  All pages present? No,. Missing page 2.     Any obvious gaps in documentation? No      Patient Contact    Attempt # 1    Was call answered?  No.  Left message on voicemail with information to call me back. We need the second page in order to scan into the system

## 2018-10-02 ENCOUNTER — TRANSFERRED RECORDS (OUTPATIENT)
Dept: HEALTH INFORMATION MANAGEMENT | Facility: CLINIC | Age: 83
End: 2018-10-02

## 2018-10-22 ENCOUNTER — OFFICE VISIT (OUTPATIENT)
Dept: FAMILY MEDICINE | Facility: CLINIC | Age: 83
End: 2018-10-22
Payer: COMMERCIAL

## 2018-10-22 VITALS
OXYGEN SATURATION: 95 % | HEART RATE: 62 BPM | SYSTOLIC BLOOD PRESSURE: 116 MMHG | TEMPERATURE: 98 F | RESPIRATION RATE: 16 BRPM | WEIGHT: 187 LBS | BODY MASS INDEX: 27.62 KG/M2 | DIASTOLIC BLOOD PRESSURE: 60 MMHG

## 2018-10-22 DIAGNOSIS — F41.1 GENERALIZED ANXIETY DISORDER: Chronic | ICD-10-CM

## 2018-10-22 DIAGNOSIS — G47.00 PERSISTENT INSOMNIA: Primary | ICD-10-CM

## 2018-10-22 DIAGNOSIS — G25.3 RESTLESS LEGS SYNDROME WITH NOCTURNAL MYOCLONUS: Chronic | ICD-10-CM

## 2018-10-22 DIAGNOSIS — G25.81 RESTLESS LEGS SYNDROME WITH NOCTURNAL MYOCLONUS: Chronic | ICD-10-CM

## 2018-10-22 PROCEDURE — 99214 OFFICE O/P EST MOD 30 MIN: CPT | Performed by: FAMILY MEDICINE

## 2018-10-22 RX ORDER — TAMSULOSIN HYDROCHLORIDE 0.4 MG/1
0.4 CAPSULE ORAL
Refills: 3 | COMMUNITY
Start: 2018-10-08

## 2018-10-22 RX ORDER — MIRTAZAPINE 15 MG/1
7.5 TABLET, FILM COATED ORAL AT BEDTIME
Qty: 30 TABLET | Refills: 1 | Status: SHIPPED | OUTPATIENT
Start: 2018-10-22 | End: 2018-11-19

## 2018-10-22 NOTE — PROGRESS NOTES
SUBJECTIVE:   Korey Pearson is a 89 year old male who presents to clinic today for the following health issues:      Restless legs      Duration: 7 years    Description (location/character/radiation): unable sleep, twitching legs all night long    Intensity:  severe    Accompanying signs and symptoms: getting worse    History (similar episodes/previous evaluation): yes    Precipitating or alleviating factors: None    Therapies tried and outcome: yes, has tried multiple meds       Insomnia      Duration: Many years    Description  Frequency of insomnia:  nightly  Time to fall asleep: 1 hour  Middle of night awakening:  several times a week  Early morning awakening:  several times a week    Accompanying signs and symptoms:  restless legs    History  Similar episodes in past:  YES  Previous evaluation/sleep study:  no     Precipitating or alleviating factors:  New stressful situation: no   Caffeine intake after lunchtime: no   OTC decongestants: no   Any new medications: no     Therapies tried and outcome: The patient has tried numerous medications for his restless leg syndrome.  He is also tried trazodone and diphenhydramine.  None of these have helped much with his sleep.      Problem list and histories reviewed & adjusted, as indicated.  Additional history: as documented    Patient Active Problem List   Diagnosis     Generalized anxiety disorder     Peripheral neuropathy     Acquired hypothyroidism     Hearing loss     Vitamin D deficiency     Restless legs syndrome with nocturnal myoclonus     Degenerative arthritis of left knee     Gastroesophageal reflux disease without esophagitis     Flatulence, eructation, and gas pain     Dizziness     Constipation     Abdominal pain, generalized     Diverticulosis of large intestine without hemorrhage     ACP (advance care planning)     Insomnia     Chest pain     Raynaud's disease without gangrene     History of colonic polyps since 1995      Hypercholesterolemia     Overweight (BMI 25.0-29.9)     Change in bowel habits since mid June , 2018     Thoracic aortic ectasia (H)      Seasonal affective disorder (H)     Moderate episode of recurrent major depressive disorder (H)     Anxiety     Benign prostatic hyperplasia with incomplete bladder emptying     Past Surgical History:   Procedure Laterality Date     ABDOMEN SURGERY  appy, hernia     ANGIOGRAM  08/21/2017     APPENDECTOMY  1941     CARDIAC SURGERY      2 stents  dec 2016     CATARACT IOL, RT/LT  1993     CORONARY ANGIOGRAPHY ADULT ORDER       HERNIA REPAIR      left inguinal hernia 1960's       Social History   Substance Use Topics     Smoking status: Former Smoker     Packs/day: 1.50     Years: 10.00     Types: Cigarettes     Quit date: 1/1/1957     Smokeless tobacco: Never Used     Alcohol use 0.0 oz/week     0 Standard drinks or equivalent per week      Comment: 1 beer per week     Family History   Problem Relation Age of Onset     Cerebrovascular Disease Mother      HEART DISEASE Father      Diabetes No family hx of      Coronary Artery Disease No family hx of      Hypertension No family hx of      Hyperlipidemia No family hx of      Breast Cancer No family hx of      Colon Cancer No family hx of      Prostate Cancer No family hx of      Other Cancer No family hx of      Depression No family hx of      Anxiety Disorder No family hx of      Mental Illness No family hx of      Substance Abuse No family hx of      Anesthesia Reaction No family hx of      Asthma No family hx of      Osteoporosis No family hx of      Genetic Disorder No family hx of      Thyroid Disease No family hx of      Obesity No family hx of      Unknown/Adopted No family hx of            Reviewed and updated as needed this visit by clinical staff  Tobacco  Allergies  Meds  Med Hx  Surg Hx  Fam Hx  Soc Hx      Reviewed and updated as needed this visit by Provider         ROS:  Constitutional, HEENT, cardiovascular,  pulmonary, gi and gu systems are negative, except as otherwise noted.    OBJECTIVE:                                                    /60  Pulse 62  Temp 98  F (36.7  C) (Tympanic)  Resp 16  Wt 187 lb (84.8 kg)  SpO2 95%  BMI 27.62 kg/m2  Body mass index is 27.62 kg/(m^2).  GENERAL APPEARANCE: healthy, alert and no distress         ASSESSMENT/PLAN:                                                        ICD-10-CM    1. Persistent insomnia G47.00 mirtazapine (REMERON) 15 MG tablet   2. 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 Generalized anxiety disorder F41.1    Return in about 4 weeks (around 11/19/2018) for insomnia.    Patient Instructions   I had a 25-minute discussion with the patient and his wife about treatment for both insomnia, restless legs and his anxiety.  He has tried numerous medications for restless legs that have not been successful.  He is also tried numerous sleep medications which have also not been successful.  We did discuss starting mirtazapine 7.5 mg at bedtime.  He will follow-up in 3-4 weeks.  We may need to increase the medication at that time and he was cautioned about that.      Shantanu Armas MD  [facility]

## 2018-10-22 NOTE — MR AVS SNAPSHOT
After Visit Summary   10/22/2018    Korey Pearson    MRN: [id number]           Patient Information     Date Of Birth          8/31/1929        Visit Information        Provider Department      10/22/2018 3:30 PM Shantanu Armas MD [facility]        Today's Diagnoses     Persistent insomnia    -  1    Restless legs syndrome with nocturnal myoclonus        Generalized anxiety disorder          Care Instructions    I had a 25-minute discussion with the patient and his wife about treatment for both insomnia, restless legs and his anxiety.  He has tried numerous medications for restless legs that have not been successful.  He is also tried numerous sleep medications which have also not been successful.  We did discuss starting mirtazapine 7.5 mg at bedtime.  He will follow-up in 3-4 weeks.  We may need to increase the medication at that time and he was cautioned about that.          Follow-ups after your visit        Follow-up notes from your care team     Return in about 4 weeks (around 11/19/2018) for insomnia.      Your next 10 appointments already scheduled     Nov 19, 2018  1:00 PM CST   SHORT with Shantanu Armas MD   [facility] ([facility])    [address] [place] [address] [id number] [phone]              Who to contact     If you have questions or need follow up information about today's clinic visit or your schedule please contact [facility] directly at [phone].  Normal or non-critical lab and imaging results will be communicated to you by MyChart, letter or phone within 4 business days after the clinic has received the results. If you do not hear from us within 7 days, please contact the clinic through MyChart or phone. If you have a critical or abnormal lab result, we will notify you by phone as soon as  possible.  Submit refill requests through [employer] or call your pharmacy and they will forward the refill request to us. Please allow 3 business days for your refill to be completed.          Additional Information About Your Visit        [employer] Information     [employer] gives you secure access to your electronic health record. If you see a primary care provider, you can also send messages to your care team and make appointments. If you have questions, please call your primary care clinic.  If you do not have a primary care provider, please call [phone] and they will assist you.        Care EveryWhere ID     This is your Care EveryWhere ID. This could be used by other organizations to access your [facility] medical records  [id number]        Your Vitals Were     Pulse Temperature Respirations Pulse Oximetry BMI (Body Mass Index)       62 98  F (36.7  C) (Tympanic) 16 95% 27.62 kg/m2        Blood Pressure from Last 3 Encounters:   10/22/18 116/60   09/11/18 110/60   09/05/18 146/71    Weight from Last 3 Encounters:   10/22/18 187 lb (84.8 kg)   09/11/18 189 lb (85.7 kg)   09/05/18 185 lb (83.9 kg)              Today, you had the following     No orders found for display         Today's Medication Changes          These changes are accurate as of 10/22/18 11:59 PM.  If you have any questions, ask your nurse or doctor.               Start taking these medicines.        Dose/Directions    mirtazapine 15 MG tablet   Commonly known as:  REMERON   Used for:  Persistent insomnia   Started by:  Shantanu Armas MD        Dose:  7.5 mg   Take 0.5 tablets (7.5 mg) by mouth At Bedtime   Quantity:  30 tablet   Refills:  1         Stop taking these medicines if you haven't already. Please contact your care team if you have questions.     citalopram 20 MG tablet   Commonly known as:  celeXA   Stopped by:  Shantanu Armas MD           docusate sodium 100 MG tablet   Commonly known as:  COLACE   Stopped by:   Sahntanu Armas MD           NIGHTTIME SLEEP AID 50 MG Caps   Generic drug:  DiphenhydrAMINE HCl (Sleep)   Stopped by:  Shantanu Armas MD           traZODone 50 MG tablet   Commonly known as:  DESYREL   Stopped by:  Shantanu Armas MD                Where to get your medicines      These medications were sent to [id number] - [place], [place] - [address]  [address] [facility] [address]     Phone:  [phone]     mirtazapine 15 MG tablet                Primary Care Provider Office Phone # Fax #    Libertychente Calhoun PA-C [phone] [address] [facility] [address]  [facility] [address]        Equal Access to Services     ROBY VIERA : Hadii aad ku hadasho Soomaali, waaxda luqadaha, qaybta kaalmada adeegyada, waxay candiein hayaan dena coon . So [facility] [phone].    ATENCIÓN: Si habla español, tiene a albarran disposición servicios gratuitos de asistencia lingüística. [facility] [phone].    We comply with applicable federal civil rights laws and [place] laws. We do not discriminate on the basis of race, color, national origin, age, disability, sex, sexual orientation, or gender identity.            Thank you!     Thank you for choosing [facility]  for your care. Our goal is always to provide you with excellent care. Hearing back from our patients is one way we can continue to improve our services. Please take a few minutes to complete the written survey that you may receive in the mail after your visit with us. Thank you!             Your Updated Medication List - Protect others around you: Learn how to safely use, store and throw away your medicines at www.disposemymeds.org.          This list is accurate as of 10/22/18 11:59 PM.  Always use your most recent med list.                   Brand Name Dispense Instructions for use Diagnosis    aspirin 81 MG tablet      Take 81 mg by mouth daily         atorvastatin 20 MG tablet    LIPITOR    90 tablet    Take 1 tablet (20 mg) by mouth At Bedtime    Unstable angina (H), Hyperlipidemia LDL goal <130       diazepam 5 MG tablet    VALIUM    90 tablet    TAKE ONE TABLET BY MOUTH EVERY 8 HOURS AS NEEDED FOR ANXIETY    Generalized anxiety disorder       isosorbide mononitrate 30 MG 24 hr tablet    IMDUR    90 tablet    Take 1 tablet (30 mg) by mouth daily In the morning    Coronary artery disease of native artery of native heart with stable angina pectoris (H)       levothyroxine 75 MCG tablet    SYNTHROID/LEVOTHROID    90 tablet    TAKE ONE TABLET BY MOUTH ONCE DAILY    Acquired hypothyroidism       lisinopril 10 MG tablet    PRINIVIL/ZESTRIL    90 tablet    Take 1 tablet (10 mg) by mouth daily    Benign essential hypertension       metoprolol succinate 25 MG 24 hr tablet    TOPROL-XL    90 tablet    Take 0.5 tablets (12.5 mg) by mouth daily    Unstable angina (H)       mirtazapine 15 MG tablet    REMERON    30 tablet    Take 0.5 tablets (7.5 mg) by mouth At Bedtime    Persistent insomnia       polyethylene glycol powder    MIRALAX/GLYCOLAX     Take 17 g by mouth daily as needed for constipation        prune juice Liqd      Take 5 mLs by mouth daily        tamsulosin 0.4 MG capsule    FLOMAX

## 2018-10-24 NOTE — PATIENT INSTRUCTIONS
I had a 25-minute discussion with the patient and his wife about treatment for both insomnia, restless legs and his anxiety.  He has tried numerous medications for restless legs that have not been successful.  He is also tried numerous sleep medications which have also not been successful.  We did discuss starting mirtazapine 7.5 mg at bedtime.  He will follow-up in 3-4 weeks.  We may need to increase the medication at that time and he was cautioned about that.

## 2018-11-19 ENCOUNTER — OFFICE VISIT (OUTPATIENT)
Dept: FAMILY MEDICINE | Facility: CLINIC | Age: 83
End: 2018-11-19
Payer: COMMERCIAL

## 2018-11-19 VITALS
BODY MASS INDEX: 28.5 KG/M2 | TEMPERATURE: 97.3 F | HEART RATE: 68 BPM | OXYGEN SATURATION: 95 % | WEIGHT: 193 LBS | SYSTOLIC BLOOD PRESSURE: 110 MMHG | DIASTOLIC BLOOD PRESSURE: 62 MMHG

## 2018-11-19 DIAGNOSIS — G25.3 RESTLESS LEGS SYNDROME WITH NOCTURNAL MYOCLONUS: Chronic | ICD-10-CM

## 2018-11-19 DIAGNOSIS — G25.81 RESTLESS LEGS SYNDROME WITH NOCTURNAL MYOCLONUS: Chronic | ICD-10-CM

## 2018-11-19 DIAGNOSIS — G47.00 PERSISTENT INSOMNIA: Primary | ICD-10-CM

## 2018-11-19 DIAGNOSIS — F41.1 GENERALIZED ANXIETY DISORDER: Chronic | ICD-10-CM

## 2018-11-19 PROCEDURE — 99213 OFFICE O/P EST LOW 20 MIN: CPT | Performed by: FAMILY MEDICINE

## 2018-11-19 RX ORDER — MIRTAZAPINE 15 MG/1
15 TABLET, FILM COATED ORAL AT BEDTIME
Qty: 30 TABLET | Refills: 1 | Status: SHIPPED | OUTPATIENT
Start: 2018-11-19 | End: 2019-01-28

## 2018-11-19 NOTE — PROGRESS NOTES
SUBJECTIVE:   Korey Pearson is a 89 year old male who presents to clinic today for the following health issues:      Insomnia      Duration: ongoing    Description  Frequency of insomnia:  4 times a week  Time to fall asleep: hours  Middle of night awakening:  nightly  Early morning awakening:  nightly    Accompanying signs and symptoms:  restless legs and mood changes    History  Similar episodes in past:  YES  Previous evaluation/sleep study:  no     Precipitating or alleviating factors:  New stressful situation: no   Caffeine intake after lunchtime: no   OTC decongestants: no   Any new medications: no     Therapies tried and outcome: Mirtazapine 7.5 mg at bedtime.  That was not working real well by itself so he started to take half a tablet of his diazepam with that and is now slept the last 3 nights pretty well.          Problem list and histories reviewed & adjusted, as indicated.  Additional history: as documented    Patient Active Problem List   Diagnosis     Generalized anxiety disorder     Peripheral neuropathy     Acquired hypothyroidism     Hearing loss     Vitamin D deficiency     Restless legs syndrome with nocturnal myoclonus     Degenerative arthritis of left knee     Gastroesophageal reflux disease without esophagitis     Flatulence, eructation, and gas pain     Dizziness     Constipation     Abdominal pain, generalized     Diverticulosis of large intestine without hemorrhage     ACP (advance care planning)     Insomnia     Chest pain     Raynaud's disease without gangrene     History of colonic polyps since 1995     Hypercholesterolemia     Overweight (BMI 25.0-29.9)     Change in bowel habits since mid June , 2018     Thoracic aortic ectasia (H)      Seasonal affective disorder (H)     Moderate episode of recurrent major depressive disorder (H)     Anxiety     Benign prostatic hyperplasia with incomplete bladder emptying     Past Surgical History:   Procedure Laterality Date      ABDOMEN SURGERY  appy, hernia     ANGIOGRAM  08/21/2017     APPENDECTOMY  1941     CARDIAC SURGERY      2 stents  dec 2016     CATARACT IOL, RT/LT  1993     CORONARY ANGIOGRAPHY ADULT ORDER       HERNIA REPAIR      left inguinal hernia 1960's       Social History   Substance Use Topics     Smoking status: Former Smoker     Packs/day: 1.50     Years: 10.00     Types: Cigarettes     Quit date: 1/1/1957     Smokeless tobacco: Never Used     Alcohol use 0.0 oz/week     0 Standard drinks or equivalent per week      Comment: 1 beer per week     Family History   Problem Relation Age of Onset     Cerebrovascular Disease Mother      HEART DISEASE Father            Reviewed and updated as needed this visit by clinical staff  Tobacco  Allergies  Meds  Med Hx  Surg Hx  Fam Hx  Soc Hx      Reviewed and updated as needed this visit by Provider         ROS:  Constitutional, HEENT, cardiovascular, pulmonary, gi and gu systems are negative, except as otherwise noted.    OBJECTIVE:                                                    /62 (Cuff Size: Adult Large)  Pulse 68  Temp 97.3  F (36.3  C) (Tympanic)  Wt 193 lb (87.5 kg)  SpO2 95%  BMI 28.5 kg/m2  Body mass index is 28.5 kg/(m^2).  GENERAL APPEARANCE: healthy, alert and no distress         ASSESSMENT/PLAN:                                                        ICD-10-CM    1. Persistent insomnia G47.00 mirtazapine (REMERON) 15 MG tablet   2. Generalized anxiety disorder F41.1    3. Restless legs syndrome with nocturnal myoclonus G25.81     G25.3      Return in about 4 weeks (around 12/17/2018) for insomnia.  Patient Instructions   I will have the patient increase his mirtazapine to 15 mg at bedtime.  He will try that without the diazepam for the first 3-5 nights.  If that is working well he will not use diazepam.  If not he will try adding a little bit of that back with the mirtazapine to see if he sleeps better.  He will follow-up in 1 month.  He will follow-up  sooner as needed.      Shantanu Armas MD  [facility]

## 2018-11-19 NOTE — MR AVS SNAPSHOT
After Visit Summary   11/19/2018    Korey Pearson    MRN: [id number]           Patient Information     Date Of Birth          8/31/1929        Visit Information        Provider Department      11/19/2018 1:00 PM Shantanu Armas MD [facility]        Today's Diagnoses     Persistent insomnia    -  1    Generalized anxiety disorder        Restless legs syndrome with nocturnal myoclonus          Care Instructions    I will have the patient increase his mirtazapine to 15 mg at bedtime.  He will try that without the diazepam for the first 3-5 nights.  If that is working well he will not use diazepam.  If not he will try adding a little bit of that back with the mirtazapine to see if he sleeps better.  He will follow-up in 1 month.  He will follow-up sooner as needed.          Follow-ups after your visit        Follow-up notes from your care team     Return in about 4 weeks (around 12/17/2018) for insomnia.      Your next 10 appointments already scheduled     Dec 19, 2018 11:45 AM CST   [facility]    [address] [place] [id number] [phone]              Who to contact     If you have questions or need follow up information about today's clinic visit or your schedule please contact [facility] directly at [phone].  Normal or non-critical lab and imaging results will be communicated to you by MyChart, letter or phone within 4 business days after the clinic has received the results. If you do not hear from us within 7 days, please contact the clinic through MyChart or phone. If you have a critical or abnormal lab result, we will notify you by phone as soon as possible.  Submit refill requests through [employer] or call your pharmacy and they will forward the refill request to us. Please  allow 3 business days for your refill to be completed.          Additional Information About Your Visit        MyChart Information     [employer] gives you secure access to your electronic health record. If you see a primary care provider, you can also send messages to your care team and make appointments. If you have questions, please call your primary care clinic.  If you do not have a primary care provider, please call [phone] and they will assist you.        Care EveryWhere ID     This is your Care EveryWhere ID. This could be used by other organizations to access your [facility] medical records  [id number]        Your Vitals Were     Pulse Temperature Pulse Oximetry BMI (Body Mass Index)          68 97.3  F (36.3  C) (Tympanic) 95% 28.5 kg/m2         Blood Pressure from Last 3 Encounters:   11/19/18 110/62   10/22/18 116/60   09/11/18 110/60    Weight from Last 3 Encounters:   11/19/18 193 lb (87.5 kg)   10/22/18 187 lb (84.8 kg)   09/11/18 189 lb (85.7 kg)              Today, you had the following     No orders found for display         Today's Medication Changes          These changes are accurate as of 11/19/18  2:06 PM.  If you have any questions, ask your nurse or doctor.               These medicines have changed or have updated prescriptions.        Dose/Directions    mirtazapine 15 MG tablet   Commonly known as:  REMERON   This may have changed:  how much to take   Used for:  Persistent insomnia   Changed by:  Shantanu Armas MD        Dose:  15 mg   Take 1 tablet (15 mg) by mouth At Bedtime   Quantity:  30 tablet   Refills:  1            Where to get your medicines      These medications were sent to [id number] - [place], [place] - [address]  [address] [address]     Phone:  [phone]     mirtazapine 15 MG tablet                Primary Care Provider Office Phone # Fax #    Liberty Calhoun PA-C [phone] [id number] [facility] [address]  [facility] [address]        Equal Access to Services     ROBY VIERA : Hadii aad ku hadtoyino Soomaali, waaxda luqadaha, qaybta kaalmada denadanielafroilan, italia cummings hayclyde mccartneyfatumakayla rosario. So [facility] [phone].    ATENCIÓN: Si habla español, tiene a albarran disposición servicios gratuitos de asistencia lingüística. Llame al [phone].    We comply with applicable federal civil rights laws and [place] laws. We do not discriminate on the basis of race, color, national origin, age, disability, sex, sexual orientation, or gender identity.            Thank you!     Thank you for choosing [facility]  for your care. Our goal is always to provide you with excellent care. Hearing back from our patients is one way we can continue to improve our services. Please take a few minutes to complete the written survey that you may receive in the mail after your visit with us. Thank you!             Your Updated Medication List - Protect others around you: Learn how to safely use, store and throw away your medicines at www.disposemymeds.org.          This list is accurate as of 11/19/18  2:06 PM.  Always use your most recent med list.                   Brand Name Dispense Instructions for use Diagnosis    aspirin 81 MG tablet      Take 81 mg by mouth daily        atorvastatin 20 MG tablet    LIPITOR    90 tablet    Take 1 tablet (20 mg) by mouth At Bedtime    Unstable angina (H), Hyperlipidemia LDL goal <130       diazepam 5 MG tablet    VALIUM    90 tablet    TAKE ONE TABLET BY MOUTH EVERY 8 HOURS AS NEEDED FOR ANXIETY    Generalized anxiety disorder       isosorbide mononitrate 30 MG 24 hr tablet    IMDUR    90 tablet    Take 1 tablet (30 mg) by mouth daily In the morning    Coronary artery disease of native artery of native heart with stable angina pectoris (H)       levothyroxine 75 MCG tablet    SYNTHROID/LEVOTHROID    90 tablet    TAKE ONE TABLET BY MOUTH ONCE DAILY     Acquired hypothyroidism       lisinopril 10 MG tablet    PRINIVIL/ZESTRIL    90 tablet    Take 1 tablet (10 mg) by mouth daily    Benign essential hypertension       metoprolol succinate 25 MG 24 hr tablet    TOPROL-XL    90 tablet    Take 0.5 tablets (12.5 mg) by mouth daily    Unstable angina (H)       mirtazapine 15 MG tablet    REMERON    30 tablet    Take 1 tablet (15 mg) by mouth At Bedtime    Persistent insomnia       polyethylene glycol powder    MIRALAX/GLYCOLAX     Take 17 g by mouth daily as needed for constipation        prune juice Liqd      Take 5 mLs by mouth daily        tamsulosin 0.4 MG capsule    FLOMAX

## 2018-11-19 NOTE — PATIENT INSTRUCTIONS
I will have the patient increase his mirtazapine to 15 mg at bedtime.  He will try that without the diazepam for the first 3-5 nights.  If that is working well he will not use diazepam.  If not he will try adding a little bit of that back with the mirtazapine to see if he sleeps better.  He will follow-up in 1 month.  He will follow-up sooner as needed.

## 2019-01-03 ENCOUNTER — OFFICE VISIT (OUTPATIENT)
Dept: FAMILY MEDICINE | Facility: CLINIC | Age: 84
End: 2019-01-03
Payer: COMMERCIAL

## 2019-01-03 ENCOUNTER — HOSPITAL ENCOUNTER (INPATIENT)
Facility: CLINIC | Age: 84
LOS: 1 days | Discharge: HOME | DRG: 194 | End: 2019-01-05
Attending: EMERGENCY MEDICINE | Admitting: INTERNAL MEDICINE
Payer: COMMERCIAL

## 2019-01-03 ENCOUNTER — TELEPHONE (OUTPATIENT)
Dept: FAMILY MEDICINE | Facility: CLINIC | Age: 84
End: 2019-01-03

## 2019-01-03 VITALS
BODY MASS INDEX: 19.26 KG/M2 | SYSTOLIC BLOOD PRESSURE: 102 MMHG | DIASTOLIC BLOOD PRESSURE: 64 MMHG | RESPIRATION RATE: 14 BRPM | WEIGHT: 130 LBS | TEMPERATURE: 99.4 F | OXYGEN SATURATION: 93 % | HEART RATE: 78 BPM | HEIGHT: 69 IN

## 2019-01-03 DIAGNOSIS — I48.92 ATRIAL FIBRILLATION AND FLUTTER (H): ICD-10-CM

## 2019-01-03 DIAGNOSIS — J10.1 INFLUENZA A: Primary | ICD-10-CM

## 2019-01-03 DIAGNOSIS — R55 SYNCOPE, UNSPECIFIED SYNCOPE TYPE: ICD-10-CM

## 2019-01-03 DIAGNOSIS — J40 BRONCHITIS: Primary | ICD-10-CM

## 2019-01-03 DIAGNOSIS — I48.91 ATRIAL FIBRILLATION AND FLUTTER (H): ICD-10-CM

## 2019-01-03 DIAGNOSIS — R41.82 ALTERED MENTAL STATUS, UNSPECIFIED ALTERED MENTAL STATUS TYPE: ICD-10-CM

## 2019-01-03 PROBLEM — E87.1 HYPONATREMIA: Status: ACTIVE | Noted: 2019-01-03

## 2019-01-03 PROBLEM — R74.8 ELEVATION OF CARDIAC ENZYMES: Status: ACTIVE | Noted: 2019-01-03

## 2019-01-03 PROBLEM — D69.6 THROMBOCYTOPENIA (H): Status: ACTIVE | Noted: 2019-01-03

## 2019-01-03 LAB
ALBUMIN SERPL-MCNC: 3.4 G/DL (ref 3.4–5)
ALBUMIN UR-MCNC: 10 MG/DL
ALP SERPL-CCNC: 73 U/L (ref 40–150)
ALT SERPL W P-5'-P-CCNC: 28 U/L (ref 0–70)
ANION GAP SERPL CALCULATED.3IONS-SCNC: 6 MMOL/L (ref 3–14)
APPEARANCE UR: CLEAR
AST SERPL W P-5'-P-CCNC: 32 U/L (ref 0–45)
BASOPHILS # BLD AUTO: 0 10E9/L (ref 0–0.2)
BASOPHILS NFR BLD AUTO: 0.3 %
BILIRUB SERPL-MCNC: 0.6 MG/DL (ref 0.2–1.3)
BILIRUB UR QL STRIP: NEGATIVE
BUN SERPL-MCNC: 14 MG/DL (ref 7–30)
CALCIUM SERPL-MCNC: 8.1 MG/DL (ref 8.5–10.1)
CHLORIDE SERPL-SCNC: 96 MMOL/L (ref 94–109)
CO2 SERPL-SCNC: 27 MMOL/L (ref 20–32)
COLOR UR AUTO: YELLOW
CREAT SERPL-MCNC: 1.06 MG/DL (ref 0.66–1.25)
DIFFERENTIAL METHOD BLD: ABNORMAL
EOSINOPHIL # BLD AUTO: 0.1 10E9/L (ref 0–0.7)
EOSINOPHIL NFR BLD AUTO: 1.4 %
ERYTHROCYTE [DISTWIDTH] IN BLOOD BY AUTOMATED COUNT: 13.2 % (ref 10–15)
GFR SERPL CREATININE-BSD FRML MDRD: 62 ML/MIN/{1.73_M2}
GLUCOSE BLD-MCNC: 124 MG/DL (ref 70–99)
GLUCOSE SERPL-MCNC: 102 MG/DL (ref 70–99)
GLUCOSE UR STRIP-MCNC: NEGATIVE MG/DL
HCT VFR BLD AUTO: 38.4 % (ref 40–53)
HGB BLD-MCNC: 13.3 G/DL (ref 13.3–17.7)
HGB UR QL STRIP: NEGATIVE
HYALINE CASTS #/AREA URNS LPF: 1 /LPF (ref 0–2)
IMM GRANULOCYTES # BLD: 0 10E9/L (ref 0–0.4)
IMM GRANULOCYTES NFR BLD: 0.3 %
INTERPRETATION ECG - MUSE: NORMAL
KETONES UR STRIP-MCNC: NEGATIVE MG/DL
LACTATE BLD-SCNC: 1.2 MMOL/L (ref 0.7–2)
LEUKOCYTE ESTERASE UR QL STRIP: NEGATIVE
LYMPHOCYTES # BLD AUTO: 2.5 10E9/L (ref 0.8–5.3)
LYMPHOCYTES NFR BLD AUTO: 37.7 %
MCH RBC QN AUTO: 30.5 PG (ref 26.5–33)
MCHC RBC AUTO-ENTMCNC: 34.6 G/DL (ref 31.5–36.5)
MCV RBC AUTO: 88 FL (ref 78–100)
MONOCYTES # BLD AUTO: 1.2 10E9/L (ref 0–1.3)
MONOCYTES NFR BLD AUTO: 18 %
MUCOUS THREADS #/AREA URNS LPF: PRESENT /LPF
NEUTROPHILS # BLD AUTO: 2.8 10E9/L (ref 1.6–8.3)
NEUTROPHILS NFR BLD AUTO: 42.3 %
NITRATE UR QL: NEGATIVE
NRBC # BLD AUTO: 0 10*3/UL
NRBC BLD AUTO-RTO: 0 /100
PH UR STRIP: 5.5 PH (ref 5–7)
PLATELET # BLD AUTO: 149 10E9/L (ref 150–450)
POTASSIUM SERPL-SCNC: 4.2 MMOL/L (ref 3.4–5.3)
PROT SERPL-MCNC: 7.2 G/DL (ref 6.8–8.8)
RBC # BLD AUTO: 4.36 10E12/L (ref 4.4–5.9)
RBC #/AREA URNS AUTO: 1 /HPF (ref 0–2)
SODIUM SERPL-SCNC: 129 MMOL/L (ref 133–144)
SODIUM UR-SCNC: 26 MMOL/L
SOURCE: ABNORMAL
SP GR UR STRIP: 1.01 (ref 1–1.03)
SQUAMOUS #/AREA URNS AUTO: <1 /HPF (ref 0–1)
TROPONIN I SERPL-MCNC: 0.05 UG/L (ref 0–0.04)
TSH SERPL DL<=0.005 MIU/L-ACNC: 3.58 MU/L (ref 0.4–4)
UROBILINOGEN UR STRIP-MCNC: 2 MG/DL (ref 0–2)
WBC # BLD AUTO: 6.7 10E9/L (ref 4–11)
WBC #/AREA URNS AUTO: 1 /HPF (ref 0–5)

## 2019-01-03 PROCEDURE — 36415 COLL VENOUS BLD VENIPUNCTURE: CPT | Performed by: FAMILY MEDICINE

## 2019-01-03 PROCEDURE — 99285 EMERGENCY DEPT VISIT HI MDM: CPT | Mod: 25

## 2019-01-03 PROCEDURE — 25000128 H RX IP 250 OP 636: Performed by: EMERGENCY MEDICINE

## 2019-01-03 PROCEDURE — 36415 COLL VENOUS BLD VENIPUNCTURE: CPT | Performed by: INTERNAL MEDICINE

## 2019-01-03 PROCEDURE — 82947 ASSAY GLUCOSE BLOOD QUANT: CPT | Performed by: FAMILY MEDICINE

## 2019-01-03 PROCEDURE — 93005 ELECTROCARDIOGRAM TRACING: CPT

## 2019-01-03 PROCEDURE — 84443 ASSAY THYROID STIM HORMONE: CPT | Performed by: EMERGENCY MEDICINE

## 2019-01-03 PROCEDURE — 84484 ASSAY OF TROPONIN QUANT: CPT | Performed by: INTERNAL MEDICINE

## 2019-01-03 PROCEDURE — G0378 HOSPITAL OBSERVATION PER HR: HCPCS

## 2019-01-03 PROCEDURE — 81001 URINALYSIS AUTO W/SCOPE: CPT | Performed by: EMERGENCY MEDICINE

## 2019-01-03 PROCEDURE — 25000132 ZZH RX MED GY IP 250 OP 250 PS 637: Performed by: INTERNAL MEDICINE

## 2019-01-03 PROCEDURE — 99220 ZZC INITIAL OBSERVATION CARE,LEVL III: CPT | Performed by: INTERNAL MEDICINE

## 2019-01-03 PROCEDURE — 84484 ASSAY OF TROPONIN QUANT: CPT | Performed by: EMERGENCY MEDICINE

## 2019-01-03 PROCEDURE — 83605 ASSAY OF LACTIC ACID: CPT | Performed by: EMERGENCY MEDICINE

## 2019-01-03 PROCEDURE — 96361 HYDRATE IV INFUSION ADD-ON: CPT

## 2019-01-03 PROCEDURE — 80053 COMPREHEN METABOLIC PANEL: CPT | Performed by: EMERGENCY MEDICINE

## 2019-01-03 PROCEDURE — 84300 ASSAY OF URINE SODIUM: CPT | Performed by: INTERNAL MEDICINE

## 2019-01-03 PROCEDURE — 25000132 ZZH RX MED GY IP 250 OP 250 PS 637: Performed by: EMERGENCY MEDICINE

## 2019-01-03 PROCEDURE — 25000128 H RX IP 250 OP 636: Performed by: INTERNAL MEDICINE

## 2019-01-03 PROCEDURE — 96360 HYDRATION IV INFUSION INIT: CPT

## 2019-01-03 PROCEDURE — 85025 COMPLETE CBC W/AUTO DIFF WBC: CPT | Performed by: EMERGENCY MEDICINE

## 2019-01-03 RX ORDER — LEVOTHYROXINE SODIUM 75 UG/1
75 TABLET ORAL DAILY
Status: DISCONTINUED | OUTPATIENT
Start: 2019-01-04 | End: 2019-01-05 | Stop reason: HOSPADM

## 2019-01-03 RX ORDER — ACETAMINOPHEN 650 MG/1
650 SUPPOSITORY RECTAL EVERY 4 HOURS PRN
Status: DISCONTINUED | OUTPATIENT
Start: 2019-01-03 | End: 2019-01-05 | Stop reason: HOSPADM

## 2019-01-03 RX ORDER — ASPIRIN 81 MG/1
162 TABLET, CHEWABLE ORAL ONCE
Status: COMPLETED | OUTPATIENT
Start: 2019-01-03 | End: 2019-01-03

## 2019-01-03 RX ORDER — ACETAMINOPHEN 325 MG/1
650 TABLET ORAL EVERY 4 HOURS PRN
Status: DISCONTINUED | OUTPATIENT
Start: 2019-01-03 | End: 2019-01-05 | Stop reason: HOSPADM

## 2019-01-03 RX ORDER — LIDOCAINE 40 MG/G
CREAM TOPICAL
Status: DISCONTINUED | OUTPATIENT
Start: 2019-01-03 | End: 2019-01-05 | Stop reason: HOSPADM

## 2019-01-03 RX ORDER — MIRTAZAPINE 15 MG/1
15 TABLET, FILM COATED ORAL AT BEDTIME
Status: DISCONTINUED | OUTPATIENT
Start: 2019-01-03 | End: 2019-01-05 | Stop reason: HOSPADM

## 2019-01-03 RX ORDER — ISOSORBIDE MONONITRATE 30 MG/1
30 TABLET, EXTENDED RELEASE ORAL DAILY
Status: DISCONTINUED | OUTPATIENT
Start: 2019-01-04 | End: 2019-01-05 | Stop reason: HOSPADM

## 2019-01-03 RX ORDER — GUAIFENESIN/DEXTROMETHORPHAN 100-10MG/5
5-10 SYRUP ORAL EVERY 4 HOURS PRN
Status: DISCONTINUED | OUTPATIENT
Start: 2019-01-03 | End: 2019-01-05 | Stop reason: HOSPADM

## 2019-01-03 RX ORDER — ONDANSETRON 4 MG/1
4 TABLET, ORALLY DISINTEGRATING ORAL EVERY 6 HOURS PRN
Status: DISCONTINUED | OUTPATIENT
Start: 2019-01-03 | End: 2019-01-05 | Stop reason: HOSPADM

## 2019-01-03 RX ORDER — ONDANSETRON 2 MG/ML
4 INJECTION INTRAMUSCULAR; INTRAVENOUS EVERY 6 HOURS PRN
Status: DISCONTINUED | OUTPATIENT
Start: 2019-01-03 | End: 2019-01-05 | Stop reason: HOSPADM

## 2019-01-03 RX ORDER — ASPIRIN 81 MG/1
81 TABLET ORAL DAILY
Status: DISCONTINUED | OUTPATIENT
Start: 2019-01-04 | End: 2019-01-05 | Stop reason: HOSPADM

## 2019-01-03 RX ORDER — TAMSULOSIN HYDROCHLORIDE 0.4 MG/1
0.4 CAPSULE ORAL
Status: DISCONTINUED | OUTPATIENT
Start: 2019-01-03 | End: 2019-01-05 | Stop reason: HOSPADM

## 2019-01-03 RX ORDER — ATORVASTATIN CALCIUM 20 MG/1
20 TABLET, FILM COATED ORAL AT BEDTIME
Status: DISCONTINUED | OUTPATIENT
Start: 2019-01-03 | End: 2019-01-05 | Stop reason: HOSPADM

## 2019-01-03 RX ORDER — CODEINE PHOSPHATE/GUAIFENESIN 10-100MG/5
5 LIQUID (ML) ORAL EVERY 6 HOURS PRN
Qty: 180 ML | Refills: 0 | Status: SHIPPED | OUTPATIENT
Start: 2019-01-03 | End: 2019-01-17

## 2019-01-03 RX ORDER — NITROGLYCERIN 0.4 MG/1
0.4 TABLET SUBLINGUAL EVERY 5 MIN PRN
Status: DISCONTINUED | OUTPATIENT
Start: 2019-01-03 | End: 2019-01-05 | Stop reason: HOSPADM

## 2019-01-03 RX ORDER — SODIUM CHLORIDE 9 MG/ML
INJECTION, SOLUTION INTRAVENOUS ONCE
Status: COMPLETED | OUTPATIENT
Start: 2019-01-03 | End: 2019-01-03

## 2019-01-03 RX ORDER — SODIUM CHLORIDE 9 MG/ML
INJECTION, SOLUTION INTRAVENOUS CONTINUOUS
Status: DISCONTINUED | OUTPATIENT
Start: 2019-01-03 | End: 2019-01-05 | Stop reason: HOSPADM

## 2019-01-03 RX ADMIN — SODIUM CHLORIDE: 9 INJECTION, SOLUTION INTRAVENOUS at 15:22

## 2019-01-03 RX ADMIN — GUAIFENESIN AND DEXTROMETHORPHAN 10 ML: 100; 10 SYRUP ORAL at 21:28

## 2019-01-03 RX ADMIN — ATORVASTATIN CALCIUM 20 MG: 20 TABLET, FILM COATED ORAL at 21:28

## 2019-01-03 RX ADMIN — SODIUM CHLORIDE: 9 INJECTION, SOLUTION INTRAVENOUS at 17:37

## 2019-01-03 RX ADMIN — MIRTAZAPINE 15 MG: 15 TABLET, FILM COATED ORAL at 21:28

## 2019-01-03 RX ADMIN — SODIUM CHLORIDE 1000 ML: 9 INJECTION, SOLUTION INTRAVENOUS at 13:37

## 2019-01-03 RX ADMIN — ASPIRIN 81 MG 162 MG: 81 TABLET ORAL at 15:22

## 2019-01-03 RX ADMIN — TAMSULOSIN HYDROCHLORIDE 0.4 MG: 0.4 CAPSULE ORAL at 18:38

## 2019-01-03 ASSESSMENT — MIFFLIN-ST. JEOR
SCORE: 1245.06
SCORE: 1533.08

## 2019-01-03 ASSESSMENT — ENCOUNTER SYMPTOMS
COUGH: 1
VOMITING: 0
PALPITATIONS: 1
NAUSEA: 0
DIARRHEA: 1
FATIGUE: 1

## 2019-01-03 NOTE — PROGRESS NOTES
"  SUBJECTIVE:   Korey Pearson is a 89 year old male who presents to clinic today for the following health issues:        RESPIRATORY SYMPTOMS      Duration: X1 week of URI symptoms    Description  cough    Severity: moderate    Accompanying signs and symptoms: low grade temp    History (predisposing factors):  none    Precipitating or alleviating factors: None    Therapies tried and outcome:  none        Problem list and histories reviewed & adjusted, as indicated.  Additional history: as documented    Labs reviewed in EPIC    Reviewed and updated as needed this visit by clinical staff  Tobacco  Allergies  Meds  Problems  Med Hx  Surg Hx  Fam Hx  Soc Hx        Reviewed and updated as needed this visit by Provider  Tobacco  Allergies  Meds  Problems  Med Hx  Surg Hx  Fam Hx         ROS:  C: NEGATIVE for fever, chills, change in weight  I: NEGATIVE for worrisome rashes, moles or lesions  E: NEGATIVE for vision changes or irritation  CV: NEGATIVE for chest pain, palpitations or peripheral edema  GI: NEGATIVE for nausea, abdominal pain, heartburn, or change in bowel habits  M: NEGATIVE for significant arthralgias or myalgia  H: NEGATIVE for bleeding problems      OBJECTIVE:     /64 (Cuff Size: Adult Regular)   Pulse 78   Temp 99.4  F (37.4  C) (Tympanic)   Resp 14   Ht 1.753 m (5' 9\")   Wt 59 kg (130 lb)   SpO2 93%   BMI 19.20 kg/m    Body mass index is 19.2 kg/m .   GENERAL: alert and no acute distress  EYES: Eyes grossly normal to inspection, PERRL and conjunctivae and sclerae normal  HENT: ear canals and TM's normal, mouth without ulcers or lesions.  +b/l boggy turbinates, no active nasal drainage.  NECK: no adenopathy, no asymmetry, masses, or scars and thyroid normal to palpation  RESP: No rales or wheezes.  +rhonchi in b/l lungs.   CV: regular rate and rhythm, normal S1 S2, no S3 or S4, no murmur, click or rub, no peripheral edema and peripheral pulses strong  SKIN: no " suspicious lesions or rashes      Diagnostic Test Results:  none     ASSESSMENT/PLAN:     Problem List Items Addressed This Visit     None      Visit Diagnoses     Bronchitis    -  Primary    Relevant Medications    amoxicillin-clavulanate (AUGMENTIN) 875-125 MG tablet    guaiFENesin-codeine (GUAIFENESIN AC) 100-10 MG/5ML syrup    Other Relevant Orders    Glucose, whole blood (Completed)    Altered mental status, unspecified altered mental status type             Clifford is a 88 y/o male presenting with URI symptoms consistent with bronchitis.  Rx Augmentin and cough syrup as needed.     UPDATE:  Clifford was initially alert and fully oriented but became more unresponsive around 12:05 PM after the appt was completed and was complaining of nausea.    911 called and ambulance arrived immediately.  Oxygen mask applied.  Stat blood sugar obtained and was 122.  Pulse 96 and respiratory 16.   Patient became more responsive and alert once he was lying flat in the stretcher.  Clifford will be going to [facility] ED for further evaluation/treatment and stabilization.        Hope Faust,   [facility]

## 2019-01-03 NOTE — H&P
[facility]    History and Physical  Hospitalist       Date of Admission:  1/3/2019    Assessment & Plan   89 year old male with HTN, CAD with stent x 2, and hyperlipidemia who presents with syncope:    Summary:    Principal Problem:    Syncope -- out for 10 seconds while sitting in Doctors office   -- no prior syncope, he felt light headed before passing out   -- wife said he looked flushed prior to passing out (?hypotension)     Active Problems:    New onset a-fib, appears paroxysmal    -- never had it before to his knowledge    Elevation of cardiac enzymes    Thrombocytopenia (H)    Hyponatremia -- ?etiology    Hypothyroidism, no thyroid replacement    URI, probably viral     Plan: Admit to observation, telemetry, serial trops, and given new afib will check echo and TSH.  Will continue Metoprolol XR 25 mg daily and Imdur, but will hold his Lisinopril 10 mg daily and monitor BP.      DVT Prophylaxis: Pneumatic Compression Devices  Code Status: DNR / DNI    Disposition: Expected discharge tomorrow if stable.      Lorenzo Monique MD  Pager: [phone]  Cell Phone:  [phone]     Primary Care Physician   Liberty Calhoun    Chief Complaint   Syncope    History is obtained from Patient and wife    History of Present Illness   89 year old male with HTN, CAD with stent x 2, and hyperlipidemia who presents with syncope.  He has had a cough with yellow sputum for 6 days (both he and his wife), so came to the clinic today to be checked out -- his wife was getting an EKG (?to check to see whether she had Afib) and she noticed him looking flushed.  He remembers the light in the room going dimmer, then pressure in his head -- and his wife said he slumped over in his chair, and the doctor tried to awake him up, but it took about 10 seconds, and initially he was groggy.  He recalls them waking him up.  No chest pain, no SOB.  Concerning the cough and sputum, he thinks it is better today.  He has felt chilled at times, no fever, and his appetite has been OK.  Currently feels OK, but is hungry -- he hasn't eaten all day.        Past Medical History    I have reviewed this patient's medical history and updated it with pertinent information if needed.   Past Medical History:   Diagnosis Date     Anxiety disorder      CAD, Stent x 2 2016     Diverticulosis of colon      Erythrocytosis      Hearing loss      Hyperlipidemia      Hypertension      Hypothyroid      Peripheral neuropathy, Idiopathic 2012     Restless leg syndrome      Skin cancer     both ears     Vitamin D deficiency        Past Surgical History   I have reviewed this patient's surgical history and updated it with pertinent information if needed.  Past Surgical History:   Procedure Laterality Date     ABDOMEN SURGERY  appy, hernia     ANGIOGRAM  08/21/2017     APPENDECTOMY  1941     CARDIAC SURGERY      2 stents  dec 2016     CATARACT IOL, RT/LT  1993     HERNIA REPAIR      left inguinal hernia 1960's     TURP  1985       Prior to Admission Medications   Prior to Admission Medications   Prescriptions Last Dose Informant Patient Reported? Taking?   amoxicillin-clavulanate (AUGMENTIN) 875-125 MG tablet  Spouse/Significant Other No No   Sig: Take 1 tablet by mouth 2 times daily for 7 days   aspirin 81 MG tablet 1/3/2019 at Unknown time Spouse/Significant Other Yes Yes   Sig: Take 81 mg by mouth daily    atorvastatin (LIPITOR) 20 MG tablet 1/2/2019 at Unknown time Spouse/Significant Other No Yes   Sig: Take 1 tablet (20 mg) by mouth At Bedtime   diazepam (VALIUM) 5 MG tablet  Spouse/Significant Other No Yes   Sig: TAKE ONE TABLET BY MOUTH EVERY 8 HOURS AS NEEDED FOR ANXIETY   guaiFENesin-codeine (GUAIFENESIN AC) 100-10 MG/5ML syrup  Spouse/Significant Other No No   Sig: Take 5 mLs by mouth every 6 hours as needed for congestion or cough   isosorbide mononitrate (IMDUR) 30 MG 24 hr tablet 1/3/2019 at Unknown time Spouse/Significant Other No Yes    Sig: Take 1 tablet (30 mg) by mouth daily In the morning   levothyroxine (SYNTHROID/LEVOTHROID) 75 MCG tablet 1/3/2019 at Unknown time Spouse/Significant Other No Yes   Sig: TAKE ONE TABLET BY MOUTH ONCE DAILY   lisinopril (PRINIVIL/ZESTRIL) 10 MG tablet 1/3/2019 at Unknown time Spouse/Significant Other No Yes   Sig: Take 1 tablet (10 mg) by mouth daily   metoprolol succinate (TOPROL-XL) 25 MG 24 hr tablet 1/3/2019 at Unknown time Spouse/Significant Other No Yes   Sig: Take 0.5 tablets (12.5 mg) by mouth daily   mirtazapine (REMERON) 15 MG tablet 1/2/2019 at Unknown time Spouse/Significant Other No Yes   Sig: Take 1 tablet (15 mg) by mouth At Bedtime   polyethylene glycol (MIRALAX/GLYCOLAX) powder  Spouse/Significant Other Yes Yes   Sig: Take 17 g by mouth daily as needed for constipation   tamsulosin (FLOMAX) 0.4 MG capsule 1/2/2019 at Unknown time Spouse/Significant Other Yes Yes   Sig: Take 0.4 mg by mouth daily (with dinner)       Facility-Administered Medications: None     Allergies   Allergies   Allergen Reactions     Ropinirole Anxiety     funny thoughts       Social History   I have reviewed this patient's social history and updated it with pertinent information if needed. Korey Pearson  reports that he quit smoking about 62 years ago. His smoking use included cigarettes. He has a 15.00 pack-year smoking history. he has never used smokeless tobacco. He reports that he drinks alcohol. He reports that he does not use drugs.    Family History   I have reviewed this patient's family history and updated it with pertinent information if needed.   Family History   Problem Relation Age of Onset     Cerebrovascular Disease Mother      Heart Disease Father        Review of Systems   The 10 point Review of Systems is negative other than noted in the HPI or here.  Does not have to get up at night.      # Pain Assessment:  Current Pain Score 7/8/2018   Patient currently in pain? -   Pain score (0-10) 0    Pain location -   Korey s pain level was assessed and he currently denies pain.        Physical Exam   Temp: 97.7  F (36.5  C) Temp src: Oral BP: 108/63 Pulse: 66 Heart Rate: 64 Resp: 9 SpO2: 94 % O2 Device: None (Room air)    Vital Signs with Ranges  Temp:  [97.7  F (36.5  C)-99.4  F (37.4  C)] 97.7  F (36.5  C)  Pulse:  [66-78] 66  Heart Rate:  [64-71] 64  Resp:  [9-20] 9  BP: ()/(53-64) 108/63  SpO2:  [88 %-96 %] 94 %  190 lbs 0 oz    Constitutional: Awake, alert, cooperative, no apparent distress.  Eyes: Conjunctiva and pupils examined and normal.  HEENT: Moist mucous membranes, normal dentition.  Respiratory: Clear to auscultation bilaterally, no crackles or wheezing.  Cardiovascular: Regular rate and rhythm, normal S1 and S2, and no murmur noted,   No carotid bruits.  No ankle edema.   GI: Soft, non-distended, non-tender, normal bowel sounds.  Lymph/Hematologic: No anterior cervical, supraclavicular or axillary adenopathy.  Skin: No rashes, no cyanosis.   Musculoskeletal: No joint swelling, erythema or tenderness.  Neurologic: Cranial nerves 2-12 intact, no focal weakness or numbness  Psychiatric: Alert, Ox3, no obvious anxiety or depression.    Data   Data reviewed today:  I personally reviewed the EKG tracing showing afib with rate of 63, and RBBB (unclear is some P wave activity), and afib is new compared to 8/21/17.  Recent Labs   Lab 01/03/19  1525 01/03/19  1255   WBC  --  6.7   HGB  --  13.3   MCV  --  88   PLT  --  149*   NA  --  129*   POTASSIUM  --  4.2   CHLORIDE  --  96   CO2  --  27   BUN  --  14   CR  --  1.06   ANIONGAP  --  6   OMAR  --  8.1*   GLC  --  102*   ALBUMIN  --  3.4   PROTTOTAL  --  7.2   BILITOTAL  --  0.6   ALKPHOS  --  73   ALT  --  28   AST  --  32   TROPI 0.050* 0.050*       Imaging:  No results found for this or any previous visit (from the past 24 hour(s)).

## 2019-01-03 NOTE — ED NOTES
Bed: ED07  Expected date:   Expected time:   Means of arrival:   Comments:  pancho - 511 - 89 M syncope eta 1250

## 2019-01-03 NOTE — ED PROVIDER NOTES
History     Chief Complaint:  Syncope    HPI   Korey Pearson is a 89 year old male with a history of hypertension, hyperlipidemia, cancer, MI, and thoracic aortic ectasia who presents to the ED for evaluation of syncope. The patient reports that he has had a cough and fatigue for the past week, and presented to his clinic this afternoon for evaluation of this. He had a syncopal episode at the clinic, and when EMS arrived the patient was pale and unresponsive. He has since come to. Here in the ED, he states he has no known history of atrial fibrillation in the past. He states that he did have an episode of diarrhea this morning, and has had some palpitations. He denies any chest pain, vomiting, or other symptoms or concerns.     Allergies:  Ropinirole    Medications:    Aspirin  Lipitor  Valium  Guaifenesin  Imdur  Levothyroxine  Lisinopril  Metoprolol  Remeron  Miralax  Flomax    Past Medical History:    Anxiety  Cancer  CAD  Diverticulosis  Erythrocytosis  Depression  Hearing loss  Hyperlipidemia  HTN  MI  Hypothyroidism  Peripheral neuropathy  RLS  GERD  Arthritis  Thoracic aortic ectasia  BPH  Insomnia  Raynaud's disease    Past Surgical History:    Abdomen surgery  Angiogram  Appendectomy  Cardiac stenting  Cataract IOL  Coronary angiography  Hernia repair    Family History:    CVD  Heart disease    Social History:  Marital Status:   [2]  Former Smoker  Alcohol use: yes  Negative for drug use.     Review of Systems   Constitutional: Positive for fatigue.   Respiratory: Positive for cough.    Cardiovascular: Positive for palpitations. Negative for chest pain.   Gastrointestinal: Positive for diarrhea. Negative for nausea and vomiting.   Neurological: Positive for syncope.   All other systems reviewed and are negative.      Physical Exam     Patient Vitals for the past 24 hrs:   BP Temp Temp src Pulse Heart Rate Resp SpO2 Height Weight   01/03/19 1400 108/63 -- -- 66 64 9 94 % -- --   01/03/19  "1345 110/59 -- -- 67 64 19 (!) 88 % -- --   01/03/19 1330 98/53 -- -- 70 67 9 94 % -- --   01/03/19 1325 101/56 -- -- -- 65 19 92 % -- --   01/03/19 1305 (!) 83/57 -- -- 71 71 18 96 % -- --   01/03/19 1253 108/62 97.7  F (36.5  C) Oral 71 71 20 94 % 1.778 m (5' 10\") 86.2 kg (190 lb)   01/03/19 1250 108/62 -- -- 69 -- -- -- -- --     Physical Exam  Vitals: reviewed by me  General: Pt seen on [facility], cooperative, and alert to conversation  Eyes: Tracking well, clear conjunctiva BL  ENT: MMM, midline trachea.  No C spine ttp, FROM to neck   Lungs:  No tachypnea, no accessory muscle use. No respiratory distress.   CV: Rate as above, in determinant rhythm.  No MGR heard   Abd: Soft, non tender, no guarding, no rebound. Non distended  MSK: no peripheral edema or joint effusion.  No evidence of trauma  Skin: No rash, normal turgor and temperature  Neuro: Clear speech and no facial droop.  Psych: Not RIS, no e/o AH/VH      Emergency Department Course   ECG:  Indication: Syncope  Time: 1259  Vent. Rate 63 bpm. ME interval *. QRS duration 132. QT/QTc 414/423. P-R-T axis * -4 23.  Atrial fibrillation. RBBB. Abnormal ECG. Read time: 1300    Laboratory:  CBC: WBC: 6.7, HGB: 13.3, PLT: 149 (L)  CMP: Glucose 102 (H),  (L), Calcium 8.1 (L), o/w WNL (Creatinine: 1.06)    1255 Troponin: 0.050 (H)  1255 Lactic acid: 1.2    UA with Microscopic: protein albumin 10 (A), mucous present (A), o/w WNL    Interventions:  Medications   aspirin (ASA) chewable tablet 162 mg (not administered)   sodium chloride 0.9% infusion (not administered)   0.9% sodium chloride BOLUS (0 mLs Intravenous Stopped 1/3/19 1457)   1337 NS 1L IV  Ordered  Aspirin 162 mg PO    Emergency Department Course:  Nursing notes and vitals reviewed. (8694) I performed an exam of the patient as documented above.     IV inserted. Medicine administered as documented above. Blood drawn. This was sent to the lab for further testing, results above.    The " patient provided a urine sample here in the emergency department. This was sent for laboratory testing, findings above.     EKG obtained in the ED, see results above.     (5202) I rechecked the patient and discussed the results of his workup thus far.     (8222)  I consulted with Dr. Salomon of the hospitalist services. They are in agreement to accept the patient for admission.    Findings and plan explained to the Patient who consents to admission. Discussed the patient with Dr. Salomon, who will admit the patient to a observation bed for further monitoring, evaluation, and treatment.    Impression & Plan      Medical Decision Making:  Korey Pearson is a 89 year old male who presents to the ED with what appears to be a syncopal episode at his doctor's office today. While he was seated, and this only lasted a few seconds, I do think this is concerning. Namely, EKG shows atrial fibrillation here, which is new. The patient has never had atrial fibrillation before, and his last echo showed no evidence of atrial fibrillation. He also has a slightly elevated troponin, which maybe is baseline, though also may represent pathogenic stress given his syncopal episode. I gave him an aspirin here. He has no chest pain, and is asymptomatic here in the ED. However, with the constellation of symptoms as above, I do think he would benefit from admission here for formal troponin rule out and advanced imaging of his heart as well as telemetry monitoring to ensure this was not arrhythmogenic syncope. There is no murmur, and he is otherwise doing well here. I do think he would benefit from inpatient stay. I have spoken to Dr. Salomon, who has very generously agreed to accept care of the patient.     Diagnosis:    ICD-10-CM    1. Syncope, unspecified syncope type R55 UA with Microscopic   2. Atrial fibrillation and flutter (H) I48.91     I48.92      Disposition:  Admitted to Dr. Salomon    Scribe Disclosure:  Nany LOVELL  Ty, am serving as a scribe on 1/3/2019 at 1:19 PM to personally document services performed by Jam Ferguson MDbased on my observations and the provider's statements to me.     Nany Crawford  1/3/2019    EMERGENCY DEPARTMENT       Jam Ferguson MD  01/03/19 1526

## 2019-01-03 NOTE — LETTER
January 3, 2019    Korey Pearson  [address] [facility] [address]    Dear Dania Horton cares about your health and your health plan.  I have reviewed your medical conditions, medication list and lab results, and am making recommendations based on this review to better manage your health.    You are in particular need of attention regarding:  -Depression/Anxiety    I am recommending that you:     Please complete the enclosed PHQ9 and mail back to clinic in the envelope provided.         Please call us at the [place] location:  [phone] or use [employer] to address the above recommendations.     Thank you for trusting [facility].  We appreciate the opportunity to serve you and look forward to supporting your healthcare in the future.    If you have (or plan to have) any of these tests done at a facility other than a [facility] or a [facility], please have the results sent to the [facility] location noted above.      Best Regards,    Frieda Khan MD

## 2019-01-03 NOTE — TELEPHONE ENCOUNTER
Panel Management Review      Patient has the following on his problem list:     Depression / Dysthymia review    Measure:  Needs PHQ-9 score of 4 or less during index window.  Administer PHQ-9 and if score is 5 or more, send encounter to provider for next steps.    5 - 7 month window range:     PHQ-9 SCORE 11/22/2013 8/31/2018 9/11/2018   PHQ-9 Total Score 19 - -   PHQ-9 Total Score - 21 17       If PHQ-9 recheck is 5 or more, route to provider for next steps.    Patient is due for:  PHQ9      Composite cancer screening  Chart review shows that this patient is due/due soon for the following   Summary:    Patient is due/failing the following:   PHQ9    Action needed:   Patient needs to do PHQ9.    Type of outreach:    Sent letter.    Questions for provider review:    None                                                                                                                                    Vania Lovell CMA       Chart routed to  .

## 2019-01-03 NOTE — ED NOTES
"[facility]  ED Nurse Handoff Report    ED Chief complaint: Loss of Consciousness (Pt presents from clinic where he was being evaluated for a cough for the past 1 week. Pt had syncopal event in clinic and when EMS arrived pt was pale and unresponsive. Initial blood pressure in the 80s systolic, improved now)      ED Diagnosis:   Final diagnoses:   Syncope, unspecified syncope type   Atrial fibrillation and flutter (H)       Code Status: Full Code    Allergies:   Allergies   Allergen Reactions     Ropinirole Anxiety     funny thoughts       Activity level - Baseline/Home:  Independent    Activity Level - Current:   Stand with Assist     Needed?: No    Isolation: No  Infection: Not Applicable  Bariatric?: No    Vital Signs:   Vitals:    01/03/19 1325 01/03/19 1330 01/03/19 1345 01/03/19 1400   BP: 101/56 98/53 110/59 108/63   Pulse:  70 67 66   Resp: 19 9 19 9   Temp:       TempSrc:       SpO2: 92% 94% (!) 88% 94%   Weight:       Height:           Cardiac Rhythm: ,   Cardiac  Cardiac Rhythm: Normal sinus rhythm    Pain level:      Is this patient confused?: No   Does this patient have a guardian?  No         If yes, is there guardianship documents in the Epic \"Code/ACP\" activity?  N/A         Guardian Notified?  N/A  [place] - Suicide Severity Rating Scale Completed?  Yes  If yes, what color did the patient score?  White    Patient Report: Initial Complaint: LOC  Focused Assessment: Pt at clinic today because he has had flu like sx for 6+days. While at the clinic pt had a syncopal event; here in the ED he has had many low b/p; He is on his 2nd 0.9 NS   Tests Performed: labs   Abnormal Results:   Labs Ordered and Resulted from Time of ED Arrival Up to the Time of Departure from the ED   CBC WITH PLATELETS DIFFERENTIAL - Abnormal; Notable for the following components:       Result Value    RBC Count 4.36 (*)     Hematocrit 38.4 (*)     Platelet Count 149 (*)     All other components within " normal limits   COMPREHENSIVE METABOLIC PANEL - Abnormal; Notable for the following components:    Sodium 129 (*)     Glucose 102 (*)     Calcium 8.1 (*)     All other components within normal limits   TROPONIN I - Abnormal; Notable for the following components:    Troponin I ES 0.050 (*)     All other components within normal limits   LACTIC ACID WHOLE BLOOD   ROUTINE UA WITH MICROSCOPIC   TROPONIN I       Treatments provided: fluids ASA    Family Comments: wife at beside     OBS brochure/video discussed/provided to patient/family: yes               ED Medications:   Medications   aspirin (ASA) chewable tablet 162 mg (not administered)   sodium chloride 0.9% infusion (not administered)   0.9% sodium chloride BOLUS (0 mLs Intravenous Stopped 1/3/19 2107)       Drips infusing?:  Yes    For the majority of the shift this patient was Green.   Interventions performed were .    Severe Sepsis OR Septic Shock Diagnosis Present: No    To be done/followed up on inpatient unit:  monitor     ED NURSE PHONE NUMBER: *[id number]

## 2019-01-03 NOTE — PROGRESS NOTES
RECEIVING UNIT ED HANDOFF REVIEW    ED Nurse Handoff Report was reviewed by: Neeta Clarke on January 3, 2019 at 4:19 PM

## 2019-01-03 NOTE — PHARMACY-ADMISSION MEDICATION HISTORY
Admission medication history interview status for the 1/3/2019  admission is complete. See EPIC admission navigator for prior to admission medications     Medication history source reliability:Good    Actions taken by pharmacist (provider contacted, etc):None     Additional medication history information not noted on PTA med list :None    Medication reconciliation/reorder completed by provider prior to medication history? No    Time spent in this activity: 10 min    Prior to Admission medications    Medication Sig Last Dose Taking? Auth Provider   aspirin 81 MG tablet Take 81 mg by mouth daily  1/3/2019 at Unknown time Yes Reported, Patient   atorvastatin (LIPITOR) 20 MG tablet Take 1 tablet (20 mg) by mouth At Bedtime 1/2/2019 at Unknown time Yes Shantanu Armas MD   diazepam (VALIUM) 5 MG tablet TAKE ONE TABLET BY MOUTH EVERY 8 HOURS AS NEEDED FOR ANXIETY  Yes Liberty Calhoun PA-C   isosorbide mononitrate (IMDUR) 30 MG 24 hr tablet Take 1 tablet (30 mg) by mouth daily In the morning 1/3/2019 at Unknown time Yes Bronwyn Lee APRN CNP   levothyroxine (SYNTHROID/LEVOTHROID) 75 MCG tablet TAKE ONE TABLET BY MOUTH ONCE DAILY 1/3/2019 at Unknown time Yes Shantanu Armas MD   lisinopril (PRINIVIL/ZESTRIL) 10 MG tablet Take 1 tablet (10 mg) by mouth daily 1/3/2019 at Unknown time Yes Bronwyn Lee APRN CNP   metoprolol succinate (TOPROL-XL) 25 MG 24 hr tablet Take 0.5 tablets (12.5 mg) by mouth daily 1/3/2019 at Unknown time Yes Bronwyn Lee APRN CNP   mirtazapine (REMERON) 15 MG tablet Take 1 tablet (15 mg) by mouth At Bedtime 1/2/2019 at Unknown time Yes Shantanu Armas MD   polyethylene glycol (MIRALAX/GLYCOLAX) powder Take 17 g by mouth daily as needed for constipation  Yes Unknown, Entered By History   tamsulosin (FLOMAX) 0.4 MG capsule Take 0.4 mg by mouth daily (with dinner)  1/2/2019 at Unknown time Yes Reported, Patient   amoxicillin-clavulanate (AUGMENTIN) 875-125  MG tablet Take 1 tablet by mouth 2 times daily for 7 days   Hope Faust, DO   guaiFENesin-codeine (GUAIFENESIN AC) 100-10 MG/5ML syrup Take 5 mLs by mouth every 6 hours as needed for congestion or cough   Hope Faust, DO

## 2019-01-04 ENCOUNTER — APPOINTMENT (OUTPATIENT)
Dept: CARDIOLOGY | Facility: CLINIC | Age: 84
DRG: 194 | End: 2019-01-04
Payer: COMMERCIAL

## 2019-01-04 ENCOUNTER — APPOINTMENT (OUTPATIENT)
Dept: GENERAL RADIOLOGY | Facility: CLINIC | Age: 84
DRG: 194 | End: 2019-01-04
Payer: COMMERCIAL

## 2019-01-04 PROBLEM — J10.1 INFLUENZA A: Status: ACTIVE | Noted: 2019-01-04

## 2019-01-04 LAB
ANION GAP SERPL CALCULATED.3IONS-SCNC: 9 MMOL/L (ref 3–14)
BASOPHILS # BLD AUTO: 0 10E9/L (ref 0–0.2)
BASOPHILS NFR BLD AUTO: 0.1 %
BUN SERPL-MCNC: 12 MG/DL (ref 7–30)
CALCIUM SERPL-MCNC: 7.9 MG/DL (ref 8.5–10.1)
CHLORIDE SERPL-SCNC: 101 MMOL/L (ref 94–109)
CO2 SERPL-SCNC: 22 MMOL/L (ref 20–32)
CREAT SERPL-MCNC: 0.76 MG/DL (ref 0.66–1.25)
DIFFERENTIAL METHOD BLD: ABNORMAL
EOSINOPHIL # BLD AUTO: 0 10E9/L (ref 0–0.7)
EOSINOPHIL NFR BLD AUTO: 0.5 %
ERYTHROCYTE [DISTWIDTH] IN BLOOD BY AUTOMATED COUNT: 13.3 % (ref 10–15)
FLUAV+FLUBV AG SPEC QL: NEGATIVE
FLUAV+FLUBV AG SPEC QL: POSITIVE
FLUAV+FLUBV RNA SPEC QL NAA+PROBE: NEGATIVE
FLUAV+FLUBV RNA SPEC QL NAA+PROBE: POSITIVE
GFR SERPL CREATININE-BSD FRML MDRD: 81 ML/MIN/{1.73_M2}
GLUCOSE SERPL-MCNC: 107 MG/DL (ref 70–99)
HCT VFR BLD AUTO: 38.8 % (ref 40–53)
HGB BLD-MCNC: 13.4 G/DL (ref 13.3–17.7)
IMM GRANULOCYTES # BLD: 0 10E9/L (ref 0–0.4)
IMM GRANULOCYTES NFR BLD: 0.4 %
LYMPHOCYTES # BLD AUTO: 1.7 10E9/L (ref 0.8–5.3)
LYMPHOCYTES NFR BLD AUTO: 21.6 %
MAGNESIUM SERPL-MCNC: 1.7 MG/DL (ref 1.6–2.3)
MCH RBC QN AUTO: 30.2 PG (ref 26.5–33)
MCHC RBC AUTO-ENTMCNC: 34.5 G/DL (ref 31.5–36.5)
MCV RBC AUTO: 87 FL (ref 78–100)
MONOCYTES # BLD AUTO: 0.9 10E9/L (ref 0–1.3)
MONOCYTES NFR BLD AUTO: 11.4 %
NEUTROPHILS # BLD AUTO: 5.3 10E9/L (ref 1.6–8.3)
NEUTROPHILS NFR BLD AUTO: 66 %
NRBC # BLD AUTO: 0 10*3/UL
NRBC BLD AUTO-RTO: 0 /100
OSMOLALITY SERPL: 278 MMOL/KG (ref 280–301)
OSMOLALITY UR: 536 MMOL/KG (ref 100–1200)
PLATELET # BLD AUTO: 146 10E9/L (ref 150–450)
POTASSIUM SERPL-SCNC: 4.1 MMOL/L (ref 3.4–5.3)
RBC # BLD AUTO: 4.44 10E12/L (ref 4.4–5.9)
RSV RNA SPEC NAA+PROBE: NEGATIVE
SODIUM SERPL-SCNC: 132 MMOL/L (ref 133–144)
SODIUM UR-SCNC: 146 MMOL/L
SPECIMEN SOURCE: ABNORMAL
SPECIMEN SOURCE: ABNORMAL
TROPONIN I SERPL-MCNC: 0.07 UG/L (ref 0–0.04)
WBC # BLD AUTO: 8 10E9/L (ref 4–11)

## 2019-01-04 PROCEDURE — 25000128 H RX IP 250 OP 636: Performed by: INTERNAL MEDICINE

## 2019-01-04 PROCEDURE — 94640 AIRWAY INHALATION TREATMENT: CPT

## 2019-01-04 PROCEDURE — 87804 INFLUENZA ASSAY W/OPTIC: CPT | Performed by: PHYSICIAN ASSISTANT

## 2019-01-04 PROCEDURE — 93005 ELECTROCARDIOGRAM TRACING: CPT

## 2019-01-04 PROCEDURE — 12000000 ZZH R&B MED SURG/OB

## 2019-01-04 PROCEDURE — 87631 RESP VIRUS 3-5 TARGETS: CPT | Performed by: PHYSICIAN ASSISTANT

## 2019-01-04 PROCEDURE — G0378 HOSPITAL OBSERVATION PER HR: HCPCS

## 2019-01-04 PROCEDURE — 93306 TTE W/DOPPLER COMPLETE: CPT | Mod: 26 | Performed by: INTERNAL MEDICINE

## 2019-01-04 PROCEDURE — 96361 HYDRATE IV INFUSION ADD-ON: CPT

## 2019-01-04 PROCEDURE — 25000128 H RX IP 250 OP 636: Performed by: PHYSICIAN ASSISTANT

## 2019-01-04 PROCEDURE — 93306 TTE W/DOPPLER COMPLETE: CPT

## 2019-01-04 PROCEDURE — 25000132 ZZH RX MED GY IP 250 OP 250 PS 637: Performed by: INTERNAL MEDICINE

## 2019-01-04 PROCEDURE — 25000125 ZZHC RX 250: Performed by: PHYSICIAN ASSISTANT

## 2019-01-04 PROCEDURE — 71046 X-RAY EXAM CHEST 2 VIEWS: CPT

## 2019-01-04 PROCEDURE — 80048 BASIC METABOLIC PNL TOTAL CA: CPT | Performed by: PHYSICIAN ASSISTANT

## 2019-01-04 PROCEDURE — 94640 AIRWAY INHALATION TREATMENT: CPT | Mod: 76

## 2019-01-04 PROCEDURE — 25000132 ZZH RX MED GY IP 250 OP 250 PS 637: Performed by: PHYSICIAN ASSISTANT

## 2019-01-04 PROCEDURE — 40000275 ZZH STATISTIC RCP TIME EA 10 MIN

## 2019-01-04 PROCEDURE — 84484 ASSAY OF TROPONIN QUANT: CPT | Performed by: PHYSICIAN ASSISTANT

## 2019-01-04 PROCEDURE — 36415 COLL VENOUS BLD VENIPUNCTURE: CPT | Performed by: PHYSICIAN ASSISTANT

## 2019-01-04 PROCEDURE — 83930 ASSAY OF BLOOD OSMOLALITY: CPT | Performed by: PHYSICIAN ASSISTANT

## 2019-01-04 PROCEDURE — 87040 BLOOD CULTURE FOR BACTERIA: CPT | Performed by: PHYSICIAN ASSISTANT

## 2019-01-04 PROCEDURE — 83735 ASSAY OF MAGNESIUM: CPT | Performed by: PHYSICIAN ASSISTANT

## 2019-01-04 PROCEDURE — 83935 ASSAY OF URINE OSMOLALITY: CPT | Performed by: PHYSICIAN ASSISTANT

## 2019-01-04 PROCEDURE — 85025 COMPLETE CBC W/AUTO DIFF WBC: CPT | Performed by: PHYSICIAN ASSISTANT

## 2019-01-04 PROCEDURE — 99233 SBSQ HOSP IP/OBS HIGH 50: CPT | Performed by: PHYSICIAN ASSISTANT

## 2019-01-04 PROCEDURE — 84300 ASSAY OF URINE SODIUM: CPT | Performed by: PHYSICIAN ASSISTANT

## 2019-01-04 RX ORDER — LISINOPRIL 10 MG/1
10 TABLET ORAL DAILY
Status: DISCONTINUED | OUTPATIENT
Start: 2019-01-04 | End: 2019-01-05 | Stop reason: HOSPADM

## 2019-01-04 RX ORDER — MAGNESIUM SULFATE HEPTAHYDRATE 40 MG/ML
2 INJECTION, SOLUTION INTRAVENOUS DAILY PRN
Status: DISCONTINUED | OUTPATIENT
Start: 2019-01-04 | End: 2019-01-05 | Stop reason: HOSPADM

## 2019-01-04 RX ORDER — DIAZEPAM 5 MG
5 TABLET ORAL EVERY 8 HOURS PRN
Status: DISCONTINUED | OUTPATIENT
Start: 2019-01-04 | End: 2019-01-05 | Stop reason: HOSPADM

## 2019-01-04 RX ORDER — MAGNESIUM SULFATE HEPTAHYDRATE 40 MG/ML
4 INJECTION, SOLUTION INTRAVENOUS EVERY 4 HOURS PRN
Status: DISCONTINUED | OUTPATIENT
Start: 2019-01-04 | End: 2019-01-05 | Stop reason: HOSPADM

## 2019-01-04 RX ORDER — OSELTAMIVIR PHOSPHATE 75 MG/1
75 CAPSULE ORAL 2 TIMES DAILY
Status: DISCONTINUED | OUTPATIENT
Start: 2019-01-04 | End: 2019-01-05 | Stop reason: HOSPADM

## 2019-01-04 RX ORDER — LEVALBUTEROL 1.25 MG/.5ML
1.25 SOLUTION, CONCENTRATE RESPIRATORY (INHALATION)
Status: DISCONTINUED | OUTPATIENT
Start: 2019-01-04 | End: 2019-01-05 | Stop reason: HOSPADM

## 2019-01-04 RX ADMIN — SODIUM CHLORIDE: 9 INJECTION, SOLUTION INTRAVENOUS at 07:09

## 2019-01-04 RX ADMIN — SODIUM CHLORIDE: 9 INJECTION, SOLUTION INTRAVENOUS at 23:58

## 2019-01-04 RX ADMIN — OSELTAMIVIR PHOSPHATE 75 MG: 75 CAPSULE ORAL at 12:27

## 2019-01-04 RX ADMIN — MAGNESIUM SULFATE IN WATER 2 G: 40 INJECTION, SOLUTION INTRAVENOUS at 19:49

## 2019-01-04 RX ADMIN — LEVOTHYROXINE SODIUM 75 MCG: 75 TABLET ORAL at 08:24

## 2019-01-04 RX ADMIN — ISOSORBIDE MONONITRATE 30 MG: 30 TABLET, EXTENDED RELEASE ORAL at 08:22

## 2019-01-04 RX ADMIN — GUAIFENESIN AND DEXTROMETHORPHAN 10 ML: 100; 10 SYRUP ORAL at 19:47

## 2019-01-04 RX ADMIN — Medication 12.5 MG: at 08:26

## 2019-01-04 RX ADMIN — TAMSULOSIN HYDROCHLORIDE 0.4 MG: 0.4 CAPSULE ORAL at 17:02

## 2019-01-04 RX ADMIN — ASPIRIN 81 MG: 81 TABLET, COATED ORAL at 08:23

## 2019-01-04 RX ADMIN — LEVALBUTEROL HYDROCHLORIDE 1.25 MG: 1.25 SOLUTION, CONCENTRATE RESPIRATORY (INHALATION) at 13:29

## 2019-01-04 RX ADMIN — OSELTAMIVIR PHOSPHATE 75 MG: 75 CAPSULE ORAL at 19:47

## 2019-01-04 RX ADMIN — ACETAMINOPHEN 650 MG: 325 TABLET, FILM COATED ORAL at 08:23

## 2019-01-04 RX ADMIN — LISINOPRIL 10 MG: 10 TABLET ORAL at 10:15

## 2019-01-04 RX ADMIN — ATORVASTATIN CALCIUM 20 MG: 20 TABLET, FILM COATED ORAL at 21:12

## 2019-01-04 RX ADMIN — LEVALBUTEROL HYDROCHLORIDE 1.25 MG: 1.25 SOLUTION, CONCENTRATE RESPIRATORY (INHALATION) at 19:16

## 2019-01-04 RX ADMIN — MIRTAZAPINE 15 MG: 15 TABLET, FILM COATED ORAL at 21:12

## 2019-01-04 ASSESSMENT — ACTIVITIES OF DAILY LIVING (ADL)
ADLS_ACUITY_SCORE: 17
ADLS_ACUITY_SCORE: 17

## 2019-01-04 NOTE — PLAN OF CARE
PRIMARY DIAGNOSIS: SYNCOPE  OUTPATIENT/OBSERVATION GOALS TO BE MET BEFORE DISCHARGE:  1. Orthostatic performed: No           2. Diagnostic testing complete & at baseline neurologic testing: No    3. Cleared by consultants (if involved): No    4. Interpretation of cardiac rhythm per telemetry tech: SR-BBB  5. Tolerating adequate PO diet and medications: Yes    6. Return to near baseline physical activity or neurologic status: No    Discharge Planner Nurse   Safe discharge environment identified: No  Barriers to discharge: No       Entered by: Hiram Jimenez 01/04/2019 1100AM     Please review provider order for any additional goals.   Nurse to notify provider when observation goals have been met and patient is ready for discharge.

## 2019-01-04 NOTE — PLAN OF CARE
"PRIMARY DIAGNOSIS: SYNCOPE/TIA  OUTPATIENT/OBSERVATION GOALS TO BE MET BEFORE DISCHARGE:  1. Orthostatic performed: Yes:          Lying Orthostatic BP: 116/58         Sitting Orthostatic BP: 118/58         Standing Orthostatic BP: 128/58     2. Diagnostic testing complete & at baseline neurologic testing: No    3. Cleared by consultants (if involved): No    4. Interpretation of cardiac rhythm per telemetry tech: SR w/ BBB and 1st deg AV block    5. Tolerating adequate PO diet and medications: Yes    6. Return to near baseline physical activity or neurologic status: No    Discharge Planner Nurse   Safe discharge environment identified: No  Barriers to discharge: Yes       Entered by: Neeta Clarke 01/03/2019 6:49 PM     Please review provider order for any additional goals.   Nurse to notify provider when observation goals have been met and patient is ready for discharge.    Pt A&Ox4. VSS on RA. SBA. IVF infusing. Tele SR w/ BBB and 1st deg AV block. Troponin 0.050, 0.050, 3rd troponin scheduled. Urine sample needed, patient aware. Regular diet. Denies n/t, pain, nausea, SOB, dizziness, lightheadedness. Per pt \"feels weird, feels better when touching bed\". Plan for echo tomorrow. Continue to monitor.  "

## 2019-01-04 NOTE — PLAN OF CARE
PRIMARY DIAGNOSIS: SYNCOPE/TIA  OUTPATIENT/OBSERVATION GOALS TO BE MET BEFORE DISCHARGE:  1. Orthostatic performed: Yes:          Lying Orthostatic BP: 116/58         Sitting Orthostatic BP: 118/58         Standing Orthostatic BP: 128/58     2. Diagnostic testing complete & at baseline neurologic testing: No    3. Cleared by consultants (if involved): No    4. Interpretation of cardiac rhythm per telemetry tech: BBB    5. Tolerating adequate PO diet and medications: Yes    6. Return to near baseline physical activity or neurologic status: No    Discharge Planner Nurse   Safe discharge environment identified: No  Barriers to discharge: Yes       Entered by: Cassius Hennessy 01/04/2019 6:31 AM     Please review provider order for any additional goals.   Nurse to notify provider when observation goals have been met and patient is ready for discharge.

## 2019-01-04 NOTE — PLAN OF CARE
Observation goals PRIOR TO DISCHARGE     Comments: List all  goals to be met before discharge: Yes  - Diagnostic tests and consults completed and resulted  Not met  - No further episodes of syncope and any new arrhythmia addressed with controlled heart rates  Met  - Vital signs normal or at patient baseline and orthostatic vitals are normal and patient not lightheaded with standing   Met  - Tolerating oral intake to maintain hydration  Met  - Safe disposition plan has been identified  Not met  - Nurse to notify provider when observation goals have been met and patient is ready for discharge.  Yes

## 2019-01-04 NOTE — PLAN OF CARE
Alert but forgetful.  AVSS.  Robert pain.  Tele SR with BBB with occasionally first degree AVB.  Fall risk.  IVF.  Troponins 0.050, 0.048.  Urine sample sent for NA Random urine.

## 2019-01-04 NOTE — PROGRESS NOTES
"[facility]    Medicine Progress Note - Hospitalist Service       Date of Admission:  1/3/2019  Assessment & Plan   89 year old male with HTN, CAD with stent x 2 and hyperlipidemia who presents with syncope.    Febrile illness  Probable viral URI with associated bronchitis: Seen by PCP 1/3 with 1 week of URI sx with reported \"low grade temp\" and cough. Per PCP notes pt was alert and fully oriented, but became more unresponsive, thus ambulance called. Blood sugar at that time 122, pulse 96, respirations 16. Tmax 102.3 degrees fahrenheit overnight. CMP unremarkable. UA unremarkable. Course rhonchi with expiratory wheezes on exam  -- CXR negative for acute pathology   -- Rapid influenza, influenza and RSV PCR ordered and pending   -- Blood cultures X2  -- Monitor fever curve   -- Xopenex q4 hrs while awake  -- RCAT consulted, encourage pulmonary toilet with IS and acapella  -- Send to UR to review for inpatient status     ADDENDUM: Rapid influenza positive for Influenza A. Start Tamiflu given age and current hospitalization. Symptom management. IVF.    Syncope: Reportedly out for 10 seconds while sitting at PCP office. No prior syncope. Felt lightheaded prior to passing out. Wife reported that pt looked flushed prior to passing out. Suspect related to viral illness above, poor oral intake, and perhaps a fib.   -- IVF at 75 mL/hr  -- Telemetry; NSR now   -- Echocardiogram with EF 70-75%, no RWMA, grade I or early diastolic dysfunciton, no significant valvular disease  -- Orthostatic blood pressure ordered   -- Eval for fever as above     Question of paroxysmal atrial fibrillation on EKG: Initial EKG read as a fib, however I appreciate p waves scattered throughout, ? Sinus arrhythmia. If this is true a fib, it would be a new diagnosis for the pt. This is in the setting of viral illness above. Tele overnight with NSR and first degree AV block. [id number] of 4. TSH 3.58. Potassium 4.1. Magnesium 1.7.  -- " Monitor on telemetry, pt currently in NSR, EKG repeated this AM, now with NSR  -- Continue PTA Toprol XL 12.5 mg po every day   -- Electrolyte replacement   -- Echocardiogram ordered, preserved EF, no significant valvular disease, no WMA  -- Given uncertainty if truly a fib, will not start anticoagulation, instead, recommend 30 day event monitor at discharge     Elevation of cardiac enzymes: In the setting of above. Trop 0.05>0.05>0.048>0.072.     Thrombocytopenia: Platelets 149>146.     Hyponatremia: Sodium 129>132.   -- Urine osm, serum osm, urine sodium     Hypothyroidism: Continue PTA Synthroid 75 mcg po every day     CAD s/p ALFRED to LAD X2 (12/2016)  Hypertension, Hyperlipidemia:   -- Continue PTA ASA 81 mg po every day, atorvastatin 20 mg po every day, Imdur 30 mg po every day, Toprol XL 12.5 mg po qd    Anxiety: Continue PTA Remeron 15 mg po at bedtime, Valium 5 mg po q8 hrs PRN for anxiety     BPH: Continue PTA Flomax 0.4 mg po every day       DVT Prophylaxis: Pneumatic Compression Devices  Code Status: DNR / DNI    Disposition: Expected discharge tomorrow if stable.      Diet: Regular Diet Adult    DVT Prophylaxis: Ambulate every shift  Macdonald Catheter: not present  Code Status: DNR/DNI      Disposition Plan   Expected discharge: 2 - 3 days, recommended to unknown at this time, pt will need PT evaluation  once safe disposition plan/ TCU bed available and SIRS/Sepsis treated.  Entered: Tami Gomes PA-C 01/04/2019, 8:23 AM       The patient's care was discussed with the Attending Physician, Dr. Hudson.    Tami Gomes PA-C  Hospitalist Service  [facility]    ______________________________________________________________________    Interval History   Tmax 102.3 degrees fahrenheit this AM. Productive cough, course rhonchi with expiratory wheezes. Generalized weakness. No chest pain, palpitations. Tele with NSR.     Data reviewed today: EKG now with NSR.      Physical Exam   Vital Signs: Temp: 102.3  F (39.1  C) Temp src: Oral BP: 166/79 Pulse: 66 Heart Rate: 96 Resp: 18 SpO2: 90 % O2 Device: None (Room air)    Weight: 190 lbs 0 oz    CONSTITUTIONAL: Pt laying in bed, dressed in hospital garb. Appears comfortable, but coughs throughout interview. Cooperative with interview. Accompanied by daughter at bedside.   HEENT: Normocephalic, atraumatic. Negative for conjunctival redness or scleral icterus.  Oral mucosa pink and moist; negative for ulcerations, erythema, or exudates.  Dentition in good repair.   CARDIOVASCULAR: RRR, no murmurs, rubs, or extra heart sounds appreciated. Pulses +2/4 and regular in upper and lower extremities, bilaterally.   RESPIRATORY: No increased work of breathing. Course rhonchi noted, expiratory wheezes throughout all fields.   GASTROINTESTINAL:  Abdomen soft, non-distended. BS auscultated in all four quadrants. Negative for tenderness to palpation.  No masses or organomegaly noted.  MUSCULOSKELETAL: No gross deformities noted. Normal muscle tone.   HEMATOLOGIC/LYMPHATIC/IMMUNOLOGIC:  Negative for lower extremity edema, bilaterally.  NEUROLOGIC: Alert and oriented to person, place, and time.  strength intact.   SKIN: Warm, dry, intact. No jaundice noted. Negative for suspicious lesions, rashes, bruising, open sores or abrasions.     Data   Recent Labs   Lab 01/04/19  0656 01/03/19  1948 01/03/19  1525 01/03/19  1255   WBC 8.0  --   --  6.7   HGB 13.4  --   --  13.3   MCV 87  --   --  88   *  --   --  149*   *  --   --  129*   POTASSIUM 4.1  --   --  4.2   CHLORIDE 101  --   --  96   CO2 22  --   --  27   BUN 12  --   --  14   CR 0.76  --   --  1.06   ANIONGAP 9  --   --  6   OMAR 7.9*  --   --  8.1*   *  --   --  102*   ALBUMIN  --   --   --  3.4   PROTTOTAL  --   --   --  7.2   BILITOTAL  --   --   --  0.6   ALKPHOS  --   --   --  73   ALT  --   --   --  28   AST  --   --   --  32   TROPI 0.072* 0.048* 0.050* 0.050*      Recent Results (from the past 24 hour(s))   XR Chest 2 Views    Narrative    CHEST TWO VIEWS   1/4/2019 8:59 AM     HISTORY: Fever, evaluate for pneumonia.    COMPARISON: None.      Impression    IMPRESSION: PA and lateral views of the chest. Lungs are clear. Heart  is normal in size. No effusions are evident. No pneumothorax.    JEREL VAUGHN MD     Medications     sodium chloride 75 mL/hr at 01/04/19 0709       aspirin  81 mg Oral Daily     atorvastatin  20 mg Oral At Bedtime     isosorbide mononitrate  30 mg Oral Daily     levalbuterol  1.25 mg Nebulization Q4H While awake     levothyroxine  75 mcg Oral Daily     lisinopril  10 mg Oral Daily     metoprolol succinate ER  12.5 mg Oral Daily     mirtazapine  15 mg Oral At Bedtime     sodium chloride (PF)  3 mL Intracatheter Q8H     tamsulosin  0.4 mg Oral Daily with supper

## 2019-01-04 NOTE — PROGRESS NOTES
Note rapid influenza A positive. Sx started 12/29, however progressively worsening and ongoing. Given age and current hospitalization, choose to treat with Tamiflu 75 mg po BID X5 days. Orders place. Droplet iso. Updated pt and family.

## 2019-01-04 NOTE — PROVIDER NOTIFICATION
MD Notification    Notified Person: MD    Notified Person Name: ELVIS Gomes SAUL    Notification Date/Time: 1/4/2019/ 08:20 AM    Notification Interaction:    Purpose of Notification: Temp 102.3, chills and anterior left sided expiratory wheezes.    Orders Received: Blood cultures, CXR, and droplet precautions.    Comments:

## 2019-01-04 NOTE — PROVIDER NOTIFICATION
MD Notification    Notified Person: MD    Notified Person Name: Eliseo HOLLAND SAUL    Notification Date/Time: 1/4/2019/ 0350PM    Notification Interaction: In person    Purpose of Notification:   Influenza A PCR Positive Abnormal   A        Orders Received:    Comments:

## 2019-01-04 NOTE — PROGRESS NOTES
A&O, forgetful, [place], SBA, VSS on RA, tele BBB, regular diet, NS 75/hr, echo in am, denies pain. Frequent dry cough, DNR/DNI, reports unsteadiness when standing/ambulating and denies these symptoms when laying down or sitting, baseline numbness in BLE, will continue to monitor

## 2019-01-04 NOTE — PLAN OF CARE
PRIMARY DIAGNOSIS: SYNCOPE/TIA  OUTPATIENT/OBSERVATION GOALS TO BE MET BEFORE DISCHARGE:  1. Orthostatic performed: Yes:          Lying Orthostatic BP: 116/58         Sitting Orthostatic BP: 118/58         Standing Orthostatic BP: 128/58     2. Diagnostic testing complete & at baseline neurologic testing: No    3. Cleared by consultants (if involved): No    4. Interpretation of cardiac rhythm per telemetry tech: BBB    5. Tolerating adequate PO diet and medications: Yes    6. Return to near baseline physical activity or neurologic status: No    Discharge Planner Nurse   Safe discharge environment identified: No  Barriers to discharge: Yes       Entered by: Cassius Hennessy 01/04/2019 3:30 AM     Please review provider order for any additional goals.   Nurse to notify provider when observation goals have been met and patient is ready for discharge.

## 2019-01-05 ENCOUNTER — APPOINTMENT (OUTPATIENT)
Dept: CARDIOLOGY | Facility: CLINIC | Age: 84
DRG: 194 | End: 2019-01-05
Payer: COMMERCIAL

## 2019-01-05 ENCOUNTER — APPOINTMENT (OUTPATIENT)
Dept: PHYSICAL THERAPY | Facility: CLINIC | Age: 84
DRG: 194 | End: 2019-01-05
Payer: COMMERCIAL

## 2019-01-05 VITALS
HEIGHT: 70 IN | BODY MASS INDEX: 28.22 KG/M2 | TEMPERATURE: 98.9 F | OXYGEN SATURATION: 94 % | SYSTOLIC BLOOD PRESSURE: 128 MMHG | WEIGHT: 197.09 LBS | DIASTOLIC BLOOD PRESSURE: 70 MMHG | HEART RATE: 66 BPM | RESPIRATION RATE: 18 BRPM

## 2019-01-05 LAB
ANION GAP SERPL CALCULATED.3IONS-SCNC: 7 MMOL/L (ref 3–14)
BASOPHILS # BLD AUTO: 0 10E9/L (ref 0–0.2)
BASOPHILS NFR BLD AUTO: 0.4 %
BUN SERPL-MCNC: 10 MG/DL (ref 7–30)
CALCIUM SERPL-MCNC: 7.6 MG/DL (ref 8.5–10.1)
CHLORIDE SERPL-SCNC: 101 MMOL/L (ref 94–109)
CO2 SERPL-SCNC: 22 MMOL/L (ref 20–32)
CREAT SERPL-MCNC: 0.72 MG/DL (ref 0.66–1.25)
DIFFERENTIAL METHOD BLD: ABNORMAL
EOSINOPHIL # BLD AUTO: 0.1 10E9/L (ref 0–0.7)
EOSINOPHIL NFR BLD AUTO: 1 %
ERYTHROCYTE [DISTWIDTH] IN BLOOD BY AUTOMATED COUNT: 13.4 % (ref 10–15)
GFR SERPL CREATININE-BSD FRML MDRD: 82 ML/MIN/{1.73_M2}
GLUCOSE SERPL-MCNC: 146 MG/DL (ref 70–99)
HCT VFR BLD AUTO: 34.4 % (ref 40–53)
HGB BLD-MCNC: 12 G/DL (ref 13.3–17.7)
IMM GRANULOCYTES # BLD: 0 10E9/L (ref 0–0.4)
IMM GRANULOCYTES NFR BLD: 0.3 %
LYMPHOCYTES # BLD AUTO: 1.9 10E9/L (ref 0.8–5.3)
LYMPHOCYTES NFR BLD AUTO: 27.4 %
MAGNESIUM SERPL-MCNC: 2.1 MG/DL (ref 1.6–2.3)
MCH RBC QN AUTO: 30.5 PG (ref 26.5–33)
MCHC RBC AUTO-ENTMCNC: 34.9 G/DL (ref 31.5–36.5)
MCV RBC AUTO: 87 FL (ref 78–100)
MONOCYTES # BLD AUTO: 0.8 10E9/L (ref 0–1.3)
MONOCYTES NFR BLD AUTO: 10.6 %
NEUTROPHILS # BLD AUTO: 4.3 10E9/L (ref 1.6–8.3)
NEUTROPHILS NFR BLD AUTO: 60.3 %
NRBC # BLD AUTO: 0 10*3/UL
NRBC BLD AUTO-RTO: 0 /100
PLATELET # BLD AUTO: 133 10E9/L (ref 150–450)
POTASSIUM SERPL-SCNC: 3.7 MMOL/L (ref 3.4–5.3)
RBC # BLD AUTO: 3.94 10E12/L (ref 4.4–5.9)
SODIUM SERPL-SCNC: 130 MMOL/L (ref 133–144)
WBC # BLD AUTO: 7.1 10E9/L (ref 4–11)

## 2019-01-05 PROCEDURE — 25000132 ZZH RX MED GY IP 250 OP 250 PS 637: Performed by: PHYSICIAN ASSISTANT

## 2019-01-05 PROCEDURE — 40000275 ZZH STATISTIC RCP TIME EA 10 MIN

## 2019-01-05 PROCEDURE — 83735 ASSAY OF MAGNESIUM: CPT | Performed by: PHYSICIAN ASSISTANT

## 2019-01-05 PROCEDURE — 93270 REMOTE 30 DAY ECG REV/REPORT: CPT

## 2019-01-05 PROCEDURE — 80048 BASIC METABOLIC PNL TOTAL CA: CPT | Performed by: PHYSICIAN ASSISTANT

## 2019-01-05 PROCEDURE — 36415 COLL VENOUS BLD VENIPUNCTURE: CPT | Performed by: PHYSICIAN ASSISTANT

## 2019-01-05 PROCEDURE — 94640 AIRWAY INHALATION TREATMENT: CPT | Mod: 76

## 2019-01-05 PROCEDURE — 25000132 ZZH RX MED GY IP 250 OP 250 PS 637: Performed by: INTERNAL MEDICINE

## 2019-01-05 PROCEDURE — 94640 AIRWAY INHALATION TREATMENT: CPT

## 2019-01-05 PROCEDURE — 85025 COMPLETE CBC W/AUTO DIFF WBC: CPT | Performed by: PHYSICIAN ASSISTANT

## 2019-01-05 PROCEDURE — 97161 PT EVAL LOW COMPLEX 20 MIN: CPT | Mod: GP

## 2019-01-05 PROCEDURE — 40000193 ZZH STATISTIC PT WARD VISIT

## 2019-01-05 PROCEDURE — 99238 HOSP IP/OBS DSCHRG MGMT 30/<: CPT | Performed by: INTERNAL MEDICINE

## 2019-01-05 PROCEDURE — 93272 ECG/REVIEW INTERPRET ONLY: CPT | Performed by: INTERNAL MEDICINE

## 2019-01-05 PROCEDURE — 25000125 ZZHC RX 250: Performed by: PHYSICIAN ASSISTANT

## 2019-01-05 RX ORDER — ALBUTEROL SULFATE 90 UG/1
2 AEROSOL, METERED RESPIRATORY (INHALATION) EVERY 4 HOURS PRN
Qty: 1 INHALER | Refills: 3 | Status: SHIPPED | OUTPATIENT
Start: 2019-01-05 | End: 2019-01-05

## 2019-01-05 RX ORDER — ALBUTEROL SULFATE 90 UG/1
2 AEROSOL, METERED RESPIRATORY (INHALATION) EVERY 4 HOURS PRN
Qty: 1 INHALER | Refills: 3 | Status: SHIPPED | OUTPATIENT
Start: 2019-01-05 | End: 2020-01-16

## 2019-01-05 RX ORDER — OSELTAMIVIR PHOSPHATE 75 MG/1
75 CAPSULE ORAL 2 TIMES DAILY
Qty: 60 CAPSULE | Refills: 0 | Status: SHIPPED | OUTPATIENT
Start: 2019-01-05 | End: 2019-03-06

## 2019-01-05 RX ORDER — OSELTAMIVIR PHOSPHATE 75 MG/1
75 CAPSULE ORAL 2 TIMES DAILY
Qty: 8 CAPSULE | Refills: 0 | Status: SHIPPED | OUTPATIENT
Start: 2019-01-05 | End: 2019-01-05

## 2019-01-05 RX ADMIN — TAMSULOSIN HYDROCHLORIDE 0.4 MG: 0.4 CAPSULE ORAL at 16:34

## 2019-01-05 RX ADMIN — OSELTAMIVIR PHOSPHATE 75 MG: 75 CAPSULE ORAL at 08:18

## 2019-01-05 RX ADMIN — Medication 12.5 MG: at 08:18

## 2019-01-05 RX ADMIN — LISINOPRIL 10 MG: 10 TABLET ORAL at 08:18

## 2019-01-05 RX ADMIN — ISOSORBIDE MONONITRATE 30 MG: 30 TABLET, EXTENDED RELEASE ORAL at 08:18

## 2019-01-05 RX ADMIN — LEVALBUTEROL HYDROCHLORIDE 1.25 MG: 1.25 SOLUTION, CONCENTRATE RESPIRATORY (INHALATION) at 07:27

## 2019-01-05 RX ADMIN — LEVOTHYROXINE SODIUM 75 MCG: 75 TABLET ORAL at 08:18

## 2019-01-05 RX ADMIN — LEVALBUTEROL HYDROCHLORIDE 1.25 MG: 1.25 SOLUTION, CONCENTRATE RESPIRATORY (INHALATION) at 12:05

## 2019-01-05 RX ADMIN — ASPIRIN 81 MG: 81 TABLET, COATED ORAL at 08:18

## 2019-01-05 ASSESSMENT — ACTIVITIES OF DAILY LIVING (ADL)
ADLS_ACUITY_SCORE: 17
ADLS_ACUITY_SCORE: 16
DRESS: 0-->INDEPENDENT
COGNITION: 0 - NO COGNITION ISSUES REPORTED
ADLS_ACUITY_SCORE: 17
AMBULATION: 0-->INDEPENDENT
TRANSFERRING: 0-->INDEPENDENT
RETIRED_EATING: 0-->INDEPENDENT
BATHING: 0-->INDEPENDENT
ADLS_ACUITY_SCORE: 17
ADLS_ACUITY_SCORE: 17
RETIRED_COMMUNICATION: 0-->UNDERSTANDS/COMMUNICATES WITHOUT DIFFICULTY
FALL_HISTORY_WITHIN_LAST_SIX_MONTHS: NO
SWALLOWING: 0-->SWALLOWS FOODS/LIQUIDS WITHOUT DIFFICULTY
TOILETING: 0-->INDEPENDENT

## 2019-01-05 ASSESSMENT — MIFFLIN-ST. JEOR: SCORE: 1565.25

## 2019-01-05 NOTE — DISCHARGE SUMMARY
[facility]  Discharge Summary        Korey Pearson MRN# [id number]   YOB: 1929 Age: 89 year old     Date of Admission: 1/3/2019  Date of Discharge: 1/5/2019  Admitting Physician: Jefry Hudson MD  Discharge Physician: Alexandr Morgan MD     Primary Provider: Liberty Calhoun  Primary Care Physician Phone Number: [phone]         Discharge Diagnoses:   1. Influenza A respiratory infection.  2. Suspected paroxysmal atrial fibrillation on EKG, suspect related to acute viral illness above.  3. Syncope, suspect related to above.  4. Trop elevation, suspect demand ischemia.  5. Thrombocytopenia, possibly part of viral syndrome.  6. Hyponatremia, suspect SIADH component.        Other Chronic Medical Problems:      1. CAD s/p ALFRED to LAD X2 (12/2016).  2. Hypertension, Hyperlipidemia.  3. Hypothyroidism.  4. Anxiety.  5. BPH.       Allergies:         Allergies   Allergen Reactions     Ropinirole Anxiety     funny thoughts           Discharge Medications:        Current Discharge Medication List      START taking these medications    Details   albuterol (PROAIR HFA) 108 (90 Base) MCG/ACT inhaler Inhale 2 puffs into the lungs every 4 hours as needed for shortness of breath / dyspnea, wheezing or other (cough)  Qty: 1 Inhaler, Refills: 3    Associated Diagnoses: Influenza A      oseltamivir (TAMIFLU) 75 MG capsule Take 1 capsule (75 mg) by mouth 2 times daily  Qty: 60 capsule, Refills: 0    Associated Diagnoses: Influenza A         CONTINUE these medications which have NOT CHANGED    Details   aspirin 81 MG tablet Take 81 mg by mouth daily       atorvastatin (LIPITOR) 20 MG tablet Take 1 tablet (20 mg) by mouth At Bedtime  Qty: 90 tablet, Refills: 0    Associated Diagnoses: Unstable angina (H); Hyperlipidemia LDL goal <130      diazepam (VALIUM) 5 MG tablet TAKE ONE TABLET BY MOUTH EVERY 8 HOURS AS NEEDED FOR ANXIETY  Qty: 90 tablet, Refills: 3     Associated Diagnoses: Generalized anxiety disorder      isosorbide mononitrate (IMDUR) 30 MG 24 hr tablet Take 1 tablet (30 mg) by mouth daily In the morning  Qty: 90 tablet, Refills: 3    Associated Diagnoses: Coronary artery disease of native artery of native heart with stable angina pectoris (H)      levothyroxine (SYNTHROID/LEVOTHROID) 75 MCG tablet TAKE ONE TABLET BY MOUTH ONCE DAILY  Qty: 90 tablet, Refills: 2    Associated Diagnoses: Acquired hypothyroidism      lisinopril (PRINIVIL/ZESTRIL) 10 MG tablet Take 1 tablet (10 mg) by mouth daily  Qty: 90 tablet, Refills: 3    Associated Diagnoses: Benign essential hypertension      metoprolol succinate (TOPROL-XL) 25 MG 24 hr tablet Take 0.5 tablets (12.5 mg) by mouth daily  Qty: 90 tablet, Refills: 3    Associated Diagnoses: Unstable angina (H)      mirtazapine (REMERON) 15 MG tablet Take 1 tablet (15 mg) by mouth At Bedtime  Qty: 30 tablet, Refills: 1    Associated Diagnoses: Persistent insomnia      polyethylene glycol (MIRALAX/GLYCOLAX) powder Take 17 g by mouth daily as needed for constipation      tamsulosin (FLOMAX) 0.4 MG capsule Take 0.4 mg by mouth daily (with dinner)   Refills: 3      guaiFENesin-codeine (GUAIFENESIN AC) 100-10 MG/5ML syrup Take 5 mLs by mouth every 6 hours as needed for congestion or cough  Qty: 180 mL, Refills: 0    Associated Diagnoses: Bronchitis         STOP taking these medications       amoxicillin-clavulanate (AUGMENTIN) 875-125 MG tablet Comments:   Reason for Stopping:                   Discharge Instructions and Follow-Up:      Follow-up Appointments     Follow-up and recommended labs and tests      Follow up with primary care provider, Liberty Calhoun, within   7 days for hospital follow- up.  The following labs/tests are recommended:   BMP, CBC.                   Consultations This Hospital Stay:      N/A.        Admission History:      Please see the H&P by Jefry Hudson MD on 1/3/2019 for complete  "details. Briefly, 89 year old male with HTN, CAD with stent x 2 and hyperlipidemia who presents with syncope.        Problem Oriented Hospital Course:      Influenza A respiratory infection.  Seen by PCP 1/3 with 1 week of URI sx with reported \"low grade temp\" and cough. Per PCP notes, pt was alert and fully oriented, but became more unresponsive, thus ambulance called; blood sugar at that time 122, pulse 96, respirations 16. Course rhonchi with expiratory wheezes on exam. CMP unremarkable. UA unremarkable. BC's 1/4 NGTD. CXR negative for acute pathology. Tmax 102.3 overnight after admission. Rapid influenza 1/4 positive for Influenza and started on Tamiflu.  - Continue Tamiflu (started 1/4).  - Continue Xopenex q4h and PRN.      Syncope, suspect related to above.  Reportedly out for 10 seconds while sitting at PCP office. No prior syncope. Felt lightheaded prior to passing out. Wife reported that pt looked flushed prior to passing out.   Echocardiogram 1/4 showed EF 70-75%, no RWMA, grade I or early diastolic dysfunciton, no significant valvular disease. Overall, suspect related to viral illness above, poor oral intake, and perhaps paroxysmal afib. Pt given IVF's this stay.  - Discharge on 30d event monitor.    Question of paroxysmal atrial fibrillation on EKG, suspect related to acute viral illness.  Initial EKG 1/3 with signs of afib with controlled ventricular response, though did appear to have some intermittent p waves. ECG 1/4 showed sinus rhythm with 1st degree AVB. [id number] of 4. Echo as above 1/4. Not started on anticoagulation given that this was felt to be due to acute viral illness.  - Continue PTA metoprolol.  - Continue ASA.  - Discharge on 30d event monitor as above.     Trop elevation, suspect demand ischemia.  Trop up to 0.072 as above. ECG's did not show acute ischemic changes. Echo as above.        Recent Labs   Lab 01/04/19  0656 01/03/19  1948 01/03/19  1525   TROPI 0.072* 0.048* 0.050*   - " Treat above issues.  - Continue management of known CAD as below.     Thrombocytopenia, possibly part of viral syndrome.        Recent Labs   Lab 01/05/19  1012 01/04/19  0656 01/03/19  1255   * 146* 149*   - Monitor CBC outpatient.     Hyponatremia, suspect SIADH component.  Labs 1/4: Serum osmol 278, Urine sodium 146, urine osmol 536.        Recent Labs   Lab 01/05/19  1012 01/04/19  0656 01/03/19  1255   * 132* 129*   - Place on 2L fluid restriction at discharge.  - Monitor BMP outpatient.      CAD s/p ALFRED to LAD X2 (12/2016).  Hypertension, Hyperlipidemia.  - Continue PTA ASA 81 mg po every day, atorvastatin 20 mg po every day, Imdur 30 mg po every day, Toprol XL 12.5 mg po every day.     Hypothyroidism.  - Continue PTA Synthroid 75 mcg po every day.      Anxiety.  - Continue PTA Remeron 15 mg po at bedtime, PRN Valium 5 mg po q8 hrs.     BPH.  - Continue PTA Flomax 0.4 mg po every day              Code Status:      DNR / DNI        Pending Results:        Unresulted Labs Ordered in the Past 30 Days of this Admission     Date and Time Order Name Status Description    1/4/2019 0820 Blood culture Preliminary     1/4/2019 0820 Blood culture Preliminary                 Discharge Disposition:      Discharged to home.        Discharge Time:      Less than 30 minutes.        Key Imaging Studies, Lab Findings and Procedures/Surgeries:        Results for orders placed or performed during the hospital encounter of 01/03/19   XR Chest 2 Views    Narrative    CHEST TWO VIEWS   1/4/2019 8:59 AM     HISTORY: Fever, evaluate for pneumonia.    COMPARISON: None.      Impression    IMPRESSION: PA and lateral views of the chest. Lungs are clear. Heart  is normal in size. No effusions are evident. No pneumothorax.    JEREL VAUGHN MD     Echocardiogram 1/4/2019:  Interpretation Summary     1. Hyperdynamic left ventricular systolic function. Estimated LVEF 70-75%.  2. No regional wall motion abnormalities.  3. Grossly  normal right ventricular size and systolic function.  4. No hemodynamically significant valve disease.  5. Mild aortic root dilatation of 4.0 cm (was 3.9 cm on previous study dated  8/25/2017).

## 2019-01-05 NOTE — PROGRESS NOTES
01/05/19 1400   Quick Adds   Type of Visit Initial PT Evaluation   Living Environment   Lives With spouse;child(wing), adult   Living Arrangements house   Home Accessibility no concerns   Self-Care   Usual Activity Tolerance excellent   Current Activity Tolerance good   Equipment Currently Used at Home cane, straight   Functional Level Prior   Ambulation 1-->assistive equipment   Transferring 0-->independent   Fall history within last six months no   Which of the above functional risks had a recent onset or change? ambulation   Prior Functional Level Comment Pt reports independence with mobility prior to admit, intermittent use of straight cane for community ambulation.   General Information   Onset of Illness/Injury or Date of Surgery - Date 01/03/19   Referring Physician Dr. Goldsmith   Patient/Family Goals Statement To go home   Pertinent History of Current Problem (include personal factors and/or comorbidities that impact the POC) Pt is an 89 year old male admitted with syncope found to test positive for influenza.   Cognitive Status Examination   Orientation orientation to person, place and time   Level of Consciousness alert   Follows Commands and Answers Questions 100% of the time   Pain Assessment   Patient Currently in Pain No   Range of Motion (ROM)   ROM Quick Adds No deficits were identified   Strength   Manual Muscle Testing Quick Adds No deficits were identified   Bed Mobility   Bed Mobility Comments Supine to/from sit independent   Transfer Skills   Transfer Comments Sit to/from stand independent   Gait   Gait Comments 200' FWW modified independent   Balance   Balance Comments Good in sitting and standing   Clinical Impression   Criteria for Skilled Therapeutic Intervention evaluation only   Clinical Presentation Stable/Uncomplicated   Clinical Presentation Rationale VSS, pain controlled   Clinical Decision Making (Complexity) Low complexity   Anticipated Equipment Needs at Discharge walker  "  Anticipated Discharge Disposition Home with Assist   Risk & Benefits of therapy have been explained Yes   Patient, Family & other staff in agreement with plan of care Yes   [facility]-PAC TM \"6 Clicks\"   2016, Trustees of [facility], under license to [employer].  All rights reserved.   6 Clicks Short Forms Basic Mobility Inpatient Short Form   [facility] AM-PAC  \"6 Clicks\" V.2 Basic Mobility Inpatient Short Form   1. Turning from your back to your side while in a flat bed without using bedrails? 4 - None   2. Moving from lying on your back to sitting on the side of a flat bed without using bedrails? 4 - None   3. Moving to and from a bed to a chair (including a wheelchair)? 4 - None   4. Standing up from a chair using your arms (e.g., wheelchair, or bedside chair)? 4 - None   5. To walk in hospital room? 4 - None   6. Climbing 3-5 steps with a railing? 3 - A Little   Basic Mobility Raw Score (Score out of 24.Lower scores equate to lower levels of function) 23   Total Evaluation Time   Total Evaluation Time (Minutes) 25     "

## 2019-01-05 NOTE — PLAN OF CARE
Physical Therapy: Order received, chart reviewed and evaluation completed. Pt is an 89 year old male admitted with syncope, tested positive for influenza A. Pt reports that at baseline, he lives with his wife and daughter in a home with all needs met on first floor. Pt reports intermittent use of straight cane for community ambulation.    Discharge Planner PT   Patient plan for discharge:  Home  Current status: Pt independent with bed mobility, good sitting balance at EOB. Pt performs sit to/from stand independently. Pt ambulates 200' with FWW modified independent, no overt LOB noted. Will discharge patient from PT and complete orders as he has no acute care skilled PT needs at this time.  Barriers to return to prior living situation: None anticipated  Recommendations for discharge: Home with family, FWW for community ambulation (pt reports he has access to walker at home)  Rationale for recommendations: Pt independent with bed mobility, transfers and ambulation. Safe to discharge home from mobility stand point.       Entered by: Anali Cyr 01/05/2019 3:09 PM

## 2019-01-05 NOTE — PROGRESS NOTES
A/Ox4. VSS. RA. SBA. Tele: NSR with BBB with 1st degree AVB. Regular diet. IVF @75. Frequent cough; nonproductive. LS wheezes. Denies SOB, chest pain, N/V. Droplet precautions maintained. Discharge today if stable.

## 2019-01-05 NOTE — PROGRESS NOTES
Care Plan Summary Note: Pt A&Ox4 , VSS. Temp 98.6. Denies pain at this time. Infrequent cough- med ordered and will like a dose before bed. Denies nausea, headache, SOB, dyspnea. Regular diet, IVF infusing. Using IS, Acapella.  SBA.  Pt transferred to station 66 at this time with belonging, chart, and cabinet meds at this time.

## 2019-01-05 NOTE — PROGRESS NOTES
.Discharge    Patient discharged to home via family. Brought down by  with staff member.   Care plan note completed     Listed belongings gathered and returned to patient. Yes  Care Plan and Patient education resolved: Yes  Prescriptions if needed, hard copies sent with patient  Yes  Home and hospital acquired medications returned to patient: NA  Medication Bin checked and emptied on discharge Yes  Follow up appointment made for patient: Yes    Discharge summary reviewed with pt.   No questions at this time.   Instructed on new meds that pt will  at own pharmacy d/t late weekend discharge.   Event monitor placed by EKG team and pt instructed on.   All possessions with pt.

## 2019-01-05 NOTE — PLAN OF CARE
Patient had VSS, denied any chest pain and SOB. Patient LS are clear, diminished. On RA. SBA to chair. PT evaluated, stable to go home. Paged MD about NA decreasing from 132 to 130. Waiting on MD to see patient and potentially discharge.

## 2019-01-07 ENCOUNTER — OFFICE VISIT (OUTPATIENT)
Dept: FAMILY MEDICINE | Facility: CLINIC | Age: 84
End: 2019-01-07
Payer: COMMERCIAL

## 2019-01-07 ENCOUNTER — TELEPHONE (OUTPATIENT)
Dept: FAMILY MEDICINE | Facility: CLINIC | Age: 84
End: 2019-01-07

## 2019-01-07 VITALS
DIASTOLIC BLOOD PRESSURE: 62 MMHG | WEIGHT: 197 LBS | HEART RATE: 72 BPM | SYSTOLIC BLOOD PRESSURE: 120 MMHG | OXYGEN SATURATION: 93 % | TEMPERATURE: 97.7 F | BODY MASS INDEX: 28.27 KG/M2

## 2019-01-07 DIAGNOSIS — I25.118 CORONARY ARTERY DISEASE OF NATIVE ARTERY OF NATIVE HEART WITH STABLE ANGINA PECTORIS (H): ICD-10-CM

## 2019-01-07 DIAGNOSIS — D69.6 THROMBOCYTOPENIA (H): ICD-10-CM

## 2019-01-07 DIAGNOSIS — F41.9 ANXIETY: ICD-10-CM

## 2019-01-07 DIAGNOSIS — F32.5 MAJOR DEPRESSION IN COMPLETE REMISSION (H): ICD-10-CM

## 2019-01-07 DIAGNOSIS — R55 SYNCOPE, UNSPECIFIED SYNCOPE TYPE: ICD-10-CM

## 2019-01-07 DIAGNOSIS — J10.1 INFLUENZA A: ICD-10-CM

## 2019-01-07 PROCEDURE — 99214 OFFICE O/P EST MOD 30 MIN: CPT | Performed by: FAMILY MEDICINE

## 2019-01-07 ASSESSMENT — ANXIETY QUESTIONNAIRES
7. FEELING AFRAID AS IF SOMETHING AWFUL MIGHT HAPPEN: SEVERAL DAYS
4. TROUBLE RELAXING: SEVERAL DAYS
1. FEELING NERVOUS, ANXIOUS, OR ON EDGE: SEVERAL DAYS
GAD7 TOTAL SCORE: 6
6. BECOMING EASILY ANNOYED OR IRRITABLE: NOT AT ALL
GAD7 TOTAL SCORE: 6
5. BEING SO RESTLESS THAT IT IS HARD TO SIT STILL: NOT AT ALL
2. NOT BEING ABLE TO STOP OR CONTROL WORRYING: SEVERAL DAYS
3. WORRYING TOO MUCH ABOUT DIFFERENT THINGS: MORE THAN HALF THE DAYS
7. FEELING AFRAID AS IF SOMETHING AWFUL MIGHT HAPPEN: SEVERAL DAYS
GAD7 TOTAL SCORE: 6

## 2019-01-07 ASSESSMENT — PATIENT HEALTH QUESTIONNAIRE - PHQ9
10. IF YOU CHECKED OFF ANY PROBLEMS, HOW DIFFICULT HAVE THESE PROBLEMS MADE IT FOR YOU TO DO YOUR WORK, TAKE CARE OF THINGS AT HOME, OR GET ALONG WITH OTHER PEOPLE: SOMEWHAT DIFFICULT
SUM OF ALL RESPONSES TO PHQ QUESTIONS 1-9: 11
SUM OF ALL RESPONSES TO PHQ QUESTIONS 1-9: 11

## 2019-01-07 NOTE — PROGRESS NOTES
SUBJECTIVE:   Korey Pearson is a 89 year old male who presents to clinic today for the following health issues:          Hospital Follow-up Visit:    Hospital/Nursing Home/IP Rehab Facility: [facility]  Date of Admission: 1-3-18  Date of Discharge: 1-5-18  Reason(s) for Admission: passed out at clinic            Problems taking medications regularly:  None       Medication changes since discharge: None       Problems adhering to non-medication therapy:  None    Summary of hospitalization:  [facility] discharge summary reviewed  Diagnostic Tests/Treatments reviewed.  Follow up needed: none  Other Healthcare Providers Involved in Patient s Care:         None  Update since discharge: improved.     Post Discharge Medication Reconciliation: discharge medications reconciled, continue medications without change.  Plan of care communicated with patient and family     Coding guidelines for this visit:  Type of Medical   Decision Making Face-to-Face Visit       within 7 Days of discharge Face-to-Face Visit        within 14 days of discharge   Moderate Complexity 14299 [id number]   High Complexity 19111 [id number]                  Problem list and histories reviewed & adjusted, as indicated.  Additional history: as documented    Patient Active Problem List   Diagnosis     Generalized anxiety disorder     Peripheral neuropathy     Acquired hypothyroidism     Hearing loss     Vitamin D deficiency     Restless legs syndrome with nocturnal myoclonus     Degenerative arthritis of left knee     Gastroesophageal reflux disease without esophagitis     Flatulence, eructation, and gas pain     Dizziness     Constipation     Abdominal pain, generalized     Diverticulosis of large intestine without hemorrhage     ACP (advance care planning)     Insomnia     Chest pain     Raynaud's disease without gangrene     History of colonic polyps since 1995     Hypercholesterolemia     Overweight (BMI 25.0-29.9)      Change in bowel habits since mid      Thoracic aortic ectasia (H)      Seasonal affective disorder (H)     Moderate episode of recurrent major depressive disorder (H)     Anxiety     Benign prostatic hyperplasia with incomplete bladder emptying     Syncope     New onset a-fib (H)     Elevation of cardiac enzymes     Thrombocytopenia (H)     Hyponatremia     Influenza A     Coronary artery disease of native artery of native heart with stable angina pectoris (H)     Past Surgical History:   Procedure Laterality Date     ABDOMEN SURGERY  appy, hernia     ANGIOGRAM  2017     APPENDECTOMY  194     CARDIAC SURGERY      2 stents  dec 2016     CATARACT IOL, RT/LT       HERNIA REPAIR      left inguinal hernia      TURP         Social History     Tobacco Use     Smoking status: Former Smoker     Packs/day: 1.50     Years: 10.00     Pack years: 15.00     Types: Cigarettes     Last attempt to quit: 1957     Years since quittin.0     Smokeless tobacco: Never Used   Substance Use Topics     Alcohol use: Yes     Alcohol/week: 0.0 oz     Comment: < 1 beer a month     Family History   Problem Relation Age of Onset     Cerebrovascular Disease Mother      Heart Disease Father            Reviewed and updated as needed this visit by clinical staff  Tobacco  Allergies  Med Hx  Surg Hx  Fam Hx  Soc Hx      Reviewed and updated as needed this visit by Provider         ROS:  Constitutional, HEENT, cardiovascular, pulmonary, gi and gu systems are negative, except as otherwise noted.    OBJECTIVE:                                                    /62 (Cuff Size: Adult Large)   Pulse 72   Temp 97.7  F (36.5  C) (Tympanic)   Wt 89.4 kg (197 lb)   SpO2 93%   BMI 28.27 kg/m    Body mass index is 28.27 kg/m .  GENERAL APPEARANCE: healthy, alert and no distress  RESP: lungs clear to auscultation - no rales, rhonchi or wheezes  CV: regular rates and rhythm, normal S1 S2, no S3 or S4 and no  murmur, click or rub         ASSESSMENT/PLAN:                                                        ICD-10-CM    1. Syncope, unspecified syncope type R55    2. Influenza A J10.1    3. Thrombocytopenia (H) D69.6    4. Anxiety F41.9    5. Coronary artery disease of native artery of native heart with stable angina pectoris (H) I25.118        Patient Instructions   Patient will finish out his course of Tamiflu for his influenza A.  He will follow-up in 1 month at which point he will need lab testing done.  He did have an elevation of his blood sugar this past hospitalization and was found to have some thrombocytopenia with a lower than normal hemoglobin.  We will check all of those when he comes in at his next visit.      Shantanu Armas MD  [facility]    Answers for HPI/ROS submitted by the patient on 1/7/2019   If you checked off any problems, how difficult have these problems made it for you to do your work, take care of things at home, or get along with other people?: Somewhat difficult  PHQ9 TOTAL SCORE: 11  VISHAL 7 TOTAL SCORE: 6

## 2019-01-07 NOTE — TELEPHONE ENCOUNTER
ED / Discharge Outreach Protocol    Patient Contact    Attempt # 1    Was call answered?  No.  Left message on voicemail with information to call triage back.

## 2019-01-07 NOTE — TELEPHONE ENCOUNTER
"Hospital/TCU/ED for chronic condition Discharge Protocol    \"Hi, my name is Steffen Meade, a registered nurse, and I am calling from [facility].  I am calling to follow up and see how things are going for you after your recent emergency visit/hospital/TCU stay.\"    Tell me how you are doing now that you are home?\" doing good      Discharge Instructions    \"Let's review your discharge instructions.  What is/are the follow-up recommendations?  Pt. Response: Pt had appointment today.    \"Has an appointment with your primary care provider been scheduled?\"   Yes. (confirm) see above    \"When you see the provider, I would recommend that you bring your medications with you.\"    Medications    \"Tell me what changed about your medicines when you discharged?\"    Changes to chronic meds?    0-1    \"What questions do you have about your medications?\"    None     New diagnoses of heart failure, COPD, diabetes, or MI?    No              Medication reconciliation completed? Yes  Was MTM referral placed (*Make sure to put transitions as reason for referral)?   No    Call Summary    \"What questions or concerns do you have about your recent visit and your follow-up care?\"     none    \"If you have questions or things don't continue to improve, we encourage you contact us through the main clinic number (give number).  Even if the clinic is not open, triage nurses are available 24/7 to help you.     We would like you to know that our clinic has extended hours (provide information).  We also have urgent care (provide details on closest location and hours/contact info)\"      \"Thank you for your time and take care!\"             "

## 2019-01-07 NOTE — PATIENT INSTRUCTIONS
Patient will finish out his course of Tamiflu for his influenza A.  He will follow-up in 1 month at which point he will need lab testing done.  He did have an elevation of his blood sugar this past hospitalization and was found to have some thrombocytopenia with a lower than normal hemoglobin.  We will check all of those when he comes in at his next visit.

## 2019-01-08 LAB — INTERPRETATION ECG - MUSE: NORMAL

## 2019-01-09 ENCOUNTER — TELEPHONE (OUTPATIENT)
Dept: FAMILY MEDICINE | Facility: CLINIC | Age: 84
End: 2019-01-09

## 2019-01-09 ASSESSMENT — PATIENT HEALTH QUESTIONNAIRE - PHQ9: SUM OF ALL RESPONSES TO PHQ QUESTIONS 1-9: 11

## 2019-01-09 ASSESSMENT — ANXIETY QUESTIONNAIRES: GAD7 TOTAL SCORE: 6

## 2019-01-09 NOTE — LETTER
January 9, 2019    Korey Pearson  [address] [facility] [address]    Dear Dania Horton cares about your health and your health plan.  I have reviewed your medical conditions, medication list and lab results, and am making recommendations based on this review to better manage your health.    You are in particular need of attention regarding:  -Depression/Anxiety    I am recommending that you:     Please complete the enclosed PHQ9 and mail back to clinic in the envelope provided.         Please call us at the [facility] location:  [phone] or use [employer] to address the above recommendations.     Thank you for trusting [facility].  We appreciate the opportunity to serve you and look forward to supporting your healthcare in the future.    If you have (or plan to have) any of these tests done at a facility other than a [facility] or a [facility], please have the results sent to the [facility] location noted above.      Best Regards,    Shantanu Armas MD

## 2019-01-09 NOTE — TELEPHONE ENCOUNTER
Panel Management Review      Patient has the following on his problem list:     Depression / Dysthymia review    Measure:  Needs PHQ-9 score of 4 or less during index window.  Administer PHQ-9 and if score is 5 or more, send encounter to provider for next steps.    5 - 7 month window range:     PHQ-9 SCORE 8/31/2018 9/11/2018 1/7/2019   PHQ-9 Total Score - - -   PHQ-9 Total Score MyChart - - 11 (Moderate depression)   PHQ-9 Total Score 21 17 11       If PHQ-9 recheck is 5 or more, route to provider for next steps.    Patient is due for:  PHQ9      Composite cancer screening  Chart review shows that this patient is due/due soon for the following None  Summary:    Patient is due/failing the following:   PHQ9    Action needed:   Patient needs to do PHQ9.    Type of outreach:    Sent letter.    Questions for provider review:    None                                                                                                                                    Vania Lovell CMA       Chart routed to  .

## 2019-01-10 LAB
BACTERIA SPEC CULT: NO GROWTH
BACTERIA SPEC CULT: NO GROWTH
Lab: NORMAL
Lab: NORMAL
SPECIMEN SOURCE: NORMAL
SPECIMEN SOURCE: NORMAL

## 2019-01-17 DIAGNOSIS — J40 BRONCHITIS: ICD-10-CM

## 2019-01-17 RX ORDER — CODEINE PHOSPHATE/GUAIFENESIN 10-100MG/5
5 LIQUID (ML) ORAL EVERY 6 HOURS PRN
Qty: 180 ML | Refills: 1 | Status: SHIPPED | OUTPATIENT
Start: 2019-01-17 | End: 2019-02-25

## 2019-01-21 DIAGNOSIS — E03.9 ACQUIRED HYPOTHYROIDISM: ICD-10-CM

## 2019-01-21 NOTE — TELEPHONE ENCOUNTER
"Requested Prescriptions   Pending Prescriptions Disp Refills     levothyroxine (SYNTHROID/LEVOTHROID) 75 MCG tablet [Pharmacy Med Name: LEVOTHYROXIN 75MCG  TAB] 90 tablet 2      Last Written Prescription Date:  4/6/18  Last Fill Quantity: 90,  # refills: 2   Last office visit: 1/7/2019 with prescribing provider:     Future Office Visit:   Next 5 appointments (look out 90 days)    Feb 12, 2019  9:00 AM CST  SHORT with Shantanu Armas MD  [facility] ([facility]) [address] [place] [id number]  [phone]          Sig: TAKE 1 TABLET BY MOUTH ONCE DAILY    Thyroid Protocol Passed - 1/21/2019  1:28 PM       Passed - Patient is 12 years or older       Passed - Recent (12 mo) or future (30 days) visit within the authorizing provider's specialty    Patient had office visit in the last 12 months or has a visit in the next 30 days with authorizing provider or within the authorizing provider's specialty.  See \"Patient Info\" tab in inbasket, or \"Choose Columns\" in Meds & Orders section of the refill encounter.             Passed - Medication is active on med list       Passed - Normal TSH on file in past 12 months    Recent Labs   Lab Test 01/03/19  1525   TSH 3.58                "

## 2019-01-22 RX ORDER — LEVOTHYROXINE SODIUM 75 UG/1
TABLET ORAL
Qty: 90 TABLET | Refills: 2 | Status: SHIPPED | OUTPATIENT
Start: 2019-01-22 | End: 2019-10-28

## 2019-01-28 DIAGNOSIS — G47.00 PERSISTENT INSOMNIA: ICD-10-CM

## 2019-01-28 RX ORDER — MIRTAZAPINE 15 MG/1
15 TABLET, FILM COATED ORAL AT BEDTIME
Qty: 30 TABLET | Refills: 3 | Status: SHIPPED | OUTPATIENT
Start: 2019-01-28 | End: 2019-03-08

## 2019-01-28 NOTE — TELEPHONE ENCOUNTER
"Requested Prescriptions   Pending Prescriptions Disp Refills     mirtazapine (REMERON) 15 MG tablet [Pharmacy Med Name: Mirtazapine Oral Tablet 15 MG]  Last Written Prescription Date:  11/19/18  Last Fill Quantity: 30,  # refills: 1   Last office visit: 1/7/2019 with prescribing provider:  angel   Future Office Visit:   Next 5 appointments (look out 90 days)    Feb 12, 2019  9:00 AM CST  SHORT with Shantanu Armas MD  [facility] ([facility]) [address] [place] [id number]  [phone]          30 tablet 0     Sig: Take 1 tablet (15 mg) by mouth At Bedtime    Atypical Antidepressants Protocol Passed - 1/28/2019  9:04 AM       Passed - Recent (12 mo) or future (30 days) visit within the authorizing provider's specialty    Patient had office visit in the last 12 months or has a visit in the next 30 days with authorizing provider or within the authorizing provider's specialty.  See \"Patient Info\" tab in inbasket, or \"Choose Columns\" in Meds & Orders section of the refill encounter.             Passed - Medication active on med list       Passed - Patient is age 18 or older          "

## 2019-02-07 DIAGNOSIS — G47.00 INSOMNIA: Chronic | ICD-10-CM

## 2019-02-07 DIAGNOSIS — F41.1 GENERALIZED ANXIETY DISORDER: Chronic | ICD-10-CM

## 2019-02-08 NOTE — TELEPHONE ENCOUNTER
Requested Prescriptions   Pending Prescriptions Disp Refills     diazepam (VALIUM) 5 MG tablet [Pharmacy Med Name: DIAZEPAM 5MG        TAB]      Last Written Prescription Date:  5/23/18  Last Fill Quantity: 90,   # refills: 3  Last Office Visit: 1/7/19 Pawel  Future Office visit:    Next 5 appointments (look out 90 days)    Feb 12, 2019  9:00 AM CST  SHORT with Shantanu Armas MD  [facility] ([facility]) [address] [place] [id number]  [phone]           Routing refill request to provider for review/approval because:  Drug not on the [facility], UMP or  Health refill protocol or controlled substance   90 tablet 3     Sig: TAKE 1 TABLET BY MOUTH EVERY 8 HOURS AS NEEDED FOR ANXIETY    There is no refill protocol information for this order

## 2019-02-11 RX ORDER — DIAZEPAM 5 MG
TABLET ORAL
Qty: 90 TABLET | Refills: 3 | Status: SHIPPED | OUTPATIENT
Start: 2019-02-11 | End: 2019-09-17

## 2019-02-11 NOTE — TELEPHONE ENCOUNTER
Controlled Substance Refill Request for diazepam (VALIUM) 5 MG tabletProblem   List Complete:  No     PROVIDER TO CONSIDER COMPLETION OF PROBLEM LIST AND OVERVIEW/CONTROLLED SUBSTANCE AGREEMENT    THE MOST RECENT OFFICE VISIT MUST BE WITHIN THE PAST 3 MONTHS. AT LEAST ONE FACE TO FACE VISIT MUST OCCUR EVERY 6 MONTHS. ADDITIONAL VISITS CAN BE VIRTUAL.  (THIS STATEMENT SHOULD BE DELETED.)    Controlled substance agreement:   Encounter-Level CSA:    There are no encounter-level csa.     Patient-Level CSA:    There are no patient-level csa.         Last Urine Drug Screen: No results found for: CDAUT, No results found for: COMDAT, No results found for: THC13, PCP13, COC13, MAMP13, OPI13, AMP13, BZO13, TCA13, MTD13, BAR13, OXY13, PPX13, BUP13     Processing:  Fax Rx to [facility]     [email or web address]     checked in past 3 months?  Yes 2/11/19

## 2019-02-25 ENCOUNTER — OFFICE VISIT (OUTPATIENT)
Dept: FAMILY MEDICINE | Facility: CLINIC | Age: 84
End: 2019-02-25
Payer: COMMERCIAL

## 2019-02-25 VITALS
HEART RATE: 79 BPM | WEIGHT: 196 LBS | TEMPERATURE: 97 F | DIASTOLIC BLOOD PRESSURE: 56 MMHG | BODY MASS INDEX: 28.12 KG/M2 | OXYGEN SATURATION: 95 % | SYSTOLIC BLOOD PRESSURE: 104 MMHG

## 2019-02-25 DIAGNOSIS — D69.6 THROMBOCYTOPENIA (H): ICD-10-CM

## 2019-02-25 DIAGNOSIS — R55 SYNCOPE, UNSPECIFIED SYNCOPE TYPE: ICD-10-CM

## 2019-02-25 DIAGNOSIS — H61.21 EXCESSIVE CERUMEN IN RIGHT EAR CANAL: ICD-10-CM

## 2019-02-25 DIAGNOSIS — I77.810 THORACIC AORTIC ECTASIA (H): ICD-10-CM

## 2019-02-25 DIAGNOSIS — E78.5 HYPERLIPIDEMIA LDL GOAL <130: ICD-10-CM

## 2019-02-25 DIAGNOSIS — I10 ESSENTIAL HYPERTENSION: Primary | ICD-10-CM

## 2019-02-25 DIAGNOSIS — I20.0 UNSTABLE ANGINA (H): ICD-10-CM

## 2019-02-25 LAB
BASOPHILS # BLD AUTO: 0.1 10E9/L (ref 0–0.2)
BASOPHILS NFR BLD AUTO: 0.6 %
DIFFERENTIAL METHOD BLD: NORMAL
EOSINOPHIL # BLD AUTO: 0.3 10E9/L (ref 0–0.7)
EOSINOPHIL NFR BLD AUTO: 3.2 %
ERYTHROCYTE [DISTWIDTH] IN BLOOD BY AUTOMATED COUNT: 13.8 % (ref 10–15)
HCT VFR BLD AUTO: 41 % (ref 40–53)
HGB BLD-MCNC: 13.6 G/DL (ref 13.3–17.7)
LYMPHOCYTES # BLD AUTO: 3 10E9/L (ref 0.8–5.3)
LYMPHOCYTES NFR BLD AUTO: 33.4 %
MCH RBC QN AUTO: 30.1 PG (ref 26.5–33)
MCHC RBC AUTO-ENTMCNC: 33.2 G/DL (ref 31.5–36.5)
MCV RBC AUTO: 91 FL (ref 78–100)
MONOCYTES # BLD AUTO: 1 10E9/L (ref 0–1.3)
MONOCYTES NFR BLD AUTO: 11.1 %
NEUTROPHILS # BLD AUTO: 4.6 10E9/L (ref 1.6–8.3)
NEUTROPHILS NFR BLD AUTO: 51.7 %
PLATELET # BLD AUTO: 198 10E9/L (ref 150–450)
RBC # BLD AUTO: 4.52 10E12/L (ref 4.4–5.9)
WBC # BLD AUTO: 9 10E9/L (ref 4–11)

## 2019-02-25 PROCEDURE — 85025 COMPLETE CBC W/AUTO DIFF WBC: CPT | Performed by: FAMILY MEDICINE

## 2019-02-25 PROCEDURE — 99213 OFFICE O/P EST LOW 20 MIN: CPT | Performed by: FAMILY MEDICINE

## 2019-02-25 PROCEDURE — 80048 BASIC METABOLIC PNL TOTAL CA: CPT | Performed by: FAMILY MEDICINE

## 2019-02-25 PROCEDURE — 36415 COLL VENOUS BLD VENIPUNCTURE: CPT | Performed by: FAMILY MEDICINE

## 2019-02-25 RX ORDER — ATORVASTATIN CALCIUM 20 MG/1
20 TABLET, FILM COATED ORAL AT BEDTIME
Qty: 90 TABLET | Refills: 1 | Status: SHIPPED | OUTPATIENT
Start: 2019-02-25 | End: 2019-08-21

## 2019-02-25 NOTE — PROGRESS NOTES
SUBJECTIVE:   Korey Pearson is a 89 year old male who presents to clinic today for the following health issues:      PT here to follow up on last apt hospital follow up repeat labs        Problem list and histories reviewed & adjusted, as indicated.  Additional history: as documented    Patient Active Problem List   Diagnosis     Generalized anxiety disorder     Peripheral neuropathy     Acquired hypothyroidism     Hearing loss     Vitamin D deficiency     Essential hypertension     Restless legs syndrome with nocturnal myoclonus     Degenerative arthritis of left knee     Gastroesophageal reflux disease without esophagitis     Flatulence, eructation, and gas pain     Dizziness     Constipation     Abdominal pain, generalized     Diverticulosis of large intestine without hemorrhage     ACP (advance care planning)     Insomnia     Chest pain     Raynaud's disease without gangrene     History of colonic polyps since 1995     Hypercholesterolemia     Overweight (BMI 25.0-29.9)     Change in bowel habits since mid June , 2018     Thoracic aortic ectasia (H)      Seasonal affective disorder (H)     Moderate episode of recurrent major depressive disorder (H)     Anxiety     Benign prostatic hyperplasia with incomplete bladder emptying     Syncope     New onset a-fib (H)     Elevation of cardiac enzymes     Thrombocytopenia (H)     Hyponatremia     Influenza A     Coronary artery disease of native artery of native heart with stable angina pectoris (H)     Past Surgical History:   Procedure Laterality Date     ABDOMEN SURGERY  appy, hernia     ANGIOGRAM  08/21/2017     APPENDECTOMY  1941     CARDIAC SURGERY      2 stents  dec 2016     CATARACT IOL, RT/LT  1993     HERNIA REPAIR      left inguinal hernia 1960's     TURP  1985       Social History     Tobacco Use     Smoking status: Former Smoker     Packs/day: 1.50     Years: 10.00     Pack years: 15.00     Types: Cigarettes     Last attempt to quit: 1/1/1957      Years since quittin.2     Smokeless tobacco: Never Used   Substance Use Topics     Alcohol use: Yes     Alcohol/week: 0.0 oz     Comment: < 1 beer a month     Family History   Problem Relation Age of Onset     Cerebrovascular Disease Mother      Heart Disease Father            Reviewed and updated as needed this visit by clinical staff  Tobacco  Allergies  Meds  Med Hx  Surg Hx  Fam Hx  Soc Hx      Reviewed and updated as needed this visit by Provider         ROS:  Constitutional, HEENT, cardiovascular, pulmonary, gi and gu systems are negative, except as otherwise noted.  No further syncopal episodes.    OBJECTIVE:                                                    /56 (Cuff Size: Adult Large)   Pulse 79   Temp 97  F (36.1  C) (Tympanic)   Wt 88.9 kg (196 lb)   SpO2 95%   BMI 28.12 kg/m    Body mass index is 28.12 kg/m .  GENERAL APPEARANCE: healthy, alert and no distress  RESP: lungs clear to auscultation - no rales, rhonchi or wheezes  CV: regular rates and rhythm, normal S1 S2, no S3 or S4 and no murmur, click or rub  NEURO: Normal strength and tone, mentation intact, speech normal and cranial nerves 2-12 intact    Diagnostic test results:  No results found for this or any previous visit (from the past 24 hour(s)).     ASSESSMENT/PLAN:                                                        ICD-10-CM    1. Essential hypertension I10 CBC with platelets differential     Basic metabolic panel   2. Syncope, unspecified syncope type R55    3. Thrombocytopenia (H) D69.6    4. Thoracic aortic ectasia (H)  I77.810    5. Excessive cerumen in right ear canal H61.21        Patient Instructions   I think we should see you back in about 3 months.  Her blood count today was nice and normal.  I am glad you have not had any further syncopal episodes.  If you need to see me sooner please make an appointment.  Patient will use Debrox drops twice daily for the next 5 days.  Follow-up will be in 1 week to  reexamine the right ear and remove whatever wax needs to be removed at that time.      Shantanu Armas MD  [facility]

## 2019-02-26 LAB
ANION GAP SERPL CALCULATED.3IONS-SCNC: 8 MMOL/L (ref 3–14)
BUN SERPL-MCNC: 12 MG/DL (ref 7–30)
CALCIUM SERPL-MCNC: 9.1 MG/DL (ref 8.5–10.1)
CHLORIDE SERPL-SCNC: 101 MMOL/L (ref 94–109)
CO2 SERPL-SCNC: 23 MMOL/L (ref 20–32)
CREAT SERPL-MCNC: 0.97 MG/DL (ref 0.66–1.25)
GFR SERPL CREATININE-BSD FRML MDRD: 69 ML/MIN/{1.73_M2}
GLUCOSE SERPL-MCNC: 91 MG/DL (ref 70–99)
POTASSIUM SERPL-SCNC: 5 MMOL/L (ref 3.4–5.3)
SODIUM SERPL-SCNC: 132 MMOL/L (ref 133–144)

## 2019-02-26 NOTE — PATIENT INSTRUCTIONS
I think we should see you back in about 3 months.  your blood count today was nice and normal.  I am glad you have not had any further syncopal episodes.  If you need to see me sooner please make an appointment.  Patient will use Debrox drops twice daily for the next 5 days.  Follow-up will be in 1 week to reexamine the right ear and remove whatever wax needs to be removed at that time.

## 2019-02-27 ENCOUNTER — TELEPHONE (OUTPATIENT)
Dept: FAMILY MEDICINE | Facility: CLINIC | Age: 84
End: 2019-02-27

## 2019-02-27 NOTE — TELEPHONE ENCOUNTER
Reason for Call:  Other lab results    Detailed comments: pt got results through [employer] and has questions.    Phone Number Patient can be reached at: Home number on file [phone] (home)    Best Time: any    Can we leave a detailed message on this number? YES    Call taken on 2/27/2019 at 8:42 AM by CLARKE NICHOLS

## 2019-02-28 NOTE — TELEPHONE ENCOUNTER
Returned patient call. Wife was under the impression that glucose would be checked, but could not see where it was. I informed patient's wife that the glucose was indeed checked again and this time it was within normal range. No further questions or concerns at this time.

## 2019-03-01 NOTE — RESULT ENCOUNTER NOTE
Dear Korey,    Your tests were all normal. A copy of your tests are available in My Chart.    Glad to see you at your appointment.  If you have any questions feel free to call.      Sincerely,      ALISIA Brown.

## 2019-03-06 ENCOUNTER — OFFICE VISIT (OUTPATIENT)
Dept: FAMILY MEDICINE | Facility: CLINIC | Age: 84
End: 2019-03-06
Payer: COMMERCIAL

## 2019-03-06 VITALS
HEART RATE: 80 BPM | DIASTOLIC BLOOD PRESSURE: 60 MMHG | HEIGHT: 70 IN | OXYGEN SATURATION: 92 % | SYSTOLIC BLOOD PRESSURE: 120 MMHG | TEMPERATURE: 97.9 F | BODY MASS INDEX: 28.63 KG/M2 | RESPIRATION RATE: 14 BRPM | WEIGHT: 200 LBS

## 2019-03-06 DIAGNOSIS — H91.13 PRESBYCUSIS OF BOTH EARS: ICD-10-CM

## 2019-03-06 DIAGNOSIS — H61.21 CERUMINOSIS, RIGHT: Primary | ICD-10-CM

## 2019-03-06 DIAGNOSIS — I10 ESSENTIAL HYPERTENSION: ICD-10-CM

## 2019-03-06 PROCEDURE — 99213 OFFICE O/P EST LOW 20 MIN: CPT | Performed by: FAMILY MEDICINE

## 2019-03-06 ASSESSMENT — PATIENT HEALTH QUESTIONNAIRE - PHQ9
SUM OF ALL RESPONSES TO PHQ QUESTIONS 1-9: 10
5. POOR APPETITE OR OVEREATING: MORE THAN HALF THE DAYS

## 2019-03-06 ASSESSMENT — ANXIETY QUESTIONNAIRES
5. BEING SO RESTLESS THAT IT IS HARD TO SIT STILL: NOT AT ALL
6. BECOMING EASILY ANNOYED OR IRRITABLE: NOT AT ALL
IF YOU CHECKED OFF ANY PROBLEMS ON THIS QUESTIONNAIRE, HOW DIFFICULT HAVE THESE PROBLEMS MADE IT FOR YOU TO DO YOUR WORK, TAKE CARE OF THINGS AT HOME, OR GET ALONG WITH OTHER PEOPLE: NOT DIFFICULT AT ALL
GAD7 TOTAL SCORE: 8
3. WORRYING TOO MUCH ABOUT DIFFERENT THINGS: MORE THAN HALF THE DAYS
7. FEELING AFRAID AS IF SOMETHING AWFUL MIGHT HAPPEN: NOT AT ALL
2. NOT BEING ABLE TO STOP OR CONTROL WORRYING: MORE THAN HALF THE DAYS
1. FEELING NERVOUS, ANXIOUS, OR ON EDGE: MORE THAN HALF THE DAYS

## 2019-03-06 ASSESSMENT — MIFFLIN-ST. JEOR: SCORE: 1578.44

## 2019-03-06 NOTE — PROGRESS NOTES
SUBJECTIVE:   Korey Pearson is a 89 year old male who presents to clinic today for the following health issues:    Ear Problem      Duration: Ongoing    Description (location/character/radiation): Both Ears Plugged    Intensity:  mild    Accompanying signs and symptoms: None    History (similar episodes/previous evaluation): None    Precipitating or alleviating factors: None    Therapies tried and outcome: Debrox/Some Relief     Problem list and histories reviewed & adjusted, as indicated.  Additional history: as documented    Patient Active Problem List   Diagnosis     Generalized anxiety disorder     Peripheral neuropathy     Acquired hypothyroidism     Hearing loss     Vitamin D deficiency     Essential hypertension     Restless legs syndrome with nocturnal myoclonus     Degenerative arthritis of left knee     Gastroesophageal reflux disease without esophagitis     Flatulence, eructation, and gas pain     Dizziness     Constipation     Abdominal pain, generalized     Diverticulosis of large intestine without hemorrhage     ACP (advance care planning)     Insomnia     Chest pain     Raynaud's disease without gangrene     History of colonic polyps since 1995     Hypercholesterolemia     Overweight (BMI 25.0-29.9)     Change in bowel habits since mid June , 2018     Thoracic aortic ectasia (H)      Seasonal affective disorder (H)     Moderate episode of recurrent major depressive disorder (H)     Anxiety     Benign prostatic hyperplasia with incomplete bladder emptying     Syncope     New onset a-fib (H)     Elevation of cardiac enzymes     Thrombocytopenia (H)     Hyponatremia     Influenza A     Coronary artery disease of native artery of native heart with stable angina pectoris (H)     Past Surgical History:   Procedure Laterality Date     ABDOMEN SURGERY  appy, hernia     ANGIOGRAM  08/21/2017     APPENDECTOMY  1941     CARDIAC SURGERY      2 stents  dec 2016     CATARACT IOL, RT/LT  1993      "HERNIA REPAIR      left inguinal hernia      TUR  1985       Social History     Tobacco Use     Smoking status: Former Smoker     Packs/day: 1.50     Years: 10.00     Pack years: 15.00     Types: Cigarettes     Last attempt to quit: 1957     Years since quittin.2     Smokeless tobacco: Never Used   Substance Use Topics     Alcohol use: Yes     Alcohol/week: 0.0 oz     Comment: < 1 beer a month     Family History   Problem Relation Age of Onset     Cerebrovascular Disease Mother      Heart Disease Father            Reviewed and updated as needed this visit by clinical staff  Tobacco  Allergies  Meds  Problems  Med Hx  Surg Hx  Fam Hx  Soc Hx        Reviewed and updated as needed this visit by Provider         ROS:  Constitutional, HEENT, cardiovascular, pulmonary, gi and gu systems are negative, except as otherwise noted.    OBJECTIVE:                                                    /60   Pulse 80   Temp 97.9  F (36.6  C) (Tympanic)   Resp 14   Ht 1.778 m (5' 10\")   Wt 90.7 kg (200 lb)   SpO2 92%   BMI 28.70 kg/m    Body mass index is 28.7 kg/m .  GENERAL APPEARANCE: healthy, alert and no distress  HENT: ear canals and TM's normal         ASSESSMENT/PLAN:                                                        ICD-10-CM    1. Ceruminosis, right H61.21     Resolved   2. Presbycusis of both ears H91.13     With bilateral hearing aids   3. Essential hypertension I10        Patient Instructions   The earwax drops have worked on removing the cerumen from his ear canals.  He will follow-up in 3 months on his blood pressure and syncopal episodes.  He is not had any further symptoms.  He just had his hearing aids adjusted and those are working relatively well.  Follow-up otherwise will be as needed.      Shantanu Armas MD  [facility]    "

## 2019-03-06 NOTE — NURSING NOTE
"Chief Complaint   Patient presents with     RECHECK     /60   Pulse 80   Temp 97.9  F (36.6  C) (Tympanic)   Resp 14   Ht 1.778 m (5' 10\")   Wt 90.7 kg (200 lb)   SpO2 92%   BMI 28.70 kg/m   Estimated body mass index is 28.7 kg/m  as calculated from the following:    Height as of this encounter: 1.778 m (5' 10\").    Weight as of this encounter: 90.7 kg (200 lb).  BP completed using cuff size: jonna Lovell CMA    Health Maintenance Due   Topic Date Due     ZOSTER IMMUNIZATION (1 of 2) 08/31/1979     MEDICARE ANNUAL WELLNESS VISIT  08/31/1994     PNEUMOCOCCAL IMMUNIZATION 65+ LOW/MEDIUM RISK (2 of 2 - PCV13) 03/07/2015     INFLUENZA VACCINE (1) 09/01/2018     Health Maintenance reviewed at today's visit patient asked to schedule/complete:   Routine Health Visit: Patient agrees to schedule  Immunizations:  Patient agrees to schedule    "

## 2019-03-06 NOTE — PATIENT INSTRUCTIONS
The earwax drops have worked on removing the cerumen from his ear canals.  He will follow-up in 3 months on his blood pressure and syncopal episodes.  He is not had any further symptoms.  He just had his hearing aids adjusted and those are working relatively well.  Follow-up otherwise will be as needed.

## 2019-03-07 DIAGNOSIS — G47.00 PERSISTENT INSOMNIA: ICD-10-CM

## 2019-03-07 ASSESSMENT — ANXIETY QUESTIONNAIRES: GAD7 TOTAL SCORE: 8

## 2019-03-07 NOTE — TELEPHONE ENCOUNTER
"mirtazapine (REMERON) 15 MG tablet  Last Written Prescription Date:  01/28/2019  Last Fill Quantity: 30,  # refills: 3   Last office visit: 3/6/2019 with prescribing provider:  03/06/2019   Future Office Visit:    Requested Prescriptions   Pending Prescriptions Disp Refills     mirtazapine (REMERON) 15 MG tablet 30 tablet 3     Sig: Take 1 tablet (15 mg) by mouth At Bedtime    Atypical Antidepressants Protocol Passed - 3/7/2019  1:31 PM       Passed - Recent (12 mo) or future (30 days) visit within the authorizing provider's specialty    Patient had office visit in the last 12 months or has a visit in the next 30 days with authorizing provider or within the authorizing provider's specialty.  See \"Patient Info\" tab in inbasket, or \"Choose Columns\" in Meds & Orders section of the refill encounter.             Passed - Medication active on med list       Passed - Patient is age 18 or older          "

## 2019-03-08 RX ORDER — MIRTAZAPINE 15 MG/1
15 TABLET, FILM COATED ORAL AT BEDTIME
Qty: 30 TABLET | Refills: 3 | Status: SHIPPED | OUTPATIENT
Start: 2019-03-08 | End: 2019-04-03

## 2019-04-01 DIAGNOSIS — G47.00 PERSISTENT INSOMNIA: ICD-10-CM

## 2019-04-03 RX ORDER — MIRTAZAPINE 15 MG/1
15 TABLET, FILM COATED ORAL AT BEDTIME
Qty: 30 TABLET | Refills: 3 | Status: SHIPPED | OUTPATIENT
Start: 2019-04-03 | End: 2019-12-03

## 2019-04-30 ENCOUNTER — TELEPHONE (OUTPATIENT)
Dept: FAMILY MEDICINE | Facility: CLINIC | Age: 84
End: 2019-04-30

## 2019-04-30 NOTE — LETTER
April 30, 2019    Korey Pearson  [address] [facility] [address]    Dear Dania Horton cares about your health and your health plan.  I have reviewed your medical conditions, medication list and lab results, and am making recommendations based on this review to better manage your health.    You are in particular need of attention regarding:  -Depression/Anxiety  -Wellness (Physical) Visit     I am recommending that you:     -schedule a WELLNESS (Physical) APPOINTMENT with me.   I will check fasting labs the same day - nothing to eat except water and meds for 8-10 hours prior.      Please complete the enclosed PHQ9 and mail back to clinic in the envelope provided.         Please call us at the [place] location:  [phone] or use [email or web address] to address the above recommendations.     Thank you for trusting [facility].  We appreciate the opportunity to serve you and look forward to supporting your healthcare in the future.    If you have (or plan to have) any of these tests done at a facility other than a [facility] or a [facility], please have the results sent to the [facility] location noted above.      Best Regards,    KERVIN Ovalles

## 2019-04-30 NOTE — TELEPHONE ENCOUNTER
Panel Management Review      Patient has the following on his problem list:     Depression / Dysthymia review    Measure:  Needs PHQ-9 score of 4 or less during index window.  Administer PHQ-9 and if score is 5 or more, send encounter to provider for next steps.    5 - 7 month window range:     PHQ-9 SCORE 9/11/2018 1/7/2019 3/6/2019   PHQ-9 Total Score - - -   PHQ-9 Total Score MyChart - 11 (Moderate depression) -   PHQ-9 Total Score 17 11 10       If PHQ-9 recheck is 5 or more, route to provider for next steps.    Patient is due for:  PHQ9      Composite cancer screening  Chart review shows that this patient is due/due soon for the following None  Summary:    Patient is due/failing the following:   PHQ9 and PHYSICAL    Action needed:   Patient needs to do PHQ9.    Type of outreach:    Sent letter.    Questions for provider review:    None                                                                                                                                    Vania Lovell CMA       Chart routed to  .

## 2019-05-07 ASSESSMENT — PATIENT HEALTH QUESTIONNAIRE - PHQ9: 5. POOR APPETITE OR OVEREATING: MORE THAN HALF THE DAYS

## 2019-05-07 ASSESSMENT — ANXIETY QUESTIONNAIRES
2. NOT BEING ABLE TO STOP OR CONTROL WORRYING: SEVERAL DAYS
3. WORRYING TOO MUCH ABOUT DIFFERENT THINGS: MORE THAN HALF THE DAYS
GAD7 TOTAL SCORE: 9
IF YOU CHECKED OFF ANY PROBLEMS ON THIS QUESTIONNAIRE, HOW DIFFICULT HAVE THESE PROBLEMS MADE IT FOR YOU TO DO YOUR WORK, TAKE CARE OF THINGS AT HOME, OR GET ALONG WITH OTHER PEOPLE: NOT DIFFICULT AT ALL
5. BEING SO RESTLESS THAT IT IS HARD TO SIT STILL: SEVERAL DAYS
7. FEELING AFRAID AS IF SOMETHING AWFUL MIGHT HAPPEN: SEVERAL DAYS
1. FEELING NERVOUS, ANXIOUS, OR ON EDGE: SEVERAL DAYS
6. BECOMING EASILY ANNOYED OR IRRITABLE: SEVERAL DAYS

## 2019-05-08 ASSESSMENT — ANXIETY QUESTIONNAIRES: GAD7 TOTAL SCORE: 9

## 2019-07-19 ENCOUNTER — TRANSFERRED RECORDS (OUTPATIENT)
Dept: HEALTH INFORMATION MANAGEMENT | Facility: CLINIC | Age: 84
End: 2019-07-19

## 2019-08-21 DIAGNOSIS — E78.5 HYPERLIPIDEMIA LDL GOAL <130: ICD-10-CM

## 2019-08-21 DIAGNOSIS — I10 BENIGN ESSENTIAL HYPERTENSION: ICD-10-CM

## 2019-08-21 DIAGNOSIS — I25.118 CORONARY ARTERY DISEASE OF NATIVE ARTERY OF NATIVE HEART WITH STABLE ANGINA PECTORIS (H): ICD-10-CM

## 2019-08-21 DIAGNOSIS — I20.0 UNSTABLE ANGINA (H): ICD-10-CM

## 2019-08-21 RX ORDER — LISINOPRIL 10 MG/1
10 TABLET ORAL DAILY
Qty: 90 TABLET | Refills: 0 | Status: SHIPPED | OUTPATIENT
Start: 2019-08-21 | End: 2019-11-20

## 2019-08-21 RX ORDER — ISOSORBIDE MONONITRATE 30 MG/1
30 TABLET, EXTENDED RELEASE ORAL DAILY
Qty: 90 TABLET | Refills: 0 | Status: SHIPPED | OUTPATIENT
Start: 2019-08-21 | End: 2019-11-20

## 2019-08-21 RX ORDER — ATORVASTATIN CALCIUM 20 MG/1
20 TABLET, FILM COATED ORAL AT BEDTIME
Qty: 90 TABLET | Refills: 0 | Status: SHIPPED | OUTPATIENT
Start: 2019-08-21 | End: 2019-11-20

## 2019-10-02 ENCOUNTER — HEALTH MAINTENANCE LETTER (OUTPATIENT)
Age: 84
End: 2019-10-02

## 2019-10-28 DIAGNOSIS — E03.9 ACQUIRED HYPOTHYROIDISM: ICD-10-CM

## 2019-10-28 RX ORDER — LEVOTHYROXINE SODIUM 75 UG/1
TABLET ORAL
Qty: 90 TABLET | Refills: 2 | Status: SHIPPED | OUTPATIENT
Start: 2019-10-28 | End: 2020-07-29

## 2019-11-04 ENCOUNTER — OFFICE VISIT (OUTPATIENT)
Dept: URGENT CARE | Facility: URGENT CARE | Age: 84
End: 2019-11-04
Payer: COMMERCIAL

## 2019-11-04 ENCOUNTER — TELEPHONE (OUTPATIENT)
Dept: URGENT CARE | Facility: URGENT CARE | Age: 84
End: 2019-11-04

## 2019-11-04 ENCOUNTER — NURSE TRIAGE (OUTPATIENT)
Dept: NURSING | Facility: CLINIC | Age: 84
End: 2019-11-04

## 2019-11-04 VITALS
BODY MASS INDEX: 28.7 KG/M2 | DIASTOLIC BLOOD PRESSURE: 62 MMHG | HEART RATE: 80 BPM | RESPIRATION RATE: 16 BRPM | TEMPERATURE: 97.8 F | SYSTOLIC BLOOD PRESSURE: 114 MMHG | WEIGHT: 200 LBS | OXYGEN SATURATION: 95 %

## 2019-11-04 DIAGNOSIS — R06.2 WHEEZING: ICD-10-CM

## 2019-11-04 DIAGNOSIS — R05.8 PRODUCTIVE COUGH: ICD-10-CM

## 2019-11-04 DIAGNOSIS — R09.89 CHEST CONGESTION: Primary | ICD-10-CM

## 2019-11-04 PROCEDURE — 99214 OFFICE O/P EST MOD 30 MIN: CPT | Performed by: PHYSICIAN ASSISTANT

## 2019-11-04 RX ORDER — PREDNISONE 20 MG/1
20 TABLET ORAL 2 TIMES DAILY
Qty: 10 TABLET | Refills: 0 | Status: SHIPPED | OUTPATIENT
Start: 2019-11-04 | End: 2020-01-13

## 2019-11-04 RX ORDER — AZITHROMYCIN 250 MG/1
TABLET, FILM COATED ORAL
Qty: 6 TABLET | Refills: 0 | Status: SHIPPED | OUTPATIENT
Start: 2019-11-04 | End: 2020-01-13

## 2019-11-04 RX ORDER — ALBUTEROL SULFATE 90 UG/1
2 AEROSOL, METERED RESPIRATORY (INHALATION) EVERY 6 HOURS
Qty: 8.5 G | Refills: 0 | Status: SHIPPED | OUTPATIENT
Start: 2019-11-04 | End: 2020-12-29

## 2019-11-04 NOTE — TELEPHONE ENCOUNTER
"FYI only    Pharmacy called with plan to fill the Proventil HFA Rx written today with ProAir. This is a different brand of albuterol. This is what the pharmacy, [facility] in [place] carries.    \"Albuterol Metered Dose Inhalers  o Substituted with the preferred inhaler per patient s insurance plan: Proair HFA, Proair Respimat, Ventolin HFA or Proventil HFA\".    This is per the Therapeutic Substitution by Registered Nurse or Pharmacist    Routed: P [id number] Blas PATTEN PA-C Kathy G, RN [facility]       Please close encounter once addressed  "

## 2019-11-04 NOTE — TELEPHONE ENCOUNTER
"FYI only    Pharmacy called with plan to fill the Proventil HFA Rx written today with ProAir. This is a different brand of albuterol. This is what the pharmacy, [facility] in [place] carries.    \"Albuterol Metered Dose Inhalers  o Substituted with the preferred inhaler per patient s insurance plan: Proair HFA, Proair Respimat, Ventolin HFA or Proventil HFA\".    This is per the Therapeutic Substitution by Registered Nurse or Pharmacist    Routed: P [id number] Blas PATTEN PA-C Kathy G, RN [facility]       Please close encounter once addressed    "

## 2019-11-04 NOTE — PROGRESS NOTES
SUBJECTIVE:   Korey Pearson is a 89 year old male presenting with a chief complaint of coughing, chest congestion, productive cough and wheezing.  Onset of symptoms was 8 day(s) ago.  Course of illness is worsening.    Severity moderate  Current and Associated symptoms: productive cough, chest congestion, wheezing  Treatment measures tried include OCT medications.  Predisposing factors include recent illness.    Past Medical History:   Diagnosis Date     Anxiety disorder      CAD, Stent x 2      Diverticulosis of colon      Erythrocytosis      Hearing loss      Hyperlipidemia      Hypertension      Hypothyroid      Peripheral neuropathy, Idiopathic      Restless leg syndrome      Skin cancer     both ears     Vitamin D deficiency         Allergies   Allergen Reactions     Ropinirole Anxiety     funny thoughts     Family History   Problem Relation Age of Onset     Cerebrovascular Disease Mother      Heart Disease Father        Social History     Tobacco Use     Smoking status: Former Smoker     Packs/day: 1.50     Years: 10.00     Pack years: 15.00     Types: Cigarettes     Last attempt to quit: 1957     Years since quittin.8     Smokeless tobacco: Never Used   Substance Use Topics     Alcohol use: Yes     Alcohol/week: 0.0 standard drinks     Comment: < 1 beer a month       ROS:  CONSTITUTIONAL:NEGATIVE for fever, chills, change in weight  INTEGUMENTARY/SKIN: NEGATIVE for worrisome rashes, moles or lesions  ENT/MOUTH: POSITIVE for nasal congestion  RESP:POSITIVE for cough-productive  CV: NEGATIVE for chest pain, palpitations or peripheral edema  GI: NEGATIVE for nausea, abdominal pain, heartburn, or change in bowel habits  : negative for and dysuria  MUSCULOSKELETAL: NEGATIVE for significant arthralgias or myalgia  NEURO: NEGATIVE for weakness, dizziness or paresthesias    OBJECTIVE  :/62 (BP Location: Right arm, Patient Position: Sitting, Cuff Size: Adult Regular)   Pulse 80    Temp 97.8  F (36.6  C) (Oral)   Resp 16   Wt 90.7 kg (200 lb)   SpO2 95%   BMI 28.70 kg/m    GENERAL APPEARANCE: healthy, alert and no distress  EYES: EOMI,  PERRL, conjunctiva clear  HENT: TM's normal bilaterally and nasal turbinates erythematous, swollen  NECK: supple, nontender, no lymphadenopathy  RESP: expiratory wheezes mild and diffuse  CV: regular rates and rhythm, normal S1 S2, no murmur noted  ABDOMEN:  soft, nontender, no HSM or masses and bowel sounds normal  NEURO: Normal strength and tone, sensory exam grossly normal,  normal speech and mentation  SKIN: no suspicious lesions or rashes    ASSESSMENT/PLAN      ICD-10-CM    1. Chest congestion R09.89 azithromycin (ZITHROMAX) 250 MG tablet   2. Productive cough R05 azithromycin (ZITHROMAX) 250 MG tablet   3. Wheezing R06.2 predniSONE (DELTASONE) 20 MG tablet     albuterol (PROVENTIL HFA) 108 (90 Base) MCG/ACT inhaler       Orders Placed This Encounter     azithromycin (ZITHROMAX) 250 MG tablet     predniSONE (DELTASONE) 20 MG tablet     albuterol (PROVENTIL HFA) 108 (90 Base) MCG/ACT inhaler       Continue using cough syrup  Fluids, rest  Follow up with PCP as needed  See orders in Epic

## 2019-11-15 ENCOUNTER — OFFICE VISIT (OUTPATIENT)
Dept: CARDIOLOGY | Facility: CLINIC | Age: 84
End: 2019-11-15
Payer: COMMERCIAL

## 2019-11-15 VITALS
DIASTOLIC BLOOD PRESSURE: 72 MMHG | BODY MASS INDEX: 27.35 KG/M2 | WEIGHT: 191 LBS | HEART RATE: 73 BPM | SYSTOLIC BLOOD PRESSURE: 115 MMHG | HEIGHT: 70 IN

## 2019-11-15 DIAGNOSIS — I25.118 CORONARY ARTERY DISEASE OF NATIVE ARTERY OF NATIVE HEART WITH STABLE ANGINA PECTORIS (H): ICD-10-CM

## 2019-11-15 DIAGNOSIS — I25.10 CORONARY ARTERY DISEASE DUE TO CALCIFIED CORONARY LESION: ICD-10-CM

## 2019-11-15 DIAGNOSIS — E78.5 HYPERLIPIDEMIA LDL GOAL <130: ICD-10-CM

## 2019-11-15 DIAGNOSIS — I25.84 CORONARY ARTERY DISEASE DUE TO CALCIFIED CORONARY LESION: ICD-10-CM

## 2019-11-15 DIAGNOSIS — I25.118 CORONARY ARTERY DISEASE OF NATIVE ARTERY OF NATIVE HEART WITH STABLE ANGINA PECTORIS (H): Primary | ICD-10-CM

## 2019-11-15 DIAGNOSIS — E78.5 HYPERLIPIDEMIA LDL GOAL <130: Chronic | ICD-10-CM

## 2019-11-15 DIAGNOSIS — I20.0 UNSTABLE ANGINA (H): ICD-10-CM

## 2019-11-15 DIAGNOSIS — I10 BENIGN ESSENTIAL HYPERTENSION: ICD-10-CM

## 2019-11-15 DIAGNOSIS — I10 ESSENTIAL HYPERTENSION WITH GOAL BLOOD PRESSURE LESS THAN 140/90: ICD-10-CM

## 2019-11-15 DIAGNOSIS — I10 ESSENTIAL HYPERTENSION WITH GOAL BLOOD PRESSURE LESS THAN 140/90: Chronic | ICD-10-CM

## 2019-11-15 LAB
ANION GAP SERPL CALCULATED.3IONS-SCNC: 6.7 MMOL/L (ref 6–17)
BUN SERPL-MCNC: 14 MG/DL (ref 7–30)
CALCIUM SERPL-MCNC: 9 MG/DL (ref 8.5–10.5)
CHLORIDE SERPL-SCNC: 98 MMOL/L (ref 98–107)
CHOLEST SERPL-MCNC: 98 MG/DL
CO2 SERPL-SCNC: 31 MMOL/L (ref 23–29)
CREAT SERPL-MCNC: 1.2 MG/DL (ref 0.7–1.3)
GFR SERPL CREATININE-BSD FRML MDRD: 57 ML/MIN/{1.73_M2}
GLUCOSE SERPL-MCNC: 98 MG/DL (ref 70–105)
HDLC SERPL-MCNC: 41 MG/DL
LDLC SERPL CALC-MCNC: 45 MG/DL
NONHDLC SERPL-MCNC: 57 MG/DL
POTASSIUM SERPL-SCNC: 4.7 MMOL/L (ref 3.5–5.1)
SODIUM SERPL-SCNC: 131 MMOL/L (ref 136–145)
TRIGL SERPL-MCNC: 61 MG/DL

## 2019-11-15 PROCEDURE — 80061 LIPID PANEL: CPT | Performed by: NURSE PRACTITIONER

## 2019-11-15 PROCEDURE — 80048 BASIC METABOLIC PNL TOTAL CA: CPT | Performed by: NURSE PRACTITIONER

## 2019-11-15 PROCEDURE — 36415 COLL VENOUS BLD VENIPUNCTURE: CPT | Performed by: NURSE PRACTITIONER

## 2019-11-15 PROCEDURE — 99213 OFFICE O/P EST LOW 20 MIN: CPT | Performed by: INTERNAL MEDICINE

## 2019-11-15 RX ORDER — MULTIVIT-MIN/IRON/FOLIC ACID/K 18-600-40
1 CAPSULE ORAL DAILY
COMMUNITY
End: 2023-03-02

## 2019-11-15 ASSESSMENT — MIFFLIN-ST. JEOR: SCORE: 1532.62

## 2019-11-15 NOTE — PROGRESS NOTES
"Cardiology Progress Note          Assessment and Plan:     1. Stable coronary artery disease with history of PCI to the LAD 2016 and 2017, chronic stable angina.    Continue current medical therapy    Follow-up in 1 year per patient request      This note was transcribed using electronic voice recognition software and there may be typographical errors present.                Interval History:   The patient is a very pleasant 89 year old whom I have been following for coronary artery disease status post PCI to the LAD 2016 and 2017.  He has some stable angina from an occluded diagonal artery.  He is very sharp and mentally acute.  He denies any lightheadedness.  He has some neuropathy which makes proprioception a little difficult and gait a little bit unstable.  He denies any change in his functional status.                     Review of Systems:   Review of Systems:  Skin:  Negative bruising   Eyes:  Positive for glasses(right red eye from rubbing too hard)  ENT:  Positive for hearing loss  Respiratory:  Positive for dyspnea on exertion  Cardiovascular:  Negative Positive for;fatigue  Gastroenterology: Negative    Genitourinary:  not assessed    Musculoskeletal:  Positive for arthritis  Neurologic:  Positive for numbness or tingling of feet(restless legs)  Psychiatric:  Negative    Heme/Lymph/Imm:  Positive for allergies  Endocrine:  Positive for thyroid disorder              Physical Exam:     Vitals: /72   Pulse 73   Ht 1.778 m (5' 10\")   Wt 86.6 kg (191 lb)   BMI 27.41 kg/m    Constitutional:  cooperative, alert and oriented, well developed, well nourished, in no acute distress   Hard of hearing    Skin:  warm and dry to the touch, no apparent skin lesions or masses noted        Head:  normocephalic, no masses or lesions        Eyes:  pupils equal and round        ENT:  no pallor or cyanosis, dentition good        Neck:  JVP normal        Chest:  clear to auscultation;normal symmetry        Cardiac: " regular rhythm;normal S1 and S2;no murmurs, gallops or rubs detected   distant heart sounds              Abdomen:      benign    Extremities and Back:  no edema;no deformities, clubbing, cyanosis, erythema observed        Neurological:  no gross motor deficits;affect appropriate                 Medications:     Current Outpatient Medications   Medication Sig Dispense Refill     albuterol (PROAIR HFA) 108 (90 Base) MCG/ACT inhaler Inhale 2 puffs into the lungs every 4 hours as needed for shortness of breath / dyspnea, wheezing or other (cough) 1 Inhaler 3     albuterol (PROVENTIL HFA) 108 (90 Base) MCG/ACT inhaler Inhale 2 puffs into the lungs every 6 hours 8.5 g 0     aspirin 81 MG tablet Take 81 mg by mouth daily        atorvastatin (LIPITOR) 20 MG tablet Take 1 tablet (20 mg) by mouth At Bedtime 90 tablet 0     Cholecalciferol (VITAMIN D) 50 MCG (2000 UT) CAPS Take 1 capsule by mouth daily       diazepam (VALIUM) 5 MG tablet TAKE 1 TABLET BY MOUTH EVERY 8 HOURS AS NEEDED FOR ANXIETY 80 tablet 4     isosorbide mononitrate (IMDUR) 30 MG 24 hr tablet Take 1 tablet (30 mg) by mouth daily In the morning 90 tablet 0     levothyroxine (SYNTHROID/LEVOTHROID) 75 MCG tablet TAKE 1 TABLET BY MOUTH ONCE DAILY 90 tablet 2     lisinopril (PRINIVIL/ZESTRIL) 10 MG tablet Take 1 tablet (10 mg) by mouth daily 90 tablet 0     metoprolol succinate (TOPROL-XL) 25 MG 24 hr tablet Take 0.5 tablets (12.5 mg) by mouth daily 90 tablet 3     mirtazapine (REMERON) 15 MG tablet Take 1 tablet (15 mg) by mouth At Bedtime 30 tablet 3     polyethylene glycol (MIRALAX/GLYCOLAX) powder Take 17 g by mouth daily as needed for constipation       tamsulosin (FLOMAX) 0.4 MG capsule Take 0.4 mg by mouth daily (with dinner)   3     azithromycin (ZITHROMAX) 250 MG tablet 2 tabs po qd day 1, then 1 tab po qd days 2-5 (Patient not taking: Reported on 11/15/2019) 6 tablet 0     predniSONE (DELTASONE) 20 MG tablet Take 1 tablet (20 mg) by mouth 2 times daily  (Patient not taking: Reported on 11/15/2019) 10 tablet 0                Data:   All laboratory data reviewed  Results for orders placed or performed in visit on 11/15/19 (from the past 24 hour(s))   Lipid Profile   Result Value Ref Range    Cholesterol 98 <200 mg/dL    Triglycerides 61 <150 mg/dL    HDL Cholesterol 41 >39 mg/dL    LDL Cholesterol Calculated 45 <100 mg/dL    Non HDL Cholesterol 57 <130 mg/dL   Basic metabolic panel   Result Value Ref Range    Sodium 131 (L) 136 - 145 mmol/L    Potassium 4.7 3.5 - 5.1 mmol/L    Chloride 98 98 - 107 mmol/L    Carbon Dioxide 31 (H) 23 - 29 mmol/L    Anion Gap 6.7 6 - 17 mmol/L    Glucose 98 70 - 105 mg/dL    Urea Nitrogen 14 7 - 30 mg/dL    Creatinine 1.20 0.70 - 1.30 mg/dL    GFR Estimate 57 (L) >60 mL/min/[1.73_m2]    GFR Estimate If Black 69 >60 mL/min/[1.73_m2]    Calcium 9.0 8.5 - 10.5 mg/dL       All laboratory data reviewed  Lab Results   Component Value Date    CHOL 98 11/15/2019     Lab Results   Component Value Date    HDL 41 11/15/2019     Lab Results   Component Value Date    LDL 45 11/15/2019     Lab Results   Component Value Date    TRIG 61 11/15/2019     Lab Results   Component Value Date    CHOLHDLRATIO 4.4 08/06/2014     TSH   Date Value Ref Range Status   01/03/2019 3.58 0.40 - 4.00 mU/L Final     Last Basic Metabolic Panel:  Lab Results   Component Value Date     11/15/2019      Lab Results   Component Value Date    POTASSIUM 4.7 11/15/2019     Lab Results   Component Value Date    CHLORIDE 98 11/15/2019     Lab Results   Component Value Date    OMAR 9.0 11/15/2019     Lab Results   Component Value Date    CO2 31 11/15/2019     Lab Results   Component Value Date    BUN 14 11/15/2019     Lab Results   Component Value Date    CR 1.20 11/15/2019     Lab Results   Component Value Date    GLC 98 11/15/2019     Lab Results   Component Value Date    WBC 9.0 02/25/2019     Lab Results   Component Value Date    RBC 4.52 02/25/2019     Lab Results    Component Value Date    HGB 13.6 02/25/2019     Lab Results   Component Value Date    HCT 41.0 02/25/2019     Lab Results   Component Value Date    MCV 91 02/25/2019     Lab Results   Component Value Date    MCH 30.1 02/25/2019     Lab Results   Component Value Date    MCHC 33.2 02/25/2019     Lab Results   Component Value Date    RDW 13.8 02/25/2019     Lab Results   Component Value Date     02/25/2019

## 2019-11-15 NOTE — LETTER
"11/15/2019    [facility]  [address], [address]  [place] [address]    RE: Korey Pearson       Dear Colleague,    I had the pleasure of seeing Korey Pearson in the [facility].    Cardiology Progress Note          Assessment and Plan:     1. Stable coronary artery disease with history of PCI to the LAD 2016 and 2017, chronic stable angina.    Continue current medical therapy    Follow-up in 1 year per patient request      This note was transcribed using electronic voice recognition software and there may be typographical errors present.                Interval History:   The patient is a very pleasant 89 year old whom I have been following for coronary artery disease status post PCI to the LAD 2016 and 2017.  He has some stable angina from an occluded diagonal artery.  He is very sharp and mentally acute.  He denies any lightheadedness.  He has some neuropathy which makes proprioception a little difficult and gait a little bit unstable.  He denies any change in his functional status.                     Review of Systems:   Review of Systems:  Skin:  Negative bruising   Eyes:  Positive for glasses(right red eye from rubbing too hard)  ENT:  Positive for hearing loss  Respiratory:  Positive for dyspnea on exertion  Cardiovascular:  Negative Positive for;fatigue  Gastroenterology: Negative    Genitourinary:  not assessed    Musculoskeletal:  Positive for arthritis  Neurologic:  Positive for numbness or tingling of feet(restless legs)  Psychiatric:  Negative    Heme/Lymph/Imm:  Positive for allergies  Endocrine:  Positive for thyroid disorder              Physical Exam:     Vitals: /72   Pulse 73   Ht 1.778 m (5' 10\")   Wt 86.6 kg (191 lb)   BMI 27.41 kg/m     Constitutional:  cooperative, alert and oriented, well developed, well nourished, in no acute distress   Hard of hearing    Skin:  warm and dry to the " touch, no apparent skin lesions or masses noted        Head:  normocephalic, no masses or lesions        Eyes:  pupils equal and round        ENT:  no pallor or cyanosis, dentition good        Neck:  JVP normal        Chest:  clear to auscultation;normal symmetry        Cardiac: regular rhythm;normal S1 and S2;no murmurs, gallops or rubs detected   distant heart sounds              Abdomen:      benign    Extremities and Back:  no edema;no deformities, clubbing, cyanosis, erythema observed        Neurological:  no gross motor deficits;affect appropriate                 Medications:     Current Outpatient Medications   Medication Sig Dispense Refill     albuterol (PROAIR HFA) 108 (90 Base) MCG/ACT inhaler Inhale 2 puffs into the lungs every 4 hours as needed for shortness of breath / dyspnea, wheezing or other (cough) 1 Inhaler 3     albuterol (PROVENTIL HFA) 108 (90 Base) MCG/ACT inhaler Inhale 2 puffs into the lungs every 6 hours 8.5 g 0     aspirin 81 MG tablet Take 81 mg by mouth daily        atorvastatin (LIPITOR) 20 MG tablet Take 1 tablet (20 mg) by mouth At Bedtime 90 tablet 0     Cholecalciferol (VITAMIN D) 50 MCG (2000 UT) CAPS Take 1 capsule by mouth daily       diazepam (VALIUM) 5 MG tablet TAKE 1 TABLET BY MOUTH EVERY 8 HOURS AS NEEDED FOR ANXIETY 80 tablet 4     isosorbide mononitrate (IMDUR) 30 MG 24 hr tablet Take 1 tablet (30 mg) by mouth daily In the morning 90 tablet 0     levothyroxine (SYNTHROID/LEVOTHROID) 75 MCG tablet TAKE 1 TABLET BY MOUTH ONCE DAILY 90 tablet 2     lisinopril (PRINIVIL/ZESTRIL) 10 MG tablet Take 1 tablet (10 mg) by mouth daily 90 tablet 0     metoprolol succinate (TOPROL-XL) 25 MG 24 hr tablet Take 0.5 tablets (12.5 mg) by mouth daily 90 tablet 3     mirtazapine (REMERON) 15 MG tablet Take 1 tablet (15 mg) by mouth At Bedtime 30 tablet 3     polyethylene glycol (MIRALAX/GLYCOLAX) powder Take 17 g by mouth daily as needed for constipation       tamsulosin (FLOMAX) 0.4 MG  capsule Take 0.4 mg by mouth daily (with dinner)   3     azithromycin (ZITHROMAX) 250 MG tablet 2 tabs po qd day 1, then 1 tab po qd days 2-5 (Patient not taking: Reported on 11/15/2019) 6 tablet 0     predniSONE (DELTASONE) 20 MG tablet Take 1 tablet (20 mg) by mouth 2 times daily (Patient not taking: Reported on 11/15/2019) 10 tablet 0                Data:   All laboratory data reviewed  Results for orders placed or performed in visit on 11/15/19 (from the past 24 hour(s))   Lipid Profile   Result Value Ref Range    Cholesterol 98 <200 mg/dL    Triglycerides 61 <150 mg/dL    HDL Cholesterol 41 >39 mg/dL    LDL Cholesterol Calculated 45 <100 mg/dL    Non HDL Cholesterol 57 <130 mg/dL   Basic metabolic panel   Result Value Ref Range    Sodium 131 (L) 136 - 145 mmol/L    Potassium 4.7 3.5 - 5.1 mmol/L    Chloride 98 98 - 107 mmol/L    Carbon Dioxide 31 (H) 23 - 29 mmol/L    Anion Gap 6.7 6 - 17 mmol/L    Glucose 98 70 - 105 mg/dL    Urea Nitrogen 14 7 - 30 mg/dL    Creatinine 1.20 0.70 - 1.30 mg/dL    GFR Estimate 57 (L) >60 mL/min/[1.73_m2]    GFR Estimate If Black 69 >60 mL/min/[1.73_m2]    Calcium 9.0 8.5 - 10.5 mg/dL       All laboratory data reviewed  Lab Results   Component Value Date    CHOL 98 11/15/2019     Lab Results   Component Value Date    HDL 41 11/15/2019     Lab Results   Component Value Date    LDL 45 11/15/2019     Lab Results   Component Value Date    TRIG 61 11/15/2019     Lab Results   Component Value Date    CHOLHDLRATIO 4.4 08/06/2014     TSH   Date Value Ref Range Status   01/03/2019 3.58 0.40 - 4.00 mU/L Final     Last Basic Metabolic Panel:  Lab Results   Component Value Date     11/15/2019      Lab Results   Component Value Date    POTASSIUM 4.7 11/15/2019     Lab Results   Component Value Date    CHLORIDE 98 11/15/2019     Lab Results   Component Value Date    OMAR 9.0 11/15/2019     Lab Results   Component Value Date    CO2 31 11/15/2019     Lab Results   Component Value Date    BUN  14 11/15/2019     Lab Results   Component Value Date    CR 1.20 11/15/2019     Lab Results   Component Value Date    GLC 98 11/15/2019     Lab Results   Component Value Date    WBC 9.0 02/25/2019     Lab Results   Component Value Date    RBC 4.52 02/25/2019     Lab Results   Component Value Date    HGB 13.6 02/25/2019     Lab Results   Component Value Date    HCT 41.0 02/25/2019     Lab Results   Component Value Date    MCV 91 02/25/2019     Lab Results   Component Value Date    MCH 30.1 02/25/2019     Lab Results   Component Value Date    MCHC 33.2 02/25/2019     Lab Results   Component Value Date    RDW 13.8 02/25/2019     Lab Results   Component Value Date     02/25/2019         Thank you for allowing me to participate in the care of your patient.    Sincerely,     Ramirez Patel MD     [facility]    cc:   Liberty Calhoun PA-C  [address]  [place] [address]

## 2019-11-20 DIAGNOSIS — I25.118 CORONARY ARTERY DISEASE OF NATIVE ARTERY OF NATIVE HEART WITH STABLE ANGINA PECTORIS (H): ICD-10-CM

## 2019-11-20 DIAGNOSIS — I10 BENIGN ESSENTIAL HYPERTENSION: ICD-10-CM

## 2019-11-20 DIAGNOSIS — I20.0 UNSTABLE ANGINA (H): ICD-10-CM

## 2019-11-20 DIAGNOSIS — E78.5 HYPERLIPIDEMIA LDL GOAL <130: ICD-10-CM

## 2019-11-20 RX ORDER — ATORVASTATIN CALCIUM 20 MG/1
20 TABLET, FILM COATED ORAL AT BEDTIME
Qty: 90 TABLET | Refills: 3 | Status: SHIPPED | OUTPATIENT
Start: 2019-11-20 | End: 2020-11-13

## 2019-11-20 RX ORDER — LISINOPRIL 10 MG/1
10 TABLET ORAL DAILY
Qty: 90 TABLET | Refills: 3 | Status: SHIPPED | OUTPATIENT
Start: 2019-11-20 | End: 2020-12-01

## 2019-11-20 RX ORDER — ISOSORBIDE MONONITRATE 30 MG/1
30 TABLET, EXTENDED RELEASE ORAL DAILY
Qty: 90 TABLET | Refills: 3 | Status: SHIPPED | OUTPATIENT
Start: 2019-11-20 | End: 2020-12-01

## 2019-12-03 DIAGNOSIS — G47.00 PERSISTENT INSOMNIA: ICD-10-CM

## 2019-12-03 NOTE — TELEPHONE ENCOUNTER
"Requested Prescriptions   Pending Prescriptions Disp Refills     mirtazapine (REMERON) 15 MG tablet [Pharmacy Med Name: Mirtazapine Oral Tablet 15 MG]  Last Written Prescription Date:  4/3/2019  Last Fill Quantity: 30 tablet,  # refills: 3   Last office visit: 3/6/2019 with prescribing provider:  Pawel   Future Office Visit:     30 tablet 2     Sig: Take 1 tablet (15 mg) by mouth At Bedtime       Atypical Antidepressants Protocol Passed - 12/3/2019  8:43 AM        Passed - Recent (12 mo) or future (30 days) visit within the authorizing provider's specialty     Patient has had an office visit with the authorizing provider or a provider within the authorizing providers department within the previous 12 mos or has a future within next 30 days. See \"Patient Info\" tab in inbasket, or \"Choose Columns\" in Meds & Orders section of the refill encounter.              Passed - Medication active on med list        Passed - Patient is age 18 or older           "

## 2019-12-06 RX ORDER — MIRTAZAPINE 15 MG/1
15 TABLET, FILM COATED ORAL AT BEDTIME
Qty: 30 TABLET | Refills: 0 | Status: SHIPPED | OUTPATIENT
Start: 2019-12-06 | End: 2019-12-31

## 2019-12-16 ENCOUNTER — HEALTH MAINTENANCE LETTER (OUTPATIENT)
Age: 84
End: 2019-12-16

## 2020-01-13 ENCOUNTER — OFFICE VISIT (OUTPATIENT)
Dept: FAMILY MEDICINE | Facility: CLINIC | Age: 85
End: 2020-01-13
Payer: COMMERCIAL

## 2020-01-13 VITALS
RESPIRATION RATE: 14 BRPM | HEART RATE: 76 BPM | HEIGHT: 70 IN | SYSTOLIC BLOOD PRESSURE: 134 MMHG | WEIGHT: 200 LBS | TEMPERATURE: 98.2 F | BODY MASS INDEX: 28.63 KG/M2 | DIASTOLIC BLOOD PRESSURE: 70 MMHG

## 2020-01-13 DIAGNOSIS — K59.01 SLOW TRANSIT CONSTIPATION: Primary | ICD-10-CM

## 2020-01-13 PROCEDURE — 99213 OFFICE O/P EST LOW 20 MIN: CPT | Performed by: FAMILY MEDICINE

## 2020-01-13 RX ORDER — SENNA AND DOCUSATE SODIUM 50; 8.6 MG/1; MG/1
1 TABLET, FILM COATED ORAL AT BEDTIME
Qty: 30 TABLET | Refills: 3 | Status: SHIPPED | OUTPATIENT
Start: 2020-01-13 | End: 2021-10-08

## 2020-01-13 ASSESSMENT — MIFFLIN-ST. JEOR: SCORE: 1573.44

## 2020-01-13 NOTE — PROGRESS NOTES
"Subjective     Korey Pearson is a 89 year old male who presents to clinic today for the following health issues:    HPI   Gastrointestinal symptoms      Duration: 2 weeks    Description: constipation, abdominal pain, bloating      Intensity:  moderate    Accompanying signs and symptoms:  nausea, chills, loose stools and bloating    History  Previous {similar problem: no   Previous evaluation:  colonoscopy    Aggravating factors: none    Alleviating factors: miralax and milk of magnesia    Other Therapies tried: None    Last good normal BM was about 2 weeks ago.  Since then he has had pressure but only small amounts at at time.  He took Milk of Magnesia for a few days, but then switched to Miralax in past few days.  Stool has been softer, pasty but not large amount    No nausea or vomiting        BP Readings from Last 3 Encounters:   01/13/20 134/70   11/15/19 115/72   11/04/19 114/62    Wt Readings from Last 3 Encounters:   01/13/20 90.7 kg (200 lb)   11/15/19 86.6 kg (191 lb)   11/04/19 90.7 kg (200 lb)                    Reviewed and updated as needed this visit by Provider         Review of Systems   ROS COMP: CONSTITUTIONAL: NEGATIVE for fever, chills, change in weight  ENT/MOUTH: NEGATIVE for ear, mouth and throat problems  RESP: NEGATIVE for significant cough or SOB  CV: NEGATIVE for chest pain, palpitations or peripheral edema  GI: POSITIVE for abdominal pain LLQ, constipation and gas or bloating      Objective    /70   Pulse 76   Temp 98.2  F (36.8  C) (Tympanic)   Resp 14   Ht 1.778 m (5' 10\")   Wt 90.7 kg (200 lb)   BMI 28.70 kg/m    Body mass index is 28.7 kg/m .  Physical Exam   GENERAL: healthy, alert and no distress  NECK: no adenopathy, no asymmetry, masses, or scars and thyroid normal to palpation  RESP: lungs clear to auscultation - no rales, rhonchi or wheezes  CV: regular rate and rhythm, normal S1 S2, no S3 or S4, no murmur, click or rub, no peripheral edema and peripheral " "pulses strong  ABDOMEN: soft, nontender, without hepatosplenomegaly or masses and bowel sounds normal  RECTAL (male): normal sphincter tone, no rectal masses and prostate 3+ enlarged, nontender    Diagnostic Test Results:  Labs reviewed in Epic  none         Assessment & Plan     Korey was seen today for gastrointestinal problem.    Diagnoses and all orders for this visit:    Slow transit constipation  -     SENNA-docusate sodium (SENNA S) 8.6-50 MG tablet; Take 1 tablet by mouth At Bedtime         BMI:   Estimated body mass index is 28.7 kg/m  as calculated from the following:    Height as of this encounter: 1.778 m (5' 10\").    Weight as of this encounter: 90.7 kg (200 lb).   Weight management plan: Discussed healthy diet and exercise guidelines        FUTURE APPOINTMENTS:       - Follow-up visit in 1 mo if not improved.  If symptoms worsen then return earlier  Patient Instructions   Take Miralax 1 capful daily with 8 oz of water  Add the Senna daily       Return in about 1 month (around 2/13/2020) for constipation.    Torito Ferguson MD  [facility]    "

## 2020-01-16 ENCOUNTER — OFFICE VISIT (OUTPATIENT)
Dept: FAMILY MEDICINE | Facility: CLINIC | Age: 85
End: 2020-01-16
Payer: COMMERCIAL

## 2020-01-16 ENCOUNTER — ANCILLARY PROCEDURE (OUTPATIENT)
Dept: GENERAL RADIOLOGY | Facility: CLINIC | Age: 85
End: 2020-01-16
Attending: FAMILY MEDICINE
Payer: COMMERCIAL

## 2020-01-16 VITALS
HEIGHT: 70 IN | DIASTOLIC BLOOD PRESSURE: 70 MMHG | WEIGHT: 201 LBS | RESPIRATION RATE: 12 BRPM | TEMPERATURE: 97.9 F | HEART RATE: 70 BPM | BODY MASS INDEX: 28.77 KG/M2 | OXYGEN SATURATION: 94 % | SYSTOLIC BLOOD PRESSURE: 138 MMHG

## 2020-01-16 DIAGNOSIS — K59.01 SLOW TRANSIT CONSTIPATION: Primary | ICD-10-CM

## 2020-01-16 DIAGNOSIS — R10.84 ABDOMINAL PAIN, GENERALIZED: ICD-10-CM

## 2020-01-16 DIAGNOSIS — K59.01 SLOW TRANSIT CONSTIPATION: ICD-10-CM

## 2020-01-16 PROCEDURE — 74019 RADEX ABDOMEN 2 VIEWS: CPT | Mod: FY

## 2020-01-16 PROCEDURE — 99214 OFFICE O/P EST MOD 30 MIN: CPT | Performed by: FAMILY MEDICINE

## 2020-01-16 ASSESSMENT — ANXIETY QUESTIONNAIRES
IF YOU CHECKED OFF ANY PROBLEMS ON THIS QUESTIONNAIRE, HOW DIFFICULT HAVE THESE PROBLEMS MADE IT FOR YOU TO DO YOUR WORK, TAKE CARE OF THINGS AT HOME, OR GET ALONG WITH OTHER PEOPLE: NOT DIFFICULT AT ALL
6. BECOMING EASILY ANNOYED OR IRRITABLE: NOT AT ALL
5. BEING SO RESTLESS THAT IT IS HARD TO SIT STILL: NOT AT ALL
1. FEELING NERVOUS, ANXIOUS, OR ON EDGE: NOT AT ALL
GAD7 TOTAL SCORE: 0
2. NOT BEING ABLE TO STOP OR CONTROL WORRYING: NOT AT ALL
7. FEELING AFRAID AS IF SOMETHING AWFUL MIGHT HAPPEN: NOT AT ALL
3. WORRYING TOO MUCH ABOUT DIFFERENT THINGS: NOT AT ALL

## 2020-01-16 ASSESSMENT — MIFFLIN-ST. JEOR: SCORE: 1577.98

## 2020-01-16 ASSESSMENT — PATIENT HEALTH QUESTIONNAIRE - PHQ9
SUM OF ALL RESPONSES TO PHQ QUESTIONS 1-9: 0
5. POOR APPETITE OR OVEREATING: NOT AT ALL

## 2020-01-16 NOTE — PATIENT INSTRUCTIONS
Use Dulcolax suppository, if not effective then use a Fleets enema.  Add Miralax powder 1 capful to diet twice daily. Mix with water or juice.  Continue with the Senna also

## 2020-01-16 NOTE — PROGRESS NOTES
"Subjective     Korey Pearson is a 89 year old male who presents to clinic today for the following health issues:    HPI     Gastrointestinal Symptoms Follow-Up      Duration: > 2 weeks and worsening    Description: constipation, abdominal pain, bloating    Intensity:  moderate    Accompanying signs and symptoms:  nausea, chills, loose stools and bloating    History  Previous similar problem: no   Previous evaluation: colonoscopy    Aggravating factors: Laying Down    Alleviating factors: miralax and milk of magnesia    Other Therapies tried: Stool Softner/No Relief      No nausea or vomiting, feels more gassy when he lays down at night  Pt did have one better BM after starting the Senna but not since      BP Readings from Last 3 Encounters:   01/16/20 138/70   01/13/20 134/70   11/15/19 115/72    Wt Readings from Last 3 Encounters:   01/16/20 91.2 kg (201 lb)   01/13/20 90.7 kg (200 lb)   11/15/19 86.6 kg (191 lb)                    Reviewed and updated as needed this visit by Provider  Tobacco  Allergies  Meds  Problems  Med Hx  Surg Hx  Fam Hx         Review of Systems   ROS COMP: CONSTITUTIONAL: NEGATIVE for fever, chills, change in weight  ENT/MOUTH: NEGATIVE for ear, mouth and throat problems  RESP: NEGATIVE for significant cough or SOB  CV: NEGATIVE for chest pain, palpitations or peripheral edema  GI: POSITIVE for abdominal pain LLQ, constipation and gas or bloating      Objective    /70   Pulse 70   Temp 97.9  F (36.6  C) (Tympanic)   Resp 12   Ht 1.778 m (5' 10\")   Wt 91.2 kg (201 lb)   SpO2 94%   BMI 28.84 kg/m    Body mass index is 28.84 kg/m .  Physical Exam   GENERAL: healthy, alert and no distress  NECK: no adenopathy, no asymmetry, masses, or scars and thyroid normal to palpation  RESP: lungs clear to auscultation - no rales, rhonchi or wheezes  CV: regular rate and rhythm, normal S1 S2, no S3 or S4, no murmur, click or rub, no peripheral edema and peripheral pulses " "strong  ABDOMEN: soft, nontender, without hepatosplenomegaly or masses and bowel sounds normal  RECTAL (male): normal sphincter tone, no rectal masses and prostate 3+ enlarged, nontender    Diagnostic Test Results:  Labs reviewed in Epic  Xray: abdomen:  Diffuse stool and gas throughout, no evidence of obstruction or free air.  I viewed xray myself, radiology report pending           Assessment & Plan     Korey was seen today for recheck.    Diagnoses and all orders for this visit:    Slow transit constipation  -     XR Abdomen 2 Views; Future    Abdominal pain, generalized  -     XR Abdomen 2 Views; Future         BMI:   Estimated body mass index is 28.7 kg/m  as calculated from the following:    Height as of this encounter: 1.778 m (5' 10\").    Weight as of this encounter: 90.7 kg (200 lb).   Weight management plan: Discussed healthy diet and exercise guidelines        FUTURE APPOINTMENTS:       - Follow-up visit in 1 mo if not improved.  If symptoms worsen then return earlier  Patient Instructions   Use Dulcolax suppository, if not effective then use a Fleets enema.  Add Miralax powder 1 capful to diet twice daily. Mix with water or juice.  Continue with the Senna also        Return in about 2 weeks (around 1/30/2020), or if symptoms worsen or fail to improve, for constipation.    Torito Ferguson MD  [facility]    "

## 2020-01-16 NOTE — NURSING NOTE
"Chief Complaint   Patient presents with     RECHECK     /70   Pulse 70   Temp 97.9  F (36.6  C) (Tympanic)   Resp 12   Ht 1.778 m (5' 10\")   Wt 91.2 kg (201 lb)   SpO2 94%   BMI 28.84 kg/m   Estimated body mass index is 28.84 kg/m  as calculated from the following:    Height as of this encounter: 1.778 m (5' 10\").    Weight as of this encounter: 91.2 kg (201 lb).  BP completed using cuff size: jonna Lovell CMA    Health Maintenance Due   Topic Date Due     ZOSTER IMMUNIZATION (1 of 2) 08/31/1979     MEDICARE ANNUAL WELLNESS VISIT  08/31/1994     PNEUMOCOCCAL IMMUNIZATION 65+ LOW/MEDIUM RISK (2 of 2 - PCV13) 03/07/2015     INFLUENZA VACCINE (1) 09/01/2019     PHQ-9  09/06/2019     VISHAL ASSESSMENT  10/30/2019     TSH W/FREE T4 REFLEX  01/03/2020     Health Maintenance reviewed at today's visit patient asked to schedule/complete:   Routine Health Visit: Patient agrees to schedule  Depression:  Patient agrees to schedule  Immunizations:  Patient agrees to schedule    "

## 2020-01-17 ASSESSMENT — ANXIETY QUESTIONNAIRES: GAD7 TOTAL SCORE: 0

## 2020-03-03 ENCOUNTER — TELEPHONE (OUTPATIENT)
Dept: FAMILY MEDICINE | Facility: CLINIC | Age: 85
End: 2020-03-03

## 2020-03-03 ENCOUNTER — OFFICE VISIT (OUTPATIENT)
Dept: FAMILY MEDICINE | Facility: CLINIC | Age: 85
End: 2020-03-03
Payer: COMMERCIAL

## 2020-03-03 VITALS
WEIGHT: 198 LBS | BODY MASS INDEX: 28.35 KG/M2 | SYSTOLIC BLOOD PRESSURE: 120 MMHG | DIASTOLIC BLOOD PRESSURE: 60 MMHG | HEIGHT: 70 IN | OXYGEN SATURATION: 100 % | HEART RATE: 65 BPM | RESPIRATION RATE: 20 BRPM

## 2020-03-03 DIAGNOSIS — F41.1 GENERALIZED ANXIETY DISORDER: Chronic | ICD-10-CM

## 2020-03-03 DIAGNOSIS — G62.9 PERIPHERAL POLYNEUROPATHY: ICD-10-CM

## 2020-03-03 DIAGNOSIS — G25.81 RESTLESS LEGS SYNDROME (RLS): Primary | ICD-10-CM

## 2020-03-03 DIAGNOSIS — E03.9 ACQUIRED HYPOTHYROIDISM: ICD-10-CM

## 2020-03-03 DIAGNOSIS — R26.89 POOR BALANCE: ICD-10-CM

## 2020-03-03 DIAGNOSIS — D64.9 ANEMIA, UNSPECIFIED TYPE: ICD-10-CM

## 2020-03-03 PROBLEM — J10.1 INFLUENZA A: Status: RESOLVED | Noted: 2019-01-04 | Resolved: 2020-03-03

## 2020-03-03 PROCEDURE — 83550 IRON BINDING TEST: CPT | Performed by: INTERNAL MEDICINE

## 2020-03-03 PROCEDURE — 36415 COLL VENOUS BLD VENIPUNCTURE: CPT | Performed by: INTERNAL MEDICINE

## 2020-03-03 PROCEDURE — 82728 ASSAY OF FERRITIN: CPT | Performed by: INTERNAL MEDICINE

## 2020-03-03 PROCEDURE — 99214 OFFICE O/P EST MOD 30 MIN: CPT | Performed by: INTERNAL MEDICINE

## 2020-03-03 PROCEDURE — 83540 ASSAY OF IRON: CPT | Performed by: INTERNAL MEDICINE

## 2020-03-03 PROCEDURE — 84443 ASSAY THYROID STIM HORMONE: CPT | Performed by: INTERNAL MEDICINE

## 2020-03-03 RX ORDER — PRAMIPEXOLE DIHYDROCHLORIDE 0.25 MG/1
TABLET ORAL
Qty: 30 TABLET | Refills: 5 | Status: SHIPPED | OUTPATIENT
Start: 2020-03-03 | End: 2020-04-28

## 2020-03-03 ASSESSMENT — MIFFLIN-ST. JEOR: SCORE: 1564.37

## 2020-03-03 NOTE — PATIENT INSTRUCTIONS
Let's do this: try the pramipexole (Mirapex) and take it 2 hours before bedtime.                         If the initial dose is not helpful, then by the 4th night  increase to 1.5 tablets .                 After another week, if still not helpful, then increase to 2 tablets.

## 2020-03-03 NOTE — PROGRESS NOTES
Subjective     Korey Pearson is a 89 year old male who presents to clinic today for the following health issues:    HPI   Joint Pain    Onset: 10 years    Description:   Location: both legs  Character: RLS    Intensity: severe    Progression of Symptoms: worse, constant    Accompanying Signs & Symptoms:  Other symptoms: Insomnia    History:   Previous similar pain: yes       Precipitating factors:   Trauma or overuse: no     Alleviating factors:  Improved by: nothing    Therapies Tried and outcome: Melatonin prn- not too effective          This patient is here with his wife.          He has some obvious memory issues.            He complains of severe RLS; 10 yr duration; worsening.          He states that when he lies down in the evening to try and sleep, after a few minutes his legs get so restless that he has to get up and walk around.  This even happens when he tries to sleep in a recliner.                  He takes diazepam chronically.  Diazepam does not help with his restless leg symptoms..                He tried gabapentin, but it did not help.                                     Stopped taking mirtazapine.                 He did not feel that was helping or hurting in any way.                   No history of iron deficiency, but this has not been checked in approximately 8 years.              He is also due to have thyroid functions checked.      Current Outpatient Medications   Medication Sig Dispense Refill     albuterol (PROVENTIL HFA) 108 (90 Base) MCG/ACT inhaler Inhale 2 puffs into the lungs every 6 hours 8.5 g 0     aspirin 81 MG tablet Take 81 mg by mouth daily        atorvastatin (LIPITOR) 20 MG tablet Take 1 tablet (20 mg) by mouth At Bedtime 90 tablet 3     Cholecalciferol (VITAMIN D) 50 MCG (2000 UT) CAPS Take 1 capsule by mouth daily       diazepam (VALIUM) 5 MG tablet TAKE 1 TABLET BY MOUTH EVERY 8 HOURS AS NEEDED FOR ANXIETY 80 tablet 4     isosorbide mononitrate (IMDUR) 30 MG 24  "hr tablet Take 1 tablet (30 mg) by mouth daily In the morning 90 tablet 3     levothyroxine (SYNTHROID/LEVOTHROID) 75 MCG tablet TAKE 1 TABLET BY MOUTH ONCE DAILY 90 tablet 2     lisinopril (PRINIVIL/ZESTRIL) 10 MG tablet Take 1 tablet (10 mg) by mouth daily 90 tablet 3     metoprolol succinate (TOPROL-XL) 25 MG 24 hr tablet Take 0.5 tablets (12.5 mg) by mouth daily 90 tablet 3     polyethylene glycol (MIRALAX/GLYCOLAX) powder Take 17 g by mouth daily as needed for constipation       SENNA-docusate sodium (SENNA S) 8.6-50 MG tablet Take 1 tablet by mouth At Bedtime 30 tablet 3     tamsulosin (FLOMAX) 0.4 MG capsule Take 0.4 mg by mouth daily (with dinner)   3     Allergies   Allergen Reactions     Ropinirole Anxiety     funny thoughts     BP Readings from Last 3 Encounters:   03/03/20 120/60   01/16/20 138/70   01/13/20 134/70    Wt Readings from Last 3 Encounters:   03/03/20 89.8 kg (198 lb)   01/16/20 91.2 kg (201 lb)   01/13/20 90.7 kg (200 lb)                      Reviewed and updated as needed this visit by Provider         Review of Systems see above plus  ROS COMP: CONSTITUTIONAL:NEGATIVE for fever, chills, change in weight  NEURO: POSITIVE for gait disturbance, memory problems and numbness or tingling       Objective    /60 (BP Location: Left arm, Patient Position: Chair, Cuff Size: Adult Large)   Pulse 65   Resp 20   Ht 1.778 m (5' 10\")   Wt 89.8 kg (198 lb)   SpO2 100%   BMI 28.41 kg/m    Body mass index is 28.41 kg/m .  Physical Exam   GENERAL APPEARANCE: alert and no distress  NEURO: His gait is a bit unsteady, and flexed at the waist  PSYCH: Asks repetitive questions and makes repetitive comments    Diagnostic Test Results:  Pending        Assessment & Plan     Korey was seen today for musculoskeletal problem.    Diagnoses and all orders for this visit:    Restless legs syndrome (RLS)  -     Ferritin  -     Iron and iron binding capacity  -     pramipexole (MIRAPEX) 0.25 MG tablet; Take " this 2 hours before bedtime.    Generalized anxiety disorder    Peripheral polyneuropathy    Poor balance    Anemia, unspecified type  -     Ferritin  -     Iron and iron binding capacity    Acquired hypothyroidism  -     TSH with free T4 reflex           Summary and implications:  We reviewed multiple issues.           We reviewed all of the issues on the diagnoses list.                        We reviewed treatment options for restless legs symptoms.                                                                      Iron studies and thyroid functions ordered.  Patient Instructions   Let's do this: try the pramipexole (Mirapex) and take it 2 hours before bedtime.                         If the initial dose is not helpful, then by the 4th night  increase to 1.5 tablets .                 After another week, if still not helpful, then increase to 2 tablets.                   Return in about 4 weeks (around 3/31/2020) for medication review and refills.    Estevan Mann MD  [facility]    Results for orders placed or performed in visit on 03/03/20   Ferritin     Status: None   Result Value Ref Range    Ferritin 206 26 - 388 ng/mL   Iron and iron binding capacity     Status: None   Result Value Ref Range    Iron 58 35 - 180 ug/dL    Iron Binding Cap 275 240 - 430 ug/dL    Iron Saturation Index 21 15 - 46 %   TSH with free T4 reflex     Status: None   Result Value Ref Range    TSH 3.84 0.40 - 4.00 mU/L     Letter sent.                       The thyroid level is good. Keep taking the same dosage.                Your iron level is normal.

## 2020-03-03 NOTE — TELEPHONE ENCOUNTER
[facility] called to verify these directions.     pramipexole (MIRAPEX) 0.25 MG tablet 30 tablet 5 3/3/2020  --   Sig: Take this 2 hours before bedtime.     Huddled with Dr. Mann. This would be take 1 tablet 2 hours before bedtime. Message given to pharmacist.

## 2020-03-03 NOTE — LETTER
March 4, 2020      Koreyraymond Pearson  [address] [facility] [id number]        Dear ,    We are writing to inform you of your test results.            The thyroid level is good. Keep taking the same dosage.                Your iron level is normal.       Results for orders placed or performed in visit on 03/03/20   Ferritin     Status: None   Result Value Ref Range    Ferritin 206 26 - 388 ng/mL   Iron and iron binding capacity     Status: None   Result Value Ref Range    Iron 58 35 - 180 ug/dL    Iron Binding Cap 275 240 - 430 ug/dL    Iron Saturation Index 21 15 - 46 %   TSH with free T4 reflex     Status: None   Result Value Ref Range    TSH 3.84 0.40 - 4.00 mU/L         If you have any questions or concerns, please call the clinic at the number listed above.       Sincerely,        Estevan Mann MD

## 2020-03-04 LAB
FERRITIN SERPL-MCNC: 206 NG/ML (ref 26–388)
IRON SATN MFR SERPL: 21 % (ref 15–46)
IRON SERPL-MCNC: 58 UG/DL (ref 35–180)
TIBC SERPL-MCNC: 275 UG/DL (ref 240–430)
TSH SERPL DL<=0.005 MIU/L-ACNC: 3.84 MU/L (ref 0.4–4)

## 2020-03-16 DIAGNOSIS — I20.0 UNSTABLE ANGINA (H): ICD-10-CM

## 2020-03-16 RX ORDER — METOPROLOL SUCCINATE 25 MG/1
12.5 TABLET, EXTENDED RELEASE ORAL DAILY
Qty: 45 TABLET | Refills: 2 | Status: SHIPPED | OUTPATIENT
Start: 2020-03-16 | End: 2020-12-16

## 2020-04-27 DIAGNOSIS — G25.81 RESTLESS LEGS SYNDROME (RLS): ICD-10-CM

## 2020-04-28 ENCOUNTER — TELEPHONE (OUTPATIENT)
Dept: FAMILY MEDICINE | Facility: CLINIC | Age: 85
End: 2020-04-28

## 2020-04-28 RX ORDER — PRAMIPEXOLE DIHYDROCHLORIDE 0.25 MG/1
TABLET ORAL
Qty: 90 TABLET | Refills: 3 | Status: SHIPPED | OUTPATIENT
Start: 2020-04-28 | End: 2020-09-04

## 2020-04-28 NOTE — TELEPHONE ENCOUNTER
RN called back to pharmacy.  Relayed provider message.  Pharmacy states understanding.  No further action needed at this time.

## 2020-04-28 NOTE — TELEPHONE ENCOUNTER
Reason for Call:  Other call back    Detailed comments:   [id number] - [place], [place] - [address] called regarding:    pramipexole (MIRAPEX) 0.25 MG tablet     Pharmacy says they need:   Directions need to say quantity of tablets.    Phone Number Patient can be reached at:   Pharmacy: [phone]    Best Time: any    Can we leave a detailed message on this number? YES    Call taken on 4/28/2020 at 11:25 AM by Jigna Cerda

## 2020-05-26 DIAGNOSIS — F41.1 GENERALIZED ANXIETY DISORDER: Chronic | ICD-10-CM

## 2020-05-27 RX ORDER — DIAZEPAM 5 MG
TABLET ORAL
Qty: 80 TABLET | Refills: 0 | Status: SHIPPED | OUTPATIENT
Start: 2020-05-27 | End: 2020-10-08

## 2020-07-29 DIAGNOSIS — E03.9 ACQUIRED HYPOTHYROIDISM: ICD-10-CM

## 2020-07-29 RX ORDER — LEVOTHYROXINE SODIUM 75 UG/1
TABLET ORAL
Qty: 90 TABLET | Refills: 2 | Status: SHIPPED | OUTPATIENT
Start: 2020-07-29 | End: 2021-05-04

## 2020-08-26 ENCOUNTER — OFFICE VISIT (OUTPATIENT)
Dept: FAMILY MEDICINE | Facility: CLINIC | Age: 85
End: 2020-08-26
Payer: COMMERCIAL

## 2020-08-26 VITALS
WEIGHT: 187 LBS | SYSTOLIC BLOOD PRESSURE: 104 MMHG | DIASTOLIC BLOOD PRESSURE: 56 MMHG | HEART RATE: 71 BPM | BODY MASS INDEX: 26.83 KG/M2 | OXYGEN SATURATION: 96 % | TEMPERATURE: 97.4 F

## 2020-08-26 DIAGNOSIS — R26.89 POOR BALANCE: Primary | ICD-10-CM

## 2020-08-26 DIAGNOSIS — G25.81 RESTLESS LEGS SYNDROME (RLS): ICD-10-CM

## 2020-08-26 DIAGNOSIS — R10.84 ABDOMINAL PAIN, GENERALIZED: ICD-10-CM

## 2020-08-26 DIAGNOSIS — K59.00 CONSTIPATION, UNSPECIFIED CONSTIPATION TYPE: ICD-10-CM

## 2020-08-26 PROCEDURE — 99213 OFFICE O/P EST LOW 20 MIN: CPT | Performed by: INTERNAL MEDICINE

## 2020-08-26 ASSESSMENT — ANXIETY QUESTIONNAIRES
3. WORRYING TOO MUCH ABOUT DIFFERENT THINGS: NEARLY EVERY DAY
5. BEING SO RESTLESS THAT IT IS HARD TO SIT STILL: MORE THAN HALF THE DAYS
GAD7 TOTAL SCORE: 15
6. BECOMING EASILY ANNOYED OR IRRITABLE: SEVERAL DAYS
IF YOU CHECKED OFF ANY PROBLEMS ON THIS QUESTIONNAIRE, HOW DIFFICULT HAVE THESE PROBLEMS MADE IT FOR YOU TO DO YOUR WORK, TAKE CARE OF THINGS AT HOME, OR GET ALONG WITH OTHER PEOPLE: SOMEWHAT DIFFICULT
2. NOT BEING ABLE TO STOP OR CONTROL WORRYING: NEARLY EVERY DAY
7. FEELING AFRAID AS IF SOMETHING AWFUL MIGHT HAPPEN: MORE THAN HALF THE DAYS
1. FEELING NERVOUS, ANXIOUS, OR ON EDGE: MORE THAN HALF THE DAYS

## 2020-08-26 ASSESSMENT — PATIENT HEALTH QUESTIONNAIRE - PHQ9
SUM OF ALL RESPONSES TO PHQ QUESTIONS 1-9: 14
5. POOR APPETITE OR OVEREATING: MORE THAN HALF THE DAYS

## 2020-08-26 NOTE — PATIENT INSTRUCTIONS
Let's do this: use the Miralax, one scoop in liquid daily.                     Then also add Senna-S and take 1 or 2 tablets, once or twice every day.

## 2020-08-26 NOTE — PROGRESS NOTES
"Subjective     Korey Pearson is a 89 year old male who presents to clinic today for the following health issues:    HPI       Constipation  Onset/Duration: months  Description:  Frequency of bowel movements: on an almost daily basis with medication  Consistency of stool: hard then loose  Progression of Symptoms: same  Accompanying signs and symptoms:    Abdominal pain: YES   Rectal pain: no   Blood in stool: no   Nausea/Vomiting: no   Weight loss or gain: YES  History:   Similar problems in past: YES  History of abdominal surgery: no  Chronic laxative use: YES- milk of magnesia   New medications: no  Precipitating or alleviating factors: laxative   Therapies tried and outcome: laxative               Has lost 13 lbs.        Has chronic GI sx; see GI note 2016.                     See [facility] consult 9/16;   Neg colonoscopy except for a 2 mm adenoma in 2015.             Add Miralax daily, and decrease dairy advised.                                                                                                                                                                                                                                               Using MOM.                         This results in a firm stool, followed by diarrhea for a day or two.                 Miralax does not help at all; or does metamucil or citrucel.                                                             At night, 3 AM, he awakens with abdominal cramping pain.           This is a long standing sx.                                                           Does not eat a lot of nuts; or fruits; eats baked beans 1-2 x per wk.                  Does not drink a lot of milk.                     Does not know why he has lost weight, other than eating less \"due to constipation\".            Review of Systems   CONSTITUTIONAL:NEGATIVE  for chills and fever   GI: NEGATIVE for hematochezia and melena       Wt Readings from Last 4 " "Encounters:   08/26/20 84.8 kg (187 lb)   03/03/20 89.8 kg (198 lb)   01/16/20 91.2 kg (201 lb)   01/13/20 90.7 kg (200 lb)       Objective    /56 (Cuff Size: Adult Large)   Pulse 71   Temp 97.4  F (36.3  C) (Tympanic)   Wt 84.8 kg (187 lb)   SpO2 96%   BMI 26.83 kg/m    Body mass index is 26.83 kg/m .  Physical Exam   GENERAL APPEARANCE: alert and no distress  ABDOMEN: soft, nontender, without hepatosplenomegaly or masses            Assessment & Plan     Korey was seen today for constipation.    Diagnoses and all orders for this visit:    Poor balance    Constipation, unspecified constipation type    Abdominal pain, generalized    Restless legs syndrome (RLS)         BMI:   Estimated body mass index is 26.83 kg/m  as calculated from the following:    Height as of 3/3/20: 1.778 m (5' 10\").    Weight as of this encounter: 84.8 kg (187 lb).                     Summary and implications:   his sx are all chronic.              Patient Instructions   Let's do this: use the Miralax, one scoop in liquid daily.                     Then also add Senna-S and take 1 or 2 tablets, once or twice every day.                            Return in about 4 weeks (around 9/23/2020) for follow up of several issues.    Estevan Mann MD  [facility]    "

## 2020-08-27 ASSESSMENT — ANXIETY QUESTIONNAIRES: GAD7 TOTAL SCORE: 15

## 2020-08-31 ENCOUNTER — TELEPHONE (OUTPATIENT)
Dept: FAMILY MEDICINE | Facility: CLINIC | Age: 85
End: 2020-08-31

## 2020-08-31 DIAGNOSIS — G25.81 RESTLESS LEGS SYNDROME (RLS): ICD-10-CM

## 2020-08-31 NOTE — TELEPHONE ENCOUNTER
[facility] and [facility] phone call message- patient requests medication or medication refill:    If this is a refill request, has the caller requested the refill from the pharmacy already? No  Name of the pharmacy and phone number for the current request:  [facility]    Name of the medication requested: pramipexole (MIRAPEX) 0.25 MG tablet     Other request: Pt states Dr Mann told him he could take 2 if one didn't work, now he ran out early and needs a refill with correct directions.    OK to leave the result message on voice mail or with a family member? YES    Call taken on 8/31/2020 at 8:37 AM by Kimberly Copeland, [facility]

## 2020-09-04 ENCOUNTER — TELEPHONE (OUTPATIENT)
Dept: FAMILY MEDICINE | Facility: CLINIC | Age: 85
End: 2020-09-04

## 2020-09-04 DIAGNOSIS — G25.81 RESTLESS LEGS SYNDROME (RLS): ICD-10-CM

## 2020-09-04 RX ORDER — PRAMIPEXOLE DIHYDROCHLORIDE 0.25 MG/1
TABLET ORAL
Qty: 135 TABLET | Refills: 3 | Status: SHIPPED | OUTPATIENT
Start: 2020-09-04 | End: 2021-02-11

## 2020-09-04 NOTE — TELEPHONE ENCOUNTER
This was an unrouted message from 08/31.  Covering providers please advise on new pended script.

## 2020-09-04 NOTE — TELEPHONE ENCOUNTER
Reason for Call:  Other prescription  Detailed comments: Steve from [facility] pharmacy has a question on medication pramipexole (MIRAPEX) 0.25 MG tablet  Phone Number Patient can be reached at: Other phone number:  [phone]  Best Time: any  Can we leave a detailed message on this number? YES  Call taken on 9/4/2020 at 11:18 AM by MARITO COCHRAN

## 2020-09-23 ENCOUNTER — OFFICE VISIT (OUTPATIENT)
Dept: FAMILY MEDICINE | Facility: CLINIC | Age: 85
End: 2020-09-23
Payer: COMMERCIAL

## 2020-09-23 VITALS
RESPIRATION RATE: 16 BRPM | OXYGEN SATURATION: 96 % | SYSTOLIC BLOOD PRESSURE: 112 MMHG | DIASTOLIC BLOOD PRESSURE: 60 MMHG | BODY MASS INDEX: 27.12 KG/M2 | HEART RATE: 66 BPM | WEIGHT: 189 LBS

## 2020-09-23 DIAGNOSIS — K59.00 CONSTIPATION, UNSPECIFIED CONSTIPATION TYPE: Primary | ICD-10-CM

## 2020-09-23 DIAGNOSIS — K58.1 IRRITABLE BOWEL SYNDROME WITH CONSTIPATION: ICD-10-CM

## 2020-09-23 PROCEDURE — 99213 OFFICE O/P EST LOW 20 MIN: CPT | Performed by: INTERNAL MEDICINE

## 2020-09-23 RX ORDER — POLYETHYLENE GLYCOL 3350 17 G/17G
17 POWDER, FOR SOLUTION ORAL 2 TIMES DAILY
Start: 2020-09-23 | End: 2021-10-08

## 2020-09-23 NOTE — PATIENT INSTRUCTIONS
Let's try this: take the Miralax twice per day.                 Keep taking the senna, 2 tablets daily.

## 2020-09-23 NOTE — PROGRESS NOTES
"Subjective     Korey Pearson is a 89 year old male who presents to clinic today for the following health issues:    HPI       Concern - Constipation and gas  Onset: ongoing  Description: pt is here to follow up on constipation and gas.  Pt states that he hasnt gotten any better since last office visit  Intensity: moderate  Progression of Symptoms:  same  Accompanying Signs & Symptoms: none  Previous history of similar problem: none  Precipitating factors:        Worsened by: none  Alleviating factors:        Improved by: none  Therapies tried and outcome:  none                 From my 8/26 note:                                    Has lost 13 lbs.        Has chronic GI sx; see GI note 2016.       See [facility] consult 9/16;   Neg colonoscopy except for a 2 mm adenoma in 2015.   Add Miralax daily, and decrease dairy advised.     Using MOM.     This results in a firm stool, followed by diarrhea for a day or two.      Miralax does not help at all; or does metamucil or citrucel.     At night, 3 AM, he awakens with abdominal cramping pain.    This is a long standing sx.  Does not eat a lot of nuts; or fruits; eats baked beans 1-2 x per wk.  Does not drink a lot of milk.    Does not know why he has lost weight, other than eating less \"due to constipation\".     Patient Instructions   Let's do this: use the Miralax, one scoop in liquid daily.                     Then also add Senna-S and take 1 or 2 tablets, once or twice every day.                                Today, he states \"no improvement\".                      Passes hard stools, every few days.             Has borborygmi, and lots of flatus over night.                 His diet is low in vegetables.         Does eat some fruit;apples, oranges, grapes.            Avoids nuts.                  Using Miralax daily, and senna 2 at bedtime.                                Review of Systems   CONSTITUTIONAL:NEGATIVE for fever, chills, change in weight  GI: NEGATIVE " for hematochezia and melena      Current Outpatient Medications   Medication Sig Dispense Refill     albuterol (PROVENTIL HFA) 108 (90 Base) MCG/ACT inhaler Inhale 2 puffs into the lungs every 6 hours 8.5 g 0     aspirin 81 MG tablet Take 81 mg by mouth daily        atorvastatin (LIPITOR) 20 MG tablet Take 1 tablet (20 mg) by mouth At Bedtime 90 tablet 3     Cholecalciferol (VITAMIN D) 50 MCG (2000 UT) CAPS Take 1 capsule by mouth daily       diazepam (VALIUM) 5 MG tablet TAKE 1 TABLET BY MOUTH EVERY 8 HOURS AS NEEDED FOR ANXIETY 80 tablet 0     isosorbide mononitrate (IMDUR) 30 MG 24 hr tablet Take 1 tablet (30 mg) by mouth daily In the morning 90 tablet 3     levothyroxine (SYNTHROID/LEVOTHROID) 75 MCG tablet Take 1 tablet by mouth once daily 90 tablet 2     lisinopril (PRINIVIL/ZESTRIL) 10 MG tablet Take 1 tablet (10 mg) by mouth daily 90 tablet 3     metoprolol succinate ER (TOPROL-XL) 25 MG 24 hr tablet Take 0.5 tablets (12.5 mg) by mouth daily 45 tablet 2     polyethylene glycol (MIRALAX/GLYCOLAX) powder Take 17 g by mouth daily as needed for constipation       pramipexole (MIRAPEX) 0.25 MG tablet Take 1-2 tablets two hours before bedtime. 135 tablet 3     SENNA-docusate sodium (SENNA S) 8.6-50 MG tablet Take 1 tablet by mouth At Bedtime 30 tablet 3     tamsulosin (FLOMAX) 0.4 MG capsule Take 0.4 mg by mouth daily (with dinner)   3     Wt Readings from Last 4 Encounters:   09/23/20 85.7 kg (189 lb)   08/26/20 84.8 kg (187 lb)   03/03/20 89.8 kg (198 lb)   01/16/20 91.2 kg (201 lb)       Objective    /60   Pulse 66   Resp 16   Wt 85.7 kg (189 lb)   SpO2 96%   BMI 27.12 kg/m    Body mass index is 27.12 kg/m .  Physical Exam   GENERAL APPEARANCE: alert and no distress  ABDOMEN: soft, nontender, without hepatosplenomegaly or masses            Assessment & Plan     Korey was seen today for recheck.    Diagnoses and all orders for this visit:    Constipation, unspecified constipation type  -      polyethylene glycol (MIRALAX) 17 GM/Dose powder; Take 17 g by mouth 2 times daily    Irritable bowel syndrome with constipation         Summary and implications:        Eventually Miralax should be successful.            Patient Instructions   Let's try this: take the Miralax twice per day.                 Keep taking the senna, 2 tablets daily.            Return in about 4 weeks (around 10/21/2020) for yearly wellness visit.    Estevan Mann MD  [facility]

## 2020-10-08 DIAGNOSIS — F41.1 GENERALIZED ANXIETY DISORDER: Chronic | ICD-10-CM

## 2020-10-08 RX ORDER — DIAZEPAM 5 MG
TABLET ORAL
Qty: 80 TABLET | Refills: 0 | Status: SHIPPED | OUTPATIENT
Start: 2020-10-08 | End: 2020-12-15

## 2020-10-08 NOTE — TELEPHONE ENCOUNTER
diazepam    Last Written Prescription Date:  5-27-20  Last Fill Quantity: 80,   # refills: 0  Last Office Visit: 9-23-20  Future Office visit:    Next 5 appointments (look out 90 days)    Oct 27, 2020  2:20 PM  PHYSICAL with Estevan Mnan MD  [facility] ([facility]) [address] [place] [id number] [phone]           Routing refill request to provider for review/approval because:  Drug not on the [facility], [facility] or [facility] refill protocol or controlled substance

## 2020-10-25 PROBLEM — I25.118 CORONARY ARTERY DISEASE OF NATIVE ARTERY OF NATIVE HEART WITH STABLE ANGINA PECTORIS (H): Chronic | Status: ACTIVE | Noted: 2019-01-07

## 2020-10-25 PROBLEM — I25.10 CORONARY ARTERY DISEASE INVOLVING NATIVE CORONARY ARTERY OF NATIVE HEART WITHOUT ANGINA PECTORIS: Status: ACTIVE | Noted: 2019-01-07

## 2020-10-27 ENCOUNTER — OFFICE VISIT (OUTPATIENT)
Dept: FAMILY MEDICINE | Facility: CLINIC | Age: 85
End: 2020-10-27
Payer: COMMERCIAL

## 2020-10-27 VITALS
DIASTOLIC BLOOD PRESSURE: 70 MMHG | WEIGHT: 192 LBS | OXYGEN SATURATION: 98 % | HEART RATE: 68 BPM | BODY MASS INDEX: 27.55 KG/M2 | SYSTOLIC BLOOD PRESSURE: 134 MMHG | RESPIRATION RATE: 16 BRPM

## 2020-10-27 DIAGNOSIS — Z00.00 ENCOUNTER FOR ANNUAL WELLNESS EXAM IN MEDICARE PATIENT: Primary | ICD-10-CM

## 2020-10-27 DIAGNOSIS — Z23 NEED FOR PROPHYLACTIC VACCINATION AND INOCULATION AGAINST INFLUENZA: ICD-10-CM

## 2020-10-27 DIAGNOSIS — I10 ESSENTIAL HYPERTENSION WITH GOAL BLOOD PRESSURE LESS THAN 130/80: ICD-10-CM

## 2020-10-27 DIAGNOSIS — E03.9 ACQUIRED HYPOTHYROIDISM: ICD-10-CM

## 2020-10-27 DIAGNOSIS — I25.10 CORONARY ARTERY DISEASE INVOLVING NATIVE CORONARY ARTERY OF NATIVE HEART WITHOUT ANGINA PECTORIS: ICD-10-CM

## 2020-10-27 DIAGNOSIS — E78.5 HYPERLIPIDEMIA LDL GOAL <100: ICD-10-CM

## 2020-10-27 DIAGNOSIS — G62.9 PERIPHERAL POLYNEUROPATHY: ICD-10-CM

## 2020-10-27 DIAGNOSIS — G25.81 RESTLESS LEGS SYNDROME (RLS): ICD-10-CM

## 2020-10-27 DIAGNOSIS — E87.1 HYPONATREMIA: ICD-10-CM

## 2020-10-27 LAB
BASOPHILS # BLD AUTO: 0 10E9/L (ref 0–0.2)
BASOPHILS NFR BLD AUTO: 0.2 %
DIFFERENTIAL METHOD BLD: NORMAL
EOSINOPHIL # BLD AUTO: 0.3 10E9/L (ref 0–0.7)
EOSINOPHIL NFR BLD AUTO: 2.6 %
ERYTHROCYTE [DISTWIDTH] IN BLOOD BY AUTOMATED COUNT: 13.4 % (ref 10–15)
HCT VFR BLD AUTO: 40.4 % (ref 40–53)
HGB BLD-MCNC: 13.6 G/DL (ref 13.3–17.7)
LYMPHOCYTES # BLD AUTO: 3.2 10E9/L (ref 0.8–5.3)
LYMPHOCYTES NFR BLD AUTO: 34.3 %
MCH RBC QN AUTO: 30.6 PG (ref 26.5–33)
MCHC RBC AUTO-ENTMCNC: 33.7 G/DL (ref 31.5–36.5)
MCV RBC AUTO: 91 FL (ref 78–100)
MONOCYTES # BLD AUTO: 0.9 10E9/L (ref 0–1.3)
MONOCYTES NFR BLD AUTO: 9.8 %
NEUTROPHILS # BLD AUTO: 5 10E9/L (ref 1.6–8.3)
NEUTROPHILS NFR BLD AUTO: 53.1 %
PLATELET # BLD AUTO: 222 10E9/L (ref 150–450)
RBC # BLD AUTO: 4.45 10E12/L (ref 4.4–5.9)
WBC # BLD AUTO: 9.5 10E9/L (ref 4–11)

## 2020-10-27 PROCEDURE — G0008 ADMIN INFLUENZA VIRUS VAC: HCPCS | Performed by: INTERNAL MEDICINE

## 2020-10-27 PROCEDURE — 36415 COLL VENOUS BLD VENIPUNCTURE: CPT | Performed by: INTERNAL MEDICINE

## 2020-10-27 PROCEDURE — 99214 OFFICE O/P EST MOD 30 MIN: CPT | Mod: 25 | Performed by: INTERNAL MEDICINE

## 2020-10-27 PROCEDURE — 90662 IIV NO PRSV INCREASED AG IM: CPT | Performed by: INTERNAL MEDICINE

## 2020-10-27 PROCEDURE — 80061 LIPID PANEL: CPT | Performed by: INTERNAL MEDICINE

## 2020-10-27 PROCEDURE — 85025 COMPLETE CBC W/AUTO DIFF WBC: CPT | Performed by: INTERNAL MEDICINE

## 2020-10-27 PROCEDURE — 99397 PER PM REEVAL EST PAT 65+ YR: CPT | Mod: 25 | Performed by: INTERNAL MEDICINE

## 2020-10-27 PROCEDURE — 84443 ASSAY THYROID STIM HORMONE: CPT | Performed by: INTERNAL MEDICINE

## 2020-10-27 PROCEDURE — 80053 COMPREHEN METABOLIC PANEL: CPT | Performed by: INTERNAL MEDICINE

## 2020-10-27 ASSESSMENT — ACTIVITIES OF DAILY LIVING (ADL): CURRENT_FUNCTION: NO ASSISTANCE NEEDED

## 2020-10-27 NOTE — PROGRESS NOTES
"SUBJECTIVE:   Korey Pearson is a 89 year old male who presents for Preventive Visit.      Patient has been advised of split billing requirements and indicates understanding: Yes   Are you in the first 12 months of your Medicare coverage?  No    Healthy Habits:    In general, how would you rate your overall health?  Good    Frequency of exercise:  None    Do you usually eat at least 4 servings of fruit and vegetables a day, include whole grains    & fiber and avoid regularly eating high fat or \"junk\" foods?  No    Taking medications regularly:  Yes    Barriers to taking medications:  None    Medication side effects:  None    Ability to successfully perform activities of daily living:  No assistance needed    Home Safety:  No safety concerns identified    Hearing Impairment:  Need to ask people to speak up or repeat themselves    In the past 6 months, have you been bothered by leaking of urine?  No    In general, how would you rate your overall mental or emotional health?  Good      PHQ-2 Total Score:    Additional concerns today:  No    Do you feel safe in your environment? Yes    Have you ever done Advance Care Planning? (For example, a Health Directive, POLST, or a discussion with a medical provider or your loved ones about your wishes): Yes, patient states has an Advance Care Planning document and will bring a copy to the clinic.      Fall risk  Fallen 2 or more times in the past year?: Yes  Any fall with injury in the past year?: Yes    Cognitive Screening   1) Repeat 3 items (Leader, Season, Table)    2) Clock draw: NORMAL  3) 3 item recall: Recalls 2 objects   Results: NORMAL clock, 2 items recalled: COGNITIVE IMPAIRMENT LESS LIKELY    Mini-CogTM Copyright RANDOLPH Acosta. Licensed by the author for use in [facility]; reprinted with permission (liz@.[email or web address]). All rights reserved.      Do you have sleep apnea, excessive snoring or daytime drowsiness?: no    Reviewed and updated as needed this " "visit by clinical staff  Tobacco  Allergies  Meds   Med Hx  Surg Hx  Fam Hx  Soc Hx        Reviewed and updated as needed this visit by Provider                Social History     Tobacco Use     Smoking status: Former Smoker     Packs/day: 1.50     Years: 10.00     Pack years: 15.00     Types: Cigarettes     Quit date: 1957     Years since quittin.8     Smokeless tobacco: Never Used   Substance Use Topics     Alcohol use: Yes     Alcohol/week: 0.0 standard drinks     Comment: 1 beer a month or less     If you drink alcohol do you typically have >3 drinks per day or >7 drinks per week? No    Alcohol Use 10/27/2020   Prescreen: >3 drinks/day or >7 drinks/week? No           Sometimes it feels that he cannot get a full, deep breath.              Sees urology in 3 wks; Dr Reynaga.                                   Takes valium \"three times per week\";  \"for 30 years\".     No SE.                 Bowels are better; with Miralax.             Has some post nasal drainage with cough.                  Poor balance; chronic; walks flexed at the waist.      Has a cane; uses it sometimes; not today.                               41 yrs as an .                Current providers sharing in care for this patient include:   Patient Care Team:  Estevan Mann MD as PCP - General (Internal Medicine)  Alexia Butt, RN as   Estevan Mann MD as Assigned PCP    The following health maintenance items are reviewed in Epic and correct as of today:  Health Maintenance   Topic Date Due     ZOSTER IMMUNIZATION (1 of 2) 1979     VISHAL ASSESSMENT  2021     PHQ-9  2021     FALL RISK ASSESSMENT  2021     MEDICARE ANNUAL WELLNESS VISIT  10/27/2021     LIPID  10/27/2021     TSH W/FREE T4 REFLEX  10/27/2021     DTAP/TDAP/TD IMMUNIZATION (2 - Td) 2023     ADVANCE CARE PLANNING  10/27/2025     DEPRESSION ACTION PLAN  Completed     INFLUENZA VACCINE  Completed     Pneumococcal " Vaccine: 65+ Years  Completed     Pneumococcal Vaccine: Pediatrics (0 to 5 Years) and At-Risk Patients (6 to 64 Years)  Aged Out     IPV IMMUNIZATION  Aged Out     MENINGITIS IMMUNIZATION  Aged Out     HEPATITIS B IMMUNIZATION  Aged Out     Patient Active Problem List    Diagnosis Date Noted     Coronary artery disease involving native coronary artery of native heart without angina pectoris 01/07/2019     Priority: High     Stents 2016       Irritable bowel syndrome with constipation 09/23/2020     Priority: Medium     Restless legs syndrome (RLS) 03/03/2020     Priority: Medium     Poor balance 03/03/2020     Priority: Medium     Syncope 01/03/2019     Priority: Medium     New onset a-fib (H) 01/03/2019     Priority: Medium     Possible; see ER note 1/19; in the setting of acute influenza; resolved       Elevation of cardiac enzymes 01/03/2019     Priority: Medium     Thrombocytopenia (H) 01/03/2019     Priority: Medium     Hyponatremia 01/03/2019     Priority: Medium     Benign prostatic hyperplasia with incomplete bladder emptying 09/11/2018     Priority: Medium     Seasonal affective disorder (H) 08/31/2018     Priority: Medium     Moderate episode of recurrent major depressive disorder (H) 08/31/2018     Priority: Medium     Anxiety 08/31/2018     Priority: Medium     Overweight (BMI 25.0-29.9) 07/04/2018     Priority: Medium     Change in bowel habits since mid June , 2018 07/04/2018     Priority: Medium     Thoracic aortic ectasia (H)  07/04/2018     Priority: Medium     History of colonic polyps since 1995 07/03/2018     Priority: Medium     Hyperlipidemia LDL goal <100 07/03/2018     Priority: Medium     Raynaud's disease without gangrene 01/10/2017     Priority: Medium     Chest pain 12/22/2016     Priority: Medium     Insomnia 12/03/2015     Priority: Medium     previously on ambien and klonopin with no improvement, currently on valium.    Last  Check: 2/11/19        Abdominal pain, generalized  07/28/2015     Priority: Medium     Goes to bed around 9:30 at night and has abdominal pain and bloating at around 3 in the am most nights. Pain feels better if he is able to sit up or eat something. Pushing on the stomach does not relieve the pain.    During the middle of the night sometimes gets up and has a glass of milk but that doesn't really help. Pain resolves after eating breakfast and does not occur during the day. Usually eats dinner between 4-6 pm.     Pain is located below the umbilicus bilaterally. Is currently on miralax because he constipated and when he does have them they are hard. Usually have 1 bowel movement a day sometimes two. Denies any emesis but occasionally has some nausea.       Diverticulosis of large intestine without hemorrhage 07/28/2015     Priority: Medium     Dizziness 09/02/2014     Priority: Medium     Constipation 09/02/2014     Priority: Medium     See [facility] consult 9/16;   Neg colonoscopy except for a 2 mm adenoma in 2015.             Add Miralax daily, and decrease dairy advised.        Flatulence, eructation, and gas pain 08/06/2014     Priority: Medium     Gastroesophageal reflux disease without esophagitis 03/18/2014     Priority: Medium     Degenerative arthritis of left knee 01/20/2014     Priority: Medium     Essential hypertension with goal blood pressure less than 130/80 06/28/2013     Priority: Medium     Restless legs syndrome with nocturnal myoclonus 06/28/2013     Priority: Medium      11/22/13 trial pramipexole (MIRAPEX) 0.5 MG tablet, can take up to 0.75 mg at bedtime.  Seen by Neurology in April and May 2013 for RLS with negative workup.  Trialed gabapentin and ropinirole with no change in symptoms.        Hearing loss      Priority: Medium     Vitamin D deficiency      Priority: Medium     Peripheral neuropathy 02/25/2013     Priority: Medium     Acquired hypothyroidism 02/25/2013     Priority: Medium     Generalized anxiety disorder 11/14/2012     Priority:  Medium     On valium prn for anxiety and insomnia  Patient is followed by ALEJANDRA BRAY for ongoing prescription of anxiety medication.  All refills should be approved by this provider, or covering partner.    Medication(s): Valium 5 mg.   Maximum quantity per month: 90 per 3 months  Clinic visit frequency required: Q 6 months     Controlled substance agreement on file: No    Last [facility] website verification:18  no concerns [email or web address]         ACP (advance care planning) 2015     Priority: Low     Advance Care Planning 2015: ACP Review and Resources Provided:  Reviewed chart for advance care plan.  Korey Merinovandana has no plan or code status on file. Discussed available resources and provided with information. . Added by Samira Alberts       Past Surgical History:   Procedure Laterality Date     ABDOMEN SURGERY  appy, hernia     ANGIOGRAM  2017     APPENDECTOMY  194     CARDIAC SURGERY      2 stents  dec 2016     CATARACT IOL, RT/LT       HERNIA REPAIR      left inguinal hernia 's     TURP  1985     Social History     Socioeconomic History     Marital status:      Spouse name: Not on file     Number of children: 4     Years of education: Not on file     Highest education level: Not on file   Occupational History     Occupation: Retired    Social Needs     Financial resource strain: Not on file     Food insecurity     Worry: Not on file     Inability: Not on file     Transportation needs     Medical: Not on file     Non-medical: Not on file   Tobacco Use     Smoking status: Former Smoker     Packs/day: 1.50     Years: 10.00     Pack years: 15.00     Types: Cigarettes     Quit date: 1957     Years since quittin.8     Smokeless tobacco: Never Used   Substance and Sexual Activity     Alcohol use: Yes     Alcohol/week: 0.0 standard drinks     Comment: 1 beer a month or less     Drug use: No     Sexual activity: Not Currently    Lifestyle     Physical activity     Days per week: Not on file     Minutes per session: Not on file     Stress: Not on file   Relationships     Social connections     Talks on phone: Not on file     Gets together: Not on file     Attends [employer] service: Not on file     Active member of club or organization: Not on file     Attends meetings of clubs or organizations: Not on file     Relationship status: Not on file     Intimate partner violence     Fear of current or ex partner: Not on file     Emotionally abused: Not on file     Physically abused: Not on file     Forced sexual activity: Not on file   Other Topics Concern     Parent/sibling w/ CABG, MI or angioplasty before 65F 55M? Yes      Service Not Asked     Blood Transfusions Not Asked     Caffeine Concern Not Asked     Occupational Exposure Not Asked     Hobby Hazards Not Asked     Sleep Concern Not Asked     Stress Concern Not Asked     Weight Concern Not Asked     Special Diet No     Back Care Not Asked     Exercise No     Bike Helmet Not Asked     Seat Belt Not Asked     Self-Exams Not Asked   Social History Narrative    , 4 children, retired .  Has living will, desires DNR/DNI.  Wife present for conversation.  (last updated 1/3/2019)                                  healthy wife in 10/20;       2 sons, 2 dtrs; all healthy;  4GC and 6 GGC     Immunization History   Administered Date(s) Administered     Influenza, Quad, High Dose, Pf, 65yr + 10/27/2020     Pneumococcal 23 valent 03/07/2014     TDAP Vaccine (Adacel) 06/25/2013       BP Readings from Last 3 Encounters:   10/27/20 134/70   09/23/20 112/60   08/26/20 104/56    Wt Readings from Last 3 Encounters:   10/27/20 87.1 kg (192 lb)   09/23/20 85.7 kg (189 lb)   08/26/20 84.8 kg (187 lb)                  Current Outpatient Medications   Medication Sig Dispense Refill     albuterol (PROVENTIL HFA) 108 (90 Base) MCG/ACT inhaler Inhale 2 puffs into the lungs every 6 hours 8.5 g 0      aspirin 81 MG tablet Take 81 mg by mouth daily        atorvastatin (LIPITOR) 20 MG tablet Take 1 tablet (20 mg) by mouth At Bedtime 90 tablet 3     Cholecalciferol (VITAMIN D) 50 MCG (2000 UT) CAPS Take 1 capsule by mouth daily       diazepam (VALIUM) 5 MG tablet TAKE 1 TABLET BY MOUTH EVERY 8 HOURS AS NEEDED FOR ANXIETY 80 tablet 0     isosorbide mononitrate (IMDUR) 30 MG 24 hr tablet Take 1 tablet (30 mg) by mouth daily In the morning 90 tablet 3     levothyroxine (SYNTHROID/LEVOTHROID) 75 MCG tablet Take 1 tablet by mouth once daily 90 tablet 2     lisinopril (PRINIVIL/ZESTRIL) 10 MG tablet Take 1 tablet (10 mg) by mouth daily 90 tablet 3     metoprolol succinate ER (TOPROL-XL) 25 MG 24 hr tablet Take 0.5 tablets (12.5 mg) by mouth daily 45 tablet 2     polyethylene glycol (MIRALAX) 17 GM/Dose powder Take 17 g by mouth 2 times daily       pramipexole (MIRAPEX) 0.25 MG tablet Take 1-2 tablets two hours before bedtime. 135 tablet 3     SENNA-docusate sodium (SENNA S) 8.6-50 MG tablet Take 1 tablet by mouth At Bedtime 30 tablet 3     tamsulosin (FLOMAX) 0.4 MG capsule Take 0.4 mg by mouth daily (with dinner)   3     Allergies   Allergen Reactions     Ropinirole Anxiety     funny thoughts         Review of Systems  CONSTITUTIONAL: NEGATIVE for fever, chills, change in weight  INTEGUMENTARY/SKIN: NEGATIVE for worrisome rashes, moles or lesions  EYES: NEGATIVE for vision changes or irritation  ENT/MOUTH: NEGATIVE for ear, mouth and throat problems  RESP: NEGATIVE for significant cough or SOB  BREAST: NEGATIVE for masses, tenderness or discharge  CV: NEGATIVE for chest pain, palpitations or peripheral edema  GI: NEGATIVE for nausea, abdominal pain, heartburn, or change in bowel habits   male :frequency  MUSCULOSKELETAL: NEGATIVE for significant arthralgias or myalgia  NEURO: gait disturbance  ENDOCRINE: NEGATIVE for temperature intolerance, skin/hair changes  HEME: NEGATIVE for bleeding problems  PSYCHIATRIC: HX  "anxiety    OBJECTIVE:   /70   Pulse 68   Resp 16   Wt 87.1 kg (192 lb)   SpO2 98%   BMI 27.55 kg/m   Estimated body mass index is 27.55 kg/m  as calculated from the following:    Height as of 3/3/20: 1.778 m (5' 10\").    Weight as of this encounter: 87.1 kg (192 lb).  Physical Exam  GENERAL: healthy, alert and no distress  EYES: Eyes grossly normal to inspection, PERRL and conjunctivae and sclerae normal  HENT: has hearing aids  NECK: no adenopathy, no asymmetry, masses, or scars and thyroid normal to palpation  RESP: lungs clear to auscultation - no rales, rhonchi or wheezes  CV: regular rate and rhythm, normal S1 S2, no S3 or S4, no murmur, click or rub, no peripheral edema and peripheral pulses strong  ABDOMEN: soft, nontender, without hepatosplenomegaly or masses  MS: no gross musculoskeletal defects noted, no edema  SKIN: no suspicious lesions or rashes  NEURO: Normal strength and tone and gait abnormal: ; flexed at the waist  PSYCH: mentation appears normal, tangential and affect normal/bright  LYMPH: no cervical adenopathy    Diagnostic Test Results:  pending    ASSESSMENT / PLAN:   Korey was seen today for physical and imm/inj.    Diagnoses and all orders for this visit:    Encounter for annual wellness exam in Medicare patient    Essential hypertension with goal blood pressure less than 130/80  -     Comprehensive metabolic panel (BMP + Alb, Alk Phos, ALT, AST, Total. Bili, TP)    Hyperlipidemia LDL goal <100  -     Comprehensive metabolic panel (BMP + Alb, Alk Phos, ALT, AST, Total. Bili, TP)  -     Lipid Profile (Chol, Trig, HDL, LDL calc)    Peripheral polyneuropathy    Restless legs syndrome (RLS)    Coronary artery disease involving native coronary artery of native heart without angina pectoris    Acquired hypothyroidism  -     CBC with platelets and differential  -     TSH with free T4 reflex    Need for prophylactic vaccination and inoculation against influenza  -     FLUZONE HIGH DOSE " "65+  [23758]  -     ADMIN INFLUENZA (For MEDICARE Patients ONLY) []               Summary and implications:  We reviewed multiple issues.           We reviewed all of the issues on the diagnoses list.                                 Stable health at age 89; no acute problems.            Ok to continue low dose diazepam.          Patient Instructions   I will let you know your lab results.                          Work on straightening out your back, when you are home and balanced.                         I will renew your medications when they come due.                     Have a good,  safe fall and winter.                     Return in about 6 months (around 4/27/2021) for follow up of several issues.      Patient has been advised of split billing requirements and indicates understanding: Yes  COUNSELING:  Reviewed preventive health counseling, as reflected in patient instructions  Special attention given to:       Regular exercise       Healthy diet/nutrition    Estimated body mass index is 27.55 kg/m  as calculated from the following:    Height as of 3/3/20: 1.778 m (5' 10\").    Weight as of this encounter: 87.1 kg (192 lb).        He reports that he quit smoking about 63 years ago. His smoking use included cigarettes. He has a 15.00 pack-year smoking history. He has never used smokeless tobacco.      Appropriate preventive services were discussed with this patient, including applicable screening as appropriate for cardiovascular disease, diabetes, osteopenia/osteoporosis, and glaucoma.  As appropriate for age/gender, discussed screening for colorectal cancer, prostate cancer, breast cancer, and cervical cancer. Checklist reviewing preventive services available has been given to the patient.    Reviewed patients plan of care and provided an AVS. The Basic Care Plan (routine screening as documented in Health Maintenance) for Korey meets the Care Plan requirement. This Care Plan has been established and " reviewed with the Patient.    Counseling Resources:  ATP IV Guidelines  Pooled Cohorts Equation Calculator  Breast Cancer Risk Calculator  Breast Cancer: Medication to Reduce Risk  FRAX Risk Assessment  ICSI Preventive Guidelines  Dietary Guidelines for Americans, 2010  USDA's MyPlate  ASA Prophylaxis  Lung CA Screening    Estevan Mann MD  [facility]    Results for orders placed or performed in visit on 10/27/20   Comprehensive metabolic panel (BMP + Alb, Alk Phos, ALT, AST, Total. Bili, TP)     Status: Abnormal   Result Value Ref Range    Sodium 130 (L) 133 - 144 mmol/L    Potassium 4.7 3.4 - 5.3 mmol/L    Chloride 98 94 - 109 mmol/L    Carbon Dioxide 28 20 - 32 mmol/L    Anion Gap 4 3 - 14 mmol/L    Glucose 91 70 - 99 mg/dL    Urea Nitrogen 20 7 - 30 mg/dL    Creatinine 0.76 0.66 - 1.25 mg/dL    GFR Estimate 80 >60 mL/min/[1.73_m2]    GFR Estimate If Black >90 >60 mL/min/[1.73_m2]    Calcium 9.1 8.5 - 10.1 mg/dL    Bilirubin Total 0.5 0.2 - 1.3 mg/dL    Albumin 3.9 3.4 - 5.0 g/dL    Protein Total 7.8 6.8 - 8.8 g/dL    Alkaline Phosphatase 101 40 - 150 U/L    ALT 34 0 - 70 U/L    AST 31 0 - 45 U/L   Lipid Profile (Chol, Trig, HDL, LDL calc)     Status: None   Result Value Ref Range    Cholesterol 106 <200 mg/dL    Triglycerides 73 <150 mg/dL    HDL Cholesterol 46 >39 mg/dL    LDL Cholesterol Calculated 45 <100 mg/dL    Non HDL Cholesterol 60 <130 mg/dL   CBC with platelets and differential     Status: None   Result Value Ref Range    WBC 9.5 4.0 - 11.0 10e9/L    RBC Count 4.45 4.4 - 5.9 10e12/L    Hemoglobin 13.6 13.3 - 17.7 g/dL    Hematocrit 40.4 40.0 - 53.0 %    MCV 91 78 - 100 fl    MCH 30.6 26.5 - 33.0 pg    MCHC 33.7 31.5 - 36.5 g/dL    RDW 13.4 10.0 - 15.0 %    Platelet Count 222 150 - 450 10e9/L    Diff Method Automated Method     % Neutrophils 53.1 %    % Lymphocytes 34.3 %    % Monocytes 9.8 %    % Eosinophils 2.6 %    % Basophils 0.2 %    Absolute Neutrophil 5.0  1.6 - 8.3 10e9/L    Absolute Lymphocytes 3.2 0.8 - 5.3 10e9/L    Absolute Monocytes 0.9 0.0 - 1.3 10e9/L    Absolute Eosinophils 0.3 0.0 - 0.7 10e9/L    Absolute Basophils 0.0 0.0 - 0.2 10e9/L   TSH with free T4 reflex     Status: None   Result Value Ref Range    TSH 2.88 0.40 - 4.00 mU/L     Letter sent.                 Your sodium level is still low.              Please cut back on your water intake by 8 ounces per day.                 Your other lab results are normal,including the liver,kidney,thyroid, cholesterol,and the glucose.       Keep taking the same medications.

## 2020-10-27 NOTE — LETTER
October 28, 2020      Korey Pearson  [address] [facility] [address]        Dear ,    We are writing to inform you of your test results.               Your sodium level is still low.              Please cut back on your water intake by 8 ounces per day.                 Your other lab results are normal,including the liver,kidney,thyroid, cholesterol,and the glucose.       Keep taking the same medications.    Results for orders placed or performed in visit on 10/27/20   Comprehensive metabolic panel (BMP + Alb, Alk Phos, ALT, AST, Total. Bili, TP)     Status: Abnormal   Result Value Ref Range    Sodium 130 (L) 133 - 144 mmol/L    Potassium 4.7 3.4 - 5.3 mmol/L    Chloride 98 94 - 109 mmol/L    Carbon Dioxide 28 20 - 32 mmol/L    Anion Gap 4 3 - 14 mmol/L    Glucose 91 70 - 99 mg/dL    Urea Nitrogen 20 7 - 30 mg/dL    Creatinine 0.76 0.66 - 1.25 mg/dL    GFR Estimate 80 >60 mL/min/[1.73_m2]    GFR Estimate If Black >90 >60 mL/min/[1.73_m2]    Calcium 9.1 8.5 - 10.1 mg/dL    Bilirubin Total 0.5 0.2 - 1.3 mg/dL    Albumin 3.9 3.4 - 5.0 g/dL    Protein Total 7.8 6.8 - 8.8 g/dL    Alkaline Phosphatase 101 40 - 150 U/L    ALT 34 0 - 70 U/L    AST 31 0 - 45 U/L   Lipid Profile (Chol, Trig, HDL, LDL calc)     Status: None   Result Value Ref Range    Cholesterol 106 <200 mg/dL    Triglycerides 73 <150 mg/dL    HDL Cholesterol 46 >39 mg/dL    LDL Cholesterol Calculated 45 <100 mg/dL    Non HDL Cholesterol 60 <130 mg/dL   CBC with platelets and differential     Status: None   Result Value Ref Range    WBC 9.5 4.0 - 11.0 10e9/L    RBC Count 4.45 4.4 - 5.9 10e12/L    Hemoglobin 13.6 13.3 - 17.7 g/dL    Hematocrit 40.4 40.0 - 53.0 %    MCV 91 78 - 100 fl    MCH 30.6 26.5 - 33.0 pg    MCHC 33.7 31.5 - 36.5 g/dL    RDW 13.4 10.0 - 15.0 %    Platelet Count 222 150 - 450 10e9/L    Diff Method Automated Method     % Neutrophils 53.1 %    % Lymphocytes 34.3 %    % Monocytes 9.8 %    % Eosinophils 2.6 %     % Basophils 0.2 %    Absolute Neutrophil 5.0 1.6 - 8.3 10e9/L    Absolute Lymphocytes 3.2 0.8 - 5.3 10e9/L    Absolute Monocytes 0.9 0.0 - 1.3 10e9/L    Absolute Eosinophils 0.3 0.0 - 0.7 10e9/L    Absolute Basophils 0.0 0.0 - 0.2 10e9/L   TSH with free T4 reflex     Status: None   Result Value Ref Range    TSH 2.88 0.40 - 4.00 mU/L         If you have any questions or concerns, please call the clinic at the number listed above.       Sincerely,        Estevan Mann MD

## 2020-10-27 NOTE — PATIENT INSTRUCTIONS
I will let you know your lab results.                          Work on straightening out your back, when you are home and balanced.                         I will renew your medications when they come due.                     Have a good,  safe fall and winter.

## 2020-10-28 LAB
ALBUMIN SERPL-MCNC: 3.9 G/DL (ref 3.4–5)
ALP SERPL-CCNC: 101 U/L (ref 40–150)
ALT SERPL W P-5'-P-CCNC: 34 U/L (ref 0–70)
ANION GAP SERPL CALCULATED.3IONS-SCNC: 4 MMOL/L (ref 3–14)
AST SERPL W P-5'-P-CCNC: 31 U/L (ref 0–45)
BILIRUB SERPL-MCNC: 0.5 MG/DL (ref 0.2–1.3)
BUN SERPL-MCNC: 20 MG/DL (ref 7–30)
CALCIUM SERPL-MCNC: 9.1 MG/DL (ref 8.5–10.1)
CHLORIDE SERPL-SCNC: 98 MMOL/L (ref 94–109)
CHOLEST SERPL-MCNC: 106 MG/DL
CO2 SERPL-SCNC: 28 MMOL/L (ref 20–32)
CREAT SERPL-MCNC: 0.76 MG/DL (ref 0.66–1.25)
GFR SERPL CREATININE-BSD FRML MDRD: 80 ML/MIN/{1.73_M2}
GLUCOSE SERPL-MCNC: 91 MG/DL (ref 70–99)
HDLC SERPL-MCNC: 46 MG/DL
LDLC SERPL CALC-MCNC: 45 MG/DL
NONHDLC SERPL-MCNC: 60 MG/DL
POTASSIUM SERPL-SCNC: 4.7 MMOL/L (ref 3.4–5.3)
PROT SERPL-MCNC: 7.8 G/DL (ref 6.8–8.8)
SODIUM SERPL-SCNC: 130 MMOL/L (ref 133–144)
TRIGL SERPL-MCNC: 73 MG/DL
TSH SERPL DL<=0.005 MIU/L-ACNC: 2.88 MU/L (ref 0.4–4)

## 2020-11-10 ENCOUNTER — TELEPHONE (OUTPATIENT)
Dept: FAMILY MEDICINE | Facility: CLINIC | Age: 85
End: 2020-11-10

## 2020-11-10 NOTE — TELEPHONE ENCOUNTER
Reason for Call:  Form, our goal is to have forms completed with 72 hours, however, some forms may require a visit or additional information.    Type of letter, form or note:  [facility] gift card    Who is the form from?: Patient    Where did the form come from: Patient or family brought in       What clinic location was the form placed at?: [facility]    Where the form was placed: Given to [facility]    What number is listed as a contact on the form?: [phone]       Additional comments: Please complete and mail to [facility]    Call taken on 11/10/2020 at 2:09 PM by KEYONNA FLORES

## 2020-11-13 DIAGNOSIS — E78.5 HYPERLIPIDEMIA LDL GOAL <130: ICD-10-CM

## 2020-11-13 DIAGNOSIS — I20.0 UNSTABLE ANGINA (H): ICD-10-CM

## 2020-11-13 RX ORDER — ATORVASTATIN CALCIUM 20 MG/1
20 TABLET, FILM COATED ORAL AT BEDTIME
Qty: 90 TABLET | Refills: 0 | Status: SHIPPED | OUTPATIENT
Start: 2020-11-13 | End: 2021-02-16

## 2020-11-16 ENCOUNTER — TRANSFERRED RECORDS (OUTPATIENT)
Dept: HEALTH INFORMATION MANAGEMENT | Facility: CLINIC | Age: 85
End: 2020-11-16

## 2020-12-01 DIAGNOSIS — I25.118 CORONARY ARTERY DISEASE OF NATIVE ARTERY OF NATIVE HEART WITH STABLE ANGINA PECTORIS (H): ICD-10-CM

## 2020-12-01 DIAGNOSIS — I10 BENIGN ESSENTIAL HYPERTENSION: ICD-10-CM

## 2020-12-01 RX ORDER — LISINOPRIL 10 MG/1
10 TABLET ORAL DAILY
Qty: 90 TABLET | Refills: 0 | Status: SHIPPED | OUTPATIENT
Start: 2020-12-01 | End: 2021-03-01

## 2020-12-01 RX ORDER — ISOSORBIDE MONONITRATE 30 MG/1
30 TABLET, EXTENDED RELEASE ORAL DAILY
Qty: 90 TABLET | Refills: 0 | Status: SHIPPED | OUTPATIENT
Start: 2020-12-01 | End: 2021-03-01

## 2020-12-15 DIAGNOSIS — F41.1 GENERALIZED ANXIETY DISORDER: Chronic | ICD-10-CM

## 2020-12-15 RX ORDER — DIAZEPAM 5 MG
5 TABLET ORAL DAILY PRN
Qty: 80 TABLET | Refills: 0 | Status: SHIPPED | OUTPATIENT
Start: 2020-12-15 | End: 2021-03-01

## 2020-12-15 NOTE — TELEPHONE ENCOUNTER
Routing refill request to provider for review/approval because:  Drug not on the [facility] refill protocol     diazepam (VALIUM) 5 MG tablet 80 tablet 0 10/8/2020  No   Sig: TAKE 1 TABLET BY MOUTH EVERY 8 HOURS AS NEEDED FOR ANXIETY   Sent to pharmacy as: diazePAM 5 MG Oral Tablet (VALIUM)   Class: E-Prescribe   Order: [id number]   E-Prescribing Status: Receipt confirmed by pharmacy (10/8/2020  2:47 PM CDT)       Tg Anglin RN Flex

## 2020-12-16 DIAGNOSIS — I20.0 UNSTABLE ANGINA (H): ICD-10-CM

## 2020-12-16 RX ORDER — METOPROLOL SUCCINATE 25 MG/1
12.5 TABLET, EXTENDED RELEASE ORAL DAILY
Qty: 45 TABLET | Refills: 0 | Status: SHIPPED | OUTPATIENT
Start: 2020-12-16 | End: 2021-03-22

## 2020-12-29 ENCOUNTER — OFFICE VISIT (OUTPATIENT)
Dept: CARDIOLOGY | Facility: CLINIC | Age: 85
End: 2020-12-29
Payer: COMMERCIAL

## 2020-12-29 VITALS
HEIGHT: 70 IN | HEART RATE: 66 BPM | SYSTOLIC BLOOD PRESSURE: 115 MMHG | WEIGHT: 196.2 LBS | BODY MASS INDEX: 28.09 KG/M2 | DIASTOLIC BLOOD PRESSURE: 63 MMHG

## 2020-12-29 DIAGNOSIS — I25.84 CORONARY ARTERY DISEASE DUE TO CALCIFIED CORONARY LESION: Primary | ICD-10-CM

## 2020-12-29 DIAGNOSIS — I10 BENIGN ESSENTIAL HYPERTENSION: ICD-10-CM

## 2020-12-29 DIAGNOSIS — I25.10 CORONARY ARTERY DISEASE DUE TO CALCIFIED CORONARY LESION: Primary | ICD-10-CM

## 2020-12-29 DIAGNOSIS — I48.0 PAF (PAROXYSMAL ATRIAL FIBRILLATION) (H): ICD-10-CM

## 2020-12-29 PROCEDURE — 99213 OFFICE O/P EST LOW 20 MIN: CPT | Performed by: INTERNAL MEDICINE

## 2020-12-29 ASSESSMENT — MIFFLIN-ST. JEOR: SCORE: 1551.21

## 2020-12-29 NOTE — LETTER
12/29/2020    Estevan Mann MD  [id number]  [facility] [address]    RE: Korey Pearson       Dear Colleague,    I had the pleasure of seeing Korey Pearson in the [facility].    Cardiology Progress Note          Assessment and Plan:     1. Stable coronary artery disease and stable angina by symptoms    If escalation in symptoms, consider repeat cardiac catheterization.    Continue nitrates and medical therapy for now.    Routine follow-up in 1 year    This note was transcribed using electronic voice recognition software and there may be typographical errors present.                Interval History:   The patient is a very pleasant and bright 89 year old with an excellent memory.  He remembers how old my daughter is and that my wife is a physician.  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    He has stable angina when he shovels snow.  It abates within 30 seconds.  It has not escalated in intensity or occurred with less activity.  We have previously attributed this to a diagonal artery that was not intervened umbel previously.  He has had PCI last in 2017 of the LAD that was quite complex.    He also had possible paroxysmal atrial fibrillation in the setting of viral illness almost 2 years ago, 30-day monitor afterward did not show any recurrent arrhythmia.  He is not anticoagulated.  He does not feel his heart racing.  He occasionally feels some abdominal tightness and dyspnea that improves when he massages the area epigastric.  He does not notice palpitations with that discomfort.                     Review of Systems:   Review of Systems:  Skin:  Negative     Eyes:  Positive for glasses  ENT:  Negative    Respiratory:  Negative    Cardiovascular:    Positive for(chest tightness)  Gastroenterology: Negative    Genitourinary:  Negative    Musculoskeletal:  Negative    Neurologic:  Negative    Psychiatric:   "Negative    Heme/Lymph/Imm:  Negative    Endocrine:  Negative                Physical Exam:     Vitals: /63   Pulse 66   Ht 1.778 m (5' 10\")   Wt 89 kg (196 lb 3.2 oz)   BMI 28.15 kg/m    Constitutional:  cooperative, alert and oriented, well developed, well nourished, in no acute distress   Hard of hearing    Skin:  warm and dry to the touch, no apparent skin lesions or masses noted        Head:  normocephalic, no masses or lesions        Eyes:  pupils equal and round        ENT:  no pallor or cyanosis        Neck:  JVP normal        Chest:  clear to auscultation;normal symmetry        Cardiac: regular rhythm;normal S1 and S2;no murmurs, gallops or rubs detected   distant heart sounds              Abdomen:  BS normoactive   benign    Extremities and Back:  no edema;no deformities, clubbing, cyanosis, erythema observed        Neurological:  no gross motor deficits;affect appropriate                 Medications:     Current Outpatient Medications   Medication Sig Dispense Refill     aspirin 81 MG tablet Take 81 mg by mouth daily        atorvastatin (LIPITOR) 20 MG tablet Take 1 tablet (20 mg) by mouth At Bedtime 90 tablet 0     Cholecalciferol (VITAMIN D) 50 MCG (2000 UT) CAPS Take 1 capsule by mouth daily       diazepam (VALIUM) 5 MG tablet Take 1 tablet (5 mg) by mouth daily as needed for anxiety 80 tablet 0     isosorbide mononitrate (IMDUR) 30 MG 24 hr tablet Take 1 tablet (30 mg) by mouth daily In the morning 90 tablet 0     levothyroxine (SYNTHROID/LEVOTHROID) 75 MCG tablet Take 1 tablet by mouth once daily 90 tablet 2     lisinopril (ZESTRIL) 10 MG tablet Take 1 tablet (10 mg) by mouth daily 90 tablet 0     metoprolol succinate ER (TOPROL-XL) 25 MG 24 hr tablet Take 0.5 tablets (12.5 mg) by mouth daily 45 tablet 0     polyethylene glycol (MIRALAX) 17 GM/Dose powder Take 17 g by mouth 2 times daily       pramipexole (MIRAPEX) 0.25 MG tablet Take 1-2 tablets two hours before bedtime. 135 tablet 3     " SENNA-docusate sodium (SENNA S) 8.6-50 MG tablet Take 1 tablet by mouth At Bedtime 30 tablet 3     tamsulosin (FLOMAX) 0.4 MG capsule Take 0.4 mg by mouth daily (with dinner)   3     albuterol (PROVENTIL HFA) 108 (90 Base) MCG/ACT inhaler Inhale 2 puffs into the lungs every 6 hours 8.5 g 0                Data:   All laboratory data reviewed  No results found for this or any previous visit (from the past 24 hour(s)).    All laboratory data reviewed  Lab Results   Component Value Date    CHOL 106 10/27/2020     Lab Results   Component Value Date    HDL 46 10/27/2020     Lab Results   Component Value Date    LDL 45 10/27/2020     Lab Results   Component Value Date    TRIG 73 10/27/2020     Lab Results   Component Value Date    CHOLHDLRATIO 4.4 08/06/2014     TSH   Date Value Ref Range Status   10/27/2020 2.88 0.40 - 4.00 mU/L Final     Last Basic Metabolic Panel:  Lab Results   Component Value Date     10/27/2020      Lab Results   Component Value Date    POTASSIUM 4.7 10/27/2020     Lab Results   Component Value Date    CHLORIDE 98 10/27/2020     Lab Results   Component Value Date    OMAR 9.1 10/27/2020     Lab Results   Component Value Date    CO2 28 10/27/2020     Lab Results   Component Value Date    BUN 20 10/27/2020     Lab Results   Component Value Date    CR 0.76 10/27/2020     Lab Results   Component Value Date    GLC 91 10/27/2020     Lab Results   Component Value Date    WBC 9.5 10/27/2020     Lab Results   Component Value Date    RBC 4.45 10/27/2020     Lab Results   Component Value Date    HGB 13.6 10/27/2020     Lab Results   Component Value Date    HCT 40.4 10/27/2020     Lab Results   Component Value Date    MCV 91 10/27/2020     Lab Results   Component Value Date    MCH 30.6 10/27/2020     Lab Results   Component Value Date    MCHC 33.7 10/27/2020     Lab Results   Component Value Date    RDW 13.4 10/27/2020     Lab Results   Component Value Date     10/27/2020       Thank you for allowing  me to participate in the care of your patient.    Sincerely,     Ramirez Patel MD     [facility]

## 2020-12-29 NOTE — PROGRESS NOTES
"Cardiology Progress Note          Assessment and Plan:     1. Stable coronary artery disease and stable angina by symptoms    If escalation in symptoms, consider repeat cardiac catheterization.    Continue nitrates and medical therapy for now.    Routine follow-up in 1 year    This note was transcribed using electronic voice recognition software and there may be typographical errors present.                Interval History:   The patient is a very pleasant and bright 89 year old with an excellent memory.  He remembers how old my daughter is and that my wife is a physician.  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    He has stable angina when he shovels snow.  It abates within 30 seconds.  It has not escalated in intensity or occurred with less activity.  We have previously attributed this to a diagonal artery that was not intervened umbel previously.  He has had PCI last in 2017 of the LAD that was quite complex.    He also had possible paroxysmal atrial fibrillation in the setting of viral illness almost 2 years ago, 30-day monitor afterward did not show any recurrent arrhythmia.  He is not anticoagulated.  He does not feel his heart racing.  He occasionally feels some abdominal tightness and dyspnea that improves when he massages the area epigastric.  He does not notice palpitations with that discomfort.                     Review of Systems:   Review of Systems:  Skin:  Negative     Eyes:  Positive for glasses  ENT:  Negative    Respiratory:  Negative    Cardiovascular:    Positive for(chest tightness)  Gastroenterology: Negative    Genitourinary:  Negative    Musculoskeletal:  Negative    Neurologic:  Negative    Psychiatric:  Negative    Heme/Lymph/Imm:  Negative    Endocrine:  Negative                Physical Exam:     Vitals: /63   Pulse 66   Ht 1.778 m (5' 10\")   Wt 89 kg (196 lb 3.2 oz)   BMI 28.15 kg/m    Constitutional:  cooperative, alert and oriented, well " developed, well nourished, in no acute distress   Hard of hearing    Skin:  warm and dry to the touch, no apparent skin lesions or masses noted        Head:  normocephalic, no masses or lesions        Eyes:  pupils equal and round        ENT:  no pallor or cyanosis        Neck:  JVP normal        Chest:  clear to auscultation;normal symmetry        Cardiac: regular rhythm;normal S1 and S2;no murmurs, gallops or rubs detected   distant heart sounds              Abdomen:  BS normoactive   benign    Extremities and Back:  no edema;no deformities, clubbing, cyanosis, erythema observed        Neurological:  no gross motor deficits;affect appropriate                 Medications:     Current Outpatient Medications   Medication Sig Dispense Refill     aspirin 81 MG tablet Take 81 mg by mouth daily        atorvastatin (LIPITOR) 20 MG tablet Take 1 tablet (20 mg) by mouth At Bedtime 90 tablet 0     Cholecalciferol (VITAMIN D) 50 MCG (2000 UT) CAPS Take 1 capsule by mouth daily       diazepam (VALIUM) 5 MG tablet Take 1 tablet (5 mg) by mouth daily as needed for anxiety 80 tablet 0     isosorbide mononitrate (IMDUR) 30 MG 24 hr tablet Take 1 tablet (30 mg) by mouth daily In the morning 90 tablet 0     levothyroxine (SYNTHROID/LEVOTHROID) 75 MCG tablet Take 1 tablet by mouth once daily 90 tablet 2     lisinopril (ZESTRIL) 10 MG tablet Take 1 tablet (10 mg) by mouth daily 90 tablet 0     metoprolol succinate ER (TOPROL-XL) 25 MG 24 hr tablet Take 0.5 tablets (12.5 mg) by mouth daily 45 tablet 0     polyethylene glycol (MIRALAX) 17 GM/Dose powder Take 17 g by mouth 2 times daily       pramipexole (MIRAPEX) 0.25 MG tablet Take 1-2 tablets two hours before bedtime. 135 tablet 3     SENNA-docusate sodium (SENNA S) 8.6-50 MG tablet Take 1 tablet by mouth At Bedtime 30 tablet 3     tamsulosin (FLOMAX) 0.4 MG capsule Take 0.4 mg by mouth daily (with dinner)   3     albuterol (PROVENTIL HFA) 108 (90 Base) MCG/ACT inhaler Inhale 2 puffs  into the lungs every 6 hours 8.5 g 0                Data:   All laboratory data reviewed  No results found for this or any previous visit (from the past 24 hour(s)).    All laboratory data reviewed  Lab Results   Component Value Date    CHOL 106 10/27/2020     Lab Results   Component Value Date    HDL 46 10/27/2020     Lab Results   Component Value Date    LDL 45 10/27/2020     Lab Results   Component Value Date    TRIG 73 10/27/2020     Lab Results   Component Value Date    CHOLHDLRATIO 4.4 08/06/2014     TSH   Date Value Ref Range Status   10/27/2020 2.88 0.40 - 4.00 mU/L Final     Last Basic Metabolic Panel:  Lab Results   Component Value Date     10/27/2020      Lab Results   Component Value Date    POTASSIUM 4.7 10/27/2020     Lab Results   Component Value Date    CHLORIDE 98 10/27/2020     Lab Results   Component Value Date    OMAR 9.1 10/27/2020     Lab Results   Component Value Date    CO2 28 10/27/2020     Lab Results   Component Value Date    BUN 20 10/27/2020     Lab Results   Component Value Date    CR 0.76 10/27/2020     Lab Results   Component Value Date    GLC 91 10/27/2020     Lab Results   Component Value Date    WBC 9.5 10/27/2020     Lab Results   Component Value Date    RBC 4.45 10/27/2020     Lab Results   Component Value Date    HGB 13.6 10/27/2020     Lab Results   Component Value Date    HCT 40.4 10/27/2020     Lab Results   Component Value Date    MCV 91 10/27/2020     Lab Results   Component Value Date    MCH 30.6 10/27/2020     Lab Results   Component Value Date    MCHC 33.7 10/27/2020     Lab Results   Component Value Date    RDW 13.4 10/27/2020     Lab Results   Component Value Date     10/27/2020

## 2021-02-01 ENCOUNTER — MYC MEDICAL ADVICE (OUTPATIENT)
Dept: INTERNAL MEDICINE | Facility: CLINIC | Age: 86
End: 2021-02-01

## 2021-02-09 NOTE — PATIENT INSTRUCTIONS
Try taking the Lyrica (pregabalin) to help with your restless legs.             Takes this 2 hours before bedtime.           Do not also take the over the counter sleep aid.                  Let me know in a couple weeks how this is working out; we can raise the dose if needed.                    Call [phone] to try to get an appointment for the COVID-19 vaccine.

## 2021-02-11 ENCOUNTER — OFFICE VISIT (OUTPATIENT)
Dept: INTERNAL MEDICINE | Facility: CLINIC | Age: 86
End: 2021-02-11
Payer: COMMERCIAL

## 2021-02-11 VITALS
SYSTOLIC BLOOD PRESSURE: 112 MMHG | HEART RATE: 98 BPM | BODY MASS INDEX: 28.63 KG/M2 | DIASTOLIC BLOOD PRESSURE: 62 MMHG | TEMPERATURE: 97.6 F | HEIGHT: 70 IN | WEIGHT: 200 LBS

## 2021-02-11 DIAGNOSIS — G25.3 RESTLESS LEGS SYNDROME WITH NOCTURNAL MYOCLONUS: Primary | Chronic | ICD-10-CM

## 2021-02-11 DIAGNOSIS — G25.81 RESTLESS LEGS SYNDROME WITH NOCTURNAL MYOCLONUS: Primary | Chronic | ICD-10-CM

## 2021-02-11 DIAGNOSIS — F41.1 GENERALIZED ANXIETY DISORDER: Chronic | ICD-10-CM

## 2021-02-11 PROCEDURE — 99213 OFFICE O/P EST LOW 20 MIN: CPT | Performed by: INTERNAL MEDICINE

## 2021-02-11 RX ORDER — MULTIVITAMIN WITH IRON
1 TABLET ORAL DAILY
COMMUNITY
Start: 2021-02-11 | End: 2022-01-28

## 2021-02-11 RX ORDER — PREGABALIN 50 MG/1
CAPSULE ORAL
Qty: 30 CAPSULE | Refills: 5 | Status: SHIPPED | OUTPATIENT
Start: 2021-02-11 | End: 2021-02-23

## 2021-02-11 ASSESSMENT — MIFFLIN-ST. JEOR: SCORE: 1568.44

## 2021-02-11 NOTE — PROGRESS NOTES
"    Assessment & Plan     Restless legs syndrome with nocturnal myoclonus   he has tried several meds without much success.             - pregabalin (LYRICA) 50 MG capsule  Dispense: 30 capsule; Refill: 5    Generalized anxiety disorder  Chronic diazepam use; many years.           Ok to continue               Patient Instructions                Try taking the Lyrica (pregabalin) to help with your restless legs.             Takes this 2 hours before bedtime.           Do not also take the over the counter sleep aid.                  Let me know in a couple weeks how this is working out; we can raise the dose if needed.                    Call [phone] to try to get an appointment for the COVID-19 vaccine.                                                                                                                                                                                                                                Return in about 3 months (around 5/11/2021) for follow up of several issues.    Estevan Mann MD  [facility]    Saroj Horton is a 89 year old who presents for the following health issues     HPI       Restless leg recheck - stopped mirapex because it wasn't helping. Has been taking sleeping pills sometimes.    Long hx of diazepam use.        PDMP reviewed by me today.                   He takes this approx bid.                   He has tried taking this in the evening; has not helped with his RLS.                 He has cut back caffeine, after 12 noon.                                                                   He has tried an OTC sleep aid; ? Sominex; and this helps, but he can be a bit groggy in the AM.    He states his legs even \"jump\" at night; he can relieve his sx by just standing up.                   From the problem list:               \"Seen by Neurology in April and May 2013 for RLS with negative workup.  Trialed gabapentin and " "ropinirole with no change in symptoms.\"   Review of Systems         Objective    /62   Pulse 98   Temp 97.6  F (36.4  C) (Oral)   Ht 1.778 m (5' 10\")   Wt 90.7 kg (200 lb)   BMI 28.70 kg/m    Body mass index is 28.7 kg/m .  Physical Exam   NEURO: mentation intact, speech normal and broad based gait; flexed at the waist                "

## 2021-02-16 DIAGNOSIS — I20.0 UNSTABLE ANGINA (H): ICD-10-CM

## 2021-02-16 DIAGNOSIS — E78.5 HYPERLIPIDEMIA LDL GOAL <130: ICD-10-CM

## 2021-02-16 RX ORDER — ATORVASTATIN CALCIUM 20 MG/1
20 TABLET, FILM COATED ORAL AT BEDTIME
Qty: 90 TABLET | Refills: 2 | Status: SHIPPED | OUTPATIENT
Start: 2021-02-16 | End: 2021-11-16

## 2021-02-22 ENCOUNTER — IMMUNIZATION (OUTPATIENT)
Dept: NURSING | Facility: CLINIC | Age: 86
End: 2021-02-22
Payer: COMMERCIAL

## 2021-02-22 PROCEDURE — 91300 PR COVID VAC PFIZER DIL RECON 30 MCG/0.3 ML IM: CPT

## 2021-02-22 PROCEDURE — 0001A PR COVID VAC PFIZER DIL RECON 30 MCG/0.3 ML IM: CPT

## 2021-03-01 DIAGNOSIS — I10 BENIGN ESSENTIAL HYPERTENSION: ICD-10-CM

## 2021-03-01 DIAGNOSIS — I25.118 CORONARY ARTERY DISEASE OF NATIVE ARTERY OF NATIVE HEART WITH STABLE ANGINA PECTORIS (H): ICD-10-CM

## 2021-03-01 RX ORDER — LISINOPRIL 10 MG/1
10 TABLET ORAL DAILY
Qty: 90 TABLET | Refills: 2 | Status: SHIPPED | OUTPATIENT
Start: 2021-03-01 | End: 2021-08-19

## 2021-03-01 RX ORDER — ISOSORBIDE MONONITRATE 30 MG/1
30 TABLET, EXTENDED RELEASE ORAL DAILY
Qty: 90 TABLET | Refills: 2 | Status: SHIPPED | OUTPATIENT
Start: 2021-03-01 | End: 2021-11-29

## 2021-03-15 ENCOUNTER — IMMUNIZATION (OUTPATIENT)
Dept: NURSING | Facility: CLINIC | Age: 86
End: 2021-03-15
Attending: INTERNAL MEDICINE
Payer: COMMERCIAL

## 2021-03-15 PROCEDURE — 0002A PR COVID VAC PFIZER DIL RECON 30 MCG/0.3 ML IM: CPT

## 2021-03-15 PROCEDURE — 91300 PR COVID VAC PFIZER DIL RECON 30 MCG/0.3 ML IM: CPT

## 2021-03-22 DIAGNOSIS — I20.0 UNSTABLE ANGINA (H): ICD-10-CM

## 2021-03-22 RX ORDER — METOPROLOL SUCCINATE 25 MG/1
12.5 TABLET, EXTENDED RELEASE ORAL DAILY
Qty: 45 TABLET | Refills: 2 | Status: SHIPPED | OUTPATIENT
Start: 2021-03-22 | End: 2021-10-11

## 2021-05-03 DIAGNOSIS — E03.9 ACQUIRED HYPOTHYROIDISM: ICD-10-CM

## 2021-05-04 DIAGNOSIS — E03.9 ACQUIRED HYPOTHYROIDISM: ICD-10-CM

## 2021-05-04 RX ORDER — LEVOTHYROXINE SODIUM 75 UG/1
75 TABLET ORAL DAILY
Qty: 90 TABLET | Refills: 2 | Status: SHIPPED | OUTPATIENT
Start: 2021-05-04 | End: 2022-01-28

## 2021-05-04 RX ORDER — LEVOTHYROXINE SODIUM 75 UG/1
TABLET ORAL
Qty: 90 TABLET | Refills: 0 | OUTPATIENT
Start: 2021-05-04

## 2021-05-10 DIAGNOSIS — F41.1 GENERALIZED ANXIETY DISORDER: Chronic | ICD-10-CM

## 2021-05-11 RX ORDER — DIAZEPAM 5 MG
TABLET ORAL
Qty: 80 TABLET | Refills: 0 | Status: SHIPPED | OUTPATIENT
Start: 2021-05-11 | End: 2021-08-11

## 2021-07-27 ENCOUNTER — OFFICE VISIT (OUTPATIENT)
Dept: INTERNAL MEDICINE | Facility: CLINIC | Age: 86
End: 2021-07-27
Payer: COMMERCIAL

## 2021-07-27 VITALS
SYSTOLIC BLOOD PRESSURE: 100 MMHG | DIASTOLIC BLOOD PRESSURE: 60 MMHG | TEMPERATURE: 98.4 F | WEIGHT: 192 LBS | HEART RATE: 66 BPM | OXYGEN SATURATION: 96 % | BODY MASS INDEX: 27.55 KG/M2

## 2021-07-27 DIAGNOSIS — G25.3 RESTLESS LEGS SYNDROME WITH NOCTURNAL MYOCLONUS: Primary | Chronic | ICD-10-CM

## 2021-07-27 DIAGNOSIS — F41.1 GENERALIZED ANXIETY DISORDER: Chronic | ICD-10-CM

## 2021-07-27 DIAGNOSIS — G25.81 RESTLESS LEGS SYNDROME WITH NOCTURNAL MYOCLONUS: Primary | Chronic | ICD-10-CM

## 2021-07-27 DIAGNOSIS — I10 ESSENTIAL HYPERTENSION WITH GOAL BLOOD PRESSURE LESS THAN 130/80: ICD-10-CM

## 2021-07-27 PROCEDURE — 99214 OFFICE O/P EST MOD 30 MIN: CPT | Performed by: INTERNAL MEDICINE

## 2021-07-27 RX ORDER — ROPINIROLE 0.25 MG/1
0.25 TABLET, FILM COATED ORAL 3 TIMES DAILY
Qty: 30 TABLET | Refills: 0 | Status: CANCELLED | OUTPATIENT
Start: 2021-07-27

## 2021-07-27 RX ORDER — CITALOPRAM HYDROBROMIDE 10 MG/1
10 TABLET ORAL DAILY
Qty: 90 TABLET | Refills: 0 | Status: SHIPPED | OUTPATIENT
Start: 2021-07-27 | End: 2021-12-07

## 2021-07-27 RX ORDER — PRAMIPEXOLE DIHYDROCHLORIDE 0.12 MG/1
0.12 TABLET ORAL 3 TIMES DAILY
Qty: 30 TABLET | Refills: 0 | Status: CANCELLED | OUTPATIENT
Start: 2021-07-27

## 2021-07-27 NOTE — PROGRESS NOTES
"    Assessment & Plan     Restless legs syndrome with nocturnal myoclonus  Discussed with patient that at this point he may benefit from getting a 2nd opinion from a different neurologist about his restless leg syndrome.  He appears to have exhausted most oral medicines without success.  I have offered him the opportunity to see a neurologist of my preference but he would like to wait and see how he responds to current medication recommendations.  - citalopram (CELEXA) 10 MG tablet; Take 1 tablet (10 mg) by mouth daily    Generalized anxiety disorder  We discussed the benefits of treating his anxiety as well as the risks.  He feels that at this point he would like to try something less that we will start low-dose citalopram 10 mg daily and he will follow-up in 4 to 6 weeks.  I have also advised him that he should be trying to cut back on his Valium use.    Essential hypertension with goal blood pressure less than 130/80  Patient manifests mild symptoms of orthostasis.  I have advised him to hold his lisinopril at present time and continue with his metoprolol as well as Imdur.  He will follow-up and recheck his blood pressure in 4 weeks and observe his orthostatic symptoms.  His wife states that she has a blood pressure cuff and will monitor blood pressure.     BMI:   Estimated body mass index is 27.55 kg/m  as calculated from the following:    Height as of 2/11/21: 1.778 m (5' 10\").    Weight as of this encounter: 87.1 kg (192 lb).       See Patient Instructions    No follow-ups on file.    Estevan Zuñiga MD  [facility]        Saroj Horton is a 89 year old who presents for the following health issues  accompanied by his self:    HPI   Here today to discuss restless legs.    Saw Neuro-Dr. [id number] for leg tremors- had blood work done and given multiple supplements.  Patient states that he was advised after his recent neurology visit that he should take some supplemental " vitamins that were prescribed by the same physician.  The patient has taken such vitamins and is really not noticed any significant change.  Patient comes with several pages of lab work that were done at the neurology clinic all of which are essentially within normal range with the exception of a slightly low transferrin level in the setting of a normal hemoglobin, ferritin and serum iron level as well as B12 level.    Patient has apparently had restless leg syndrome dating back to at least 2013 when he has been seen in neurological consultation.  He apparently has tried numerous medications including gabapentin, Lyrica, rec whip, Mirapex as well as numerous supplements inclusive of CBD oil.  He has not found any resolution to his symptoms.    He does occasionally use Valium on an as-needed basis for anxiety and his wife feels that the anxiety is of major concern in the sense that he is constantly anxious.  He states that he gets so nervous when it starts to get dark and go to bed that he wonders whether this may be contributing to some of his issues.  He states he supposed to go to one of his granddaughters weddings this weekend but is still nervous about doing such that he is not sure that he will go.  He apparently has never been treated for such.    He also reports that periodically when he gets up out of a seated position or changes position to an upright posture he feels fairly lightheaded.  Of note he has been seen in the cardiology clinic and is currently being managed with beta-blocker therapy, ACE inhibitor therapy and long-acting nitrates.  He does describe orthostatic complaints.    Patient has not seen me prior in clinic.  He has seen Dr. Mann.      Review of Systems   CONSTITUTIONAL: NEGATIVE for fever, chills, change in weight  EYES: NEGATIVE for vision changes or irritation  ENT/MOUTH: NEGATIVE for ear, mouth and throat problems  RESP: NEGATIVE for significant cough or SOB  CV: NEGATIVE for chest  pain, palpitations or peripheral edema  GI: NEGATIVE for nausea, abdominal pain, heartburn, or change in bowel habits  : NEGATIVE for frequency, dysuria, or hematuria  HEME: NEGATIVE for bleeding problems  PSYCHIATRIC: NEGATIVE for changes in mood or affect      Objective    /60   Pulse 66   Temp 98.4  F (36.9  C) (Tympanic)   Wt 87.1 kg (192 lb)   SpO2 96%   BMI 27.55 kg/m    There is no height or weight on file to calculate BMI.     Physical Exam   GENERAL:  alert and no distress  EYES: Eyes grossly normal to inspection, PERRL and conjunctivae and sclerae normal  HENT: ear canals and TM's normal, nose and mouth without ulcers or lesions, he is mildly hard of hearing   NECK: no adenopathy, no asymmetry, masses, or scars and thyroid normal to palpation  RESP: lungs clear to auscultation - no rales, rhonchi or wheezes  CV: regular rate and rhythm, normal S1 S2, no S3 or S4, no click or rub  MS: no gross musculoskeletal defects noted  NEURO: He does not manifest any focal neurologic complaints less the fact that he drives his legs is being jumpy.  PSYCH: mentation appears normal, affect anxious

## 2021-08-09 ENCOUNTER — OFFICE VISIT (OUTPATIENT)
Dept: URGENT CARE | Facility: URGENT CARE | Age: 86
End: 2021-08-09
Payer: COMMERCIAL

## 2021-08-09 ENCOUNTER — ANCILLARY PROCEDURE (OUTPATIENT)
Dept: GENERAL RADIOLOGY | Facility: CLINIC | Age: 86
End: 2021-08-09
Attending: PHYSICIAN ASSISTANT
Payer: COMMERCIAL

## 2021-08-09 VITALS
OXYGEN SATURATION: 97 % | TEMPERATURE: 97.8 F | RESPIRATION RATE: 16 BRPM | HEART RATE: 61 BPM | DIASTOLIC BLOOD PRESSURE: 56 MMHG | SYSTOLIC BLOOD PRESSURE: 114 MMHG

## 2021-08-09 DIAGNOSIS — R05.9 COUGH: ICD-10-CM

## 2021-08-09 DIAGNOSIS — R05.8 PRODUCTIVE COUGH: Primary | ICD-10-CM

## 2021-08-09 DIAGNOSIS — J01.90 ACUTE NON-RECURRENT SINUSITIS, UNSPECIFIED LOCATION: ICD-10-CM

## 2021-08-09 LAB — SARS-COV-2 RNA RESP QL NAA+PROBE: NEGATIVE

## 2021-08-09 PROCEDURE — U0003 INFECTIOUS AGENT DETECTION BY NUCLEIC ACID (DNA OR RNA); SEVERE ACUTE RESPIRATORY SYNDROME CORONAVIRUS 2 (SARS-COV-2) (CORONAVIRUS DISEASE [COVID-19]), AMPLIFIED PROBE TECHNIQUE, MAKING USE OF HIGH THROUGHPUT TECHNOLOGIES AS DESCRIBED BY CMS-2020-01-R: HCPCS | Performed by: PHYSICIAN ASSISTANT

## 2021-08-09 PROCEDURE — U0005 INFEC AGEN DETEC AMPLI PROBE: HCPCS | Performed by: PHYSICIAN ASSISTANT

## 2021-08-09 PROCEDURE — 71046 X-RAY EXAM CHEST 2 VIEWS: CPT | Performed by: RADIOLOGY

## 2021-08-09 PROCEDURE — 99214 OFFICE O/P EST MOD 30 MIN: CPT | Performed by: PHYSICIAN ASSISTANT

## 2021-08-09 RX ORDER — DOXYCYCLINE 100 MG/1
100 CAPSULE ORAL 2 TIMES DAILY
Qty: 20 CAPSULE | Refills: 0 | Status: SHIPPED | OUTPATIENT
Start: 2021-08-09 | End: 2021-08-19

## 2021-08-09 ASSESSMENT — PAIN SCALES - GENERAL: PAINLEVEL: NO PAIN (0)

## 2021-08-09 NOTE — PATIENT INSTRUCTIONS
"(R05) Productive cough  (primary encounter diagnosis)  Comment:   Plan: doxycycline hyclate (VIBRAMYCIN) 100 MG capsule            (R05) Cough  Comment:   Plan: Symptomatic COVID-19 Virus (Coronavirus) by PCR        Nasopharyngeal, XR Chest 2 Views            (J01.90) Acute non-recurrent sinusitis, unspecified location  Comment:   Plan: doxycycline hyclate (VIBRAMYCIN) 100 MG capsule        Saline nasal spray            Patient Education   After Your COVID-19 (Coronavirus) Test  You have been tested for COVID-19 (coronavirus).   If you'll have surgery in the next few days, we'll let you know ahead of time if you have the virus. Please call your surgeon's office with any questions.  For all other patients: Results are usually available in [employer] within 2 to 3 days.   If you do not have a [employer] account, you'll get a letter in the mail in about 7 to 10 days.   [facility] is often the fastest way to get test results. Please sign up if you do not already have a [employer] account. See the handout Getting COVID-19 Test Results in [employer] for help.  What if my test result is positive?  If your test is positive and you have not viewed your result in [facility], you'll get a phone call with your result. (A positive test means that you have the virus.)     Follow the tips under \"How do I self-isolate?\" below for 10 days (20 days if you have a weak immune system).    You don't need to be retested for COVID-19 before going back to school or work. As long as you're fever-free and feeling better, you can go back to school, work and other activities after waiting the 10 or 20 days.  What if I have questions after I get my results?  If you have questions about your results, please visit our testing website at [email or web address].   After 7 to 10 days, if you have not gotten your results:     Call [phone] ([phone]) and ask to speak with our COVID-19 results team.    If you're being treated at " "an infusion center: Call your infusion center directly.  What are the symptoms of COVID-19?  Cough, fever and trouble breathing are the most common signs of COVID-19.  Other symptoms can include new headaches, new muscle or body aches, new and unexplained fatigue (feeling very tired), chills, sore throat, congestion (stuffy or runny nose), diarrhea (loose poop), loss of taste or smell, belly pain, and nausea or vomiting (feeling sick to your stomach or throwing up).  You may already have symptoms of COVID-19, or they may show up later.  What should I do if I have symptoms?  If you're having surgery: Call your surgeon's office.  For all other patients: Stay home and away from others (self-isolate) until ...    You've had no fever--and no medicine that reduces fever--for 1 full day (24 hours), AND    Other symptoms have gotten better. For example, your cough or breathing has improved, AND    At least 10 days have passed since your symptoms first started.  How do I self-isolate?    Stay in your own room, even for meals. Use your own bathroom if you can.    Stay away from others in your home. No hugging, kissing or shaking hands. No visitors.    Don't go to work, school or anywhere else.    Clean \"high touch\" surfaces often (doorknobs, counters, handles). Use household cleaning spray or wipes. You'll find a full list of  on the EPA website: www.epa.gov/pesticide-registration/list-n-disinfectants-use-against-sars-cov-2.    Cover your mouth and nose with a mask or other face covering to avoid spreading germs.    Wash your hands and face often. Use soap and water.    Caregivers in these groups are at risk for severe illness due to COVID-19:  ? People 65 years and older  ? People who live in a nursing home or long-term care facility  ? People with chronic disease (lung, heart, cancer, diabetes, kidney, liver, immunologic)  ? People who have a weakened immune system, including those who:    Are in cancer " treatment    Take medicine that weakens the immune system, such as corticosteroids    Had a bone marrow or organ transplant    Have an immune deficiency    Have poorly controlled HIV or AIDS    Are obese (body mass index of 40 or higher)    Smoke regularly    Caregivers should wear gloves while washing dishes, handling laundry and cleaning bedrooms and bathrooms.    Use caution when washing and drying laundry: Don't shake dirty laundry and use the warmest water setting that you can.    For more tips on managing your health at home, go to www.cdc.gov/coronavirus/2019-ncov/downloads/10Things.pdf.  How can I take care of myself at home?  1. Get lots of rest. Drink extra fluids (unless a doctor has told you not to).  2. Take Tylenol (acetaminophen) for fever or pain. If you have liver or kidney problems, ask your family doctor if it's OK to take Tylenol.   Adults can take either:  ? 650 mg (two 325 mg pills) every 4 to 6 hours, or   ? 1,000 mg (two 500 mg pills) every 8 hours as needed.  ? Note: Don't take more than 3,000 mg in one day. Acetaminophen is found in many medicines (both prescribed and over-the-counter medicines). Read all labels to be sure you don't take too much.   For children, check the Tylenol bottle for the right dose. The dose is based on the child's age or weight.  3. If you have other health problems (like cancer, heart failure, an organ transplant or severe kidney disease): Call your specialty clinic if you don't feel better in the next 2 days.  4. Know when to call 911. Emergency warning signs include:  ? Trouble breathing or shortness of breath  ? Chest pain or pressure that doesn't go away  ? Feeling confused like you haven't felt before, or not being able to wake up  ? Bluish-colored lips or face  5. If your doctor prescribed a blood thinner medicine: Follow their instructions.  Where can I get more information?     [facility] - About COVID-19:   [email or web address]    CDC -  If You're Sick: cdc.gov/coronavirus/2019-ncov/about/steps-when-sick.html    CDC - Ending Home Isolation: www.cdc.gov/coronavirus/2019-ncov/hcp/disposition-in-home-patients.html    CDC - Caring for Someone: www.cdc.gov/coronavirus/2019-ncov/if-you-are-sick/care-for-someone.html    [facility] - Interim Guidance for Hospital Discharge to Home: [email or web address]/[email or web address]/hcp/hospdischarge.pdf    [facility] clinical trials (COVID-19 research studies): [email or web address]-clinical-trials    Below are the COVID-19 hotlines at the [place] Department of [facility] ([facility]). Interpreters are available.  ? For health questions: Call 117-483-9284 or 1-926.265.9169 (7 a.m. to 7 p.m.)  ? For questions about schools and childcare: Call 685-148-8363 or 1-522.594.4238 (7 a.m. to 7 p.m.)    For informational purposes only. Not to replace the advice of your health care provider. Clinically reviewed by Infection Prevention and the [facility] COVID-19 Clinical Team. Copyright   2020 [facility]. All rights reserved. [employer] 460986 - Rev 11/11/20.

## 2021-08-09 NOTE — PROGRESS NOTES
Patient presents with:  Urgent Care  Cough: x1 week, one day did also have a HA frontal lobe, but no other symptoms, cough has not gotten better or worse, feels like there is phlegm stuck; no known exposure to COVID, has had vaccine    (R05) Productive cough  (primary encounter diagnosis)  Comment:   Plan: doxycycline hyclate (VIBRAMYCIN) 100 MG capsule            (R05) Cough  Comment:   Plan: Symptomatic COVID-19 Virus (Coronavirus) by PCR        Nasopharyngeal, XR Chest 2 Views            (J01.90) Acute non-recurrent sinusitis, unspecified location  Comment:   Plan: doxycycline hyclate (VIBRAMYCIN) 100 MG capsule        Saline nasal spray    F/U with PCP for re-check in 10 days sooner should symptoms persist, to ED should symptoms worsen.      SUBJECTIVE:   Korey Pearson is a 89 year old male who presents today with cough for one week, occasionally productive. Sinus congestion.  Some sneezing as well.    Post nasal drip, feels like he needs to clear his throat.       Covid vaccination complete 3/15/21    SH: here with his wife today      Past Medical History:   Diagnosis Date     Anxiety disorder      CAD, Stent x 2 2016     Diverticulosis of colon      Erythrocytosis      Hearing loss      Hyperlipidemia      Hypertension      Hypothyroid      Peripheral neuropathy, Idiopathic 2012     Restless leg syndrome      Skin cancer     both ears     Vitamin D deficiency          Current Outpatient Medications   Medication Sig Dispense Refill     Multiple Vitamins-Iron (DAILY-SINDHU/IRON/BETA-CAROTENE) TABS TAKE 1 TABLET BY MOUTH DAILY. (Patient not taking: Reported on 10/19/2020) 30 tablet 7     Social History     Tobacco Use     Smoking status: Never Smoker     Smokeless tobacco: Never Used   Substance Use Topics     Alcohol use: Not on file     Family History   Problem Relation Age of Onset     Diabetes Mother      Diabetes Father          ROS:    10 point ROS of systems including Constitutional, Eyes,  Respiratory, Cardiovascular, Gastroenterology, Genitourinary, Integumentary, Muscularskeletal, Psychiatric ,neurological were all negative except for pertinent positives noted in my HPI       OBJECTIVE:  /56 (BP Location: Right arm, Patient Position: Chair, Cuff Size: Adult Regular)   Pulse 61   Temp 97.8  F (36.6  C) (Oral)   Resp 16   SpO2 97%   Physical Exam:  GENERAL APPEARANCE: healthy, alert and no distress  EYES: EOMI,  PERRL, conjunctiva clear  HENT: ear canals and TM's normal.  Nose and mouth without ulcers, erythema or lesions  HENT: nasal turbinates boggy with bluish hue and rhinorrhea yellow  NECK: supple, nontender, no lymphadenopathy  RESP: lungs clear to auscultation - no rales, rhonchi or wheezes  CV: regular rates and rhythm, normal S1 S2, no murmur noted  ABDOMEN:  soft, nontender, no HSM or masses and bowel sounds normal  NEURO: Normal strength and tone, sensory exam grossly normal,  normal speech and mentation  SKIN: no suspicious lesions or rashes    X-Ray was done, my findings are: atelectasis as per previous films

## 2021-08-19 ENCOUNTER — OFFICE VISIT (OUTPATIENT)
Dept: INTERNAL MEDICINE | Facility: CLINIC | Age: 86
End: 2021-08-19
Payer: COMMERCIAL

## 2021-08-19 VITALS
OXYGEN SATURATION: 98 % | TEMPERATURE: 98.5 F | BODY MASS INDEX: 27.26 KG/M2 | SYSTOLIC BLOOD PRESSURE: 104 MMHG | WEIGHT: 190 LBS | DIASTOLIC BLOOD PRESSURE: 56 MMHG | HEART RATE: 67 BPM

## 2021-08-19 DIAGNOSIS — S22.000A COMPRESSION FRACTURE OF BODY OF THORACIC VERTEBRA (H): ICD-10-CM

## 2021-08-19 DIAGNOSIS — F41.1 GENERALIZED ANXIETY DISORDER: Chronic | ICD-10-CM

## 2021-08-19 DIAGNOSIS — R05.9 COUGH: Primary | ICD-10-CM

## 2021-08-19 PROCEDURE — 99214 OFFICE O/P EST MOD 30 MIN: CPT | Performed by: INTERNAL MEDICINE

## 2021-08-19 NOTE — PROGRESS NOTES
Assessment & Plan     Cough  Suspect postinfectious cough status post recent antibiotic treatment.  Clinically improving.  No focal changes on exam and stable vitals.  Suggest supportive care and observation.  No indication for further antibiotics    Compression fracture of body of thoracic vertebra (H)  Reviewed chest x-ray results and potential compression fraction.  Offered further evaluation and potential secondary assessment.  He is not interested.    Generalized anxiety disorder  Continuing with SSRI.  Advised cautious and judicious use of benzodiazepine as ordered.  Reviewed PDMP     See Patient Instructions    Return in about 2 weeks (around 9/2/2021) for if symptoms recur or worsen.    Estevan Zuñiga MD  [facility]    Subjective :    Clifford is a 89 year old who presents for the following health issues  accompanied by his self:    HPI     Patient is here for follow-up after being seen in the urgent care.  Noted cough.  Patient was found to be negative for Covid and given doxycycline.  Still having cough improved per wife.    Patient has a history of a chest x-ray being performed.  This was reviewed with the patient.  There was a question of a compression fracture which was not previously identified.  He does not report any obvious falls although his wife mentions that at some point in the past she does remember him slipping in the garage but does not report or did not report any pain.    He has been on his SSRI for his anxiety which he feels is helped a little bit but his wife states that he is still using the Valium given to him by Dr. Mann.    He wants to consider seeing somebody for his restless leg syndrome as he was dissatisfied with his prior neurological assessment.    Review of Systems:    CONSTITUTIONAL: NEGATIVE for fever, chills, change in weight  EYES: NEGATIVE for vision changes or irritation  ENT/MOUTH: NEGATIVE for ear, mouth and throat problems  CV:  NEGATIVE for chest pain, palpitations or peripheral edema  GI: NEGATIVE for nausea, abdominal pain, heartburn, or change in bowel habits  : NEGATIVE for frequency, dysuria, or hematuria  HEME: NEGATIVE for bleeding problems  PSYCHIATRIC: NEGATIVE for changes in mood or affect      Objective    /56   Pulse 67   Temp 98.5  F (36.9  C) (Tympanic)   Wt 86.2 kg (190 lb)   SpO2 98%   BMI 27.26 kg/m    Body mass index is 27.26 kg/m .     Physical Exam:    GENERAL: alert and no distress  EYES: Eyes grossly normal to inspection, PERRL and conjunctivae and sclerae normal  HENT: ear canals and TM's normal, nose and mouth without ulcers or lesions  NECK: no adenopathy, no asymmetry, masses, or scars and thyroid normal to palpation  RESP: lungs clear to auscultation - no rales, rhonchi or wheezes  CV: regular rate and rhythm, normal S1 S2, no S3 or S4, no click or rub  MS: no gross musculoskeletal defects noted, no edema  NEURO: No new focal changes  PSYCH: mentation appears normal, affect anxious

## 2021-08-31 ENCOUNTER — MYC MEDICAL ADVICE (OUTPATIENT)
Dept: INTERNAL MEDICINE | Facility: CLINIC | Age: 86
End: 2021-08-31

## 2021-08-31 DIAGNOSIS — G62.9 PERIPHERAL POLYNEUROPATHY: Primary | ICD-10-CM

## 2021-09-01 NOTE — TELEPHONE ENCOUNTER
Please see [employer] message and advise.    Thank you,    Iris GALLARDORN BSN  [facility]  [phone]

## 2021-09-20 ENCOUNTER — TRANSFERRED RECORDS (OUTPATIENT)
Dept: HEALTH INFORMATION MANAGEMENT | Facility: CLINIC | Age: 86
End: 2021-09-20

## 2021-09-29 DIAGNOSIS — F41.1 GENERALIZED ANXIETY DISORDER: Chronic | ICD-10-CM

## 2021-09-30 RX ORDER — DIAZEPAM 5 MG
TABLET ORAL
Qty: 30 TABLET | Refills: 0 | Status: SHIPPED | OUTPATIENT
Start: 2021-09-30 | End: 2021-10-08

## 2021-10-07 NOTE — PATIENT INSTRUCTIONS
AFTER VISIT SUMMARY (AVS):    At today's visit we thoroughly discussed various diagnostic possibilities for your symptoms, possible additional evaluation (vitamin B12, methylmalonic acid, B1, B6, vitamin D, and ferritin), available treatment options and the plan.    We decided to switch Valium to clonazepam at bedtime to see if it works better.  You should completely stop Valium (diazepam) and only take clonazepam at bedtime every night for the next several weeks.  Please contact my clinic with any intolerable side effects or excessive sedation.  If not effective, we might consider a trial of rotigotine patch.    Please also review recently completed laboratory work-up from your previous neurologist.  It would be helpful if you bring a copy of lab results to my office.  Then we will decide if additional laboratory evaluation is needed.    We also discussed additional behavioral strategies to help restless leg symptoms.    Next follow-up appointment is in the next 4 weeks or earlier if needed.    Please do not hesitate to call me with any questions or concerns.    Thanks.

## 2021-10-07 NOTE — PROGRESS NOTES
INITIAL NEUROLOGY CONSULTATION    DATE OF VISIT: 10/8/2021  CLINIC LOCATION: [facility]  MRN: [id number]  PATIENT NAME: Korey Pearson  YOB: 1929    PRIMARY CARE PROVIDER: Estevan Mann MD     REASON FOR VISIT:   Chief Complaint   Patient presents with     Numbness     bilateral legs says they are also moving a lot      Gait Problem     patient reports being unsteady on thier feet      HISTORY OF PRESENT ILLNESS:                                                    Mr. Korey Pearson is 89 year old right handed male patient with past medical history of peripheral polyneuropathy, hypothyroidism, hearing loss, anxiety, hypertension, and restless leg syndrome, who was seen in consultation today requested by Estevan Zuñiga MD, for peripheral polyneuropathy/restless leg syndrome management.  The patient is accompanied by his wife, who participates in the interview today.    Per patient's report, for the last 5 years he has been dealing with jumpy leg movements that affects his ability to sleep.  He describes this as shaking from his knees down to his feet all night long.  It could be one leg 1 night, and the other next night.  He also have an irresistible urge to move his feet or walk, which seem to relieve his symptoms temporarily.  He was diagnosed with peripheral polyneuropathy.  No significant degree of pain.  Reports unsteadiness in his feet.  He is stooped forward.  He has fasciculations in both calf muscles.  No significant muscle atrophy, though feels that his calves are not as bulky as they used to be.  Leaning on a shopping cart helps.  No significant low back pain.  He denies any additional focal neurological symptoms, aggravating or alleviating factors.  He tried sleeping pills (Unisom), Valium, citalopram, and a pill for restless leg syndrome without relief.  On chart review, he was on gabapentin, Mirapex, and Lyrica.  According to scanned notes  from [facility] (Dr. Granger, 2013) the patient also tried ropinirole at 0.75 mg, which was not effective.    EMG from 10/16/2012, performed at the [facility], demonstrated normal conduction studies of both lower extremities with chronic neurogenic changes in distal musculature bilaterally.    His labs from October 2020 include low sodium (130) on otherwise unremarkable CMP, normal TSH (2.88), and CBC.  His ferritin was 206 in March 2020.  COVID-19 PCR was negative in August 2021.    Head CT from 12/21/2016, performed for headache, was negative for acute intracranial abnormality.  Brain atrophy and white matter changes consistent with small vessel ischemic disease were noted.    No additional useful information is available in Care Everywhere, which was reviewed.    Review of Systems - the patient endorses insomnia, thyroid problems, loss of libido, anxiety, restlessness, bilateral hearing loss, increasing constipation, prostate problems and unusual moles.  All of them have been previously discussed with other medical providers. Otherwise, he denies any other complaints on 14-point comprehensive review of systems.  PAST MEDICAL/SURGICAL HISTORY:                                                    I personally reviewed patient's past medical and surgical history with the patient at today's visit.  MEDICATIONS:                                                    I personally reviewed patient's medications and allergies with the patient at today's visit.  aspirin 81 MG tablet, Take 81 mg by mouth daily   atorvastatin (LIPITOR) 20 MG tablet, Take 1 tablet (20 mg) by mouth At Bedtime  Cholecalciferol (VITAMIN D) 50 MCG (2000 UT) CAPS, Take 1 capsule by mouth daily  citalopram (CELEXA) 10 MG tablet, Take 1 tablet (10 mg) by mouth daily  diazepam (VALIUM) 5 MG tablet, TAKE 1 TABLET BY MOUTH ONCE DAILY AS NEEDED FOR ANXIETY  isosorbide mononitrate (IMDUR) 30 MG 24 hr tablet, Take 1  "tablet (30 mg) by mouth daily In the morning  levothyroxine (SYNTHROID/LEVOTHROID) 75 MCG tablet, Take 1 tablet (75 mcg) by mouth daily  magnesium 250 MG tablet, Take 1 tablet (250 mg) by mouth daily  metoprolol succinate ER (TOPROL-XL) 25 MG 24 hr tablet, Take 0.5 tablets (12.5 mg) by mouth daily  tamsulosin (FLOMAX) 0.4 MG capsule, Take 0.4 mg by mouth daily (with dinner)   polyethylene glycol (MIRALAX) 17 GM/Dose powder, Take 17 g by mouth 2 times daily  SENNA-docusate sodium (SENNA S) 8.6-50 MG tablet, Take 1 tablet by mouth At Bedtime    No current facility-administered medications on file prior to visit.    ALLERGIES:                                                      Allergies   Allergen Reactions     Ropinirole Anxiety     funny thoughts     FAMILY/SOCIAL HISTORY:                                                    Family and social history was reviewed with the patient at today's visit.  No family history of neurological disorders, except hemorrhagic stroke (mother).  Former smoker, quit in 1957.  1 beer per month or less.  Denies recreational drug use.  .  Retired  of 40 years.  REVIEW OF SYSTEMS:                                                    Patient has completed a [facility] Patient Health History, including a 14-system review, which was personally reviewed, and pertinent positives are listed in HPI. He denies any additional problems on the further questioning.  EXAM:                                                    VITAL SIGNS:   BP 90/52 (BP Location: Right arm, Patient Position: Sitting, Cuff Size: Adult Regular)   Pulse 71   Ht 1.753 m (5' 9\")   Wt 84.6 kg (186 lb 9.6 oz)   SpO2 92%   BMI 27.56 kg/m    Mini-Cog Assessment:  Mini Cog Assessment  Clock Draw Score: 2 Normal  3 Item Recall: 3 objects recalled  Mini Cog Total Score: 5  Administered by: : Cammy PITTMAN    General: pt is in NAD, cooperative.  Skin: normal turgor, moist mucous membranes, no lesions/rashes " noticed.  HEENT: ATNC, EOMI, PERRL, white sclera, normal conjunctiva, no nystagmus or ptosis. No carotid bruits bilaterally.  Respiratory: lung sounds clear to auscultation bilaterally, no crackles, wheezes, rhonchi. Symmetric lung excursion, no accessory respiratory muscle use.  Cardiovascular: normal S1/S2, no murmurs/rubs/gallops.   Abdomen: Not distended.  : deferred.    Neurological:  Mental: alert, follows commands, Mini Cog Total Score: 5/5 with 3/3 on memory recall, no aphasia or dysarthria. Fund of knowledge is appropriate for age.  Cranial Nerves:  CN II: visual acuity - able to accurately count fingers with each eye. Visual fields intact, fundi: discs sharp, no papilledema and normal vessels bilaterally.  CN III, IV, VI: EOM intact, pupils equal and reactive  CN V: facial sensation nl  CN VII: face symmetric, no facial droop  CN VIII: hearing is bilaterally reduced  CN IX: palate elevation symmetric, uvula at midline  CN XI SCM normal, shoulder shrug nl  CN XII: tongue midline  Motor: Strength: 5/5 in all major groups of all extremities. Normal tone. No abnormal movements.  Bilateral calf fasciculations.  No pronator drift b/l.  Reflexes: Triceps, biceps, brachioradialis, and patellar reflexes normal and symmetric, achilles. No clonus noted. Toes are down-going b/l.   Sensory:  light touch, pinprick, and vibration reduced in both feet. Romberg: Questionably positive.  Coordination: FNF and heel-shin tests intact b/l.   Gait: Stooped forward posture, preserved arm swings, slightly unsteady.  DATA:   LABS/EMG/IMAGING/OTHER STUDIES: I reviewed pertinent medical records, including personal review of head CT images, EMG report, prior neurology notes and Care Everywhere, as detailed in the history of present illness.  ASSESSMENT AND PLAN:      ASSESSMENT: Korey Pearson is a 89 year old male patient with listed above past medical history, who presents with uncomfortable limb movements at night in  context of peripheral polyneuropathy/restless leg syndrome.    We had a detailed discussion with the patient and his wife regarding his presenting complaints.  The neurological exam today is supportive of known history of peripheral polyneuropathy.  We discussed that clinical presentation is most likely consistent with peripheral polyneuropathy with associated restless leg syndrome and periodic limb movements of sleep.  I reviewed that the treatment should be concentrated on restless leg syndrome rather than limb movements.  I would like to recheck his ferritin level to assure that it remains above 75.  It was 206 in March 2020.  It looks like his vitamin B12 was low in the distant past, and currently he is not on any replacement.  So, it would be helpful to check his vitamin B1, B6, B12, and E along with methylmalonic acid level.  The patient believes that he was recently tested by his previous neurologist and will check records at home before we proceed with additional labs.    In the past the patient tried gabapentin, Lyrica, pramipexole, and ropinirole.  These medications could be retried at higher doses, though it is questionable whether or not they provide the relief.  The remaining options include Sinemet, rotigotine patch, low-dose of clonazepam (would require to stop his Valium), and opiates.  The patient stated that he did not have any side effects with Valium and would be inclined to try clonazepam instead, which I think is reasonable option.  If not effective, we might try rotigotine patch or Sinemet.  I doubt that his presentation is consistent with segmental myoclonus, but if we exhaust all options a trial of Keppra might be an option.    Also briefly discussed his stooped forward posture that might be due to lumbar spinal stenosis given positive shopping cart sign.  I offered him referral to physical therapy to assess for need of assistive devices (walker).  The patient will think about  it.    DIAGNOSES:    ICD-10-CM    1. Peripheral polyneuropathy  G62.9 Adult Neurology Referral   2. Restless legs syndrome (RLS)  G25.81 clonazePAM (KLONOPIN) 1 MG tablet     PLAN: At today's visit we thoroughly discussed various diagnostic possibilities for patient's symptoms, possible additional evaluation (vitamin B12, methylmalonic acid, B1, B6, vitamin D, and ferritin), available treatment options and the plan.    We decided to switch Valium to clonazepam at bedtime to see if it works better.  I advised the patient to completely stop Valium (diazepam) and only take clonazepam at bedtime every night for the next several weeks.  He will contact my clinic with any intolerable side effects or excessive sedation.  If not effective, we might consider a trial of rotigotine patch.    The patient will review recently completed laboratory work-up from his previous neurologist.  He will provide a copy of lab results to my office.  Then we will decide if additional laboratory evaluation is needed.    We also discussed additional behavioral strategies (stretching exercises and walking before bedtime) to help restless leg symptoms.    Next follow-up appointment is in the next 4 weeks or earlier if needed.    Total Time: 65 minutes spent on the date of the encounter doing chart review, history and exam, documentation and further activities per the note.    Arturo Vences MD  [facility]  (Chart documentation was completed in part with Dragon voice-recognition software. Even though reviewed, some grammatical, spelling, and word errors may remain.)    October 11, 2021  Addendum.  I reviewed brought by the patient recent laboratory evaluation from Dr. Colorado's office. It appears that the patient had laboratory evaluation on 5/20/2021. At that time his CBC was normal. CMP demonstrated sodium of 129 and chloride of 94. Methylmalonic acid, vitamin C, folate, TSH, free T3/T4, vitamin B6 (20.4), A, D, B1  (118.6), B2, GGT, ferritin (294), magnesium,, elevated hemoglobin A1C (5.9), and low total iron binding capacity of 221. The results will be scanned into patient's medical record.  Arturo Vences MD.

## 2021-10-08 ENCOUNTER — CARE COORDINATION (OUTPATIENT)
Dept: CARDIOLOGY | Facility: CLINIC | Age: 86
End: 2021-10-08

## 2021-10-08 ENCOUNTER — OFFICE VISIT (OUTPATIENT)
Dept: NEUROLOGY | Facility: CLINIC | Age: 86
End: 2021-10-08
Attending: INTERNAL MEDICINE
Payer: COMMERCIAL

## 2021-10-08 VITALS
OXYGEN SATURATION: 92 % | BODY MASS INDEX: 27.64 KG/M2 | HEART RATE: 71 BPM | WEIGHT: 186.6 LBS | SYSTOLIC BLOOD PRESSURE: 90 MMHG | DIASTOLIC BLOOD PRESSURE: 52 MMHG | HEIGHT: 69 IN

## 2021-10-08 DIAGNOSIS — G25.81 RESTLESS LEGS SYNDROME (RLS): ICD-10-CM

## 2021-10-08 DIAGNOSIS — G62.9 PERIPHERAL POLYNEUROPATHY: Primary | ICD-10-CM

## 2021-10-08 PROBLEM — E53.8 VITAMIN B12 DEFICIENCY (NON ANEMIC): Status: ACTIVE | Noted: 2021-10-08

## 2021-10-08 PROCEDURE — G0463 HOSPITAL OUTPT CLINIC VISIT: HCPCS

## 2021-10-08 PROCEDURE — 99205 OFFICE O/P NEW HI 60 MIN: CPT | Performed by: PSYCHIATRY & NEUROLOGY

## 2021-10-08 RX ORDER — CLONAZEPAM 1 MG/1
1 TABLET ORAL AT BEDTIME
Qty: 31 TABLET | Refills: 1 | Status: SHIPPED | OUTPATIENT
Start: 2021-10-08 | End: 2021-11-01

## 2021-10-08 ASSESSMENT — MIFFLIN-ST. JEOR: SCORE: 1486.79

## 2021-10-08 NOTE — LETTER
10/8/2021         RE: Korey Pearson  [address]  [facility] [address]        Dear Colleague,    Thank you for referring your patient, Korey Pearson, to the [facility] [place]. Please see a copy of my visit note below.    INITIAL NEUROLOGY CONSULTATION    DATE OF VISIT: 10/8/2021  CLINIC LOCATION: [facility]  MRN: [id number]  PATIENT NAME: Korey Pearson  YOB: 1929    PRIMARY CARE PROVIDER: Estevan Mann MD     REASON FOR VISIT:   Chief Complaint   Patient presents with     Numbness     bilateral legs says they are also moving a lot      Gait Problem     patient reports being unsteady on thier feet      HISTORY OF PRESENT ILLNESS:                                                    Mr. Korey Pearson is 89 year old right handed male patient with past medical history of peripheral polyneuropathy, hypothyroidism, hearing loss, anxiety, hypertension, and restless leg syndrome, who was seen in consultation today requested by Estevan Zuñiga MD, for peripheral polyneuropathy/restless leg syndrome management.  The patient is accompanied by his wife, who participates in the interview today.    Per patient's report, for the last 5 years he has been dealing with jumpy leg movements that affects his ability to sleep.  He describes this as shaking from his knees down to his feet all night long.  It could be one leg 1 night, and the other next night.  He also have an irresistible urge to move his feet or walk, which seem to relieve his symptoms temporarily.  He was diagnosed with peripheral polyneuropathy.  No significant degree of pain.  Reports unsteadiness in his feet.  He is stooped forward.  He has fasciculations in both calf muscles.  No significant muscle atrophy, though feels that his calves are not as bulky as they used to be.  Leaning on a shopping cart helps.  No significant low back pain.  He  denies any additional focal neurological symptoms, aggravating or alleviating factors.  He tried sleeping pills (Unisom), Valium, citalopram, and a pill for restless leg syndrome without relief.  On chart review, he was on gabapentin, Mirapex, and Lyrica.  According to scanned notes from [facility] (Dr. Granger, 2013) the patient also tried ropinirole at 0.75 mg, which was not effective.    EMG from 10/16/2012, performed at the [facility], demonstrated normal conduction studies of both lower extremities with chronic neurogenic changes in distal musculature bilaterally.    His labs from October 2020 include low sodium (130) on otherwise unremarkable CMP, normal TSH (2.88), and CBC.  His ferritin was 206 in March 2020.  COVID-19 PCR was negative in August 2021.    Head CT from 12/21/2016, performed for headache, was negative for acute intracranial abnormality.  Brain atrophy and white matter changes consistent with small vessel ischemic disease were noted.    No additional useful information is available in Care Everywhere, which was reviewed.    Review of Systems - the patient endorses insomnia, thyroid problems, loss of libido, anxiety, restlessness, bilateral hearing loss, increasing constipation, prostate problems and unusual moles.  All of them have been previously discussed with other medical providers. Otherwise, he denies any other complaints on 14-point comprehensive review of systems.  PAST MEDICAL/SURGICAL HISTORY:                                                    I personally reviewed patient's past medical and surgical history with the patient at today's visit.  MEDICATIONS:                                                    I personally reviewed patient's medications and allergies with the patient at today's visit.  aspirin 81 MG tablet, Take 81 mg by mouth daily   atorvastatin (LIPITOR) 20 MG tablet, Take 1 tablet (20 mg) by mouth At Bedtime  Cholecalciferol  "(VITAMIN D) 50 MCG (2000 UT) CAPS, Take 1 capsule by mouth daily  citalopram (CELEXA) 10 MG tablet, Take 1 tablet (10 mg) by mouth daily  diazepam (VALIUM) 5 MG tablet, TAKE 1 TABLET BY MOUTH ONCE DAILY AS NEEDED FOR ANXIETY  isosorbide mononitrate (IMDUR) 30 MG 24 hr tablet, Take 1 tablet (30 mg) by mouth daily In the morning  levothyroxine (SYNTHROID/LEVOTHROID) 75 MCG tablet, Take 1 tablet (75 mcg) by mouth daily  magnesium 250 MG tablet, Take 1 tablet (250 mg) by mouth daily  metoprolol succinate ER (TOPROL-XL) 25 MG 24 hr tablet, Take 0.5 tablets (12.5 mg) by mouth daily  tamsulosin (FLOMAX) 0.4 MG capsule, Take 0.4 mg by mouth daily (with dinner)   polyethylene glycol (MIRALAX) 17 GM/Dose powder, Take 17 g by mouth 2 times daily  SENNA-docusate sodium (SENNA S) 8.6-50 MG tablet, Take 1 tablet by mouth At Bedtime    No current facility-administered medications on file prior to visit.    ALLERGIES:                                                      Allergies   Allergen Reactions     Ropinirole Anxiety     funny thoughts     FAMILY/SOCIAL HISTORY:                                                    Family and social history was reviewed with the patient at today's visit.  No family history of neurological disorders, except hemorrhagic stroke (mother).  Former smoker, quit in 1957.  1 beer per month or less.  Denies recreational drug use.  .  Retired  of 40 years.  REVIEW OF SYSTEMS:                                                    Patient has completed a [facility] Patient Health History, including a 14-system review, which was personally reviewed, and pertinent positives are listed in HPI. He denies any additional problems on the further questioning.  EXAM:                                                    VITAL SIGNS:   BP 90/52 (BP Location: Right arm, Patient Position: Sitting, Cuff Size: Adult Regular)   Pulse 71   Ht 1.753 m (5' 9\")   Wt 84.6 kg (186 lb 9.6 oz)   SpO2 92%  "  BMI 27.56 kg/m    Mini-Cog Assessment:  Mini Cog Assessment  Clock Draw Score: 2 Normal  3 Item Recall: 3 objects recalled  Mini Cog Total Score: 5  Administered by: : Cammy PITTMAN    General: pt is in NAD, cooperative.  Skin: normal turgor, moist mucous membranes, no lesions/rashes noticed.  HEENT: ATNC, EOMI, PERRL, white sclera, normal conjunctiva, no nystagmus or ptosis. No carotid bruits bilaterally.  Respiratory: lung sounds clear to auscultation bilaterally, no crackles, wheezes, rhonchi. Symmetric lung excursion, no accessory respiratory muscle use.  Cardiovascular: normal S1/S2, no murmurs/rubs/gallops.   Abdomen: Not distended.  : deferred.    Neurological:  Mental: alert, follows commands, Mini Cog Total Score: 5/5 with 3/3 on memory recall, no aphasia or dysarthria. Fund of knowledge is appropriate for age.  Cranial Nerves:  CN II: visual acuity - able to accurately count fingers with each eye. Visual fields intact, fundi: discs sharp, no papilledema and normal vessels bilaterally.  CN III, IV, VI: EOM intact, pupils equal and reactive  CN V: facial sensation nl  CN VII: face symmetric, no facial droop  CN VIII: hearing is bilaterally reduced  CN IX: palate elevation symmetric, uvula at midline  CN XI SCM normal, shoulder shrug nl  CN XII: tongue midline  Motor: Strength: 5/5 in all major groups of all extremities. Normal tone. No abnormal movements.  Bilateral calf fasciculations.  No pronator drift b/l.  Reflexes: Triceps, biceps, brachioradialis, and patellar reflexes normal and symmetric, achilles. No clonus noted. Toes are down-going b/l.   Sensory:  light touch, pinprick, and vibration reduced in both feet. Romberg: Questionably positive.  Coordination: FNF and heel-shin tests intact b/l.   Gait: Stooped forward posture, preserved arm swings, slightly unsteady.  DATA:   LABS/EMG/IMAGING/OTHER STUDIES: I reviewed pertinent medical records, including personal review of head CT images, EMG report,  prior neurology notes and Care Everywhere, as detailed in the history of present illness.  ASSESSMENT AND PLAN:      ASSESSMENT: Korey Pearson is a 89 year old male patient with listed above past medical history, who presents with uncomfortable limb movements at night in context of peripheral polyneuropathy/restless leg syndrome.    We had a detailed discussion with the patient and his wife regarding his presenting complaints.  The neurological exam today is supportive of known history of peripheral polyneuropathy.  We discussed that clinical presentation is most likely consistent with peripheral polyneuropathy with associated restless leg syndrome and periodic limb movements of sleep.  I reviewed that the treatment should be concentrated on restless leg syndrome rather than limb movements.  I would like to recheck his ferritin level to assure that it remains above 75.  It was 206 in March 2020.  It looks like his vitamin B12 was low in the distant past, and currently he is not on any replacement.  So, it would be helpful to check his vitamin B1, B6, B12, and E along with methylmalonic acid level.  The patient believes that he was recently tested by his previous neurologist and will check records at home before we proceed with additional labs.    In the past the patient tried gabapentin, Lyrica, pramipexole, and ropinirole.  These medications could be retried at higher doses, though it is questionable whether or not they provide the relief.  The remaining options include Sinemet, rotigotine patch, low-dose of clonazepam (would require to stop his Valium), and opiates.  The patient stated that he did not have any side effects with Valium and would be inclined to try clonazepam instead, which I think is reasonable option.  If not effective, we might try rotigotine patch or Sinemet.  I doubt that his presentation is consistent with segmental myoclonus, but if we exhaust all options a trial of Keppra might  be an option.    Also briefly discussed his stooped forward posture that might be due to lumbar spinal stenosis given positive shopping cart sign.  I offered him referral to physical therapy to assess for need of assistive devices (walker).  The patient will think about it.    DIAGNOSES:    ICD-10-CM    1. Peripheral polyneuropathy  G62.9 Adult Neurology Referral   2. Restless legs syndrome (RLS)  G25.81 clonazePAM (KLONOPIN) 1 MG tablet     PLAN: At today's visit we thoroughly discussed various diagnostic possibilities for patient's symptoms, possible additional evaluation (vitamin B12, methylmalonic acid, B1, B6, vitamin D, and ferritin), available treatment options and the plan.    We decided to switch Valium to clonazepam at bedtime to see if it works better.  I advised the patient to completely stop Valium (diazepam) and only take clonazepam at bedtime every night for the next several weeks.  He will contact my clinic with any intolerable side effects or excessive sedation.  If not effective, we might consider a trial of rotigotine patch.    The patient will review recently completed laboratory work-up from his previous neurologist.  He will provide a copy of lab results to my office.  Then we will decide if additional laboratory evaluation is needed.    We also discussed additional behavioral strategies (stretching exercises and walking before bedtime) to help restless leg symptoms.    Next follow-up appointment is in the next 4 weeks or earlier if needed.    Total Time: 65 minutes spent on the date of the encounter doing chart review, history and exam, documentation and further activities per the note.    Arturo Vences MD  [facility]  (Chart documentation was completed in part with Dragon voice-recognition software. Even though reviewed, some grammatical, spelling, and word errors may remain.)              Again, thank you for allowing me to participate in the care of your patient.         Sincerely,        Arturo Vences MD

## 2021-10-08 NOTE — PROGRESS NOTES
Call from patient's wife regarding appointment he had with his Neurologist. Patient's wife states that his BP was 90/52 at the office visit. Patient's wife wondering if he needs a decrease in his BP medications. Called patient back, [facility] for return call.         ANA Irving October 8, 2021 10:52 AM

## 2021-10-08 NOTE — PROGRESS NOTES
Call from patient's wife to update on patient's BP's. Patient's wife states that as of lately his BP has been running on the low side. Patient's wife states that his SBP hasn't been above 100 for some time. Patient states that he's felt dizzy and lightheaded. He denies any syncope, but reports that it wouldn't take a lot for him to have a syncopal episode. He states that he has felt very unsteady on his feet. Patient is currently taking 12.5mg Metoprolol and 30mg Isosorbide daily. Will route to Dr. Patel for review.       Last OV 12/29/20 w/ Dr. Patel:  Assessment and Plan:      1. Stable coronary artery disease and stable angina by symptoms    If escalation in symptoms, consider repeat cardiac catheterization.    Continue nitrates and medical therapy for now.    Routine follow-up in 1 year         ANA Irving October 8, 2021 1:26 PM

## 2021-10-11 ENCOUNTER — TELEPHONE (OUTPATIENT)
Dept: NEUROLOGY | Facility: CLINIC | Age: 86
End: 2021-10-11

## 2021-10-11 NOTE — PROGRESS NOTES
Amanda, MD Anayeli Bell Elizabeth, RN 1 hour ago (9:02 AM)     CK    He may stop his Toprol. He may also consider cutting his Imdur in half (if his angina doesn't get worse).    Message text      Contacted patient's wife to review Dr. Patel's recommendations. Patient's wife verbalized understanding and agreed with plan of care. She will update our clinic with BP readings on Wednesday this week. Medication list updated.    unrestricted

## 2021-10-11 NOTE — TELEPHONE ENCOUNTER
Received in bound call from Yesica to adv that [facility] pharmacy said they did not receive RX on Friday. Verified we had the right [facility] pharmacy location and adv I would resend and verify it was received

## 2021-10-11 NOTE — TELEPHONE ENCOUNTER
Out bound call to Yesica:  Spoke with wife and adv that I had checked with [facility] and they had received the RX and it was ready for . Also adv Dr reviewd the labs she dropped off froem 5/20/2021 and it was what he would have ordered so no further labs needed at this time. She wanted me to let Dr Vences know that they did talk with Korey's Dr about his very low blood pressure at Friday appointment and they took him off his blood pressure meds and want him to take his BP daily and then call PCP on Wed with the numbers. She said it seemed much better this am 109/58

## 2021-10-12 NOTE — TELEPHONE ENCOUNTER
LM asking if they were able to get rx picked up asked them to call back if they ran into any issues

## 2021-10-13 NOTE — PROGRESS NOTES
VM received from patient's wife, stating that she is calling to provide a BP update since patient stopped his metoprolol. Patient's wife states that patient's BP has been 120/62. Contacted patient's wife to review further. Patient's wife states that the patient is feeling better as well. Advised patient's wife to have patient continue current medications, and let us know if patient's BP is consistently over 140/90. Patient's wife verbalized understanding and agreed with plan of care.

## 2021-10-19 ENCOUNTER — TELEPHONE (OUTPATIENT)
Dept: NEUROLOGY | Facility: CLINIC | Age: 86
End: 2021-10-19

## 2021-10-19 NOTE — TELEPHONE ENCOUNTER
Regarding patient's concerns with his medication, Dr Vences recommends giving it 3 to 4 weeks before we consider next steps.   Attempted to reach patient 2x. No answer, no VM. [employer] message sent.

## 2021-10-19 NOTE — CONFIDENTIAL NOTE
Patient started on Clonazepam by Dr Vences for his c/o restless leg syndrome. Began taking medication on 10/11 and as of yet has not experienced any improvement. Was wondering if it is time to try something else, or is it too soon. Also stated the rotigotine patch, possible next step, is not covered by insurance.  Routed to provider.

## 2021-10-19 NOTE — TELEPHONE ENCOUNTER
Patient is requesting a return call, he is having some questions about his medication, he would like a call back at [phone].

## 2021-10-21 ENCOUNTER — TELEPHONE (OUTPATIENT)
Dept: NEUROLOGY | Facility: CLINIC | Age: 86
End: 2021-10-21

## 2021-10-21 NOTE — TELEPHONE ENCOUNTER
Writer called patient's wife back, reviewed Dr. Olvera's recommendations  Listed in the 10/19/21 mychart encounter. Wife verbalizes understanding and will call back if needed.    Bonita Lopez RN on 10/21/2021 at 12:42 PM

## 2021-10-21 NOTE — TELEPHONE ENCOUNTER
"[facility] called regarding Clifford's new medication: clonazepan. It is not working, he can't sleep at night and his legs are \"jumping\" all day. Please reach them: [phone]  "

## 2021-10-25 NOTE — TELEPHONE ENCOUNTER
Out bound call to Yesica. Spoke with her and Adv her per Dr Vences to increase to 1.5 tabs each evening and if no side effects or improvment in a couple days try 2 tabs per night

## 2021-10-25 NOTE — TELEPHONE ENCOUNTER
He could try 1.5 tablet of clonazepam at night for several days and if no side effects and no improvement increase it to 2 tablets at bedtime before seeing me.  Thanks,  Arturo Vences MD.

## 2021-11-01 ENCOUNTER — TELEPHONE (OUTPATIENT)
Dept: NEUROLOGY | Facility: CLINIC | Age: 86
End: 2021-11-01

## 2021-11-01 DIAGNOSIS — G25.81 RESTLESS LEGS SYNDROME (RLS): Primary | ICD-10-CM

## 2021-11-01 RX ORDER — CLONAZEPAM 1 MG/1
2 TABLET ORAL AT BEDTIME
Qty: 30 TABLET | Refills: 0 | Status: SHIPPED | OUTPATIENT
Start: 2021-11-01 | End: 2021-11-11

## 2021-11-01 NOTE — TELEPHONE ENCOUNTER
Yesica called , she wanted to see if there Dr Scanlon could prescribe a bridge for colonpin as the 2 tabs per night is working but he will be out after tonight. Adv I will speak with him and call her back when the refill is sent

## 2021-11-11 ENCOUNTER — OFFICE VISIT (OUTPATIENT)
Dept: NEUROLOGY | Facility: CLINIC | Age: 86
End: 2021-11-11
Attending: PSYCHIATRY & NEUROLOGY
Payer: COMMERCIAL

## 2021-11-11 VITALS — HEART RATE: 71 BPM | SYSTOLIC BLOOD PRESSURE: 114 MMHG | DIASTOLIC BLOOD PRESSURE: 66 MMHG | OXYGEN SATURATION: 93 %

## 2021-11-11 DIAGNOSIS — R29.818 DIFFICULTY BALANCING: ICD-10-CM

## 2021-11-11 DIAGNOSIS — F41.9 ANXIETY: ICD-10-CM

## 2021-11-11 DIAGNOSIS — G25.81 RESTLESS LEGS SYNDROME (RLS): Primary | ICD-10-CM

## 2021-11-11 DIAGNOSIS — G62.9 PERIPHERAL POLYNEUROPATHY: ICD-10-CM

## 2021-11-11 PROCEDURE — 99214 OFFICE O/P EST MOD 30 MIN: CPT | Performed by: PSYCHIATRY & NEUROLOGY

## 2021-11-11 PROCEDURE — G0463 HOSPITAL OUTPT CLINIC VISIT: HCPCS

## 2021-11-11 RX ORDER — CLONAZEPAM 1 MG/1
TABLET ORAL
Qty: 37 TABLET | Refills: 0 | Status: SHIPPED | OUTPATIENT
Start: 2021-11-11 | End: 2021-12-07

## 2021-11-11 NOTE — PROGRESS NOTES
ESTABLISHED PATIENT NEUROLOGY NOTE    DATE OF VISIT: 11/11/2021  CLINIC LOCATION: [facility]  MRN: [id number]  PATIENT NAME: Korey Pearson  YOB: 1929    PCP: Estevan Mann MD    REASON FOR VISIT:   Chief Complaint   Patient presents with     Follow Up     Testing      SUBJECTIVE:                                                      HISTORY OF PRESENT ILLNESS: Patient is here to follow up regarding peripheral neuropathy with secondary restless leg syndrome. Please refer to my initial note from 10/8/2021 for further information.  The patient is accompanied by his wife and daughter, who participate in interview today.    Since the last visit, the patient contacted me reporting that 1 mg of clonazepam was not working, and we increased the dose to 2 mg at bedtime, which initially seem to work, but today patient states that it is not helping his restless legs.  His wife reports increased anxiety at nighttime.  His daughter reports balance worsening and is concerned about falls.  He denies interval development of new focal neurological symptoms.    On review of systems, patient endorses no additional active complaints. Medications, allergies, family and social history were also reviewed. There are no changes reported by patient.  REVIEW OF SYSTEMS:                                                    10-system review was completed. Pertinent positives are included in HPI. The remainder of ROS is negative.  EXAM:                                                    Physical Exam:   Vitals: BP (!) 159/79 (BP Location: Right arm, Patient Position: Sitting, Cuff Size: Adult Regular)   Pulse 71   SpO2 96%     General: pt is in NAD, cooperative.  Skin: normal turgor, moist mucous membranes, no lesions/rashes noticed.  HEENT: ATNC, white sclera, normal conjunctiva.  Respiratory: Symmetric lung excursion, no accessory respiratory muscle use.  Abdomen: Non  distended.  Neurological: awake, cooperative, follows commands, no exam changes compared to the initial visit.  ASSESSMENT AND PLAN:                                                    Assessment: 89-year-old patient with uncomfortable limb movements at night in context of peripheral polyneuropathy/restless leg syndrome presents for follow-up after trial of clonazepam, which at 2 mg at night is not helping.  Previously we discussed that we could try rotigotine patch or Sinemet and possibly Keppra if the first 2 options do not work.  We decided to gradually wean him off clonazepam and try rotigotine patch next.  I also referred patient to physical therapy.  I advised him to contact his primary care provider regarding treatment of anxiety.    Clifford to follow up with Primary Care provider regarding elevated blood pressure.     Diagnoses:    ICD-10-CM   1. Restless legs syndrome (RLS)  G25.81   2. Peripheral polyneuropathy  G62.9   3. Difficulty balancing  R29.818   4. Anxiety  F41.9     Plan: At today's visit we thoroughly discussed current symptoms, available treatment options, and the plan.    We decided to gradually wean him off clonazepam due to lack of effect.  I recommended to take 1 mg at bedtime for the next 4 weeks, then take 1 mg at bedtime every other day for additional 7 days and stop.    Regarding restless leg syndrome, we decided to try rotigotine patch.  We also discussed additional treatment options of Sinemet and possibly Keppra if it is not effective.    For balance difficulties/fall prevention, I placed a physical therapy referral to evaluate for need of assistive devices and to prevent falls.    For anxiety, he will discuss with primary care provider whether a dose of Celexa could be further increased or different medications could be tried.    Next follow-up appointment is in the next 4-6 weeks or earlier if needed.    Total Time: 35 minutes spent on the date of the encounter doing chart review,  history and exam, documentation and further activities per the note.    Arturo Vences MD  [facility]  (Chart documentation was completed in part with Dragon voice-recognition software. Even though reviewed, some grammatical, spelling, and word errors may remain.)

## 2021-11-11 NOTE — LETTER
11/11/2021         RE: Korey Pearson  [address]  [place] [address]        Dear Colleague,    Thank you for referring your patient, Korey Pearson, to the [facility] [place]. Please see a copy of my visit note below.    ESTABLISHED PATIENT NEUROLOGY NOTE    DATE OF VISIT: 11/11/2021  CLINIC LOCATION: [facility]  MRN: [id number]  PATIENT NAME: Korey Pearson  YOB: 1929    PCP: Estevan Mann MD    REASON FOR VISIT:   Chief Complaint   Patient presents with     Follow Up     Testing      SUBJECTIVE:                                                      HISTORY OF PRESENT ILLNESS: Patient is here to follow up regarding peripheral neuropathy with secondary restless leg syndrome. Please refer to my initial note from 10/8/2021 for further information.  The patient is accompanied by his wife and daughter, who participate in interview today.    Since the last visit, the patient contacted me reporting that 1 mg of clonazepam was not working, and we increased the dose to 2 mg at bedtime, which initially seem to work, but today patient states that it is not helping his restless legs.  His wife reports increased anxiety at nighttime.  His daughter reports balance worsening and is concerned about falls.  He denies interval development of new focal neurological symptoms.    On review of systems, patient endorses no additional active complaints. Medications, allergies, family and social history were also reviewed. There are no changes reported by patient.  REVIEW OF SYSTEMS:                                                    10-system review was completed. Pertinent positives are included in HPI. The remainder of ROS is negative.  EXAM:                                                    Physical Exam:   Vitals: BP (!) 159/79 (BP Location: Right arm, Patient Position: Sitting, Cuff Size: Adult Regular)   Pulse 71    SpO2 96%     General: pt is in NAD, cooperative.  Skin: normal turgor, moist mucous membranes, no lesions/rashes noticed.  HEENT: ATNC, white sclera, normal conjunctiva.  Respiratory: Symmetric lung excursion, no accessory respiratory muscle use.  Abdomen: Non distended.  Neurological: awake, cooperative, follows commands, no exam changes compared to the initial visit.  ASSESSMENT AND PLAN:                                                    Assessment: 89-year-old patient with uncomfortable limb movements at night in context of peripheral polyneuropathy/restless leg syndrome presents for follow-up after trial of clonazepam, which at 2 mg at night is not helping.  Previously we discussed that we could try rotigotine patch or Sinemet and possibly Keppra if the first 2 options do not work.  We decided to gradually wean him off clonazepam and try rotigotine patch next.  I also referred patient to physical therapy.  I advised him to contact his primary care provider regarding treatment of anxiety.    Clifford to follow up with Primary Care provider regarding elevated blood pressure.     Diagnoses:    ICD-10-CM   1. Restless legs syndrome (RLS)  G25.81   2. Peripheral polyneuropathy  G62.9   3. Difficulty balancing  R29.818   4. Anxiety  F41.9     Plan: At today's visit we thoroughly discussed current symptoms, available treatment options, and the plan.    We decided to gradually wean him off clonazepam due to lack of effect.  I recommended to take 1 mg at bedtime for the next 4 weeks, then take 1 mg at bedtime every other day for additional 7 days and stop.    Regarding restless leg syndrome, we decided to try rotigotine patch.  We also discussed additional treatment options of Sinemet and possibly Keppra if it is not effective.    For balance difficulties/fall prevention, I placed a physical therapy referral to evaluate for need of assistive devices and to prevent falls.    For anxiety, he will discuss with primary care  provider whether a dose of Celexa could be further increased or different medications could be tried.    Next follow-up appointment is in the next 4-6 weeks or earlier if needed.    Total Time: 35 minutes spent on the date of the encounter doing chart review, history and exam, documentation and further activities per the note.    Arturo Vences MD  [facility]  (Chart documentation was completed in part with Dragon voice-recognition software. Even though reviewed, some grammatical, spelling, and word errors may remain.)      Again, thank you for allowing me to participate in the care of your patient.        Sincerely,        Arturo Vences MD

## 2021-11-11 NOTE — PATIENT INSTRUCTIONS
AFTER VISIT SUMMARY (AVS):    At today's visit we thoroughly discussed current symptoms, available treatment options, and the plan.    We decided to gradually wean you off clonazepam due to lack of effect.  Please take 1 mg at bedtime for the next 4 weeks, then take 1 mg at bedtime every other day for additional 7 days and stop.    Regarding restless leg syndrome, we decided to try rotigotine patch.  We also discussed additional treatment options of Sinemet and possibly Keppra if it is not effective.    For balance difficulties/fall prevention, I placed a physical therapy referral to evaluate you for need of assistive devices and to prevent falls.    For anxiety, please discuss with your primary care provider whether a dose of Celexa could be further increased or different medications could be tried.    Next follow-up appointment is in the next 4-6 weeks or earlier if needed.    Please do not hesitate to call me with any questions or concerns.    Thanks.

## 2021-11-15 ENCOUNTER — OFFICE VISIT (OUTPATIENT)
Dept: URGENT CARE | Facility: URGENT CARE | Age: 86
End: 2021-11-15
Payer: COMMERCIAL

## 2021-11-15 VITALS
SYSTOLIC BLOOD PRESSURE: 116 MMHG | WEIGHT: 192.2 LBS | BODY MASS INDEX: 28.38 KG/M2 | OXYGEN SATURATION: 95 % | TEMPERATURE: 97.5 F | HEART RATE: 83 BPM | DIASTOLIC BLOOD PRESSURE: 61 MMHG

## 2021-11-15 DIAGNOSIS — G47.00 INSOMNIA, UNSPECIFIED TYPE: Primary | ICD-10-CM

## 2021-11-15 DIAGNOSIS — K08.89 PAIN IN A TOOTH OR TEETH: ICD-10-CM

## 2021-11-15 DIAGNOSIS — R22.0 RIGHT FACIAL SWELLING: ICD-10-CM

## 2021-11-15 PROCEDURE — 99214 OFFICE O/P EST MOD 30 MIN: CPT | Performed by: PHYSICIAN ASSISTANT

## 2021-11-15 RX ORDER — AMOXICILLIN AND CLAVULANATE POTASSIUM 500; 125 MG/1; MG/1
1 TABLET, FILM COATED ORAL 3 TIMES DAILY
Qty: 30 TABLET | Refills: 0 | Status: SHIPPED | OUTPATIENT
Start: 2021-11-15 | End: 2021-11-25

## 2021-11-15 RX ORDER — HYDROXYZINE HYDROCHLORIDE 25 MG/1
25 TABLET, FILM COATED ORAL
Qty: 10 TABLET | Refills: 0 | Status: SHIPPED | OUTPATIENT
Start: 2021-11-15 | End: 2021-11-25

## 2021-11-15 NOTE — PROGRESS NOTES
Assessment & Plan     Insomnia, unspecified type  Trial course of atarax  Follow up with neurology for this  - hydrOXYzine (ATARAX) 25 MG tablet; Take 1 tablet (25 mg) by mouth nightly as needed for itching    Right facial swelling  augmentin for infection  - amoxicillin-clavulanate (AUGMENTIN) 500-125 MG tablet; Take 1 tablet by mouth 3 times daily for 10 days    Pain in a tooth or teeth  augmentin for infection  Follow up with dentistry  - amoxicillin-clavulanate (AUGMENTIN) 500-125 MG tablet; Take 1 tablet by mouth 3 times daily for 10 days    Review of external notes as documented elsewhere in note      No follow-ups on file.    Blas Lau PA-C  [facility] Horton is a 89 year old who presents for the following health issues     HPI     Right side facial swelling  Tooth pain  Restless leg and insomnia    Review of Systems   Constitutional, HEENT, cardiovascular, pulmonary, gi and gu systems are negative, except as otherwise noted.      Objective    /61 (BP Location: Right arm, Patient Position: Sitting, Cuff Size: Adult Regular)   Pulse 83   Temp 97.5  F (36.4  C) (Oral)   Wt 87.2 kg (192 lb 3.2 oz)   SpO2 95%   BMI 28.38 kg/m    Body mass index is 28.38 kg/m .  Physical Exam   GENERAL: healthy, alert and no distress  HENT: ear canals and TM's normal, nose and mouth without ulcers or lesions  NECK: no adenopathy, no asymmetry, masses, or scars and thyroid normal to palpation  RESP: lungs clear to auscultation - no rales, rhonchi or wheezes  CV: regular rate and rhythm, normal S1 S2, no S3 or S4, no murmur, click or rub, no peripheral edema and peripheral pulses strong  MS: no gross musculoskeletal defects noted, no edema  SKIN: Positive for right side facial swelling  NEURO: Positive for restless leg syndrome

## 2021-11-16 DIAGNOSIS — E78.5 HYPERLIPIDEMIA LDL GOAL <130: ICD-10-CM

## 2021-11-16 DIAGNOSIS — I20.0 UNSTABLE ANGINA (H): ICD-10-CM

## 2021-11-16 RX ORDER — ATORVASTATIN CALCIUM 20 MG/1
20 TABLET, FILM COATED ORAL AT BEDTIME
Qty: 90 TABLET | Refills: 0 | Status: SHIPPED | OUTPATIENT
Start: 2021-11-16 | End: 2022-02-15

## 2021-11-29 DIAGNOSIS — I25.118 CORONARY ARTERY DISEASE OF NATIVE ARTERY OF NATIVE HEART WITH STABLE ANGINA PECTORIS (H): ICD-10-CM

## 2021-11-29 RX ORDER — ISOSORBIDE MONONITRATE 30 MG/1
30 TABLET, EXTENDED RELEASE ORAL DAILY
Qty: 90 TABLET | Refills: 0 | Status: SHIPPED | OUTPATIENT
Start: 2021-11-29 | End: 2022-03-08

## 2021-12-02 ENCOUNTER — TELEPHONE (OUTPATIENT)
Dept: NEUROLOGY | Facility: CLINIC | Age: 86
End: 2021-12-02
Payer: COMMERCIAL

## 2021-12-02 NOTE — TELEPHONE ENCOUNTER
1 option would be to increase the dose to 2 mg per 24 hours, but if they want to stop it no tapering is required.  We could discuss additional treatment options if desired.  Thanks,  Arturo Vences MD      Out bound call to Yesica to adv that Dr Razo is suggesting maybe he try out the 2mg/24hr patch and see if he has more success, bu that if he does not want to do this that there is no tapering required for the 1 mg/24 hr patch. She said she will go discuss it with Clifford and see if she can get him to try the 2 mg/24 hr patches and will get back to us for either the higher dosage or other medication options

## 2021-12-02 NOTE — TELEPHONE ENCOUNTER
In bound call from Yesica Clifford tried the Neruopro 1mg/24 hours patches and they have not helped his legs are still jumping around. Yesica is wanting to know if there is something he should do to taper off or if they just stop. Adv her I will run it by DR Vences and call her back with what he says

## 2021-12-06 ENCOUNTER — MYC MEDICAL ADVICE (OUTPATIENT)
Dept: INTERNAL MEDICINE | Facility: CLINIC | Age: 86
End: 2021-12-06
Payer: COMMERCIAL

## 2021-12-07 ENCOUNTER — OFFICE VISIT (OUTPATIENT)
Dept: INTERNAL MEDICINE | Facility: CLINIC | Age: 86
End: 2021-12-07
Payer: COMMERCIAL

## 2021-12-07 ENCOUNTER — NURSE TRIAGE (OUTPATIENT)
Dept: INTERNAL MEDICINE | Facility: CLINIC | Age: 86
End: 2021-12-07
Payer: COMMERCIAL

## 2021-12-07 VITALS
SYSTOLIC BLOOD PRESSURE: 142 MMHG | DIASTOLIC BLOOD PRESSURE: 56 MMHG | BODY MASS INDEX: 28.21 KG/M2 | HEART RATE: 78 BPM | OXYGEN SATURATION: 97 % | RESPIRATION RATE: 18 BRPM | WEIGHT: 191 LBS | TEMPERATURE: 97.2 F

## 2021-12-07 DIAGNOSIS — R42 DIZZINESS: ICD-10-CM

## 2021-12-07 DIAGNOSIS — S09.90XA TRAUMATIC INJURY OF HEAD, INITIAL ENCOUNTER: Primary | ICD-10-CM

## 2021-12-07 DIAGNOSIS — R11.2 NAUSEA AND VOMITING, INTRACTABILITY OF VOMITING NOT SPECIFIED, UNSPECIFIED VOMITING TYPE: ICD-10-CM

## 2021-12-07 DIAGNOSIS — Z23 HIGH PRIORITY FOR 2019-NCOV VACCINE: ICD-10-CM

## 2021-12-07 DIAGNOSIS — E03.4 HYPOTHYROIDISM DUE TO ACQUIRED ATROPHY OF THYROID: ICD-10-CM

## 2021-12-07 DIAGNOSIS — E87.1 HYPONATREMIA: Primary | ICD-10-CM

## 2021-12-07 DIAGNOSIS — G47.9 TROUBLE IN SLEEPING: ICD-10-CM

## 2021-12-07 DIAGNOSIS — R26.81 UNSTEADY GAIT: ICD-10-CM

## 2021-12-07 DIAGNOSIS — E55.9 VITAMIN D DEFICIENCY: ICD-10-CM

## 2021-12-07 LAB
ALBUMIN SERPL-MCNC: 3.8 G/DL (ref 3.4–5)
ALP SERPL-CCNC: 106 U/L (ref 40–150)
ALT SERPL W P-5'-P-CCNC: 49 U/L (ref 0–70)
ANION GAP SERPL CALCULATED.3IONS-SCNC: 6 MMOL/L (ref 3–14)
AST SERPL W P-5'-P-CCNC: 37 U/L (ref 0–45)
BILIRUB SERPL-MCNC: 1 MG/DL (ref 0.2–1.3)
BUN SERPL-MCNC: 13 MG/DL (ref 7–30)
CALCIUM SERPL-MCNC: 8.8 MG/DL (ref 8.5–10.1)
CHLORIDE BLD-SCNC: 95 MMOL/L (ref 94–109)
CO2 SERPL-SCNC: 25 MMOL/L (ref 20–32)
CREAT SERPL-MCNC: 0.71 MG/DL (ref 0.66–1.25)
ERYTHROCYTE [DISTWIDTH] IN BLOOD BY AUTOMATED COUNT: 13.3 % (ref 10–15)
GFR SERPL CREATININE-BSD FRML MDRD: 81 ML/MIN/1.73M2
GLUCOSE BLD-MCNC: 128 MG/DL (ref 70–99)
HCT VFR BLD AUTO: 45.7 % (ref 40–53)
HGB BLD-MCNC: 15.2 G/DL (ref 13.3–17.7)
MAGNESIUM SERPL-MCNC: 2.1 MG/DL (ref 1.6–2.3)
MCH RBC QN AUTO: 30.2 PG (ref 26.5–33)
MCHC RBC AUTO-ENTMCNC: 33.3 G/DL (ref 31.5–36.5)
MCV RBC AUTO: 91 FL (ref 78–100)
PHOSPHATE SERPL-MCNC: 3.5 MG/DL (ref 2.5–4.5)
PLATELET # BLD AUTO: 200 10E3/UL (ref 150–450)
POTASSIUM BLD-SCNC: 4.5 MMOL/L (ref 3.4–5.3)
PROT SERPL-MCNC: 7.9 G/DL (ref 6.8–8.8)
RBC # BLD AUTO: 5.04 10E6/UL (ref 4.4–5.9)
SODIUM SERPL-SCNC: 126 MMOL/L (ref 133–144)
TSH SERPL DL<=0.005 MIU/L-ACNC: 2.56 MU/L (ref 0.4–4)
VIT B12 SERPL-MCNC: 1070 PG/ML (ref 193–986)
WBC # BLD AUTO: 11 10E3/UL (ref 4–11)

## 2021-12-07 PROCEDURE — 36415 COLL VENOUS BLD VENIPUNCTURE: CPT | Performed by: INTERNAL MEDICINE

## 2021-12-07 PROCEDURE — 84443 ASSAY THYROID STIM HORMONE: CPT | Performed by: INTERNAL MEDICINE

## 2021-12-07 PROCEDURE — 84100 ASSAY OF PHOSPHORUS: CPT | Performed by: INTERNAL MEDICINE

## 2021-12-07 PROCEDURE — 99214 OFFICE O/P EST MOD 30 MIN: CPT | Performed by: INTERNAL MEDICINE

## 2021-12-07 PROCEDURE — 80053 COMPREHEN METABOLIC PANEL: CPT | Performed by: INTERNAL MEDICINE

## 2021-12-07 PROCEDURE — 83735 ASSAY OF MAGNESIUM: CPT | Performed by: INTERNAL MEDICINE

## 2021-12-07 PROCEDURE — 91300 COVID-19,PF,PFIZER (12+ YRS): CPT | Performed by: INTERNAL MEDICINE

## 2021-12-07 PROCEDURE — 82306 VITAMIN D 25 HYDROXY: CPT | Performed by: INTERNAL MEDICINE

## 2021-12-07 PROCEDURE — 82607 VITAMIN B-12: CPT | Performed by: INTERNAL MEDICINE

## 2021-12-07 PROCEDURE — 0004A COVID-19,PF,PFIZER (12+ YRS): CPT | Performed by: INTERNAL MEDICINE

## 2021-12-07 PROCEDURE — 85027 COMPLETE CBC AUTOMATED: CPT | Performed by: INTERNAL MEDICINE

## 2021-12-07 RX ORDER — QUETIAPINE FUMARATE 25 MG/1
12.5-25 TABLET, FILM COATED ORAL
Qty: 30 TABLET | Refills: 0 | Status: SHIPPED | OUTPATIENT
Start: 2021-12-07 | End: 2021-12-07

## 2021-12-07 RX ORDER — QUETIAPINE FUMARATE 25 MG/1
12.5-25 TABLET, FILM COATED ORAL
Qty: 30 TABLET | Refills: 0 | Status: SHIPPED | OUTPATIENT
Start: 2021-12-07 | End: 2021-12-22

## 2021-12-07 NOTE — PATIENT INSTRUCTIONS
Labs today.     Head CT scheduled for 3:40pm.    Seroquel half to full tab at night before bed.

## 2021-12-07 NOTE — PROGRESS NOTES
ASSESSMENT/PLAN                                                      (S09.90XA) Traumatic injury of head, initial encounter  (primary encounter diagnosis)  (R26.81) Unsteady gait  (R42) Dizziness  (R11.2) Nausea and vomiting, intractability of vomiting not specified, unspecified vomiting type  (E55.9) Vitamin D deficiency  (E03.4) Hypothyroidism due to acquired atrophy of thyroid  Comment: presentation very concerning; patient is unable to walk without significant assistance - both a cane and holding onto his daughter; his recent memory is poor; his nausea and vomiting are progressively worse.  Plan: labs obtained today to evaluate for potential organic causes of/contributors to his symptoms/presentation; STAT CT head ordered to evaluate for intracranial bleeding - this was scheduled for this afternoon but family is unable to transport him to the hospital due to his condition (gait instability/imbalance, intractable nausea and vomiting); ER evaluation strongly recommended but declined by family (daughter is an ER nurse at [employer] -she is worried about the wait times in the ER and the hospital in ICU bed availability); even taking these factors into account, I do not think he can be safely managed at home at this point.    (Z23) High priority for 2019-nCoV vaccine  Plan: COVID-19 booster given today.    (G47.9) Trouble in sleeping  Plan: Trial of Seroquel prescribed though this is low priority in light of above.    Anais Pickett MD   [address] [address]  T: [phone], F: [phone]    SUBJECTIVE                                                      Korey Pearson is a very pleasant 89 year old male who presents for evaluation:    Accompanied by wife and daughter.    Patient had a fall yesterday morning. Hit the back of his head.  Fall was unwitnessed - wife was in the kitchen at the time and fall occurred in another room. No reported loss of  consciousness.  Patient developed nausea and vomiting today, which is worsening over time. Medications significant for a daily baby aspirin.    Per wife and daughter patient has been struggling less significant dizziness and gait instability over the last month.    Unrelated to above, patient is struggling to sleep due to difficult to control restless legs. His difficulty sleeping has been causing him significant anxiety. Patient and family are requesting a medication that will help the patient sleep and also address his anxiety.    Also, unrelated to above, patient is due for his COVID-19 booster and flu shot.  Family defers flu shot to a later date.    OBJECTIVE                                                      BP (!) 142/56 (BP Location: Left arm, Patient Position: Chair, Cuff Size: Adult Regular)   Pulse 78   Temp 97.2  F (36.2  C) (Temporal)   Resp 18   Wt 86.6 kg (191 lb)   SpO2 97%   BMI 28.21 kg/m    Constitutional: well-appearing  Head: normocephalic, atraumatic  Respiratory: normal respiratory effort; clear to auscultation bilaterally  Cardiovascular: regular rate and rhythm; no edema  Musculoskeletal: very unstable gait - needs to hold onto daughter and a cane for support  Psych: normal mood and affect; poor recent memory intact    ---    (Note documentation was completed, in part, with [employer] voice-recognition software. Documentation was reviewed, but some grammatical, spelling, and word errors may remain.)

## 2021-12-07 NOTE — TELEPHONE ENCOUNTER
Dr Pickett,     Patient reports dizziness, reports that's been an ongoing problem. He reports it happens when he gets up, he doesn't do much and trys to move slow. Patient reports a fall yesterday, patient reports he hit his head but was not knocked unconscious, denies any red areas or bruises denies any pain. Patient reports he probably does not drink enough water. They do not want to go to ED, patient does take daily ASA.     2nd level triage  Patient scheduled for today @ 12/7/21 @ 2:40pm with you, are you okay with that?    Jacqueline Hartman RN      Reason for Disposition    Spinning or tilting sensation (vertigo) present now and one or more stroke risk factors (i.e., hypertension, diabetes, prior stroke/TIA, heart attack, age over 60) (Exception: prior physician evaluation for this AND no different/worse than usual)    Additional Information    Negative: Shock suspected (e.g., cold/pale/clammy skin, too weak to stand, low BP, rapid pulse)    Negative: Difficult to awaken or acting confused (e.g., disoriented, slurred speech)    Negative: Fainted, and still feels dizzy afterwards    Negative: Severe difficulty breathing (e.g., struggling for each breath, speaks in single words)    Negative: Overdose (accidental or intentional) of medications    Negative: New neurologic deficit that is present now: * Weakness of the face, arm, or leg on one side of the body * Numbness of the face, arm, or leg on one side of the body * Loss of speech or garbled speech    Negative: Heart beating < 50 beats per minute OR > 140 beats per minute    Negative: Sounds like a life-threatening emergency to the triager    Negative: Chest pain    Negative: Rectal bleeding, bloody stool, or tarry-black stool    Negative: Vomiting is the main symptom    Negative: Diarrhea is the main symptom    Negative: Headache is the main symptom    Negative: Heat exhaustion suspected (i.e., dehydration from heat exposure)    Negative: Patient states that  "he/she is having an anxiety/panic attack    Negative: SEVERE dizziness (e.g., unable to stand, requires support to walk, feels like passing out now)    Negative: SEVERE headache or neck pain    Negative: Loss of vision or double vision    Negative: Extra heart beats OR irregular heart beating (i.e., 'palpitations')    Negative: Difficulty breathing    Negative: Drinking very little and has signs of dehydration (e.g., no urine > 12 hours, very dry mouth, very lightheaded)    Negative: Follows bleeding (e.g., stomach, rectum, vagina) (Exception: became dizzy from sight of small amount blood)    Negative: Patient sounds very sick or weak to the triager    Answer Assessment - Initial Assessment Questions  1. DESCRIPTION: \"Describe your dizziness.\"      X long time, dizziness  2. LIGHTHEADED: \"Do you feel lightheaded?\" (e.g., somewhat faint, woozy, weak upon standing)      yes  3. VERTIGO: \"Do you feel like either you or the room is spinning or tilting?\" (i.e. vertigo)     Yes   4. SEVERITY: \"How bad is it?\"  \"Do you feel like you are going to faint?\" \"Can you stand and walk?\"    - MILD - walking normally    - MODERATE - interferes with normal activities (e.g., work, school)     - SEVERE - unable to stand, requires support to walk, feels like passing out now.       Patient doesn't do anything   5. ONSET:  \"When did the dizziness begin?\"      For long time   6. AGGRAVATING FACTORS: \"Does anything make it worse?\" (e.g., standing, change in head position)      Getting up, moves slow  7. HEART RATE: \"Can you tell me your heart rate?\" \"How many beats in 15 seconds?\"  (Note: not all patients can do this)        /67, P 68. 12/6/21   8. CAUSE: \"What do you think is causing the dizziness?\"      Unsure   9. RECURRENT SYMPTOM: \"Have you had dizziness before?\" If so, ask: \"When was the last time?\" \"What happened that time?\"      yes  10. OTHER SYMPTOMS: \"Do you have any other symptoms?\" (e.g., fever, chest pain, vomiting, " "diarrhea, bleeding)        Denies   11. PREGNANCY: \"Is there any chance you are pregnant?\" \"When was your last menstrual period?\"        Denies    Protocols used: DIZZINESS-A-OH      "

## 2021-12-08 ENCOUNTER — HOSPITAL ENCOUNTER (OUTPATIENT)
Dept: CT IMAGING | Facility: CLINIC | Age: 86
Discharge: HOME | End: 2021-12-08
Attending: INTERNAL MEDICINE | Admitting: INTERNAL MEDICINE
Payer: COMMERCIAL

## 2021-12-08 DIAGNOSIS — R11.2 NAUSEA AND VOMITING, INTRACTABILITY OF VOMITING NOT SPECIFIED, UNSPECIFIED VOMITING TYPE: ICD-10-CM

## 2021-12-08 DIAGNOSIS — I63.9 CEREBELLAR STROKE (H): Primary | ICD-10-CM

## 2021-12-08 DIAGNOSIS — S09.90XA TRAUMATIC INJURY OF HEAD, INITIAL ENCOUNTER: ICD-10-CM

## 2021-12-08 LAB — DEPRECATED CALCIDIOL+CALCIFEROL SERPL-MC: 37 UG/L (ref 20–75)

## 2021-12-08 PROCEDURE — 70450 CT HEAD/BRAIN W/O DYE: CPT

## 2021-12-08 RX ORDER — ONDANSETRON 4 MG/1
4 TABLET, FILM COATED ORAL EVERY 8 HOURS PRN
Qty: 30 TABLET | Refills: 0 | Status: SHIPPED | OUTPATIENT
Start: 2021-12-08 | End: 2022-01-28

## 2021-12-10 ENCOUNTER — LAB (OUTPATIENT)
Dept: LAB | Facility: CLINIC | Age: 86
End: 2021-12-10
Payer: COMMERCIAL

## 2021-12-10 DIAGNOSIS — E87.1 HYPONATREMIA: ICD-10-CM

## 2021-12-10 PROCEDURE — 36415 COLL VENOUS BLD VENIPUNCTURE: CPT

## 2021-12-10 PROCEDURE — 80048 BASIC METABOLIC PNL TOTAL CA: CPT

## 2021-12-13 DIAGNOSIS — E87.1 HYPONATREMIA: Primary | ICD-10-CM

## 2021-12-13 LAB
ANION GAP SERPL CALCULATED.3IONS-SCNC: 4 MMOL/L (ref 3–14)
BUN SERPL-MCNC: 19 MG/DL (ref 7–30)
CALCIUM SERPL-MCNC: 8.4 MG/DL (ref 8.5–10.1)
CHLORIDE BLD-SCNC: 94 MMOL/L (ref 94–109)
CO2 SERPL-SCNC: 29 MMOL/L (ref 20–32)
CREAT SERPL-MCNC: 0.92 MG/DL (ref 0.66–1.25)
GFR SERPL CREATININE-BSD FRML MDRD: 72 ML/MIN/1.73M2
GLUCOSE BLD-MCNC: 112 MG/DL (ref 70–99)
POTASSIUM BLD-SCNC: 3.9 MMOL/L (ref 3.4–5.3)
SODIUM SERPL-SCNC: 127 MMOL/L (ref 133–144)

## 2021-12-25 ENCOUNTER — HEALTH MAINTENANCE LETTER (OUTPATIENT)
Age: 86
End: 2021-12-25

## 2021-12-28 ENCOUNTER — HOSPITAL ENCOUNTER (OUTPATIENT)
Dept: MRI IMAGING | Facility: CLINIC | Age: 86
Discharge: HOME | End: 2021-12-28
Attending: INTERNAL MEDICINE | Admitting: INTERNAL MEDICINE
Payer: COMMERCIAL

## 2021-12-28 DIAGNOSIS — I63.9 CEREBELLAR STROKE (H): ICD-10-CM

## 2021-12-28 PROCEDURE — 70551 MRI BRAIN STEM W/O DYE: CPT

## 2021-12-29 DIAGNOSIS — I63.9 CEREBELLAR STROKE (H): Primary | ICD-10-CM

## 2022-01-27 PROBLEM — E53.8 VITAMIN B12 DEFICIENCY (NON ANEMIC): Status: RESOLVED | Noted: 2021-10-08 | Resolved: 2022-01-27

## 2022-01-27 PROBLEM — N40.1 BENIGN PROSTATIC HYPERPLASIA WITH INCOMPLETE BLADDER EMPTYING: Status: RESOLVED | Noted: 2018-09-11 | Resolved: 2022-01-27

## 2022-01-27 PROBLEM — I25.10 CAD (CORONARY ARTERY DISEASE): Status: ACTIVE | Noted: 2022-01-27

## 2022-01-27 PROBLEM — E87.1 HYPONATREMIA: Status: RESOLVED | Noted: 2019-01-03 | Resolved: 2022-01-27

## 2022-01-27 PROBLEM — I25.10 CORONARY ARTERY DISEASE INVOLVING NATIVE CORONARY ARTERY OF NATIVE HEART WITHOUT ANGINA PECTORIS: Status: RESOLVED | Noted: 2019-01-07 | Resolved: 2022-01-27

## 2022-01-27 PROBLEM — R19.4 CHANGE IN BOWEL HABITS: Status: RESOLVED | Noted: 2018-07-04 | Resolved: 2022-01-27

## 2022-01-27 PROBLEM — Z86.0100 HISTORY OF COLONIC POLYPS: Status: RESOLVED | Noted: 2018-07-03 | Resolved: 2022-01-27

## 2022-01-27 PROBLEM — R55 SYNCOPE: Status: RESOLVED | Noted: 2019-01-03 | Resolved: 2022-01-27

## 2022-01-27 PROBLEM — G25.81 RESTLESS LEGS SYNDROME (RLS): Status: ACTIVE | Noted: 2022-01-27

## 2022-01-27 PROBLEM — G62.9 PERIPHERAL POLYNEUROPATHY: Status: ACTIVE | Noted: 2022-01-27

## 2022-01-27 PROBLEM — I48.0 PAF (PAROXYSMAL ATRIAL FIBRILLATION) (H): Status: ACTIVE | Noted: 2022-01-27

## 2022-01-27 PROBLEM — K58.1 IRRITABLE BOWEL SYNDROME WITH CONSTIPATION: Status: RESOLVED | Noted: 2020-09-23 | Resolved: 2022-01-27

## 2022-01-27 PROBLEM — F33.1 MODERATE EPISODE OF RECURRENT MAJOR DEPRESSIVE DISORDER (H): Status: RESOLVED | Noted: 2018-08-31 | Resolved: 2022-01-27

## 2022-01-27 PROBLEM — I77.810 THORACIC AORTIC ECTASIA (H): Status: RESOLVED | Noted: 2018-07-04 | Resolved: 2022-01-27

## 2022-01-27 PROBLEM — I10 BENIGN ESSENTIAL HYPERTENSION: Status: ACTIVE | Noted: 2022-01-27

## 2022-01-27 PROBLEM — I48.91 NEW ONSET A-FIB (H): Status: RESOLVED | Noted: 2019-01-03 | Resolved: 2022-01-27

## 2022-01-27 PROBLEM — F41.1 GAD (GENERALIZED ANXIETY DISORDER): Status: ACTIVE | Noted: 2022-01-27

## 2022-01-27 PROBLEM — R39.14 BENIGN PROSTATIC HYPERPLASIA WITH INCOMPLETE BLADDER EMPTYING: Status: RESOLVED | Noted: 2018-09-11 | Resolved: 2022-01-27

## 2022-01-27 PROBLEM — D69.6 THROMBOCYTOPENIA (H): Status: RESOLVED | Noted: 2019-01-03 | Resolved: 2022-01-27

## 2022-01-27 PROBLEM — R74.8 ELEVATION OF CARDIAC ENZYMES: Status: RESOLVED | Noted: 2019-01-03 | Resolved: 2022-01-27

## 2022-01-27 PROBLEM — F41.9 ANXIETY: Status: RESOLVED | Noted: 2018-08-31 | Resolved: 2022-01-27

## 2022-01-27 PROBLEM — E66.3 OVERWEIGHT (BMI 25.0-29.9): Status: RESOLVED | Noted: 2018-07-04 | Resolved: 2022-01-27

## 2022-01-27 PROBLEM — I73.00 RAYNAUD'S DISEASE WITHOUT GANGRENE: Status: RESOLVED | Noted: 2017-01-10 | Resolved: 2022-01-27

## 2022-01-27 PROBLEM — Z86.73 HISTORY OF STROKE: Status: ACTIVE | Noted: 2020-01-01

## 2022-01-27 PROBLEM — F33.8 SEASONAL AFFECTIVE DISORDER (H): Status: RESOLVED | Noted: 2018-08-31 | Resolved: 2022-01-27

## 2022-01-27 PROBLEM — R26.89 POOR BALANCE: Status: RESOLVED | Noted: 2020-03-03 | Resolved: 2022-01-27

## 2022-01-27 PROBLEM — G25.81 RESTLESS LEGS SYNDROME (RLS): Status: RESOLVED | Noted: 2020-03-03 | Resolved: 2022-01-27

## 2022-01-27 PROBLEM — E78.5 HYPERLIPIDEMIA LDL GOAL <100: Status: RESOLVED | Noted: 2018-07-03 | Resolved: 2022-01-27

## 2022-01-28 ENCOUNTER — OFFICE VISIT (OUTPATIENT)
Dept: INTERNAL MEDICINE | Facility: CLINIC | Age: 87
End: 2022-01-28
Payer: COMMERCIAL

## 2022-01-28 VITALS
OXYGEN SATURATION: 96 % | HEART RATE: 72 BPM | BODY MASS INDEX: 27.71 KG/M2 | DIASTOLIC BLOOD PRESSURE: 78 MMHG | SYSTOLIC BLOOD PRESSURE: 152 MMHG | TEMPERATURE: 97.4 F | HEIGHT: 69 IN | WEIGHT: 187.1 LBS

## 2022-01-28 DIAGNOSIS — I10 BENIGN ESSENTIAL HYPERTENSION: ICD-10-CM

## 2022-01-28 DIAGNOSIS — G47.9 TROUBLE IN SLEEPING: ICD-10-CM

## 2022-01-28 DIAGNOSIS — F41.1 GAD (GENERALIZED ANXIETY DISORDER): ICD-10-CM

## 2022-01-28 DIAGNOSIS — I48.0 PAF (PAROXYSMAL ATRIAL FIBRILLATION) (H): ICD-10-CM

## 2022-01-28 DIAGNOSIS — E03.9 ACQUIRED HYPOTHYROIDISM: ICD-10-CM

## 2022-01-28 DIAGNOSIS — G25.81 RESTLESS LEGS SYNDROME (RLS): Primary | ICD-10-CM

## 2022-01-28 DIAGNOSIS — Z76.89 ENCOUNTER TO ESTABLISH CARE: ICD-10-CM

## 2022-01-28 PROCEDURE — 99214 OFFICE O/P EST MOD 30 MIN: CPT | Performed by: INTERNAL MEDICINE

## 2022-01-28 RX ORDER — METOPROLOL SUCCINATE 25 MG/1
25 TABLET, EXTENDED RELEASE ORAL DAILY
Qty: 90 TABLET | Refills: 3 | Status: SHIPPED | OUTPATIENT
Start: 2022-01-28 | End: 2023-03-02

## 2022-01-28 RX ORDER — ZOLPIDEM TARTRATE 5 MG/1
5 TABLET ORAL
Qty: 30 TABLET | Refills: 0 | Status: SHIPPED | OUTPATIENT
Start: 2022-01-28 | End: 2022-02-02

## 2022-01-28 RX ORDER — LEVOTHYROXINE SODIUM 75 UG/1
75 TABLET ORAL DAILY
Qty: 90 TABLET | Refills: 2 | Status: SHIPPED | OUTPATIENT
Start: 2022-01-28 | End: 2022-11-04

## 2022-01-28 RX ORDER — PAROXETINE 10 MG/1
TABLET, FILM COATED ORAL
Qty: 49 TABLET | Refills: 0 | Status: SHIPPED | OUTPATIENT
Start: 2022-01-28 | End: 2022-02-22

## 2022-01-28 RX ORDER — LISINOPRIL 5 MG/1
5 TABLET ORAL DAILY
Qty: 90 TABLET | Refills: 3 | Status: SHIPPED | OUTPATIENT
Start: 2022-01-28 | End: 2022-02-22

## 2022-01-28 ASSESSMENT — ANXIETY QUESTIONNAIRES
GAD7 TOTAL SCORE: 5
7. FEELING AFRAID AS IF SOMETHING AWFUL MIGHT HAPPEN: NOT AT ALL
3. WORRYING TOO MUCH ABOUT DIFFERENT THINGS: SEVERAL DAYS
IF YOU CHECKED OFF ANY PROBLEMS ON THIS QUESTIONNAIRE, HOW DIFFICULT HAVE THESE PROBLEMS MADE IT FOR YOU TO DO YOUR WORK, TAKE CARE OF THINGS AT HOME, OR GET ALONG WITH OTHER PEOPLE: NOT DIFFICULT AT ALL
6. BECOMING EASILY ANNOYED OR IRRITABLE: NOT AT ALL
1. FEELING NERVOUS, ANXIOUS, OR ON EDGE: SEVERAL DAYS
5. BEING SO RESTLESS THAT IT IS HARD TO SIT STILL: SEVERAL DAYS
2. NOT BEING ABLE TO STOP OR CONTROL WORRYING: SEVERAL DAYS

## 2022-01-28 ASSESSMENT — PATIENT HEALTH QUESTIONNAIRE - PHQ9
5. POOR APPETITE OR OVEREATING: SEVERAL DAYS
SUM OF ALL RESPONSES TO PHQ QUESTIONS 1-9: 7

## 2022-01-28 ASSESSMENT — MIFFLIN-ST. JEOR: SCORE: 1489.06

## 2022-01-28 NOTE — PROGRESS NOTES
ASSESSMENT/PLAN                                                       (G25.81) Restless legs syndrome (RLS)  (primary encounter diagnosis)  (F41.1) VISHAL (generalized anxiety disorder)  (G47.9) Trouble in sleeping  Comment: chronic and challenging problems; patient has tried and failed (or has not tolerated) multiple medications (see below for list).   Plan: TRIAL of Paxil late afternoon, prior to sundown to better address anxiety; TRIAL of Ambien at night before bed; follow-up in 1 month (earlier as needed).    (I10) Benign essential hypertension  (I48.0) PAF (paroxysmal atrial fibrillation) (H)  Comment: suboptimally controlled blood pressure on Imdur 30 mg daily; history of CAD s/p PCI and recently diagnosed cerebellar stroke.  Plan: RESTART low-dose lisinopril and Toprol XL; blood pressure and heart rate follow-up in 1 month (earlier as needed).    (Z76.89) Encounter to establish care  Comment: PMH, PSH, FH, SH, medications, allergies, immunizations, and preventative health measures reviewed and updated as appropriate.    (E03.9) Acquired hypothyroidism  Comment: well-controlled on current regimen.    Plan: continue present management; refills provided.     Anais Pickett MD   [address] [address]  T: [phone], F: [phone]    SUBJECTIVE                                                      Korey Pearson is a very pleasant 89 year old male who presents to discuss restless legs, anxiety, and difficulty sleeping as well as to establish care:    Accompanied by wife, who assists with history.    Patient has struggled with anxiety, restless leg syndrome, and difficulty sleeping, for many years.    He has tried multiple medications including gabapentin, Lyrica, clonazepam, Valium, hydroxyzine, Celexa, Remeron, Seroquel, trazodone, ropinirole, rotigotine, and pramipexole either to no avail or with significant side effects.    Patient develops significant  anxiety as soon as it starts getting dark out (no significant anxiety during the day). He has difficulty sleeping due to both anxiety and restless legs.    Unrelated to above, patient's blood pressure is noted to be elevated today. His blood pressure was borderline elevated at last visit. His only blood pressure medication is Imdur 30 mg daily.  He has been on lisinopril and Toprol-XL in the past, but these were stopped last year due to hypotension/orthostasis and dizziness.  Patient should, arguably, be on both a beta-blocker and ACE inhibitor in light of his known CAD and now stroke (cerebellar noted on MRI last year).    PMH, PSH, FH, SH, medications, allergies, immunizations, and preventative health measures reviewed and updated as appropriate.    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     PAF (paroxysmal atrial fibrillation) (H)      Peripheral polyneuropathy      Restless legs syndrome (RLS)      Past Surgical History:   Procedure Laterality Date     APPENDECTOMY       CATARACT IOL, RT/LT       INGUINAL HERNIA REPAIR Left      TURP       Family History   Problem Relation Age of Onset     Cerebrovascular Disease Mother 40     Hypertension Mother      Heart Disease Father         details unknown     Prostate Cancer No family hx of      Colon Cancer No family hx of      Social History     Occupational History     Occupation: Retired -    Tobacco Use     Smoking status: Former Smoker     Packs/day: 1.50     Years: 10.00     Pack years: 15.00     Types: Cigarettes     Quit date: 1957     Years since quittin.1     Smokeless tobacco: Never Used   Substance and Sexual Activity     Alcohol use: Yes     Comment: rare     Drug use: No     Sexual activity: Not Currently   Social History Narrative    .    Lives at home  with wife.     4 adult children.    6 grandchildren.    6 great grandchildren.     Allergies   Allergen Reactions     Ropinirole Anxiety     Rotigotine Other (See Comments)     Neupro patch; dizziness + imbalance     Current Outpatient Medications   Medication Sig     aspirin 81 MG tablet Take 81 mg by mouth daily      atorvastatin (LIPITOR) 20 MG tablet Take 1 tablet (20 mg) by mouth At Bedtime Please call to schedule your annual follow up. [phone]     Cholecalciferol (VITAMIN D) 50 MCG (2000 UT) CAPS Take 1 capsule by mouth daily     isosorbide mononitrate (IMDUR) 30 MG 24 hr tablet Take 1 tablet (30 mg) by mouth daily In the morning. Please schedule an appointment for further refills. [phone]     levothyroxine (SYNTHROID/LEVOTHROID) 75 MCG tablet Take 1 tablet (75 mcg) by mouth daily     lisinopril (ZESTRIL) 5 MG tablet Take 1 tablet (5 mg) by mouth daily     metoprolol succinate ER (TOPROL-XL) 25 MG 24 hr tablet Take 1 tablet (25 mg) by mouth daily     PARoxetine (PAXIL) 10 MG tablet Take 1 tablet (10 mg) by mouth every evening for 7 days, THEN 2 tablets (20 mg) every evening for 21 days.     tamsulosin (FLOMAX) 0.4 MG capsule Take 0.4 mg by mouth daily (with dinner)      zolpidem (AMBIEN) 5 MG tablet Take 1 tablet (5 mg) by mouth nightly as needed for sleep     Immunization History   Administered Date(s) Administered     COVID-19,PF,Pfizer (12+ Yrs) 02/22/2021, 03/15/2021, 12/07/2021     Influenza, Quad, High Dose, Pf, 65yr+ (Fluzone HD) 10/27/2020     Pneumococcal 23 valent 03/07/2014     TDAP Vaccine (Adacel) 06/25/2013     PREVENTATIVE HEALTH                                                      BMI: within normal limits for age  Blood pressure: elevated on current regimen  Prostate CA Screening: screening no longer indicated  Colon CA screening: screening no longer indicated  Lung CA screening: patient does not meet screening criteria  AAA screening: screening no longer indicated  Screening  "cholesterol: up to date   Screening diabetes: up to date   STD testing: no risk factors present  Alcohol misuse screening: alcohol use reviewed - no intervention indicated at this time  Immunizations: reviewed; flu shot DUE and Shingrix series DUE - not covered by Medicare (may obtain in pharmacy if desired)     OBJECTIVE                                                      BP (!) 152/78 (BP Location: Left arm, Cuff Size: Adult Regular)   Pulse 72   Temp 97.4  F (36.3  C) (Oral)   Ht 1.753 m (5' 9\")   Wt 84.9 kg (187 lb 1.6 oz)   SpO2 96%   BMI 27.63 kg/m    Constitutional: well-appearing, hard of hearing    ---    (Note was completed, in part, with [employer] voice-recognition software. Documentation was reviewed, but some grammatical, spelling, and word errors may remain.)    "

## 2022-01-28 NOTE — PATIENT INSTRUCTIONS
STOP magnesium supplement and quetiapine.    ---    RESTART lisinopril 5mg once daily and Toprol XL 25mg once daily.    ---    START Paxil 10mg late afternoon (before sunset). Can increase to 20mg (two tablets taken together) after 1 week.    ---    START Ambien 5mg 30 minutes before bed.    ---    Follow-up on Thursday, February 17th at 1:10pm please.

## 2022-01-29 ASSESSMENT — ANXIETY QUESTIONNAIRES: GAD7 TOTAL SCORE: 5

## 2022-02-02 ENCOUNTER — TELEPHONE (OUTPATIENT)
Dept: INTERNAL MEDICINE | Facility: CLINIC | Age: 87
End: 2022-02-02
Payer: COMMERCIAL

## 2022-02-02 DIAGNOSIS — G25.81 RESTLESS LEGS SYNDROME (RLS): Primary | ICD-10-CM

## 2022-02-02 RX ORDER — OXYCODONE HYDROCHLORIDE 5 MG/1
2.5-5 TABLET ORAL AT BEDTIME
Qty: 7 TABLET | Refills: 0 | Status: SHIPPED | OUTPATIENT
Start: 2022-02-02 | End: 2022-02-09

## 2022-02-02 NOTE — TELEPHONE ENCOUNTER
STOP Ambien (added to allergy list).    Muscle relaxers are not indicated (nor will they work) for RLS.     Please see recent note re: multiple medications tried and failed.     The only option left for RLS is an opioid (like Norco or oxycodone). These medications are cognitively impairing, increase the risk of falls, and may be habit forming. Would he like to try this?

## 2022-02-02 NOTE — TELEPHONE ENCOUNTER
Pt called the clinic reporting that he took Ambien for 2 nights. After the first night he took it - he started to have some ringing in his ears and after the second time which was last night, both ears have a sound of a train or aiplane. He is able to hear the triage nurse. He was not able to fall asleep or stay asleep with Ambien at all. He is not able to sleep due to restless legs and reports that he has tried many meds before and nothing has helped. Among which was a 24 hr patch neupro (robgotine) prescribed by his neurologist and Seroquel. Patient does not recall trying a muscle relaxant. Provider to review and recommend an plan.     Pharmacy: [id number] - [place], [place] - [address]

## 2022-02-02 NOTE — TELEPHONE ENCOUNTER
Oxocodone 0.5-1 tab at night. #7 sent in.    It is not for pain. It is specifically for RLS in his case.

## 2022-02-02 NOTE — TELEPHONE ENCOUNTER
"Pt was called with provider's message. He does not have pain in his legs, but the legs do \"jump up\" or twitch and which wake him up. He is willing to try anything at this point. He would like to try a one week short term supply at the lowest dose and will report of the effect to Dr. Pickett.   "

## 2022-02-15 DIAGNOSIS — E78.5 HYPERLIPIDEMIA LDL GOAL <130: ICD-10-CM

## 2022-02-15 DIAGNOSIS — I20.0 UNSTABLE ANGINA (H): ICD-10-CM

## 2022-02-15 RX ORDER — ATORVASTATIN CALCIUM 20 MG/1
20 TABLET, FILM COATED ORAL AT BEDTIME
Qty: 90 TABLET | Refills: 0 | Status: SHIPPED | OUTPATIENT
Start: 2022-02-15 | End: 2022-03-29

## 2022-02-21 ENCOUNTER — TELEPHONE (OUTPATIENT)
Dept: INTERNAL MEDICINE | Facility: CLINIC | Age: 87
End: 2022-02-21
Payer: COMMERCIAL

## 2022-02-21 NOTE — TELEPHONE ENCOUNTER
Appt scheduled- opening tomorrow 2/22/22 at 330(arrival time 310). Wife verbalized that they will be at appointment.       Appointments in Next Year    Feb 22, 2022  3:30 PM  (Arrive by 3:10 PM)  Provider Visit with Anais Pickett MD  [facility] ([facility] ) [phone]   Mar 29, 2022  1:45 PM  Return Visit with Ramirez Patel MD  [facility] ([facility] ) [phone]        Veronica Faust RN

## 2022-02-21 NOTE — TELEPHONE ENCOUNTER
Wife reports patient having difficulty sleeping at night due to restless leg syndrome, would like to discuss starting Carbidopa Levadopa ER.     Do you want patient to schedule appointment to be evaluated?

## 2022-02-21 NOTE — TELEPHONE ENCOUNTER
Reason for call:  Patient reporting a symptom    Symptom or request: Restless legs    Duration (how long have symptoms been present): for years    Have you been treated for this before? Yes    Additional comments: Wife is calling. States patient cannot sleep at night. Would like to discuss a medication called Carbidopa levadopa ER as it could help with his restless legs. Please call.     Phone Number patient can be reached at:  Home number on file [phone] (home)    Best Time:  any    Can we leave a detailed message on this number:  YES    Call taken on 2/21/2022 at 8:34 AM by Isabell Carballo

## 2022-02-22 ENCOUNTER — OFFICE VISIT (OUTPATIENT)
Dept: INTERNAL MEDICINE | Facility: CLINIC | Age: 87
End: 2022-02-22
Payer: COMMERCIAL

## 2022-02-22 VITALS
BODY MASS INDEX: 27.82 KG/M2 | SYSTOLIC BLOOD PRESSURE: 156 MMHG | HEART RATE: 80 BPM | TEMPERATURE: 97.8 F | DIASTOLIC BLOOD PRESSURE: 94 MMHG | OXYGEN SATURATION: 98 % | WEIGHT: 188.4 LBS

## 2022-02-22 DIAGNOSIS — I10 BENIGN ESSENTIAL HYPERTENSION: ICD-10-CM

## 2022-02-22 DIAGNOSIS — E87.1 HYPONATREMIA: ICD-10-CM

## 2022-02-22 DIAGNOSIS — I25.10 CORONARY ARTERY DISEASE INVOLVING NATIVE CORONARY ARTERY OF NATIVE HEART WITHOUT ANGINA PECTORIS: ICD-10-CM

## 2022-02-22 DIAGNOSIS — Z23 NEED FOR PROPHYLACTIC VACCINATION AND INOCULATION AGAINST INFLUENZA: ICD-10-CM

## 2022-02-22 DIAGNOSIS — G25.81 RESTLESS LEGS SYNDROME (RLS): Primary | ICD-10-CM

## 2022-02-22 LAB
ANION GAP SERPL CALCULATED.3IONS-SCNC: 6 MMOL/L (ref 3–14)
BUN SERPL-MCNC: 16 MG/DL (ref 7–30)
CALCIUM SERPL-MCNC: 9.4 MG/DL (ref 8.5–10.1)
CHLORIDE BLD-SCNC: 102 MMOL/L (ref 94–109)
CHOLEST SERPL-MCNC: 108 MG/DL
CO2 SERPL-SCNC: 25 MMOL/L (ref 20–32)
CREAT SERPL-MCNC: 0.89 MG/DL (ref 0.66–1.25)
FASTING STATUS PATIENT QL REPORTED: NO
GFR SERPL CREATININE-BSD FRML MDRD: 80 ML/MIN/1.73M2
GLUCOSE BLD-MCNC: 95 MG/DL (ref 70–99)
HDLC SERPL-MCNC: 42 MG/DL
LDLC SERPL CALC-MCNC: 56 MG/DL
NONHDLC SERPL-MCNC: 66 MG/DL
POTASSIUM BLD-SCNC: 4.2 MMOL/L (ref 3.4–5.3)
SODIUM SERPL-SCNC: 133 MMOL/L (ref 133–144)
TRIGL SERPL-MCNC: 52 MG/DL

## 2022-02-22 PROCEDURE — 90662 IIV NO PRSV INCREASED AG IM: CPT | Performed by: INTERNAL MEDICINE

## 2022-02-22 PROCEDURE — 80048 BASIC METABOLIC PNL TOTAL CA: CPT | Performed by: INTERNAL MEDICINE

## 2022-02-22 PROCEDURE — 36415 COLL VENOUS BLD VENIPUNCTURE: CPT | Performed by: INTERNAL MEDICINE

## 2022-02-22 PROCEDURE — G0008 ADMIN INFLUENZA VIRUS VAC: HCPCS | Performed by: INTERNAL MEDICINE

## 2022-02-22 PROCEDURE — 80061 LIPID PANEL: CPT | Performed by: INTERNAL MEDICINE

## 2022-02-22 PROCEDURE — 99214 OFFICE O/P EST MOD 30 MIN: CPT | Mod: 25 | Performed by: INTERNAL MEDICINE

## 2022-02-22 RX ORDER — CARBIDOPA AND LEVODOPA 25; 100 MG/1; MG/1
1 TABLET, EXTENDED RELEASE ORAL AT BEDTIME
Qty: 30 TABLET | Refills: 0 | Status: SHIPPED | OUTPATIENT
Start: 2022-02-22 | End: 2022-03-29

## 2022-02-22 RX ORDER — LISINOPRIL 5 MG/1
5 TABLET ORAL DAILY
COMMUNITY
Start: 2022-02-22 | End: 2022-03-29

## 2022-02-22 NOTE — PROGRESS NOTES
ASSESSMENT/PLAN                                                      (G25.81) Restless legs syndrome (RLS)  (primary encounter diagnosis)  Comment: chronic and challenging problems; patient has tried and failed (or has not tolerated) multiple medications (see below for list).   Plan: TRIAL of Sinemet CR prescribed.    (I10) Benign essential hypertension  Comment: poorly-controlled on current regimen.   Plan: RESTART Toprol-XL 25 mg daily; INCREASE lisinopril from 5 to 10 mg daily; CONTINUE Imdur 30 mg daily without change; scheduled to follow-up with cardiology next month.    (I25.10) Coronary artery disease involving native coronary artery of native heart without angina pectoris  (E87.1) Hyponatremia  Plan: fasting labs today.    (Z23) Need for prophylactic vaccination and inoculation against influenza  Plan: flu shot given today.    Anais Pickett MD   [facility]  [address] [place] [id number]  T: [phone], F: [phone]    SUBJECTIVE                                                      Korey Pearson is a very pleasant 89 year old male who presents with restless legs:    Accompanied by wife, who assists with history.    Patient has struggled with anxiety, restless leg syndrome, and difficulty sleeping, for many years.     Patient has tried and failed (or has not tolerated) multiple medications including gabapentin, Lyrica, clonazepam, Valium, hydroxyzine, Celexa, Remeron, Seroquel, trazodone, droperidol, rotigotine, pramipexole, Paxil, Ambien, and Norco.     He is interested in trying Sinemet ER. Patient made aware that this is considered an off label use of Sinemet CR, but is reasonable in his case since he has tried and failed multiple other medications. Patient made aware that Sinemet may potentially worsen his symptoms.    Unrelated to above, patient's blood pressure is noted to be elevated today.  He did restart lisinopril 5 mg daily after last visit but did not  restart Toprol-XL. He has previously been on lisinopril 10 mg daily and still has that dose at home. He is asymptomatic from a blood pressure perspective: no chest pain or palpitations, no shortness of breath, no light-headedness or dizziness, no headaches or vision changes.     Also, unrelated to above, patient is due for his flu shot and fasting labs (cholesterol and hyponatremia checks).     OBJECTIVE                                                      BP (!) 156/94 (Cuff Size: Adult Regular)   Pulse 80   Temp 97.8  F (36.6  C) (Tympanic)   Wt 85.5 kg (188 lb 6.4 oz)   SpO2 98%   BMI 27.82 kg/m    Constitutional: well-appearing  Psych: normal judgment and insight; normal mood and affect; recent and remote memory intact    ---    (Note documentation was completed, in part, with [employer] voice-recognition software. Documentation was reviewed, but some grammatical, spelling, and word errors may remain.)

## 2022-02-22 NOTE — PATIENT INSTRUCTIONS
Let your audiologist know that you're hearing an extra sound.     ---    Flu shot today.    Cholesterol check today.     ---    TRIAL of Sinemet ER - 1 tablet before bed at night.    ---    RESTART Toprol XL 25mg daily.    INCREASE lisinopril back to 10mg once daily.    CONTINUE Imdur without change.

## 2022-03-08 DIAGNOSIS — I25.118 CORONARY ARTERY DISEASE OF NATIVE ARTERY OF NATIVE HEART WITH STABLE ANGINA PECTORIS (H): ICD-10-CM

## 2022-03-08 RX ORDER — ISOSORBIDE MONONITRATE 30 MG/1
30 TABLET, EXTENDED RELEASE ORAL DAILY
Qty: 90 TABLET | Refills: 0 | Status: SHIPPED | OUTPATIENT
Start: 2022-03-08 | End: 2022-06-07

## 2022-03-22 ENCOUNTER — OFFICE VISIT (OUTPATIENT)
Dept: INTERNAL MEDICINE | Facility: CLINIC | Age: 87
End: 2022-03-22
Payer: COMMERCIAL

## 2022-03-22 VITALS
DIASTOLIC BLOOD PRESSURE: 60 MMHG | RESPIRATION RATE: 16 BRPM | TEMPERATURE: 97.5 F | BODY MASS INDEX: 27.51 KG/M2 | OXYGEN SATURATION: 96 % | HEART RATE: 79 BPM | SYSTOLIC BLOOD PRESSURE: 130 MMHG | WEIGHT: 186.3 LBS

## 2022-03-22 DIAGNOSIS — G47.9 TROUBLE IN SLEEPING: ICD-10-CM

## 2022-03-22 DIAGNOSIS — G25.81 RESTLESS LEGS SYNDROME (RLS): ICD-10-CM

## 2022-03-22 DIAGNOSIS — F41.1 GAD (GENERALIZED ANXIETY DISORDER): Primary | ICD-10-CM

## 2022-03-22 PROCEDURE — 99214 OFFICE O/P EST MOD 30 MIN: CPT | Performed by: NURSE PRACTITIONER

## 2022-03-22 RX ORDER — SERTRALINE HYDROCHLORIDE 25 MG/1
TABLET, FILM COATED ORAL
Qty: 30 TABLET | Refills: 0 | Status: SHIPPED | OUTPATIENT
Start: 2022-03-22 | End: 2023-03-02

## 2022-03-22 ASSESSMENT — ANXIETY QUESTIONNAIRES
1. FEELING NERVOUS, ANXIOUS, OR ON EDGE: NEARLY EVERY DAY
4. TROUBLE RELAXING: NEARLY EVERY DAY
7. FEELING AFRAID AS IF SOMETHING AWFUL MIGHT HAPPEN: NOT AT ALL
2. NOT BEING ABLE TO STOP OR CONTROL WORRYING: NEARLY EVERY DAY
GAD7 TOTAL SCORE: 13
3. WORRYING TOO MUCH ABOUT DIFFERENT THINGS: NEARLY EVERY DAY
GAD7 TOTAL SCORE: 13
5. BEING SO RESTLESS THAT IT IS HARD TO SIT STILL: NOT AT ALL
6. BECOMING EASILY ANNOYED OR IRRITABLE: SEVERAL DAYS
GAD7 TOTAL SCORE: 13
7. FEELING AFRAID AS IF SOMETHING AWFUL MIGHT HAPPEN: NOT AT ALL

## 2022-03-22 ASSESSMENT — ENCOUNTER SYMPTOMS: NERVOUS/ANXIOUS: 1

## 2022-03-22 ASSESSMENT — PATIENT HEALTH QUESTIONNAIRE - PHQ9
10. IF YOU CHECKED OFF ANY PROBLEMS, HOW DIFFICULT HAVE THESE PROBLEMS MADE IT FOR YOU TO DO YOUR WORK, TAKE CARE OF THINGS AT HOME, OR GET ALONG WITH OTHER PEOPLE: EXTREMELY DIFFICULT
SUM OF ALL RESPONSES TO PHQ QUESTIONS 1-9: 21
SUM OF ALL RESPONSES TO PHQ QUESTIONS 1-9: 21

## 2022-03-22 NOTE — PATIENT INSTRUCTIONS
-Let's try sertraline (zoloft) for anxiety.  Take 1/2 pill daily for 1 week, and then increase to 1 pill daily  -Schedule an appointment with the Sleep Clinic  -Schedule an appointment with a therapist to specifically discuss sleep concerns and anxiety

## 2022-03-22 NOTE — PROGRESS NOTES
"  Assessment & Plan     VISHAL (generalized anxiety disorder)  - Several years of anxiety.  Reports that he recently saw a psychiatrist but no other recommendations were made.  Is requesting to try a different medication to see if this will help with his anxiety.  We discussed that he is taken multiple medications that have not been successful in the past.  He is actually pleading with me to try something to help him.  - Would be reasonable to try low dose sertraline to see if this helps- start 12.5 mg daily for 1 week, increase to 25 mg daily thereafter if tolerated  - Referral to Mental Health for individual therapy- would possibly benefit from seeing a psychologist for sleep and anxiety - pt agreeable  - sertraline (ZOLOFT) 25 MG tablet  Dispense: 30 tablet; Refill: 0  - Adult Mental Health  Referral    Restless legs syndrome (RLS)  - Recently prescribed carbidopa levodopa, without relief or of any help.  He has been on multiple medications without improvement.  - Discussed referral to Sleep Medicine- patient agreeable  - Adult Sleep Eval & Management  Referral    Trouble in sleeping  - Longstanding issue- has been on multiple medications in the past without improvement or relief.  - Referral to Sleep Medicine  - Would be a good idea to see a sleep psychologist if available- pt agreeable.  - Adult Sleep Eval & Management  Referral  - Adult Mental Health  Referral         BMI:   Estimated body mass index is 27.51 kg/m  as calculated from the following:    Height as of 1/28/22: 1.753 m (5' 9\").    Weight as of this encounter: 84.5 kg (186 lb 4.8 oz).       See Patient Instructions    Return in about 4 weeks (around 4/19/2022) for Mood check.    RADAMES Witt CNP  [facility]    Saroj Horton is a 89 year old who presents for the following health issues, accompanied by spouse.    Anxiety    History of Present Illness       Mental " Health Follow-up:  Patient presents to follow-up on Depression & Anxiety.Patient's depression since last visit has been:  Worse  The patient is not having other symptoms associated with depression.  Patient's anxiety since last visit has been:  Bad  The patient is not having other symptoms associated with anxiety.  Any significant life events: No  Patient is not feeling anxious or having panic attacks.  Patient has no concerns about alcohol or drug use.       Today's PHQ-9         PHQ-9 Total Score: 21  PHQ-9 Q9 Thoughts of better off dead/self-harm past 2 weeks :   (P) Not at all    How difficult have these problems made it for you to do your work, take care of things at home, or get along with other people: Extremely difficult    Today's VISHAL-7 Score: 13    He eats 2-3 servings of fruits and vegetables daily.He consumes 1 sweetened beverage(s) daily.He exercises with enough effort to increase his heart rate 9 or less minutes per day.  He exercises with enough effort to increase his heart rate 3 or less days per week.   He is taking medications regularly.    Pt has had RLS over two year hard to sleep because of it. Does not feel relaxed enough to sleep.     Pt was taking carbidopa-levodopa is out of medication for that but, feels that it is not helping.      Discuss iron to see if he should have that checked. Last iron check was two years ago and it was WNL.     Anxiety:  Insomnia:  RLS:  Long standing issue. Has been on multiple medications without relief.  Most recently started on carbidopa-levodopa, and states this was not helpful for his RLS.  States he took this for a month and has run out.    He reports that his main concern is that he is unable to sleep and relax when it is time to go to bed at nighttime.  This has been going on for the past couple years.  States at night when he goes to sleep his legs start jerking.  Sometimes having a weighted blanket on is helpful.  He does note that when it comes to the  evening hours and is time to go to bed, he gets very anxious about being able to sleep.  His biggest concern is his anxiety and his desire for sleep. He reports that his grandchild takes Effexor and is wondering if this would be a good medicine to take for his anxiety.  He states that he is willing to try anything that he has not tried in the past.    He reports that he went to [facility] for his anxiety and sleep issues and was not offered anything else due to his age and endorsing needing to be careful with medications at his age.  Recommended continuing the carbidopa-levodopa.    Review of Systems   Psychiatric/Behavioral: The patient is nervous/anxious.       CONSTITUTIONAL:NEGATIVE for fever, chills, change in weight  RESP:NEGATIVE for significant cough or SOB  CV: NEGATIVE for chest pain, palpitations or peripheral edema  MUSCULOSKELETAL: NEGATIVE for significant arthralgias or myalgia  NEURO: NEGATIVE for weakness, dizziness or paresthesias  PSYCHIATRIC: POSITIVE foranxiety and insomnia for years      Objective    /60 (BP Location: Left arm, Patient Position: Chair, Cuff Size: Adult Regular)   Pulse 79   Temp 97.5  F (36.4  C) (Oral)   Resp 16   Wt 84.5 kg (186 lb 4.8 oz)   SpO2 96%   BMI 27.51 kg/m    Body mass index is 27.51 kg/m .     Physical Exam   GENERAL APPEARANCE: healthy, alert and no distress  EYES: Eyes grossly normal to inspection, PERRL and conjunctivae and sclerae normal  RESP: lungs clear to auscultation - no rales, rhonchi or wheezes  CV: regular rates and rhythm, normal S1 S2, no S3 or S4 and no murmur, click or rub  ABDOMEN: soft, nontender, without hepatosplenomegaly or masses and bowel sounds normal  MS: extremities normal- no gross deformities noted  SKIN: no suspicious lesions or rashes  PSYCH: mentation appears normal    - Chart reviewed

## 2022-03-23 ASSESSMENT — ANXIETY QUESTIONNAIRES: GAD7 TOTAL SCORE: 13

## 2022-03-23 ASSESSMENT — PATIENT HEALTH QUESTIONNAIRE - PHQ9: SUM OF ALL RESPONSES TO PHQ QUESTIONS 1-9: 21

## 2022-03-29 ENCOUNTER — OFFICE VISIT (OUTPATIENT)
Dept: CARDIOLOGY | Facility: CLINIC | Age: 87
End: 2022-03-29
Payer: COMMERCIAL

## 2022-03-29 VITALS
WEIGHT: 184.3 LBS | SYSTOLIC BLOOD PRESSURE: 107 MMHG | HEART RATE: 67 BPM | BODY MASS INDEX: 27.3 KG/M2 | DIASTOLIC BLOOD PRESSURE: 62 MMHG | OXYGEN SATURATION: 95 % | HEIGHT: 69 IN

## 2022-03-29 DIAGNOSIS — E78.5 HYPERLIPIDEMIA LDL GOAL <130: ICD-10-CM

## 2022-03-29 DIAGNOSIS — I20.0 UNSTABLE ANGINA (H): ICD-10-CM

## 2022-03-29 DIAGNOSIS — G25.81 RESTLESS LEGS SYNDROME (RLS): Primary | ICD-10-CM

## 2022-03-29 DIAGNOSIS — I10 BENIGN ESSENTIAL HYPERTENSION: ICD-10-CM

## 2022-03-29 DIAGNOSIS — I25.84 CORONARY ARTERY DISEASE DUE TO CALCIFIED CORONARY LESION: ICD-10-CM

## 2022-03-29 DIAGNOSIS — I25.10 CORONARY ARTERY DISEASE DUE TO CALCIFIED CORONARY LESION: ICD-10-CM

## 2022-03-29 DIAGNOSIS — R06.02 SHORTNESS OF BREATH: ICD-10-CM

## 2022-03-29 DIAGNOSIS — I48.0 PAF (PAROXYSMAL ATRIAL FIBRILLATION) (H): ICD-10-CM

## 2022-03-29 PROCEDURE — 99214 OFFICE O/P EST MOD 30 MIN: CPT | Performed by: INTERNAL MEDICINE

## 2022-03-29 RX ORDER — ATORVASTATIN CALCIUM 20 MG/1
20 TABLET, FILM COATED ORAL DAILY
Qty: 90 TABLET | Refills: 0 | COMMUNITY
Start: 2022-03-29 | End: 2022-05-18

## 2022-03-29 NOTE — LETTER
"3/29/2022    Anais Pickett MD  [address] [facility] [address]    RE: Korey Pearson       Dear Colleague,     I had the pleasure of seeing Koreyraymond Jerez Lili in the [facility].  Cardiology Progress Note          Assessment and Plan:       1. Orthostasis and dizziness    Trial of discontinuing his lisinopril again.      2. Restless legs    Check venous competency testing in case this could be contributing.    If severe reflux, would consider venous ablation      3. Anxiety    Sertraline is reasonable    Routine follow-up in 1 year      30 minutes was spent with the patient, precharting and reviewing tests as well as post visit charting all done today..    This note was transcribed using electronic voice recognition software and there may be typographical errors present.                    Interval History:     The patient is a very pleasant 89 year old whom I have been following for coronary artery disease, he states that he has been having more restless legs at night when he lays down.  He also has anxiety for which he started sertraline.  He also has had some orthostasis and dizziness.                     Review of Systems:     Review of Systems:  Skin:  Negative bruising   Eyes:  Positive for glasses  ENT:  Positive for hearing loss  Respiratory:  Negative dyspnea on exertion;shortness of breath;sleep apnea  Cardiovascular:  Negative Positive for;dizziness;fatigue (chest tightness)  Gastroenterology: not assessed abdominal pain  Genitourinary:  Negative    Musculoskeletal:  Negative arthritis  Neurologic:  Positive for numbness or tingling of feet (restless legs)  Psychiatric:  Positive for sleep disturbances  Heme/Lymph/Imm:  Negative allergies  Endocrine:  Positive for thyroid disorder              Physical Exam:     Vitals: /62   Pulse 67   Ht 1.753 m (5' 9\")   Wt 83.6 kg (184 lb 4.8 oz)   SpO2 95%   BMI 27.22 kg/m    Constitutional:  cooperative, alert " and oriented, well developed, well nourished, in no acute distress   Hard of hearing    Skin:  warm and dry to the touch, no apparent skin lesions or masses noted        Head:  normocephalic, no masses or lesions        Eyes:  pupils equal and round        Chest:  clear to auscultation;normal symmetry        Cardiac: regular rhythm;normal S1 and S2;no murmurs, gallops or rubs detected   distant heart sounds              Extremities and Back:  no edema;no deformities, clubbing, cyanosis, erythema observed        Neurological:  no gross motor deficits;affect appropriate                 Medications:     Current Outpatient Medications   Medication Sig Dispense Refill     aspirin 81 MG tablet Take 81 mg by mouth daily        atorvastatin (LIPITOR) 20 MG tablet Take 1 tablet (20 mg) by mouth daily 90 tablet 0     Cholecalciferol (VITAMIN D) 50 MCG (2000 UT) CAPS Take 1 capsule by mouth daily       isosorbide mononitrate (IMDUR) 30 MG 24 hr tablet Take 1 tablet (30 mg) by mouth daily In the morning. 90 tablet 0     levothyroxine (SYNTHROID/LEVOTHROID) 75 MCG tablet Take 1 tablet (75 mcg) by mouth daily 90 tablet 2     metoprolol succinate ER (TOPROL-XL) 25 MG 24 hr tablet Take 1 tablet (25 mg) by mouth daily 90 tablet 3     sertraline (ZOLOFT) 25 MG tablet Take 1/2 tablet daily for 1 week, then increase to 1 pill daily 30 tablet 0     tamsulosin (FLOMAX) 0.4 MG capsule Take 0.4 mg by mouth daily (with dinner)   3                Data:   All laboratory data reviewed  No results found for this or any previous visit (from the past 24 hour(s)).    All laboratory data reviewed  Lab Results   Component Value Date    CHOL 108 02/22/2022    CHOL 106 10/27/2020     Lab Results   Component Value Date    HDL 42 02/22/2022    HDL 46 10/27/2020     Lab Results   Component Value Date    LDL 56 02/22/2022    LDL 45 10/27/2020     Lab Results   Component Value Date    TRIG 52 02/22/2022    TRIG 73 10/27/2020     Lab Results   Component  Value Date    CHOLHDLRATIO 4.4 08/06/2014     TSH   Date Value Ref Range Status   12/07/2021 2.56 0.40 - 4.00 mU/L Final   10/27/2020 2.88 0.40 - 4.00 mU/L Final     Last Basic Metabolic Panel:  Lab Results   Component Value Date     02/22/2022     10/27/2020      Lab Results   Component Value Date    POTASSIUM 4.2 02/22/2022    POTASSIUM 4.7 10/27/2020     Lab Results   Component Value Date    CHLORIDE 102 02/22/2022    CHLORIDE 98 10/27/2020     Lab Results   Component Value Date    OMAR 9.4 02/22/2022    OMAR 9.1 10/27/2020     Lab Results   Component Value Date    CO2 25 02/22/2022    CO2 28 10/27/2020     Lab Results   Component Value Date    BUN 16 02/22/2022    BUN 20 10/27/2020     Lab Results   Component Value Date    CR 0.89 02/22/2022    CR 0.76 10/27/2020     Lab Results   Component Value Date    GLC 95 02/22/2022    GLC 91 10/27/2020     Lab Results   Component Value Date    WBC 11.0 12/07/2021    WBC 9.5 10/27/2020     Lab Results   Component Value Date    RBC 5.04 12/07/2021    RBC 4.45 10/27/2020     Lab Results   Component Value Date    HGB 15.2 12/07/2021    HGB 13.6 10/27/2020     Lab Results   Component Value Date    HCT 45.7 12/07/2021    HCT 40.4 10/27/2020     Lab Results   Component Value Date    MCV 91 12/07/2021    MCV 91 10/27/2020     Lab Results   Component Value Date    MCH 30.2 12/07/2021    MCH 30.6 10/27/2020     Lab Results   Component Value Date    MCHC 33.3 12/07/2021    MCHC 33.7 10/27/2020     Lab Results   Component Value Date    RDW 13.3 12/07/2021    RDW 13.4 10/27/2020     Lab Results   Component Value Date     12/07/2021     10/27/2020       Thank you for allowing me to participate in the care of your patient.      Sincerely,     Ramirez Patel MD     [facility]  cc:   Ramirez Patel MD  [address]  [place] [id number]

## 2022-03-29 NOTE — PATIENT INSTRUCTIONS
Please stop the LISINOPRIL for the low blood pressure.  Please move the ATORVASTATIN to the morning to see if that helps your night time leg symptoms.    Let's also do an ultrasound of your leg veins to see if vein incompetence is contributing to your restless legs.

## 2022-03-29 NOTE — PROGRESS NOTES
"Cardiology Progress Note          Assessment and Plan:       1. Orthostasis and dizziness    Trial of discontinuing his lisinopril again.      2. Restless legs    Check venous competency testing in case this could be contributing.    If severe reflux, would consider venous ablation      3. Anxiety    Sertraline is reasonable    Routine follow-up in 1 year      30 minutes was spent with the patient, precharting and reviewing tests as well as post visit charting all done today..    This note was transcribed using electronic voice recognition software and there may be typographical errors present.                    Interval History:     The patient is a very pleasant 89 year old whom I have been following for coronary artery disease, he states that he has been having more restless legs at night when he lays down.  He also has anxiety for which he started sertraline.  He also has had some orthostasis and dizziness.                     Review of Systems:     Review of Systems:  Skin:  Negative bruising   Eyes:  Positive for glasses  ENT:  Positive for hearing loss  Respiratory:  Negative dyspnea on exertion;shortness of breath;sleep apnea  Cardiovascular:  Negative Positive for;dizziness;fatigue (chest tightness)  Gastroenterology: not assessed abdominal pain  Genitourinary:  Negative    Musculoskeletal:  Negative arthritis  Neurologic:  Positive for numbness or tingling of feet (restless legs)  Psychiatric:  Positive for sleep disturbances  Heme/Lymph/Imm:  Negative allergies  Endocrine:  Positive for thyroid disorder              Physical Exam:     Vitals: /62   Pulse 67   Ht 1.753 m (5' 9\")   Wt 83.6 kg (184 lb 4.8 oz)   SpO2 95%   BMI 27.22 kg/m    Constitutional:  cooperative, alert and oriented, well developed, well nourished, in no acute distress   Hard of hearing    Skin:  warm and dry to the touch, no apparent skin lesions or masses noted        Head:  normocephalic, no masses or lesions        Eyes: "  pupils equal and round        Chest:  clear to auscultation;normal symmetry        Cardiac: regular rhythm;normal S1 and S2;no murmurs, gallops or rubs detected   distant heart sounds              Extremities and Back:  no edema;no deformities, clubbing, cyanosis, erythema observed        Neurological:  no gross motor deficits;affect appropriate                 Medications:     Current Outpatient Medications   Medication Sig Dispense Refill     aspirin 81 MG tablet Take 81 mg by mouth daily        atorvastatin (LIPITOR) 20 MG tablet Take 1 tablet (20 mg) by mouth daily 90 tablet 0     Cholecalciferol (VITAMIN D) 50 MCG (2000 UT) CAPS Take 1 capsule by mouth daily       isosorbide mononitrate (IMDUR) 30 MG 24 hr tablet Take 1 tablet (30 mg) by mouth daily In the morning. 90 tablet 0     levothyroxine (SYNTHROID/LEVOTHROID) 75 MCG tablet Take 1 tablet (75 mcg) by mouth daily 90 tablet 2     metoprolol succinate ER (TOPROL-XL) 25 MG 24 hr tablet Take 1 tablet (25 mg) by mouth daily 90 tablet 3     sertraline (ZOLOFT) 25 MG tablet Take 1/2 tablet daily for 1 week, then increase to 1 pill daily 30 tablet 0     tamsulosin (FLOMAX) 0.4 MG capsule Take 0.4 mg by mouth daily (with dinner)   3                Data:   All laboratory data reviewed  No results found for this or any previous visit (from the past 24 hour(s)).    All laboratory data reviewed  Lab Results   Component Value Date    CHOL 108 02/22/2022    CHOL 106 10/27/2020     Lab Results   Component Value Date    HDL 42 02/22/2022    HDL 46 10/27/2020     Lab Results   Component Value Date    LDL 56 02/22/2022    LDL 45 10/27/2020     Lab Results   Component Value Date    TRIG 52 02/22/2022    TRIG 73 10/27/2020     Lab Results   Component Value Date    CHOLHDLRATIO 4.4 08/06/2014     TSH   Date Value Ref Range Status   12/07/2021 2.56 0.40 - 4.00 mU/L Final   10/27/2020 2.88 0.40 - 4.00 mU/L Final     Last Basic Metabolic Panel:  Lab Results   Component Value Date      02/22/2022     10/27/2020      Lab Results   Component Value Date    POTASSIUM 4.2 02/22/2022    POTASSIUM 4.7 10/27/2020     Lab Results   Component Value Date    CHLORIDE 102 02/22/2022    CHLORIDE 98 10/27/2020     Lab Results   Component Value Date    OMAR 9.4 02/22/2022    OMAR 9.1 10/27/2020     Lab Results   Component Value Date    CO2 25 02/22/2022    CO2 28 10/27/2020     Lab Results   Component Value Date    BUN 16 02/22/2022    BUN 20 10/27/2020     Lab Results   Component Value Date    CR 0.89 02/22/2022    CR 0.76 10/27/2020     Lab Results   Component Value Date    GLC 95 02/22/2022    GLC 91 10/27/2020     Lab Results   Component Value Date    WBC 11.0 12/07/2021    WBC 9.5 10/27/2020     Lab Results   Component Value Date    RBC 5.04 12/07/2021    RBC 4.45 10/27/2020     Lab Results   Component Value Date    HGB 15.2 12/07/2021    HGB 13.6 10/27/2020     Lab Results   Component Value Date    HCT 45.7 12/07/2021    HCT 40.4 10/27/2020     Lab Results   Component Value Date    MCV 91 12/07/2021    MCV 91 10/27/2020     Lab Results   Component Value Date    MCH 30.2 12/07/2021    MCH 30.6 10/27/2020     Lab Results   Component Value Date    MCHC 33.3 12/07/2021    MCHC 33.7 10/27/2020     Lab Results   Component Value Date    RDW 13.3 12/07/2021    RDW 13.4 10/27/2020     Lab Results   Component Value Date     12/07/2021     10/27/2020

## 2022-04-06 ENCOUNTER — ANCILLARY PROCEDURE (OUTPATIENT)
Dept: VASCULAR ULTRASOUND | Facility: CLINIC | Age: 87
End: 2022-04-06
Attending: INTERNAL MEDICINE
Payer: COMMERCIAL

## 2022-04-06 DIAGNOSIS — G25.81 RESTLESS LEGS SYNDROME (RLS): ICD-10-CM

## 2022-04-06 DIAGNOSIS — I20.0 UNSTABLE ANGINA (H): ICD-10-CM

## 2022-04-06 DIAGNOSIS — I25.84 CORONARY ARTERY DISEASE DUE TO CALCIFIED CORONARY LESION: ICD-10-CM

## 2022-04-06 DIAGNOSIS — R06.02 SHORTNESS OF BREATH: ICD-10-CM

## 2022-04-06 DIAGNOSIS — E78.5 HYPERLIPIDEMIA LDL GOAL <130: ICD-10-CM

## 2022-04-06 DIAGNOSIS — I48.0 PAF (PAROXYSMAL ATRIAL FIBRILLATION) (H): ICD-10-CM

## 2022-04-06 DIAGNOSIS — I10 BENIGN ESSENTIAL HYPERTENSION: ICD-10-CM

## 2022-04-06 DIAGNOSIS — I25.10 CORONARY ARTERY DISEASE DUE TO CALCIFIED CORONARY LESION: ICD-10-CM

## 2022-04-06 PROCEDURE — 93970 EXTREMITY STUDY: CPT | Performed by: INTERNAL MEDICINE

## 2022-05-04 ENCOUNTER — OFFICE VISIT (OUTPATIENT)
Dept: URGENT CARE | Facility: URGENT CARE | Age: 87
End: 2022-05-04
Payer: COMMERCIAL

## 2022-05-04 ENCOUNTER — HOSPITAL ENCOUNTER (EMERGENCY)
Facility: CLINIC | Age: 87
Discharge: HOME | End: 2022-05-04
Attending: EMERGENCY MEDICINE | Admitting: EMERGENCY MEDICINE
Payer: COMMERCIAL

## 2022-05-04 ENCOUNTER — APPOINTMENT (OUTPATIENT)
Dept: CT IMAGING | Facility: CLINIC | Age: 87
End: 2022-05-04
Attending: EMERGENCY MEDICINE
Payer: COMMERCIAL

## 2022-05-04 VITALS
BODY MASS INDEX: 25.99 KG/M2 | HEART RATE: 73 BPM | DIASTOLIC BLOOD PRESSURE: 87 MMHG | WEIGHT: 176 LBS | RESPIRATION RATE: 16 BRPM | SYSTOLIC BLOOD PRESSURE: 183 MMHG | TEMPERATURE: 97.7 F | OXYGEN SATURATION: 97 %

## 2022-05-04 VITALS
DIASTOLIC BLOOD PRESSURE: 75 MMHG | HEART RATE: 71 BPM | RESPIRATION RATE: 20 BRPM | OXYGEN SATURATION: 95 % | WEIGHT: 177 LBS | BODY MASS INDEX: 26.14 KG/M2 | TEMPERATURE: 98.9 F | SYSTOLIC BLOOD PRESSURE: 135 MMHG

## 2022-05-04 DIAGNOSIS — R11.0 NAUSEA: ICD-10-CM

## 2022-05-04 DIAGNOSIS — R10.84 ABDOMINAL PAIN, GENERALIZED: ICD-10-CM

## 2022-05-04 DIAGNOSIS — R11.2 NON-INTRACTABLE VOMITING WITH NAUSEA, UNSPECIFIED VOMITING TYPE: Primary | ICD-10-CM

## 2022-05-04 DIAGNOSIS — R11.2 NON-INTRACTABLE VOMITING WITH NAUSEA, UNSPECIFIED VOMITING TYPE: ICD-10-CM

## 2022-05-04 LAB
ALBUMIN SERPL-MCNC: 4 G/DL (ref 3.4–5)
ALBUMIN UR-MCNC: NEGATIVE MG/DL
ALP SERPL-CCNC: 94 U/L (ref 40–150)
ALT SERPL W P-5'-P-CCNC: 31 U/L (ref 0–70)
ANION GAP SERPL CALCULATED.3IONS-SCNC: 3 MMOL/L (ref 3–14)
APPEARANCE UR: CLEAR
AST SERPL W P-5'-P-CCNC: 23 U/L (ref 0–45)
BASOPHILS # BLD AUTO: 0 10E3/UL (ref 0–0.2)
BASOPHILS NFR BLD AUTO: 0 %
BILIRUB SERPL-MCNC: 0.8 MG/DL (ref 0.2–1.3)
BILIRUB UR QL STRIP: NEGATIVE
BUN SERPL-MCNC: 12 MG/DL (ref 7–30)
CALCIUM SERPL-MCNC: 9.4 MG/DL (ref 8.5–10.1)
CHLORIDE BLD-SCNC: 99 MMOL/L (ref 94–109)
CO2 SERPL-SCNC: 28 MMOL/L (ref 20–32)
COLOR UR AUTO: YELLOW
CREAT SERPL-MCNC: 0.76 MG/DL (ref 0.66–1.25)
EOSINOPHIL # BLD AUTO: 0 10E3/UL (ref 0–0.7)
EOSINOPHIL NFR BLD AUTO: 0 %
ERYTHROCYTE [DISTWIDTH] IN BLOOD BY AUTOMATED COUNT: 13.5 % (ref 10–15)
GFR SERPL CREATININE-BSD FRML MDRD: 84 ML/MIN/1.73M2
GLUCOSE BLD-MCNC: 114 MG/DL (ref 70–99)
GLUCOSE UR STRIP-MCNC: NEGATIVE MG/DL
HCT VFR BLD AUTO: 42 % (ref 40–53)
HGB BLD-MCNC: 14.4 G/DL (ref 13.3–17.7)
HGB UR QL STRIP: NEGATIVE
HOLD SPECIMEN: NORMAL
KETONES UR STRIP-MCNC: ABNORMAL MG/DL
LEUKOCYTE ESTERASE UR QL STRIP: NEGATIVE
LIPASE SERPL-CCNC: 72 U/L (ref 73–393)
LYMPHOCYTES # BLD AUTO: 2.2 10E3/UL (ref 0.8–5.3)
LYMPHOCYTES NFR BLD AUTO: 24 %
MCH RBC QN AUTO: 31 PG (ref 26.5–33)
MCHC RBC AUTO-ENTMCNC: 34.3 G/DL (ref 31.5–36.5)
MCV RBC AUTO: 91 FL (ref 78–100)
MONOCYTES # BLD AUTO: 0.8 10E3/UL (ref 0–1.3)
MONOCYTES NFR BLD AUTO: 9 %
NEUTROPHILS # BLD AUTO: 6.2 10E3/UL (ref 1.6–8.3)
NEUTROPHILS NFR BLD AUTO: 67 %
NITRATE UR QL: NEGATIVE
PH UR STRIP: 6 [PH] (ref 5–7)
PLATELET # BLD AUTO: 239 10E3/UL (ref 150–450)
POTASSIUM BLD-SCNC: 3.8 MMOL/L (ref 3.4–5.3)
PROT SERPL-MCNC: 8.2 G/DL (ref 6.8–8.8)
RBC # BLD AUTO: 4.64 10E6/UL (ref 4.4–5.9)
SARS-COV-2 RNA RESP QL NAA+PROBE: NEGATIVE
SODIUM SERPL-SCNC: 130 MMOL/L (ref 133–144)
SP GR UR STRIP: 1.02 (ref 1–1.03)
TROPONIN I SERPL HS-MCNC: 10 NG/L
UROBILINOGEN UR STRIP-ACNC: 0.2 E.U./DL
WBC # BLD AUTO: 9.3 10E3/UL (ref 4–11)

## 2022-05-04 PROCEDURE — C9803 HOPD COVID-19 SPEC COLLECT: HCPCS

## 2022-05-04 PROCEDURE — 96361 HYDRATE IV INFUSION ADD-ON: CPT

## 2022-05-04 PROCEDURE — 250N000011 HC RX IP 250 OP 636: Performed by: EMERGENCY MEDICINE

## 2022-05-04 PROCEDURE — 83690 ASSAY OF LIPASE: CPT | Performed by: EMERGENCY MEDICINE

## 2022-05-04 PROCEDURE — 96374 THER/PROPH/DIAG INJ IV PUSH: CPT | Mod: 59

## 2022-05-04 PROCEDURE — 99285 EMERGENCY DEPT VISIT HI MDM: CPT | Mod: 25

## 2022-05-04 PROCEDURE — 85025 COMPLETE CBC W/AUTO DIFF WBC: CPT | Performed by: PHYSICIAN ASSISTANT

## 2022-05-04 PROCEDURE — 93000 ELECTROCARDIOGRAM COMPLETE: CPT | Performed by: PHYSICIAN ASSISTANT

## 2022-05-04 PROCEDURE — 84484 ASSAY OF TROPONIN QUANT: CPT | Performed by: EMERGENCY MEDICINE

## 2022-05-04 PROCEDURE — 80053 COMPREHEN METABOLIC PANEL: CPT | Performed by: PHYSICIAN ASSISTANT

## 2022-05-04 PROCEDURE — 81003 URINALYSIS AUTO W/O SCOPE: CPT | Performed by: PHYSICIAN ASSISTANT

## 2022-05-04 PROCEDURE — 74177 CT ABD & PELVIS W/CONTRAST: CPT

## 2022-05-04 PROCEDURE — 36415 COLL VENOUS BLD VENIPUNCTURE: CPT | Performed by: EMERGENCY MEDICINE

## 2022-05-04 PROCEDURE — 250N000009 HC RX 250: Performed by: EMERGENCY MEDICINE

## 2022-05-04 PROCEDURE — 258N000003 HC RX IP 258 OP 636: Performed by: EMERGENCY MEDICINE

## 2022-05-04 PROCEDURE — U0005 INFEC AGEN DETEC AMPLI PROBE: HCPCS | Performed by: EMERGENCY MEDICINE

## 2022-05-04 PROCEDURE — 93005 ELECTROCARDIOGRAM TRACING: CPT | Mod: RTG

## 2022-05-04 PROCEDURE — 99207 PR NO CHARGE LOS: CPT | Performed by: PHYSICIAN ASSISTANT

## 2022-05-04 PROCEDURE — 36415 COLL VENOUS BLD VENIPUNCTURE: CPT | Performed by: PHYSICIAN ASSISTANT

## 2022-05-04 RX ORDER — ONDANSETRON 2 MG/ML
4 INJECTION INTRAMUSCULAR; INTRAVENOUS ONCE
Status: COMPLETED | OUTPATIENT
Start: 2022-05-04 | End: 2022-05-04

## 2022-05-04 RX ORDER — ONDANSETRON 4 MG/1
4 TABLET, ORALLY DISINTEGRATING ORAL EVERY 8 HOURS PRN
Qty: 10 TABLET | Refills: 0 | Status: SHIPPED | OUTPATIENT
Start: 2022-05-04 | End: 2022-05-07

## 2022-05-04 RX ORDER — IOPAMIDOL 755 MG/ML
89 INJECTION, SOLUTION INTRAVASCULAR ONCE
Status: COMPLETED | OUTPATIENT
Start: 2022-05-04 | End: 2022-05-04

## 2022-05-04 RX ADMIN — SODIUM CHLORIDE 1000 ML: 9 INJECTION, SOLUTION INTRAVENOUS at 19:20

## 2022-05-04 RX ADMIN — ONDANSETRON 4 MG: 2 INJECTION INTRAMUSCULAR; INTRAVENOUS at 19:22

## 2022-05-04 RX ADMIN — IOPAMIDOL 89 ML: 755 INJECTION, SOLUTION INTRAVENOUS at 19:58

## 2022-05-04 RX ADMIN — SODIUM CHLORIDE 65 ML: 9 INJECTION, SOLUTION INTRAVENOUS at 19:58

## 2022-05-04 NOTE — ED TRIAGE NOTES
Pt presents from [facility] as he was instruced to come here for concern for dehydration. Pt reports he has been ill for the past 5 days with GI symptoms, no appetite and not eating/drinking. Pt had blood work and EKG at , told to come to ED for IVF as they were unable to administer.      Triage Assessment     Row Name 05/04/22 1522       Triage Assessment (Adult)    Airway WDL WDL       Respiratory WDL    Respiratory WDL WDL       Skin Circulation/Temperature WDL    Skin Circulation/Temperature WDL WDL       Cardiac WDL    Cardiac WDL WDL       Cognitive/Neuro/Behavioral WDL    Cognitive/Neuro/Behavioral WDL WDL

## 2022-05-05 NOTE — ED PROVIDER NOTES
History     Chief Complaint:  Dehydration and Nausea & Vomiting      HPI   Korey Pearson is a 89 year old male who presents with dehydration.  Patient reports the last 1 to 2 weeks having poor appetite and nausea and vomiting.  He states that he is having difficulty eating anything.  Nothing tastes quite well.  He states that he is having normal bowel movements, no diarrhea.  Denies any chest pain or shortness of breath.  Making urine but not much.  Denies fever, cough, runny nose or other concerns.  No one else is ill in the home.    Review of Systems   All other systems reviewed and are negative.      Allergies:    Ambien  Ropinirole  Rotigotine      Medications:      ondansetron (ZOFRAN ODT) 4 MG ODT tab  aspirin 81 MG tablet  atorvastatin (LIPITOR) 20 MG tablet  Cholecalciferol (VITAMIN D) 50 MCG (2000 UT) CAPS  isosorbide mononitrate (IMDUR) 30 MG 24 hr tablet  levothyroxine (SYNTHROID/LEVOTHROID) 75 MCG tablet  metoprolol succinate ER (TOPROL-XL) 25 MG 24 hr tablet  sertraline (ZOLOFT) 25 MG tablet  tamsulosin (FLOMAX) 0.4 MG capsule        Past Medical History:      Past Medical History:   Diagnosis Date     Benign essential hypertension      CAD (coronary artery disease)      VISHAL (generalized anxiety disorder)      History of stroke 2020     PAF (paroxysmal atrial fibrillation) (H)      Peripheral polyneuropathy      Restless legs syndrome (RLS)      Patient Active Problem List    Diagnosis Date Noted     Restless legs syndrome (RLS) 01/27/2022     Priority: Medium     PAF (paroxysmal atrial fibrillation) (H) 01/27/2022     Priority: Medium     Benign essential hypertension 01/27/2022     Priority: Medium     VISHAL (generalized anxiety disorder) 01/27/2022     Priority: Medium     Peripheral polyneuropathy 01/27/2022     Priority: Medium     CAD (coronary artery disease) 01/27/2022     Priority: Medium     s/p PCI to LAD in 2017       History of stroke 2020     Priority: Medium     cerebellar           Past Surgical History:      Past Surgical History:   Procedure Laterality Date     APPENDECTOMY       CATARACT IOL, RT/LT       INGUINAL HERNIA REPAIR Left      TURP         Family History:      Family History   Problem Relation Age of Onset     Cerebrovascular Disease Mother 40     Hypertension Mother      Heart Disease Father         details unknown     Prostate Cancer No family hx of      Colon Cancer No family hx of        Social History:  Presents with wife who was at bedside.    Physical Exam     Patient Vitals for the past 24 hrs:   BP Temp Temp src Pulse Resp SpO2 Weight   05/04/22 2024 -- -- -- 73 16 97 % --   05/04/22 1910 (!) 183/87 -- -- 70 -- 96 % --   05/04/22 1521 136/68 97.7  F (36.5  C) Oral 65 20 95 % 79.8 kg (176 lb)       Physical Exam  General: Resting on the bed. Very pleasant   Head: No obvious trauma to head.  Ears, Nose, Throat:  External ears normal.  Nose normal.    Eyes:  Conjunctivae clear.  Pupils are equal, round, and reactive.   Neck: Normal range of motion.  Neck supple.   CV: Regular rate and rhythm.  No murmurs.    2+ radial pulses   Respiratory: Effort normal and breath sounds normal.  No wheezing or crackles.   Gastrointestinal: Soft.  No distension. There is periumbilical tenderness.  There is no rigidity, no rebound and no guarding.   Neuro: Alert. Moving all extremities appropriately.  Normal speech.    Skin: Skin is warm and dry.  No rash noted.       Emergency Department Course   ECG:  ECG taken at 1945, ECG read at 1945  Sinus rhythm with 1st degree AV block   Left axis deviation   Right bundle branch block   Abnormal ECG   No significant change as compared to prior, dated 1/4/2019.  Rate 65 bpm. KS interval 248 ms. QRS duration 132 ms. QT/QTc 436/453 ms. P-R-T axes 21 -54 8.     Imaging:  CT Abdomen Pelvis w Contrast   Final Result   IMPRESSION:    1.  Large, 11 cm posterior left renal cyst, producing anterior displaced into the left kidney. The above-mentioned  renal cysts have the benign appearance and do not require follow-up.   2.  Right inguinal hernia with protrusion of the right aspect of the bladder into the inguinal canal. No fat stranding surrounding the herniated portion of the bladder to suggest angulation.   3.  Diverticulosis of the sigmoid colon without CT evidence for diverticulitis.   4.  Cholelithiasis.        Report per radiology    Laboratory:  Labs Ordered and Resulted from Time of ED Arrival to Time of ED Departure   LIPASE - Abnormal       Result Value    Lipase 72 (*)    COVID-19 VIRUS (CORONAVIRUS) BY PCR - Normal    SARS CoV2 PCR Negative     TROPONIN I - Normal    Troponin I High Sensitivity 10         Procedures:      Emergency Department Course:             Reviewed:  I reviewed nursing notes, vitals, past medical history and Care Everywhere    Assessments:   I obtained history and examined the patient as noted above.    I rechecked the patient and explained findings.       Interventions:  Medications   0.9% sodium chloride BOLUS (0 mLs Intravenous Stopped 22)   ondansetron (ZOFRAN) injection 4 mg (4 mg Intravenous Given 22)   Saline Flush - CT (65 mLs Intravenous Given 22)   iopamidol (ISOVUE-370) solution 89 mL (89 mLs Intravenous Given 22)       Disposition:  The patient was discharged to home.     Impression & Plan      Medical Decision Makin-year-old male presents with concern for dehydration.  Vital signs are reassuring.  Broad differential was pursued including not limited to obstruction, perforation, electrolyte, metabolic, renal dysfunction, pancreatitis, hepatitis, UTI, pyelonephritis, ACS, referred pain, etc.  Overall patient is well-appearing nontoxic.  Patient does have some abdominal discomfort and therefore CT scan was obtained.  I reviewed labs from the clinic.  CBC shows no leukocytosis or anemia.  CMP shows no acute electrolyte, metabolic or renal dysfunction.  UA is unrevealing no  signs of acute infectious etiology.  EKG here shows sinus rhythm, no acute ischemic change.  Troponin within normal limits.  Patient has no chest pain or shortness of breath.  He has reproducible abdominal tenderness on examination.  I do not suspect ACS or arrhythmia.  Do not suspect PE based on no shortness of breath, tachycardia or hypoxia.  COVID testing negative.  CT shows no evidence of acute surgical pathology.  I did discuss with patient that he has an inguinal hernia with bladder protruding through it.  There does not appear to be signs of obstruction.  He is urinating normally.  There is evidence of cholelithiasis the patient has no focal right upper quadrant tenderness to indicate cholecystitis.  No other pathology noted on today's examination or labs.  Patient feeling much improved after fluids and Zofran.  He wishes to go home.  Advise close follow-up with primary care doctor.  Strict return precautions were discussed.  Patient was discharged.    Covid-19  Korey Pearson was evaluated during a global COVID-19 pandemic, which necessitated consideration that the patient might be at risk for infection with the SARS-CoV-2 virus that causes COVID-19.   Applicable protocols for evaluation were followed during the patient's care.   COVID-19 was considered as part of the patient's evaluation.    Diagnosis:    ICD-10-CM    1. Nausea  R11.0    2. Non-intractable vomiting with nausea, unspecified vomiting type  R11.2    3. Abdominal pain, generalized  R10.84        Discharge Medications:  Discharge Medication List as of 5/4/2022  9:44 PM      START taking these medications    Details   ondansetron (ZOFRAN ODT) 4 MG ODT tab Take 1 tablet (4 mg) by mouth every 8 hours as needed for nausea, Disp-10 tablet, R-0, E-Prescribe              Nany Iniguez MD  05/04/22 7625

## 2022-05-05 NOTE — DISCHARGE INSTRUCTIONS
We did see gallstones on the CT scan as well as part of your bladder is going through a right inguinal hernia.  There does not appear to be obstruction of the bladder otherwise.  You may use Zofran as needed for nausea and or vomiting.  Please follow-up with your primary doctor if having worsening pain, nausea, fevers, intractable vomiting, or other acute concerns return to the ER sooner.    Discharge Instructions  Abdominal Pain    Abdominal pain (belly pain) can be caused by many things. Your evaluation today does not show the exact cause for your pain. Your provider today has decided that it is unlikely your pain is due to a life threatening problem, or a problem requiring surgery or hospital admission. Sometimes those problems cannot be found right away, so it is very important that you follow up as directed.  Sometimes only the changes which occur over time allow the cause of your pain to be found.    Generally, every Emergency Department visit should have a follow-up clinic visit with either a primary or a specialty clinic/provider. Please follow-up as instructed by your emergency provider today. With abdominal pain, we often recommend very close follow-up, such as the following day.    ADULTS:  Return to the Emergency Department right away if:    You get an oral temperature above 102oF or as directed by your provider.  You have blood in your stools. This may be bright red or appear as black, tarry stools.    You keep vomiting (throwing up) or cannot drink liquids.  You see blood when you vomit.   You cannot have a bowel movement or you cannot pass gas.  Your stomach gets bloated or bigger.  Your skin or the whites of your eyes look yellow.  You faint.  You have bloody, frequent or painful urination (peeing).  You have new symptoms or anything that worries you.    CHILDREN:  Return to the Emergency Department right away if your child has any of the above-listed symptoms or the following:    Pushes your hand  away or screams/cries when his/her belly is touched.  You notice your child is very fussy or weak.  Your child is very tired and is too tired to eat or drink.  Your child is dehydrated.  Signs of dehydration can be:  Significant change in the amount of wet diapers/urine.  Your infant or child starts to have dry mouth and lips, or no saliva (spit) or tears.    PREGNANT WOMEN:  Return to the Emergency Department right away if you have any of the above-listed symptoms or the following:    You have bleeding, leaking fluid or passing tissue from the vagina.  You have worse pain or cramping, or pain in your shoulder or back.  You have vomiting that will not stop.  You have a temperature of 100oF or more.  Your baby is not moving as much as usual.  You faint.  You get a bad headache with or without eye problems and abdominal pain.  You have a seizure.  You have unusual discharge from your vagina and abdominal pain.    Abdominal pain is pretty common during pregnancy.  Your pain may or may not be related to your pregnancy. You should follow-up closely with your OB provider so they can evaluate you and your baby.  Until you follow-up with your regular provider, do the following:     Avoid sex and do not put anything in your vagina.  Drink clear fluids.  Only take medications approved by your provider.    MORE INFORMATION:    Appendicitis:  A possible cause of abdominal pain in any person who still has their appendix is acute appendicitis. Appendicitis is often hard to diagnose.  Testing does not always rule out early appendicitis or other causes of abdominal pain. Close follow-up with your provider and re-evaluations may be needed to figure out the reason for your abdominal pain.    Follow-up:  It is very important that you make an appointment with your clinic and go to the appointment.  If you do not follow-up with your primary provider, it may result in missing an important development which could result in permanent  "injury or disability and/or lasting pain.  If there is any problem keeping your appointment, call your provider or return to the Emergency Department.    Medications:  Take your medications as directed by your provider today.  Before using over-the-counter medications, ask your provider and make sure to take the medications as directed.  If you have any questions about medications, ask your provider.    Diet:  Resume your normal diet as much as possible, but do not eat fried, fatty or spicy foods while you have pain.  Do not drink alcohol or have caffeine.  Do not smoke tobacco.    Probiotics: If you have been given an antibiotic, you may want to also take a probiotic pill or eat yogurt with live cultures. Probiotics have \"good bacteria\" to help your intestines stay healthy. Studies have shown that probiotics help prevent diarrhea (loose stools) and other intestine problems (including C. diff infection) when you take antibiotics. You can buy these without a prescription in the pharmacy section of the store.     If you were given a prescription for medicine here today, be sure to read all of the information (including the package insert) that comes with your prescription.  This will include important information about the medicine, its side effects, and any warnings that you need to know about.  The pharmacist who fills the prescription can provide more information and answer questions you may have about the medicine.  If you have questions or concerns that the pharmacist cannot address, please call or return to the Emergency Department.       Remember that you can always come back to the Emergency Department if you are not able to see your regular provider in the amount of time listed above, if you get any new symptoms, or if there is anything that worries you.    Discharge Instructions  Vomiting    You have been seen today for vomiting (throwing up). This is usually caused by a virus, but some bacteria, parasites, " medicines or other medical conditions can cause similar symptoms. At this time your provider does not find that your vomiting is a sign of anything dangerous or life-threatening. However, sometimes the signs of serious illness do not show up right away. If you have new or worse symptoms, you may need to be seen again in the Emergency Department or by your primary provider. Remember that serious problems like appendicitis can start as vomiting.    Generally, every Emergency Department visit should have a follow-up clinic visit with either a primary or a specialty clinic/provider. Please follow-up as instructed by your emergency provider today.    Return to the Emergency Department if:  You keep vomiting and you are not able to keep liquids down.   You feel you are getting dehydrated, such as being very thirsty, not urinating (peeing) at least every 8-12 hours, or feeling faint or lightheaded.   You develop a new fever, or your fever continues for more than 2 days.   You have abdominal (belly pain) that seems worse than cramps, is in one spot, or is getting worse over time. Appendicitis usually causes pain in the right lower abdomen (to the right and below your belly button) so watch for pain in this location.  You have blood in your vomit or stools.   You feel very weak.  You are not starting to improve within 24 hours of your visit here.     What can I do to help myself?  The most important thing to do is to drink clear liquids. If you have been vomiting a lot, it is best to have only small, frequent sips of liquids. Drinking too much at once may cause more vomiting. If you are vomiting often, you must replace minerals, sodium and potassium lost with your illness. Pedialyte  is the best available rehydration liquid but some find that it doesn t taste good so sports drinks are an alterative. You can also drink clear liquids such as water, weak tea, apple juice, and 7-Up . Avoid acid liquids (orange), caffeine  (coffee) or alcohol. Do not drink milk until you no longer have diarrhea (loose stools).   After liquids are staying down, you may start eating mild foods. Soda crackers, toast, plain noodles, gelatin, applesauce and bananas are good first choices. Avoid foods that have acid, are spicy, fatty or have a lot of fiber (such as meats, coarse grains, vegetables). You may start eating these foods again in about 3 days when you are better.   Sometimes treatment includes prescription medicine to prevent nausea (sick to your stomach) and vomiting. If your provider prescribes these for you, take them as directed.   Do not take ibuprofen, naproxen, or other nonsteroidal anti-inflammatory (NSAID) medicines without checking with your healthcare provider.     If you were given a prescription for medicine here today, be sure to read all of the information (including the package insert) that comes with your prescription.  This will include important information about the medicine, its side effects, and any warnings that you need to know about.  The pharmacist who fills the prescription can provide more information and answer questions you may have about the medicine.  If you have questions or concerns that the pharmacist cannot address, please call or return to the Emergency Department.     Remember that you can always come back to the Emergency Department if you are not able to see your regular provider in the amount of time listed above, if you get any new symptoms, or if there is anything that worries you.

## 2022-05-06 LAB
ATRIAL RATE - MUSE: 65 BPM
DIASTOLIC BLOOD PRESSURE - MUSE: NORMAL MMHG
INTERPRETATION ECG - MUSE: NORMAL
P AXIS - MUSE: 21 DEGREES
PR INTERVAL - MUSE: 248 MS
QRS DURATION - MUSE: 132 MS
QT - MUSE: 436 MS
QTC - MUSE: 453 MS
R AXIS - MUSE: -54 DEGREES
SYSTOLIC BLOOD PRESSURE - MUSE: NORMAL MMHG
T AXIS - MUSE: 8 DEGREES
VENTRICULAR RATE- MUSE: 65 BPM

## 2022-05-18 DIAGNOSIS — E78.5 HYPERLIPIDEMIA LDL GOAL <130: ICD-10-CM

## 2022-05-18 DIAGNOSIS — I20.0 UNSTABLE ANGINA (H): ICD-10-CM

## 2022-05-18 RX ORDER — ATORVASTATIN CALCIUM 20 MG/1
20 TABLET, FILM COATED ORAL DAILY
Qty: 90 TABLET | Refills: 2 | Status: SHIPPED | OUTPATIENT
Start: 2022-05-18 | End: 2023-03-01

## 2022-06-07 DIAGNOSIS — I25.118 CORONARY ARTERY DISEASE OF NATIVE ARTERY OF NATIVE HEART WITH STABLE ANGINA PECTORIS (H): ICD-10-CM

## 2022-06-07 RX ORDER — ISOSORBIDE MONONITRATE 30 MG/1
30 TABLET, EXTENDED RELEASE ORAL DAILY
Qty: 90 TABLET | Refills: 2 | Status: SHIPPED | OUTPATIENT
Start: 2022-06-07 | End: 2023-03-02

## 2022-07-10 ENCOUNTER — VIRTUAL VISIT (OUTPATIENT)
Dept: URGENT CARE | Facility: CLINIC | Age: 87
End: 2022-07-10
Payer: COMMERCIAL

## 2022-07-10 DIAGNOSIS — R50.9 FEVER, UNSPECIFIED FEVER CAUSE: ICD-10-CM

## 2022-07-10 DIAGNOSIS — R05.9 COUGH: ICD-10-CM

## 2022-07-10 DIAGNOSIS — B34.9 VIRAL ILLNESS: ICD-10-CM

## 2022-07-10 DIAGNOSIS — U07.1 INFECTION DUE TO 2019 NOVEL CORONAVIRUS: ICD-10-CM

## 2022-07-10 PROCEDURE — 99442 PR PHYSICIAN TELEPHONE EVALUATION 11-20 MIN: CPT | Mod: CS

## 2022-07-10 NOTE — PROGRESS NOTES
"Clifford is a 89 year old who is being evaluated via a billable telephone visit.      What phone number would you like to be contacted at? [phone]  How would you like to obtain your AVS? MyChart    Assessment & Plan     Cough    - nirmatrelvir and ritonavir (PAXLOVID) therapy pack; Take 3 tablets by mouth 2 times daily for 5 days Take 2 Nirmatrelvir tablets and 1 Ritonavir tablet twice daily for 5 days.    Fever, unspecified fever cause    - nirmatrelvir and ritonavir (PAXLOVID) therapy pack; Take 3 tablets by mouth 2 times daily for 5 days Take 2 Nirmatrelvir tablets and 1 Ritonavir tablet twice daily for 5 days.    Viral illness    - nirmatrelvir and ritonavir (PAXLOVID) therapy pack; Take 3 tablets by mouth 2 times daily for 5 days Take 2 Nirmatrelvir tablets and 1 Ritonavir tablet twice daily for 5 days.    Infection due to 2019 novel coronavirus  GFR 84  - nirmatrelvir and ritonavir (PAXLOVID) therapy pack; Take 3 tablets by mouth 2 times daily for 5 days Take 2 Nirmatrelvir tablets and 1 Ritonavir tablet twice daily for 5 days.      16 minutes spent on the date of the encounter doing chart review, history and exam, documentation and further activities per the note       BMI:   Estimated body mass index is 25.99 kg/m  as calculated from the following:    Height as of 3/29/22: 1.753 m (5' 9\").    Weight as of 5/4/22: 79.8 kg (176 lb).       Patient Instructions   Hold lipitor and flomax for 7 days while taking medication   Paxlovid sent in       No follow-ups on file.    Virtual Urgent Care  [facility]    Subjective   Clifford is a 89 year old presenting for the following health issues:  No chief complaint on file.      HPI   Temp of 101 headache and feeling bad   Home covid positive     Review of Systems   Constitutional, HEENT, cardiovascular, pulmonary, gi and gu systems are negative, except as otherwise noted.  Constitutional, HEENT, cardiovascular, pulmonary, GI, , musculoskeletal, " neuro, skin, endocrine and psych systems are negative, except as otherwise noted.      Objective           Vitals:  No vitals were obtained today due to virtual visit.    Physical Exam   alert and no distress  PSYCH: Alert and oriented times 3; coherent speech, normal   rate and volume, able to articulate logical thoughts, able   to abstract reason, no tangential thoughts, no hallucinations   or delusions  His affect is normal  RESP: No cough, no audible wheezing, able to talk in full sentences  Remainder of exam unable to be completed due to telephone visits            Phone call duration: 16 minutes    .  ..

## 2022-10-13 ENCOUNTER — OFFICE VISIT (OUTPATIENT)
Dept: INTERNAL MEDICINE | Facility: CLINIC | Age: 87
End: 2022-10-13
Payer: COMMERCIAL

## 2022-10-13 VITALS
HEART RATE: 76 BPM | DIASTOLIC BLOOD PRESSURE: 64 MMHG | WEIGHT: 185.5 LBS | SYSTOLIC BLOOD PRESSURE: 128 MMHG | HEIGHT: 69 IN | OXYGEN SATURATION: 100 % | BODY MASS INDEX: 27.47 KG/M2

## 2022-10-13 DIAGNOSIS — E78.5 HYPERLIPIDEMIA LDL GOAL <100: ICD-10-CM

## 2022-10-13 DIAGNOSIS — Z00.00 ENCOUNTER FOR MEDICARE ANNUAL WELLNESS EXAM: Primary | ICD-10-CM

## 2022-10-13 DIAGNOSIS — I10 BENIGN ESSENTIAL HYPERTENSION: ICD-10-CM

## 2022-10-13 DIAGNOSIS — I25.118 CORONARY ARTERY DISEASE OF NATIVE ARTERY OF NATIVE HEART WITH STABLE ANGINA PECTORIS (H): ICD-10-CM

## 2022-10-13 DIAGNOSIS — F41.1 GAD (GENERALIZED ANXIETY DISORDER): ICD-10-CM

## 2022-10-13 DIAGNOSIS — G62.9 PERIPHERAL POLYNEUROPATHY: ICD-10-CM

## 2022-10-13 DIAGNOSIS — I20.0 UNSTABLE ANGINA (H): ICD-10-CM

## 2022-10-13 DIAGNOSIS — Z23 HIGH PRIORITY FOR 2019-NCOV VACCINE: ICD-10-CM

## 2022-10-13 DIAGNOSIS — Z23 NEED FOR PROPHYLACTIC VACCINATION AND INOCULATION AGAINST INFLUENZA: ICD-10-CM

## 2022-10-13 DIAGNOSIS — I48.0 PAF (PAROXYSMAL ATRIAL FIBRILLATION) (H): ICD-10-CM

## 2022-10-13 DIAGNOSIS — E03.9 HYPOTHYROIDISM, UNSPECIFIED TYPE: ICD-10-CM

## 2022-10-13 DIAGNOSIS — G25.81 RESTLESS LEGS SYNDROME (RLS): ICD-10-CM

## 2022-10-13 LAB
ANION GAP SERPL CALCULATED.3IONS-SCNC: 10 MMOL/L (ref 3–14)
BUN SERPL-MCNC: 17 MG/DL (ref 7–30)
CALCIUM SERPL-MCNC: 9 MG/DL (ref 8.5–10.1)
CHLORIDE BLD-SCNC: 98 MMOL/L (ref 94–109)
CO2 SERPL-SCNC: 24 MMOL/L (ref 20–32)
CREAT SERPL-MCNC: 0.82 MG/DL (ref 0.66–1.25)
ERYTHROCYTE [DISTWIDTH] IN BLOOD BY AUTOMATED COUNT: 13.6 % (ref 10–15)
FERRITIN SERPL-MCNC: 219 NG/ML (ref 26–388)
GFR SERPL CREATININE-BSD FRML MDRD: 82 ML/MIN/1.73M2
GLUCOSE BLD-MCNC: 86 MG/DL (ref 70–99)
HCT VFR BLD AUTO: 42.5 % (ref 40–53)
HGB BLD-MCNC: 14.3 G/DL (ref 13.3–17.7)
IRON SATN MFR SERPL: 30 % (ref 15–46)
IRON SERPL-MCNC: 66 UG/DL (ref 35–180)
MCH RBC QN AUTO: 31 PG (ref 26.5–33)
MCHC RBC AUTO-ENTMCNC: 33.6 G/DL (ref 31.5–36.5)
MCV RBC AUTO: 92 FL (ref 78–100)
PLATELET # BLD AUTO: 202 10E3/UL (ref 150–450)
POTASSIUM BLD-SCNC: 4 MMOL/L (ref 3.4–5.3)
RBC # BLD AUTO: 4.62 10E6/UL (ref 4.4–5.9)
SODIUM SERPL-SCNC: 132 MMOL/L (ref 133–144)
TIBC SERPL-MCNC: 223 UG/DL (ref 240–430)
TSH SERPL DL<=0.005 MIU/L-ACNC: 2.8 MU/L (ref 0.4–4)
WBC # BLD AUTO: 10.7 10E3/UL (ref 4–11)

## 2022-10-13 PROCEDURE — G0008 ADMIN INFLUENZA VIRUS VAC: HCPCS | Performed by: INTERNAL MEDICINE

## 2022-10-13 PROCEDURE — 83540 ASSAY OF IRON: CPT | Performed by: INTERNAL MEDICINE

## 2022-10-13 PROCEDURE — 36415 COLL VENOUS BLD VENIPUNCTURE: CPT | Performed by: INTERNAL MEDICINE

## 2022-10-13 PROCEDURE — 90662 IIV NO PRSV INCREASED AG IM: CPT | Performed by: INTERNAL MEDICINE

## 2022-10-13 PROCEDURE — 82728 ASSAY OF FERRITIN: CPT | Performed by: INTERNAL MEDICINE

## 2022-10-13 PROCEDURE — 99214 OFFICE O/P EST MOD 30 MIN: CPT | Mod: 25 | Performed by: INTERNAL MEDICINE

## 2022-10-13 PROCEDURE — 85027 COMPLETE CBC AUTOMATED: CPT | Performed by: INTERNAL MEDICINE

## 2022-10-13 PROCEDURE — 0124A COVID-19,PF,PFIZER BOOSTER BIVALENT: CPT | Performed by: INTERNAL MEDICINE

## 2022-10-13 PROCEDURE — 80048 BASIC METABOLIC PNL TOTAL CA: CPT | Performed by: INTERNAL MEDICINE

## 2022-10-13 PROCEDURE — 83550 IRON BINDING TEST: CPT | Performed by: INTERNAL MEDICINE

## 2022-10-13 PROCEDURE — 84443 ASSAY THYROID STIM HORMONE: CPT | Performed by: INTERNAL MEDICINE

## 2022-10-13 PROCEDURE — G0438 PPPS, INITIAL VISIT: HCPCS | Performed by: INTERNAL MEDICINE

## 2022-10-13 PROCEDURE — 91312 COVID-19,PF,PFIZER BOOSTER BIVALENT: CPT | Performed by: INTERNAL MEDICINE

## 2022-10-13 RX ORDER — GABAPENTIN 300 MG/1
300 CAPSULE ORAL AT BEDTIME
Qty: 90 CAPSULE | Refills: 1 | Status: SHIPPED | OUTPATIENT
Start: 2022-10-13 | End: 2023-03-02

## 2022-10-13 ASSESSMENT — ENCOUNTER SYMPTOMS
PALPITATIONS: 0
ABDOMINAL PAIN: 0
CONSTIPATION: 1
SHORTNESS OF BREATH: 0
WEAKNESS: 1
JOINT SWELLING: 0
PARESTHESIAS: 0
HEARTBURN: 0
NAUSEA: 0
FEVER: 1
DYSURIA: 0
MYALGIAS: 0
HEMATOCHEZIA: 0
HEMATURIA: 0
SORE THROAT: 0
COUGH: 1
DIARRHEA: 1
NERVOUS/ANXIOUS: 0
FREQUENCY: 0
EYE PAIN: 1
ARTHRALGIAS: 0
HEADACHES: 0
CHILLS: 0
DIZZINESS: 0

## 2022-10-13 ASSESSMENT — ANXIETY QUESTIONNAIRES
GAD7 TOTAL SCORE: 4
2. NOT BEING ABLE TO STOP OR CONTROL WORRYING: SEVERAL DAYS
3. WORRYING TOO MUCH ABOUT DIFFERENT THINGS: SEVERAL DAYS
GAD7 TOTAL SCORE: 4
4. TROUBLE RELAXING: SEVERAL DAYS
5. BEING SO RESTLESS THAT IT IS HARD TO SIT STILL: NOT AT ALL
7. FEELING AFRAID AS IF SOMETHING AWFUL MIGHT HAPPEN: NOT AT ALL
1. FEELING NERVOUS, ANXIOUS, OR ON EDGE: SEVERAL DAYS
8. IF YOU CHECKED OFF ANY PROBLEMS, HOW DIFFICULT HAVE THESE MADE IT FOR YOU TO DO YOUR WORK, TAKE CARE OF THINGS AT HOME, OR GET ALONG WITH OTHER PEOPLE?: NOT DIFFICULT AT ALL
6. BECOMING EASILY ANNOYED OR IRRITABLE: NOT AT ALL
IF YOU CHECKED OFF ANY PROBLEMS ON THIS QUESTIONNAIRE, HOW DIFFICULT HAVE THESE PROBLEMS MADE IT FOR YOU TO DO YOUR WORK, TAKE CARE OF THINGS AT HOME, OR GET ALONG WITH OTHER PEOPLE: NOT DIFFICULT AT ALL
7. FEELING AFRAID AS IF SOMETHING AWFUL MIGHT HAPPEN: NOT AT ALL
GAD7 TOTAL SCORE: 4

## 2022-10-13 ASSESSMENT — ACTIVITIES OF DAILY LIVING (ADL): CURRENT_FUNCTION: TELEPHONE REQUIRES ASSISTANCE

## 2022-10-13 ASSESSMENT — PATIENT HEALTH QUESTIONNAIRE - PHQ9
SUM OF ALL RESPONSES TO PHQ QUESTIONS 1-9: 8
10. IF YOU CHECKED OFF ANY PROBLEMS, HOW DIFFICULT HAVE THESE PROBLEMS MADE IT FOR YOU TO DO YOUR WORK, TAKE CARE OF THINGS AT HOME, OR GET ALONG WITH OTHER PEOPLE: NOT DIFFICULT AT ALL
SUM OF ALL RESPONSES TO PHQ QUESTIONS 1-9: 8

## 2022-10-13 NOTE — PATIENT INSTRUCTIONS
"   Trial of Gabapentin 300 mg at bedtime to try and help insomnia and  restless leg syndrome.       Trial of a daily iron supplement to help reduce  restless leg syndrome.      [facility] if the  restless leg syndrome remains problematic despite these measures.      Continue all other medications at the same doses.  Contact your usual pharmacy if you need refills.      We are rechecking your labs.  I will let you know if the results indicate any changes in your therapy.  Unless you hear otherwise, continue all medications at the same doses.  Contact your usual pharmacy if you need refills.         5 GOALS TO PREVENT VASCULAR DISEASE:     1.  Aggressive blood pressure control, under 130/80 ideally.  Using medications if needed.    Your blood pressure is under good control    BP Readings from Last 4 Encounters:   10/13/22 128/64   05/04/22 (!) 183/87   05/04/22 135/75   03/29/22 107/62       2.  Aggressive LDL cholesterol (\"bad cholesterol\") lowering as indicated.    Your goal is an LDL under 130 for sure, preferably under 100.  (If you have diabetes or previous vascular disease, the the LDL goals would be under 100 for sure, preferably under 70.)    New guidelines identify four high-risk groups who could benefit from statins:   *people with pre-existing heart disease, such as those who have had a heart attack;   *people ages 40 to 75 who have diabetes of any type  *patients ages 40 to 75 with at least a 7.5% risk of developing cardiovascular disease over the next decade, according to a formula described in the guidelines  *patients with the sort of super-high cholesterol that sometimes runs in families, as evidenced by an LDL of 190 milligrams per deciliter or higher    Your cholesterol levels are well controlled.    Recent Labs   Lab Test 02/22/22  1550 10/27/20  1501 12/23/16  0520 08/06/14  1056   CHOL 108 106   < > 186   HDL 42 46   < > 42   LDL 56 45   < > 114   TRIG 52 73   < > 148 "   CHOLHDLRATIO  --   --   --  4.4    < > = values in this interval not displayed.       3.  Aggressive diabetic prevention, screening and/or management.      You do not have diabetes as of the most recent blood tests.     4.  No smoking    5.  Consider daily preventative aspirin over age 50 if you have enough cardiac risk factors to place you at higher risk for the presence of vascular disease.    If you have any reason not to take aspirin such easy bruising or bleeding, stomach problems, other anticoagulant medications, or any other side effects, then you should not take Aspirin.     --Based on your current cardiac risk factor profile, you should take regular daily Aspirin 81 mg once per day (as long as you do not have any side effects from taking aspirin).           Preventive Health Recommendations:   Male Ages 65 and over    Yearly exam:             See your health care provider every year in order to  o   Review health changes.   o   Discuss preventive care.    o   Review your medicines if your doctor has prescribed any.  Talk with your health care provider about whether you should have a test to screen for prostate cancer (PSA).  Every 3 years, have a diabetes test (fasting glucose). If you are at risk for diabetes, you should have this test more often.  Every 5 years, have a cholesterol test. Have this test more often if you are at risk for high cholesterol or heart disease.   Every 10 years, have a colonoscopy. Or, have a yearly FIT test (stool test). These exams will check for colon cancer.  Talk to with your health care provider about screening for Abdominal Aortic Aneurysm if you have a family history of AAA or have a history of smoking.    Shots:   Get a flu shot each year.   Get a tetanus shot every 10 years.   Talk to your doctor about your pneumonia vaccines. There are now two you should receive - Pneumovax (PPSV 23) and Prevnar (PCV 13).   Talk to your doctor about a shingles vaccine.   Talk to your  "doctor about the hepatitis B vaccine.  Nutrition:   Eat at least 5 servings of fruits and vegetables each day.   Eat whole-grain bread, whole-wheat pasta and brown rice instead of white grains and rice.   Talk to your provider about Calcium and Vitamin D.      --Good Grains:  Oats, brown rice, Quinoa (these do not raise the blood sugar as much)     --Bad grains:  Anything made from wheat or white rice     (because these raise the blood sugars significantly, and the possible gluten issue from wheat for some people).      --Proteins:  Aim for \"lean proteins\" including chicken, fish, seafood, pork, turkey, and eggs (in moderation); Eat red meat only occasionally    Lifestyle  Exercise for at least 150 minutes a week (30 minutes a day, 5 days a week). This will help you control your weight and prevent disease.   Limit alcohol to one drink per day.   No smoking.   Wear sunscreen to prevent skin cancer.   See your dentist every six months for an exam and cleaning.   See your eye doctor every 1 to 2 years to screen for conditions such as glaucoma, macular degeneration, cataracts, etc           Patient Education   Personalized Prevention Plan  You are due for the preventive services outlined below.  Your care team is available to assist you in scheduling these services.  If you have already completed any of these items, please share that information with your care team to update in your medical record.  Health Maintenance Due   Topic Date Due    Hepatitis B Vaccine (1 of 3 - 3-dose series) Never done    Pneumococcal Vaccine (2 - PCV) 03/07/2015    Annual Wellness Visit  10/27/2021    COVID-19 Vaccine (4 - Booster for Pfizer series) 02/01/2022    Flu Vaccine (1) 09/01/2022       Exercise for a Healthier Heart  You may wonder how you can improve the health of your heart. If you re thinking about exercise, you re on the right track. You don t need to become an athlete. But you do need a certain amount of brisk exercise to help " strengthen your heart. If you have been diagnosed with a heart condition, your healthcare provider may advise exercise to help stabilize your condition. To help make exercise a habit, choose safe, fun activities.      Exercise with a friend. When activity is fun, you're more likely to stick with it.   Before you start  Check with your healthcare provider before starting an exercise program. This is especially important if you have not been active for a while. It's also important if you have a long-term (chronic) health problem such as heart disease, diabetes, or obesity. Or if you are at high risk for having these problems.   Why exercise?  Exercising regularly offers many healthy rewards. It can help you do all of the following:   Improve your blood cholesterol level to help prevent further heart trouble  Lower your blood pressure to help prevent a stroke or heart attack  Control diabetes, or reduce your risk of getting this disease  Improve your heart and lung function  Reach and stay at a healthy weight  Make your muscles stronger so you can stay active  Prevent falls and fractures by slowing the loss of bone mass (osteoporosis)  Manage stress better  Reduce your blood pressure  Improve your sense of self and your body image  Exercise tips    Ease into your routine. Set small goals. Then build on them. If you are not sure what your activity level should be, talk with your healthcare provider first before starting an exercise routine.  Exercise on most days. Aim for a total of 150 minutes (2 hours and 30 minutes) or more of moderate-intensity aerobic activity each week. Or 75 minutes (1 hour and 15 minutes) or more of vigorous-intensity aerobic activity each week. Or try for a combination of both. Moderate activity means that you breathe heavier and your heart rate increases but you can still talk. Think about doing 40 minutes of moderate exercise, 3 to 4 times a week. For best results, activity should last for  about 40 minutes to lower blood pressure and cholesterol. It's OK to work up to the 40-minute period over time. Examples of moderate-intensity activity are walking 1 mile in 15 minutes. Or doing 30 to 45 minutes of yard work.  Step up your daily activity level.  Along with your exercise program, try being more active the whole day. Walk instead of drive. Or park further away so that you take more steps each day. Do more household tasks or yard work. You may not be able to meet the advised mount of physical activity. But doing some moderate- or vigorous-intensity aerobic activity can help reduce your risk for heart disease. Your healthcare provider can help you figure out what is best for you.  Choose 1 or more activities you enjoy.  Walking is one of the easiest things you can do. You can also try swimming, riding a bike, dancing, or taking an exercise class.    When to call your healthcare provider  Call your healthcare provider if you have any of these:   Chest pain or feel dizzy or lightheaded  Burning, tightness, pressure, or heaviness in your chest, neck, shoulders, back, or arms  Abnormal shortness of breath  More joint or muscle pain  A very fast or irregular heartbeat (palpitations)  [employer] last reviewed this educational content on 7/1/2019 2000-2021 [employer]. All rights reserved. This information is not intended as a substitute for professional medical care. Always follow your healthcare professional's instructions.          Understanding USDA MyPlate  The USDA has guidelines to help you make healthy food choices. These are called MyPlate. MyPlate shows the food groups that make up healthy meals using the image of a place setting. Before you eat, think about the healthiest choices for what to put on your plate or in your cup or bowl. To learn more about building a healthy plate, visit www.choosemyplate.gov.    The food groups  Fruits. Any fruit or 100% fruit juice counts as part of the  Fruit Group. Fruits may be fresh, canned, frozen, or dried, and may be whole, cut-up, or pureed. Make 1/2 of your plate fruits and vegetables.  Vegetables. Any vegetable or 100% vegetable juice counts as a member of the Vegetable Group. Vegetables may be fresh, frozen, canned, or dried. They can be served raw or cooked and may be whole, cut-up, or mashed. Make 1/2 of your plate fruits and vegetables.  Grains. All foods made from grains are part of the Grains Group. These include wheat, rice, oats, cornmeal, and barley. Grains are often used to make foods such as bread, pasta, oatmeal, cereal, tortillas, and grits. Grains should be no more than 1/4 of your plate. At least half of your grains should be whole grains.  Protein. This group includes meat, poultry, seafood, beans and peas, eggs, processed soy products (such as tofu), nuts (including nut butters), and seeds. Make protein choices no more than 1/4 of your plate. Meat and poultry choices should be lean or low fat.  Dairy. The Dairy Group includes all fluid milk products and foods made from milk that contain calcium, such as yogurt and cheese. (Foods that have little calcium, such as cream, butter, and cream cheese, are not part of this group.) Most dairy choices should be low-fat or fat-free.  Oils. Oils aren't a food group, but they do contain essential nutrients. However it's important to watch your intake of oils. These are fats that are liquid at room temperature. They include canola, corn, olive, soybean, vegetable, and sunflower oil. Foods that are mainly oil include mayonnaise, certain salad dressings, and soft margarines. You likely already get your daily oil allowance from the foods you eat.  Things to limit  Eating healthy also means limiting these things in your diet:     Salt (sodium). Many processed foods have a lot of sodium. To keep sodium intake down, eat fresh vegetables, meats, poultry, and seafood when possible. Purchase low-sodium,  reduced-sodium, or no-salt-added food products at the store. And don't add salt to your meals at home. Instead, season them with herbs and spices such as dill, oregano, cumin, and paprika. Or try adding flavor with lemon or lime zest and juice.  Saturated fat. Saturated fats are most often found in animal products such as beef, pork, and chicken. They are often solid at room temperature, such as butter. To reduce your saturated fat intake, choose leaner cuts of meat and poultry. And try healthier cooking methods such as grilling, broiling, roasting, or baking. For a simple lower-fat swap, use plain nonfat yogurt instead of mayonnaise when making potato salad or macaroni salad.  Added sugars. These are sugars added to foods. They are in foods such as ice cream, candy, soda, fruit drinks, sports drinks, energy drinks, cookies, pastries, jams, and syrups. Cut down on added sugars by sharing sweet treats with a family member or friend. You can also choose fruit for dessert, and drink water or other unsweetened beverages.     [employer] last reviewed this educational content on 6/1/2020 2000-2021 [employer]. All rights reserved. This information is not intended as a substitute for professional medical care. Always follow your healthcare professional's instructions.        Activities of Daily Living    Your Health Risk Assessment indicates you have difficulties with activities of daily living such as housework, bathing, preparing meals, taking medication, etc. Please make a follow up appointment for us to address this issue in more detail.    Signs of Hearing Loss      Hearing much better with one ear can be a sign of hearing loss.   Hearing loss is a problem shared by many people. In fact, it is one of the most common health problems, particularly as people age. Most people age 65 and older have some hearing loss. By age 80, almost everyone does. Hearing loss often occurs slowly over the years. So you may  "not realize your hearing has gotten worse.  Have your hearing checked  Call your healthcare provider if you:  Have to strain to hear normal conversation  Have to watch other people s faces very carefully to follow what they re saying  Need to ask people to repeat what they ve said  Often misunderstand what people are saying  Turn the volume of the television or radio up so high that others complain  Feel that people are mumbling when they re talking to you  Find that the effort to hear leaves you feeling tired and irritated  Notice, when using the phone, that you hear better with one ear than the other  [employer] last reviewed this educational content on 1/1/2020 2000-2021 [employer]. All rights reserved. This information is not intended as a substitute for professional medical care. Always follow your healthcare professional's instructions.          Depression and Suicide in Older Adults    Nearly 2 million older Americans have some type of depression. Some of them even take their own lives. Yet depression among older adults is often ignored. Learn the warning signs. You may help spare a loved one needless pain. You may also save a life.   What is depression?  Depression is a common and serious illness that affects the way you think and feel. It is not a normal part of aging, nor is it a sign of weakness, a character flaw, or something you can snap out of. Most people with depression need treatment to get better. The most common symptom is a feeling of deep sadness. People who are depressed also may seem tired and listless. And nothing seems to give them pleasure. It s normal to grieve or be sad sometimes. But sadness lessens or passes with time. Depression rarely goes away or improves on its own. A person with clinical depression can't \"snap out of it.\" Other symptoms of depression are:   Sleeping more or less than normal  Eating more or less than normal  Having headaches, stomachaches, or other " pains that don t go away  Feeling nervous,  empty,  or worthless  Crying a great deal  Thinking or talking about suicide or death  Loss of interest in activities previously enjoyed  Social isolation  Feeling confused or forgetful  What causes it?  The causes of depression aren t fully known. But it is thought to result from a complex blend of these factors:   Biochemistry. Certain chemicals in the brain play a role.  Genes. Depression does run in families.  Life stress. Life stresses can also trigger depression in some people. Older adults often face many stressors, such as death of friends or a spouse, health problems, and financial concerns.  Chronic conditions. This includes conditions such as diabetes, heart disease, or cancer. These can cause symptoms of depression. Medicine side effects can cause changes in thoughts and behaviors.  How you can help  Often, depressed people may not want to ask for help. When they do, they may be ignored. Or, they may receive the wrong treatment. You can help by showing parents and older friends love and support. If they seem depressed, don t lecture the person, ignore the symptoms, or discount the symptoms as a  normal  part of aging -which they are not. Get involved, listen, and show interest and support.   Help them understand that depression is a treatable illness. Tell them you can help them find the right treatment. Offer to go to their healthcare provider's appointment with them for support when the symptoms are discussed. With their approval, contact a local mental health center, social service agency, or hospital about services.   You can be an advocate for him or her at healthcare appointments. Many older adults have chronic illnesses that can cause symptoms of depression. Medicine side effects can change thoughts and behaviors. You can help make sure that the healthcare provider looks at all of these factors. He or she should refer your family member or friend to a  mental healthcare provider when needed. in some cases, untreated depression can lead to a misdiagnosis. A person may be diagnosed with a brain disorder such as dementia. If the healthcare provider does not take the issue of depression seriously, help your family member or friend to find another provider.   Don't be afraid to ask  If you think an older person you care about could be suicidal, ask,  Have you thought about suicide?  Most people will tell you the truth. If they say  yes,  they may already have a plan for how and when they will attempt it. Find out as much as you can. The more detailed the plan, and the easier it is to carry out, the more danger the person is in right now. Tell the person you are there for them and do not want them to harm him or herself. Don't wait to get help for the person. Call the person's healthcare provider, local hospital, or emergency services.   To learn more  National Suicide Prevention Lifeline (crisis hotline) 451-721-YSEV (886-193-0245)  National [place] of Mental Deiopw896-975-9344cmp.[facility].nih.gov  National [place] on Mental Mtxcwjs943-502-0638pkm.[place].org  Mental Health Xllfiln100-901-4886mkm.[facility].org  National Suicide Dlhcjiw662-GYQKWDN (622-800-5062)    Call 911  Never leave the person alone. A person who is actively suicidal needs psychiatric care right away. They will need constant supervision. Never leave the person out of sight. Call 911 or the national 24-hour suicide crisis hotline at 780-781-ZTJL (945-404-0494). You can also take the person to the closest emergency room.   [employer] last reviewed this educational content on 5/1/2020 2000-2021 [employer]. All rights reserved. This information is not intended as a substitute for professional medical care. Always follow your healthcare professional's instructions.

## 2022-10-13 NOTE — PROGRESS NOTES
"SUBJECTIVE:   Clifford is a 89 year old who presents for Preventive Visit.      Patient has been advised of split billing requirements and indicates understanding: Yes  Are you in the first 12 months of your Medicare coverage?  No    Healthy Habits:     In general, how would you rate your overall health?  Good    Frequency of exercise:  None    Do you usually eat at least 4 servings of fruit and vegetables a day, include whole grains    & fiber and avoid regularly eating high fat or \"junk\" foods?  No    Taking medications regularly:  Yes    Medication side effects:  None    Ability to successfully perform activities of daily living:  Telephone requires assistance    Home Safety:  No safety concerns identified    Hearing Impairment:  Difficulty following a conversation in a noisy restaurant or crowded room and need to ask people to speak up or repeat themselves    In the past 6 months, have you been bothered by leaking of urine?  No    In general, how would you rate your overall mental or emotional health?  Good      PHQ-2 Total Score: 2    Additional concerns today:  No    Do you feel safe in your environment? Yes    Have you ever done Advance Care Planning? (For example, a Health Directive, POLST, or a discussion with a medical provider or your loved ones about your wishes): Yes, advance care planning is on file.      Fall risk  Fallen 2 or more times in the past year?: No  Any fall with injury in the past year?: No    Cognitive Screening   1) Repeat 3 items (Leader, Season, Table)    2) Clock draw: NORMAL  3) 3 item recall: Recalls 1 object   Results: NORMAL clock, 1-2 items recalled: COGNITIVE IMPAIRMENT LESS LIKELY    Mini-CogTM Copyright RANDOLPH Acosta. Licensed by the author for use in [facility]; reprinted with permission (liz@.[facility]). All rights reserved.      Do you have sleep apnea, excessive snoring or daytime drowsiness?: no    Reviewed and updated as needed this visit by clinical staff   Tobacco  " Allergies  Meds  Problems             Reviewed and updated as needed this visit by Provider    Allergies  Meds  Problems            Social History     Tobacco Use     Smoking status: Former     Packs/day: 1.50     Years: 10.00     Pack years: 15.00     Types: Cigarettes     Quit date: 1957     Years since quittin.8     Smokeless tobacco: Never   Substance Use Topics     Alcohol use: Yes     Comment: rare     If you drink alcohol do you typically have >3 drinks per day or >7 drinks per week? No    Alcohol Use 10/13/2022   Prescreen: >3 drinks/day or >7 drinks/week? No   Prescreen: >3 drinks/day or >7 drinks/week? -       1.  History of restless leg syndrome.    2.  Has history of atrial fibrillation.    No palpitations, no dizziness, no dyspnea on exertion, no shortness of breath.  No racing heart, no fast heart rates.    Taking medications as ordered, no side effects reported.   Patient is not taking anticoagulation.    3.  Prior of coronary artery disease as listed in medical history.  No current or recent cardiovascaulr symptoms.   No shortness of breath, no episodes of chest pain/pressure, no dyspnea on exertion, no changes in his abiliy to perform physical exertion or tasks.  Takes the same amount of time to perform similar physical tasks.      4.  History of hypothyroidism.  On replacement therapy.  He has not experienced any significant side effects of this medication.   The patient denies of fatigue, weight changes, heat/cold intolerance, hair changes, nail changes, bowel changes.     Latest labs reviewed:    Lab Results   Component Value Date    TSH 2.80 10/13/2022    TSH 2.56 2021    TSH 2.88 10/27/2020    TSH 3.84 2020    TSH 3.58 2019    TSH 2.70 2018    TSH 2.65 10/02/2017    TSH 2.36 2016    TSH 1.55 2014    TSH 2.56 2013       4.  The blood count has been lower than expected and desired.   No evidence for obvious blood loss.  No recent surgeries,  "no nosebleeds, no obvious intestinal blood loss, no hematochezia.   Does not take NSAIDs regularly, does not drink alcohol regularly, does not donate blood products regularly.   Reports nutrition as \"good\"  Has not had issues or workup for previous anemia before.   Has not had previous colonoscopy.     Reviewed labs in [facility]:    Lab Results   Component Value Date    HGB 14.3 10/13/2022    HGB 14.4 05/04/2022    HGB 15.2 12/07/2021    HGB 13.6 10/27/2020    HGB 13.6 02/25/2019    HGB 12.0 01/05/2019    HGB 13.4 01/04/2019    HGB 13.3 01/03/2019    HGB 14.1 08/21/2017    HGB 14.0 12/27/2016    HGB 14.1 12/24/2016    HGB 13.7 12/23/2016    HGB 14.2 12/22/2016    HGB 15.1 12/20/2016    HGB 15.4 07/29/2015    HGB 15.1 01/07/2015    HGB 14.4 09/20/2014    HGB 14.4 08/25/2012    HGB 15.8 07/25/2012    HGB 15.3 03/21/2012    HGB 16.1 02/06/2012    HGB 15.9 01/25/2011    HGB 14.6 07/10/2010    HGB 15.0 07/09/2010    HGB 16.1 07/08/2010    HGB 15.1 10/25/2009    HGB 15.6 06/12/2005      x    Current providers sharing in care for this patient include:   Patient Care Team:  Anais Pickett MD as PCP - General (Internal Medicine)  Alexia Butt RN as   Ramirez Patel MD as Assigned Heart and Vascular Provider  Arturo Vences MD as Assigned Neuroscience Provider  Anais Pickett MD as Assigned PCP    The following health maintenance items are reviewed in Epic and correct as of today:  Health Maintenance   Topic Date Due     HEPATITIS B IMMUNIZATION (1 of 3 - 3-dose series) Never done     Pneumococcal Vaccine: 65+ Years (2 - PCV) 03/07/2015     ANNUAL REVIEW OF HM ORDERS  01/28/2023     LIPID  02/22/2023     VISHAL ASSESSMENT  04/13/2023     PHQ-9  04/13/2023     DTAP/TDAP/TD IMMUNIZATION (2 - Td or Tdap) 06/25/2023     MEDICARE ANNUAL WELLNESS VISIT  10/13/2023     TSH W/FREE T4 REFLEX  10/13/2023     FALL RISK ASSESSMENT  10/13/2023     ADVANCE CARE PLANNING  10/13/2027     DEPRESSION " "ACTION PLAN  Completed     INFLUENZA VACCINE  Completed     COVID-19 Vaccine  Completed     IPV IMMUNIZATION  Aged Out     MENINGITIS IMMUNIZATION  Aged Out     ZOSTER IMMUNIZATION  Discontinued       **I reviewed the information recorded in the patient's EPIC chart (including but not limited to medical history, surgical history, family history, problem list, medication list, and allergy list) and updated the information as indicated based on the patients reported information.             Review of Systems   Constitutional: Positive for fever. Negative for chills.   HENT: Positive for congestion, ear pain and hearing loss. Negative for sore throat.    Eyes: Positive for pain. Negative for visual disturbance.   Respiratory: Positive for cough. Negative for shortness of breath.    Cardiovascular: Negative for chest pain, palpitations and peripheral edema.   Gastrointestinal: Positive for constipation and diarrhea. Negative for abdominal pain, heartburn, hematochezia and nausea.   Genitourinary: Negative for dysuria, frequency, genital sores, hematuria, impotence, penile discharge and urgency.   Musculoskeletal: Negative for arthralgias, joint swelling and myalgias.   Skin: Positive for rash.   Neurological: Positive for weakness. Negative for dizziness, headaches and paresthesias.   Psychiatric/Behavioral: Negative for mood changes. The patient is not nervous/anxious.      Constitutional, HEENT, cardiovascular, pulmonary, gi and gu systems are negative, except as otherwise noted.    OBJECTIVE:   /64   Pulse 76   Ht 1.753 m (5' 9\")   Wt 84.1 kg (185 lb 8 oz)   SpO2 100%   BMI 27.39 kg/m   Estimated body mass index is 27.39 kg/m  as calculated from the following:    Height as of this encounter: 1.753 m (5' 9\").    Weight as of this encounter: 84.1 kg (185 lb 8 oz).  Physical Exam  GENERAL alert and no distress  EYES:  Normal sclera,conjunctiva, EOMI  HENT: oral and posterior pharynx without lesions or " erythema, facies symmetric  NECK: Neck supple. No LAD, without thyroidmegaly.  RESP: Clear to ausculation bilaterally without wheezes or crackles. Normal BS in all fields.  CV: RRR normal S1S2 without murmurs, rubs or gallops.  LYMPH: no cervical lymph adenopathy appreciated  MS: extremities- no gross deformities of the visible extremities noted,   EXT:  no lower extremity edema  PSYCH: Alert and oriented times 3; speech- coherent  SKIN:  No obvious significant skin lesions on visible portions of face     Diagnostic Test Results:  Labs reviewed in Epic    ASSESSMENT / PLAN:     (Z00.00) Encounter for Medicare annual wellness exam  (primary encounter diagnosis)  Comment: Discussed cardiac disease risk factors and cardiac disease risk factor modification, including diabetes screening, blood pressure screening (and management if indicated), and cholesterol screening.   Reviewed immunzation guidelines, including pneumococcal vaccines, annual influenza, and shingles vaccines.   Discussed routine cancer screenings, including skin cancer, colon cancer screening for everyone until age 80, prostate cancer screening in men until age 75, mammogram and PAP/pelvic for women until age 75.   Recommended regular dentist visits to care for remaining teeth.   Recommended regular screening for vision and glaucoma.   Recommended safe driving and accident avoidance.   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  Continue current therapy.   Discussed secondary risk factor modification and recommended continuing aggressive management of these items.   Plan: Ferritin, Iron & Iron Binding Capacity            (I20.0) Unstable angina (H)  Comment: no new symptoms   Plan: Ferritin, Iron & Iron Binding Capacity            (I48.0) PAF (paroxysmal atrial  fibrillation) (H)  Comment: This condition is currently controlled on the current medical regimen.  Continue current therapy.   Plan: Ferritin, Iron & Iron Binding Capacity            (E78.5) Hyperlipidemia LDL goal <100  Comment: This condition is currently controlled on the current medical regimen.  Continue current therapy.  Discussed guidelines recommending a statin cholesterol lowering medication for any patient with either diabetes and/or vascular disease, aiming for a LDL goal under 100 for sure, ideally under 70, using whatever dose of statin tolerated.    Plan: CBC with platelets, Ferritin, Iron & Iron         Binding Capacity            (E03.9) Hypothyroidism, unspecified type  Comment: Discussed signs and symptoms of hypo and hyperthyroidism.  Reviewed treatment options.   Recommended absolute medication compliance to avoid adding any additionial variance to the labs.   Plan: Ferritin, Iron & Iron Binding Capacity, TSH         with free T4 reflex            (I10) Benign essential hypertension  Comment: This condition is currently controlled on the current medical regimen.  Continue current therapy.   Plan: CBC with platelets, Ferritin, Iron & Iron         Binding Capacity, Basic metabolic panel            (F41.1) VISHAL (generalized anxiety disorder)  Comment: This condition is currently controlled on the current medical regimen.  Continue current therapy.   Plan: Ferritin, Iron & Iron Binding Capacity,         gabapentin (NEURONTIN) 300 MG capsule            (G25.81) Restless legs syndrome (RLS)  Comment: Discussed restless legs syndrome and treatment options including clonazepam, requip, and sinemet including potential side effects of each one.  DRUGS WHICH MAY MAKE RESTLESS LEGS WORSE:  - Antipsychotics  - Dopamine blocking antiemetics  - Centrally acting antihistamines, such as diphenhydramine  - Serotonergic medications  - Serotonergic antidepressants (except bupropion)   - Caffeine  Plan: Ferritin,  "Iron & Iron Binding Capacity,         gabapentin (NEURONTIN) 300 MG capsule            (G62.9) Peripheral polyneuropathy  Comment: This condition is currently controlled on the current medical regimen.  Continue current therapy.   Plan: CBC with platelets, Ferritin, Iron & Iron         Binding Capacity            (Z23) Need for prophylactic vaccination and inoculation against influenza  Comment:   Plan: INFLUENZA, QUAD, HIGH DOSE, PF, 65YR + (FLUZONE        HD)            (Z23) High priority for 2019-nCoV vaccine  Comment:   Plan: COVID-19,PF,PFIZER BOOSTER BIVALENT 12+Yrs               Patient has been advised of split billing requirements and indicates understanding: Yes    COUNSELING:  Reviewed preventive health counseling, as reflected in patient instructions       Regular exercise       Healthy diet/nutrition       Vision screening       Hearing screening       Dental care       Bladder control       Fall risk prevention       Immunizations    Vaccinated for: Influenza and updated COVID booster        Estimated body mass index is 27.39 kg/m  as calculated from the following:    Height as of this encounter: 1.753 m (5' 9\").    Weight as of this encounter: 84.1 kg (185 lb 8 oz).        He reports that he quit smoking about 65 years ago. His smoking use included cigarettes. He has a 15.00 pack-year smoking history. He has never used smokeless tobacco.      Appropriate preventive services were discussed with this patient, including applicable screening as appropriate for cardiovascular disease, diabetes, osteopenia/osteoporosis, and glaucoma.  As appropriate for age/gender, discussed screening for colorectal cancer, prostate cancer, breast cancer, and cervical cancer. Checklist reviewing preventive services available has been given to the patient.    Reviewed patients plan of care and provided an AVS. The  asher Nair meets the Care Plan requirement. This Care Plan has been established and reviewed with the " .    Counseling Resources:  ATP IV Guidelines  Pooled Cohorts Equation Calculator  Breast Cancer Risk Calculator  Breast Cancer: Medication to Reduce Risk  FRAX Risk Assessment  ICSI Preventive Guidelines  Dietary Guidelines for Americans, 2010  USDA's MyPlate  ASA Prophylaxis  Lung CA Screening    Tylor Aguero MD  [facility]    Identified Health Risks:  Answers for HPI/ROS submitted by the patient on 10/13/2022  If you checked off any problems, how difficult have these problems made it for you to do your work, take care of things at home, or get along with other people?: Not difficult at all  PHQ9 TOTAL SCORE: 8  VISHAL 7 TOTAL SCORE: 4

## 2022-10-13 NOTE — PROGRESS NOTES
"    He is at risk for lack of exercise and has been provided with information to increase physical activity for the benefit of his well-being.  The patient was counseled and encouraged to consider modifying their diet and eating habits. He was provided with information on recommended healthy diet options.  The patient reports that he has difficulty with activities of daily living. I have asked that the patient make a follow up appointment in 26 weeks where this issue will be further evaluated and addressed.  The patient was provided with written information regarding signs of hearing loss.  The patient's PHQ-9 score is consistent with mild depression. He was provided with information regarding depression and was advised to schedule a follow up appointment in 26 weeks to further address this issue.        Answers for HPI/ROS submitted by the patient on 10/13/2022  If you checked off any problems, how difficult have these problems made it for you to do your work, take care of things at home, or get along with other people?: Not difficult at all  PHQ9 TOTAL SCORE: 8  VISHAL 7 TOTAL SCORE: 4  In general, how would you rate your overall physical health?: good  Frequency of exercise:: None  Do you usually eat at least 4 servings of fruit and vegetables a day, include whole grains & fiber, and avoid regularly eating high fat or \"junk\" foods? : No  Taking medications regularly:: Yes  Medication side effects:: None  Activities of Daily Living: telephone requires assistance  Home safety: no safety concerns identified  Hearing Impairment:: difficulty following a conversation in a noisy restaurant or crowded room, need to ask people to speak up or repeat themselves  In the past 6 months, have you been bothered by leaking of urine?: No  abdominal pain: No  Blood in stool: No  Blood in urine: No  chest pain: No  chills: No  congestion: Yes  constipation: Yes  cough: Yes  diarrhea: Yes  dizziness: No  ear pain: Yes  eye pain: " Yes  nervous/anxious: No  fever: Yes  frequency: No  genital sores: No  headaches: No  hearing loss: Yes  heartburn: No  arthralgias: No  joint swelling: No  peripheral edema: No  mood changes: No  myalgias: No  nausea: No  dysuria: No  palpitations: No  Skin sensation changes: No  sore throat: No  urgency: No  rash: Yes  shortness of breath: No  visual disturbance: No  weakness: Yes  impotence: No  penile discharge: No  In general, how would you rate your overall mental or emotional health?: good  Additional concerns today:: No

## 2022-11-02 DIAGNOSIS — E03.9 ACQUIRED HYPOTHYROIDISM: ICD-10-CM

## 2022-11-04 RX ORDER — LEVOTHYROXINE SODIUM 75 UG/1
75 TABLET ORAL DAILY
Qty: 90 TABLET | Refills: 2 | Status: SHIPPED | OUTPATIENT
Start: 2022-11-04 | End: 2023-08-02

## 2022-11-04 NOTE — TELEPHONE ENCOUNTER
Prescription approved per [facility] Refill Protocol.  Paco Link RN  [facility] Triage Nurse

## 2023-03-01 DIAGNOSIS — E78.5 HYPERLIPIDEMIA LDL GOAL <130: ICD-10-CM

## 2023-03-01 DIAGNOSIS — I20.0 UNSTABLE ANGINA (H): ICD-10-CM

## 2023-03-01 RX ORDER — ATORVASTATIN CALCIUM 20 MG/1
20 TABLET, FILM COATED ORAL DAILY
Qty: 90 TABLET | Refills: 0 | Status: SHIPPED | OUTPATIENT
Start: 2023-03-01 | End: 2023-03-02

## 2023-03-02 ENCOUNTER — OFFICE VISIT (OUTPATIENT)
Dept: CARDIOLOGY | Facility: CLINIC | Age: 88
End: 2023-03-02
Payer: COMMERCIAL

## 2023-03-02 VITALS
WEIGHT: 188.4 LBS | SYSTOLIC BLOOD PRESSURE: 119 MMHG | HEIGHT: 69 IN | HEART RATE: 63 BPM | OXYGEN SATURATION: 100 % | DIASTOLIC BLOOD PRESSURE: 72 MMHG | BODY MASS INDEX: 27.91 KG/M2

## 2023-03-02 DIAGNOSIS — I20.0 UNSTABLE ANGINA (H): ICD-10-CM

## 2023-03-02 DIAGNOSIS — I77.810 AORTIC ROOT DILATION (H): ICD-10-CM

## 2023-03-02 DIAGNOSIS — I25.118 CORONARY ARTERY DISEASE OF NATIVE ARTERY OF NATIVE HEART WITH STABLE ANGINA PECTORIS (H): ICD-10-CM

## 2023-03-02 DIAGNOSIS — I10 BENIGN ESSENTIAL HYPERTENSION: ICD-10-CM

## 2023-03-02 DIAGNOSIS — I25.10 CORONARY ARTERY DISEASE INVOLVING NATIVE CORONARY ARTERY OF NATIVE HEART WITHOUT ANGINA PECTORIS: Primary | ICD-10-CM

## 2023-03-02 DIAGNOSIS — E78.5 HYPERLIPIDEMIA LDL GOAL <130: ICD-10-CM

## 2023-03-02 PROCEDURE — 99214 OFFICE O/P EST MOD 30 MIN: CPT | Performed by: NURSE PRACTITIONER

## 2023-03-02 RX ORDER — MULTIVIT-MIN/IRON/FOLIC ACID/K 18-600-40
1 CAPSULE ORAL DAILY
COMMUNITY
Start: 2023-03-02

## 2023-03-02 RX ORDER — ATORVASTATIN CALCIUM 20 MG/1
20 TABLET, FILM COATED ORAL DAILY
Qty: 90 TABLET | Refills: 2 | Status: SHIPPED | OUTPATIENT
Start: 2023-03-02 | End: 2024-03-14

## 2023-03-02 RX ORDER — ISOSORBIDE MONONITRATE 30 MG/1
30 TABLET, EXTENDED RELEASE ORAL DAILY
Qty: 90 TABLET | Refills: 3 | Status: SHIPPED | OUTPATIENT
Start: 2023-03-02 | End: 2024-03-21

## 2023-03-02 RX ORDER — METOPROLOL SUCCINATE 25 MG/1
12.5 TABLET, EXTENDED RELEASE ORAL DAILY
Qty: 45 TABLET | Refills: 3 | Status: SHIPPED | OUTPATIENT
Start: 2023-03-02 | End: 2024-05-09

## 2023-03-02 NOTE — PATIENT INSTRUCTIONS
Today's Recommendations    We will recheck an ultrasound of your heart next year  You can have your cholesterol checked in the next 2 weeks at [facility], you can call them to make an appointment. This is a fasting lab  Continue all medications without changes.  Please follow up with Dr. Patel in 1 year.    Please send a MailWriter message or call [phone] to the RN team with questions or concerns.     Scheduling number [phone]  RADAMES Castro, CNP

## 2023-03-02 NOTE — PROGRESS NOTES
Cardiology Clinic Progress Note  Korey Pearson MRN# [id number]   YOB: 1929 Age: 89 year old   Primary Cardiologist: Dr. Patel  Reason for visit: Annual follow up             Assessment and Plan:     1.  Coronary artery disease, s/p ALFRED x2 to LAD (2016) and PCI (2017)  -Continue aspirin, Imdur, and statin  -No anginal symptoms     2.  Hypertension with orthostasis   -Blood pressure stable off lisinopril and reduced dose of metoprolol XL, will continue at 12.5mg daily     3.  Paroxysmal atrial fibrillation, [id number]  -Occurred in 2018 in the setting of viral illness, follow-up 30-day event monitor did not show any recurrent arrhythmia  -No symptoms to suggest recurrence  -Continue metoprolol  -With unsteadiness, orthostasis and high risk for falls, would continue off anticoagulation     4.  Hyperlipidemia   -Patient is due for fasting lipid panel  -Continue atorvastatin 20mg daily    5.  Hypothyroidism  -TSH (11/2022) 2.8  -on levothyroxine    6. Restless leg syndrome  -4/2022 Venous competency ultrasound negative for incompetence  -Working with PCP for treatment options    7. Mild aortic root dilation  -2019 TTE with aortic root 4.0 cm, previous study in 2017 was 3.9 cm  -Repeat echocardiogram in 2024     Changes today: Patient to have a fasting lipid panel within the next 2 weeks, no medication changes made    Follow up plan: Follow up in 1 year with Dr. Patel, echocardiogram to be completed prior         History of Presenting Illness:    Korey Pearson is a very pleasant 89 year old male with a history of coronary artery disease, hyperlipidemia, paroxysmal atrial fibrillation, hypertension, orthostasis, restless leg syndrome, anxiety.    Patient's last transthoracic echocardiogram was 2019, which showed hyperdynamic LV systolic function with an EF of 70 to 75% no regional wall motion abnormalities, grossly normal right ventricular size and function with no significant  valvular disease.  Mild aortic root dilation of 4.0 cm.    He also had a 30-day event monitor completed in January 2019 which did not show any recurrence of atrial fibrillation.    Last ischemic evaluation was a coronary angiogram done in 2017 during which laser angioplasty was completed in the LAD.  There was a residual totally occluded diagonal.  No additional narrowings greater than 40 to 50% were noted elsewhere.    Patient was last seen in clinic in May of 2022 and was having issues with restless legs and orthostasis. His lisinopril was discontinued and venous competency ultrasound of his bilateral lower legs completed, which was negative for incompetence.    He had COVID-19 in July of 2022 and was treated with PAXLOVID.     Patient is here today for his annual follow up. Patient reports feeling good. His wife is present for the visit today. He feels unsteady on his feet but denies dizziness, lightheadedness or other presyncopal symptoms.He has been taking metoprolol XL 12.5mg daily.     Patient denies chest pain or chest tightness. Denies tachycardia or palpitations. Denies shortness of breath, orthopnea, or PND.     Denies any bleeding issues. He has been compliant with taking his medications.     Labs from 10/13/2022 with sodium 132, potassium 4.0, BUN is 17, creatinine is 0.82, GFR 82, TSH 2.8, hemoglobin 14.3, platelet 202.    Blood pressure 155/78 and HR 63 in clinic today. His wife has not been checking his blood pressure at home.        Recent Hospitalizations   None in [id number]          Social History    , has 4 children, 6 grandchildren, 7 great grandchildren   Social History     Socioeconomic History     Marital status:      Spouse name: Not on file     Number of children: 4     Years of education: Not on file     Highest education level: Not on file   Occupational History     Occupation: Retired -    Tobacco Use     Smoking status: Former     Packs/day: 1.50     Years:  "10.00     Pack years: 15.00     Types: Cigarettes     Quit date: 1957     Years since quittin.2     Smokeless tobacco: Never   Substance and Sexual Activity     Alcohol use: Yes     Comment: rare     Drug use: No     Sexual activity: Not Currently   Other Topics Concern     Parent/sibling w/ CABG, MI or angioplasty before 65F 55M? Yes      Service Not Asked     Blood Transfusions Not Asked     Caffeine Concern Not Asked     Occupational Exposure Not Asked     Hobby Hazards Not Asked     Sleep Concern Not Asked     Stress Concern Not Asked     Weight Concern Not Asked     Special Diet No     Back Care Not Asked     Exercise No     Bike Helmet Not Asked     Seat Belt Not Asked     Self-Exams Not Asked   Social History Narrative    .    Lives at home with wife.     4 adult children.    6 grandchildren.    6 great grandchildren.     Social Determinants of Health     Financial Resource Strain: Not on file   Food Insecurity: Not on file   Transportation Needs: Not on file   Physical Activity: Not on file   Stress: Not on file   Social Connections: Not on file   Intimate Partner Violence: Not on file   Housing Stability: Not on file            Review of Systems:   Skin:        Eyes:       ENT:       Respiratory:  Negative    Cardiovascular:  chest pain;Negative;palpitations;lightheadedness;dizziness;edema Positive for;fatigue  Gastroenterology:      Genitourinary:       Musculoskeletal:       Neurologic:       Psychiatric:  Positive for sleep disturbances  Heme/Lymph/Imm:       Endocrine:  Negative           Physical Exam:   Vitals: BP (!) 155/78 (BP Location: Right arm, Patient Position: Sitting)   Pulse 63   Ht 1.753 m (5' 9\")   Wt 85.5 kg (188 lb 6.4 oz)   SpO2 100%   BMI 27.82 kg/m     Wt Readings from Last 4 Encounters:   23 85.5 kg (188 lb 6.4 oz)   10/13/22 84.1 kg (185 lb 8 oz)   22 79.8 kg (176 lb)   22 80.3 kg (177 lb)     GEN: well nourished, in no acute " distress.  HEENT:  Pupils equal, round. Sclerae nonicteric.   NECK: Supple, no masses appreciated.  No JVD with patient supine.  C/V:  Regular rate and rhythm, no murmur, rub or gallop.    RESP: Respirations are unlabored. Clear to auscultation bilaterally without wheezing, rales, or rhonchi.  GI: Abdomen soft, nontender.  EXTREM: No LE edema.  NEURO: Alert and oriented, cooperative.  SKIN: Warm and dry.        Data:       LIPID RESULTS:  Lab Results   Component Value Date    CHOL 108 02/22/2022    CHOL 106 10/27/2020    HDL 42 02/22/2022    HDL 46 10/27/2020    LDL 56 02/22/2022    LDL 45 10/27/2020    TRIG 52 02/22/2022    TRIG 73 10/27/2020    CHOLHDLRATIO 4.4 08/06/2014     LIVER ENZYME RESULTS:  Lab Results   Component Value Date    AST 23 05/04/2022    AST 31 10/27/2020    ALT 31 05/04/2022    ALT 34 10/27/2020     CBC RESULTS:  Lab Results   Component Value Date    WBC 10.7 10/13/2022    WBC 9.5 10/27/2020    RBC 4.62 10/13/2022    RBC 4.45 10/27/2020    HGB 14.3 10/13/2022    HGB 13.6 10/27/2020    HCT 42.5 10/13/2022    HCT 40.4 10/27/2020    MCV 92 10/13/2022    MCV 91 10/27/2020    MCH 31.0 10/13/2022    MCH 30.6 10/27/2020    MCHC 33.6 10/13/2022    MCHC 33.7 10/27/2020    RDW 13.6 10/13/2022    RDW 13.4 10/27/2020     10/13/2022     10/27/2020     BMP RESULTS:  Lab Results   Component Value Date     (L) 10/13/2022     (L) 10/27/2020    POTASSIUM 4.0 10/13/2022    POTASSIUM 4.7 10/27/2020    CHLORIDE 98 10/13/2022    CHLORIDE 98 10/27/2020    CO2 24 10/13/2022    CO2 28 10/27/2020    ANIONGAP 10 10/13/2022    ANIONGAP 4 10/27/2020    GLC 86 10/13/2022    GLC 91 10/27/2020    BUN 17 10/13/2022    BUN 20 10/27/2020    CR 0.82 10/13/2022    CR 0.76 10/27/2020    GFRESTIMATED 82 10/13/2022    GFRESTIMATED 80 10/27/2020    GFRESTBLACK >90 10/27/2020    OMAR 9.0 10/13/2022    OMAR 9.1 10/27/2020      A1C RESULTS:  No results found for: A1C  INR RESULTS:  Lab Results   Component Value  Date    INR 0.95 08/21/2017            Medications     Current Outpatient Medications   Medication Sig Dispense Refill     aspirin 81 MG tablet Take 81 mg by mouth daily        atorvastatin (LIPITOR) 20 MG tablet Take 1 tablet (20 mg) by mouth daily 90 tablet 0     Cholecalciferol (VITAMIN D) 50 MCG (2000 UT) CAPS Take 1 capsule by mouth daily       Doxylamine Succinate, Sleep, (UNISOM PO)        isosorbide mononitrate (IMDUR) 30 MG 24 hr tablet Take 1 tablet (30 mg) by mouth daily In the morning. 90 tablet 2     levothyroxine (SYNTHROID/LEVOTHROID) 75 MCG tablet Take 1 tablet (75 mcg) by mouth daily 90 tablet 2     magnesium 100 MG CAPS        metoprolol succinate ER (TOPROL-XL) 25 MG 24 hr tablet Take 1 tablet (25 mg) by mouth daily 90 tablet 3     tamsulosin (FLOMAX) 0.4 MG capsule Take 0.4 mg by mouth daily (with dinner)   3     gabapentin (NEURONTIN) 300 MG capsule Take 1 capsule (300 mg) by mouth At Bedtime 90 capsule 1     sertraline (ZOLOFT) 25 MG tablet Take 1/2 tablet daily for 1 week, then increase to 1 pill daily 30 tablet 0          Past Medical History     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     PAF (paroxysmal atrial fibrillation) (H)      Peripheral polyneuropathy      Restless legs syndrome (RLS)      Past Surgical History:   Procedure Laterality Date     APPENDECTOMY       CATARACT IOL, RT/LT       INGUINAL HERNIA REPAIR Left      TURP       Family History   Problem Relation Age of Onset     Cerebrovascular Disease Mother 40     Hypertension Mother      Heart Disease Father         details unknown     Prostate Cancer No family hx of      Colon Cancer No family hx of             Allergies   Ambien, Ropinirole, and Rotigotine        Hope Gil NP  [facility]  Pager: [phone]

## 2023-03-02 NOTE — LETTER
3/2/2023    Anais Pickett MD  [address] [facility] [place] [address]    RE: Korey Pearson       Dear Colleague,     I had the pleasure of seeing Korey Pearson in the [facility].  Cardiology Clinic Progress Note  Korey Pearson MRN# [id number]   YOB: 1929 Age: 89 year old   Primary Cardiologist: Dr. Patel  Reason for visit: Annual follow up             Assessment and Plan:     1.  Coronary artery disease, s/p ALFRED x2 to LAD (2016) and PCI (2017)  -Continue aspirin, Imdur, and statin  -No anginal symptoms     2.  Hypertension with orthostasis   -Blood pressure stable off lisinopril and reduced dose of metoprolol XL, will continue at 12.5mg daily     3.  Paroxysmal atrial fibrillation, [id number] 4  -Occurred in 2018 in the setting of viral illness, follow-up 30-day event monitor did not show any recurrent arrhythmia  -No symptoms to suggest recurrence  -Continue metoprolol  -With unsteadiness, orthostasis and high risk for falls, would continue off anticoagulation     4.  Hyperlipidemia   -Patient is due for fasting lipid panel  -Continue atorvastatin 20mg daily    5.  Hypothyroidism  -TSH (11/2022) 2.8  -on levothyroxine    6. Restless leg syndrome  -4/2022 Venous competency ultrasound negative for incompetence  -Working with PCP for treatment options    7. Mild aortic root dilation  -2019 TTE with aortic root 4.0 cm, previous study in 2017 was 3.9 cm  -Repeat echocardiogram in 2024     Changes today: Patient to have a fasting lipid panel within the next 2 weeks, no medication changes made    Follow up plan: Follow up in 1 year with Dr. Patel, echocardiogram to be completed prior         History of Presenting Illness:    Korey Pearson is a very pleasant 89 year old male with a history of coronary artery disease, hyperlipidemia, paroxysmal atrial fibrillation, hypertension, orthostasis, restless leg syndrome, anxiety.    Patient's last  transthoracic echocardiogram was 2019, which showed hyperdynamic LV systolic function with an EF of 70 to 75% no regional wall motion abnormalities, grossly normal right ventricular size and function with no significant valvular disease.  Mild aortic root dilation of 4.0 cm.    He also had a 30-day event monitor completed in January 2019 which did not show any recurrence of atrial fibrillation.    Last ischemic evaluation was a coronary angiogram done in 2017 during which laser angioplasty was completed in the LAD.  There was a residual totally occluded diagonal.  No additional narrowings greater than 40 to 50% were noted elsewhere.    Patient was last seen in clinic in May of 2022 and was having issues with restless legs and orthostasis. His lisinopril was discontinued and venous competency ultrasound of his bilateral lower legs completed, which was negative for incompetence.    He had COVID-19 in July of 2022 and was treated with PAXLOVID.     Patient is here today for his annual follow up. Patient reports feeling good. His wife is present for the visit today. He feels unsteady on his feet but denies dizziness, lightheadedness or other presyncopal symptoms.He has been taking metoprolol XL 12.5mg daily.     Patient denies chest pain or chest tightness. Denies tachycardia or palpitations. Denies shortness of breath, orthopnea, or PND.     Denies any bleeding issues. He has been compliant with taking his medications.     Labs from 10/13/2022 with sodium 132, potassium 4.0, BUN is 17, creatinine is 0.82, GFR 82, TSH 2.8, hemoglobin 14.3, platelet 202.    Blood pressure 155/78 and HR 63 in clinic today. His wife has not been checking his blood pressure at home.        Recent Hospitalizations   None in [id number]          Social History    , has 4 children, 6 grandchildren, 7 great grandchildren   Social History     Socioeconomic History     Marital status:      Spouse name: Not on file     Number of  "children: 4     Years of education: Not on file     Highest education level: Not on file   Occupational History     Occupation: Retired -    Tobacco Use     Smoking status: Former     Packs/day: 1.50     Years: 10.00     Pack years: 15.00     Types: Cigarettes     Quit date: 1957     Years since quittin.2     Smokeless tobacco: Never   Substance and Sexual Activity     Alcohol use: Yes     Comment: rare     Drug use: No     Sexual activity: Not Currently   Other Topics Concern     Parent/sibling w/ CABG, MI or angioplasty before 65F 55M? Yes      Service Not Asked     Blood Transfusions Not Asked     Caffeine Concern Not Asked     Occupational Exposure Not Asked     Hobby Hazards Not Asked     Sleep Concern Not Asked     Stress Concern Not Asked     Weight Concern Not Asked     Special Diet No     Back Care Not Asked     Exercise No     Bike Helmet Not Asked     Seat Belt Not Asked     Self-Exams Not Asked   Social History Narrative    .    Lives at home with wife.     4 adult children.    6 grandchildren.    6 great grandchildren.     Social Determinants of Health     Financial Resource Strain: Not on file   Food Insecurity: Not on file   Transportation Needs: Not on file   Physical Activity: Not on file   Stress: Not on file   Social Connections: Not on file   Intimate Partner Violence: Not on file   Housing Stability: Not on file            Review of Systems:   Skin:        Eyes:       ENT:       Respiratory:  Negative    Cardiovascular:  chest pain;Negative;palpitations;lightheadedness;dizziness;edema Positive for;fatigue  Gastroenterology:      Genitourinary:       Musculoskeletal:       Neurologic:       Psychiatric:  Positive for sleep disturbances  Heme/Lymph/Imm:       Endocrine:  Negative           Physical Exam:   Vitals: BP (!) 155/78 (BP Location: Right arm, Patient Position: Sitting)   Pulse 63   Ht 1.753 m (5' 9\")   Wt 85.5 kg (188 lb 6.4 oz)   SpO2 100%   BMI " 27.82 kg/m     Wt Readings from Last 4 Encounters:   03/02/23 85.5 kg (188 lb 6.4 oz)   10/13/22 84.1 kg (185 lb 8 oz)   05/04/22 79.8 kg (176 lb)   05/04/22 80.3 kg (177 lb)     GEN: well nourished, in no acute distress.  HEENT:  Pupils equal, round. Sclerae nonicteric.   NECK: Supple, no masses appreciated.  No JVD with patient supine.  C/V:  Regular rate and rhythm, no murmur, rub or gallop.    RESP: Respirations are unlabored. Clear to auscultation bilaterally without wheezing, rales, or rhonchi.  GI: Abdomen soft, nontender.  EXTREM: No LE edema.  NEURO: Alert and oriented, cooperative.  SKIN: Warm and dry.        Data:       LIPID RESULTS:  Lab Results   Component Value Date    CHOL 108 02/22/2022    CHOL 106 10/27/2020    HDL 42 02/22/2022    HDL 46 10/27/2020    LDL 56 02/22/2022    LDL 45 10/27/2020    TRIG 52 02/22/2022    TRIG 73 10/27/2020    CHOLHDLRATIO 4.4 08/06/2014     LIVER ENZYME RESULTS:  Lab Results   Component Value Date    AST 23 05/04/2022    AST 31 10/27/2020    ALT 31 05/04/2022    ALT 34 10/27/2020     CBC RESULTS:  Lab Results   Component Value Date    WBC 10.7 10/13/2022    WBC 9.5 10/27/2020    RBC 4.62 10/13/2022    RBC 4.45 10/27/2020    HGB 14.3 10/13/2022    HGB 13.6 10/27/2020    HCT 42.5 10/13/2022    HCT 40.4 10/27/2020    MCV 92 10/13/2022    MCV 91 10/27/2020    MCH 31.0 10/13/2022    MCH 30.6 10/27/2020    MCHC 33.6 10/13/2022    MCHC 33.7 10/27/2020    RDW 13.6 10/13/2022    RDW 13.4 10/27/2020     10/13/2022     10/27/2020     BMP RESULTS:  Lab Results   Component Value Date     (L) 10/13/2022     (L) 10/27/2020    POTASSIUM 4.0 10/13/2022    POTASSIUM 4.7 10/27/2020    CHLORIDE 98 10/13/2022    CHLORIDE 98 10/27/2020    CO2 24 10/13/2022    CO2 28 10/27/2020    ANIONGAP 10 10/13/2022    ANIONGAP 4 10/27/2020    GLC 86 10/13/2022    GLC 91 10/27/2020    BUN 17 10/13/2022    BUN 20 10/27/2020    CR 0.82 10/13/2022    CR 0.76 10/27/2020     GFRESTIMATED 82 10/13/2022    GFRESTIMATED 80 10/27/2020    GFRESTBLACK >90 10/27/2020    OMAR 9.0 10/13/2022    OMAR 9.1 10/27/2020      A1C RESULTS:  No results found for: A1C  INR RESULTS:  Lab Results   Component Value Date    INR 0.95 08/21/2017            Medications     Current Outpatient Medications   Medication Sig Dispense Refill     aspirin 81 MG tablet Take 81 mg by mouth daily        atorvastatin (LIPITOR) 20 MG tablet Take 1 tablet (20 mg) by mouth daily 90 tablet 0     Cholecalciferol (VITAMIN D) 50 MCG (2000 UT) CAPS Take 1 capsule by mouth daily       Doxylamine Succinate, Sleep, (UNISOM PO)        isosorbide mononitrate (IMDUR) 30 MG 24 hr tablet Take 1 tablet (30 mg) by mouth daily In the morning. 90 tablet 2     levothyroxine (SYNTHROID/LEVOTHROID) 75 MCG tablet Take 1 tablet (75 mcg) by mouth daily 90 tablet 2     magnesium 100 MG CAPS        metoprolol succinate ER (TOPROL-XL) 25 MG 24 hr tablet Take 1 tablet (25 mg) by mouth daily 90 tablet 3     tamsulosin (FLOMAX) 0.4 MG capsule Take 0.4 mg by mouth daily (with dinner)   3     gabapentin (NEURONTIN) 300 MG capsule Take 1 capsule (300 mg) by mouth At Bedtime 90 capsule 1     sertraline (ZOLOFT) 25 MG tablet Take 1/2 tablet daily for 1 week, then increase to 1 pill daily 30 tablet 0          Past Medical History     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     PAF (paroxysmal atrial fibrillation) (H)      Peripheral polyneuropathy      Restless legs syndrome (RLS)      Past Surgical History:   Procedure Laterality Date     APPENDECTOMY       CATARACT IOL, RT/LT       INGUINAL HERNIA REPAIR Left      TURP       Family History   Problem Relation Age of Onset     Cerebrovascular Disease Mother 40     Hypertension Mother      Heart Disease Father         details unknown      Prostate Cancer No family hx of      Colon Cancer No family hx of             Allergies   Ambien, Ropinirole, and Rotigotine        Hope Gil NP  [facility]  Pager: [phone]        Thank you for allowing me to participate in the care of your patient.      Sincerely,     Hope Gil NP     [facility]  cc:   Ramirez Patel MD  [address]  [place] [id number]

## 2023-03-15 ENCOUNTER — LAB (OUTPATIENT)
Dept: LAB | Facility: CLINIC | Age: 88
End: 2023-03-15
Payer: COMMERCIAL

## 2023-03-15 DIAGNOSIS — I25.10 CORONARY ARTERY DISEASE INVOLVING NATIVE CORONARY ARTERY OF NATIVE HEART WITHOUT ANGINA PECTORIS: ICD-10-CM

## 2023-03-15 LAB
ALT SERPL W P-5'-P-CCNC: 27 U/L (ref 10–50)
CHOLEST SERPL-MCNC: 120 MG/DL
HDLC SERPL-MCNC: 44 MG/DL
LDLC SERPL CALC-MCNC: 63 MG/DL
NONHDLC SERPL-MCNC: 76 MG/DL
TRIGL SERPL-MCNC: 66 MG/DL

## 2023-03-15 PROCEDURE — 80061 LIPID PANEL: CPT

## 2023-03-15 PROCEDURE — 84460 ALANINE AMINO (ALT) (SGPT): CPT

## 2023-03-15 PROCEDURE — 36415 COLL VENOUS BLD VENIPUNCTURE: CPT

## 2023-03-29 NOTE — TELEPHONE ENCOUNTER
Nephrology Progress Note    Patient: Rony Gaffney Date: 3/29/2023   : 1951 Attending: Caro Ureña MD   71 year old male        Subjective:     This is a f/u for ESRD on HD.   Patient was seen during dialysis. Feels fine. Tolerating UF well. No SOB, leg swelling.   Bps are elevated.   Awaiting LE angiogram today.       Vital Last Value 24 Hour Range   Temperature 98.2 °F (36.8 °C) (23 134) Temp  Min: 97.5 °F (36.4 °C)  Max: 98.2 °F (36.8 °C)   Pulse (!) 53 (23 134) Pulse  Min: 53  Max: 70   Respiratory 17 (23) Resp  Min: 16  Max: 18   Non-Invasive  Blood Pressure 126/62 (23 134) BP  Min: 122/52  Max: 169/89   Arterial   Blood Pressure   No data recorded   Pulse Oximetry 100 % (23) SpO2  Min: 100 %  Max: 100 %     Vital Today Admitted   Weight  (unable to weigh pt, pt remains in cart) (23 1034) Weight: 90.8 kg (200 lb 2.8 oz) (23)   Height N/A Height: 5' 11\" (180.3 cm) (23)   BMI N/A BMI (Calculated): 27.92 (23)     Weight over the past 48 Hours:  No data found.     Intake/Output:    Last Stool Occurrence: 1 (23 1600)    No intake/output data recorded.    I/O last 3 completed shifts:  In: -   Out:  [Other:]      Intake/Output Summary (Last 24 hours) at 3/29/2023 180  Last data filed at 3/29/2023 1034  Gross per 24 hour   Intake --   Output 2000 ml   Net -2000 ml       Medications/Infusions:  Scheduled:   Current Facility-Administered Medications   Medication Dose Route Frequency Provider Last Rate Last Admin   • [START ON 3/30/2023] amLODIPine (NORVASC) tablet 10 mg  10 mg Oral Daily Collin Ray, DO       • epoetin misty-epbx (RETACRIT) 4000 UNIT/ML injection 4,000 Units  4,000 Units Subcutaneous Once per day on  Brandyn Alejo MD   4,000 Units at 23 1726   • methaDONE (METHADOSE) tablet for oral suspension 80 mg  80 mg Oral Daily Shara Minor CNP   80 mg at 23 1143   •  Routing refill request to provider for review/approval because:  Drug not on the [facility] refill protocol   Last filled 3/1/21  #80 with no refills    Constanza Chance, MSN, RN  Triage RN  [facility]           cinacalcet (SENSIPAR) tablet 180 mg  180 mg Oral Daily Luis Doss,    180 mg at 03/29/23 1145   • sodium chloride (PF) 0.9 % injection 2 mL  2 mL Intracatheter 2 times per day Bradley Knight MD   2 mL at 03/29/23 1414   • predniSONE (DELTASONE) tablet 10 mg  10 mg Oral BID WC Shara Minor CNP   10 mg at 03/29/23 1727   • cholecalciferol (VITAMIN D) 10 mcg (400 units)/mL oral liquid 10 mcg  10 mcg Oral Once per day on Mon Wed Fri Shara Minor CNP   10 mcg at 03/29/23 1145   • heparin (porcine) injection 5,000 Units  5,000 Units Subcutaneous 3 times per day Shara Minor CNP   5,000 Units at 03/28/23 2103       Continuous Infusions:   Current Facility-Administered Medications   Medication Dose Route Frequency Provider Last Rate Last Admin   • dextrose 10 % infusion   Intravenous Continuous Caro Ureña MD 25 mL/hr at 03/29/23 1613 New Bag at 03/29/23 1613   • sodium chloride 0.9% infusion   Intravenous Continuous PRN Caro Ureña MD             Physical Exam:    Vital Signs Reviewed.     Gen: Awake, alert, NAD. On NC.  HEENT: MMM, EOMI, anicteric.  CVS: Irregularly irregular, +S1, +S2  Lungs: Diminished at bases, otherwise clear. No crackles or wheezes.  Abdomen: Soft, NT, ND, +BS.  Extremities: Warm. No edema in LEs.  Neuro: Alert, answers questions, moves all four extremities.   Psychiatric: Cooperative  Skin: +ulcer/wound in Right 2nd digit. Superficial ulcer over dorsal Left 5th digit.  Access: RUE AVG with +thrill and a bruit.       Laboratory Results:    Recent Labs   Lab 03/29/23  0747 03/28/23  1040 03/27/23  0931 03/26/23  0557 03/25/23  1154 03/25/23  0611   WBC 6.9 7.8 7.9 9.9  --  6.6   HGB 8.3* 9.0* 6.7* 7.9* 7.3* 7.0*   PLT 96* 108* 121* 143  --  113*     Recent Labs   Lab 03/29/23  0746 03/28/23  1040 03/27/23  0931 03/26/23  0558 03/25/23  0611 03/24/23  0808 03/23/23  0907 03/22/23  2228   SODIUM 137 139 135 137 138 137 138 129*   POTASSIUM 4.4 4.2 4.0 4.5 4.3 4.2 5.3* 5.4*   CHLORIDE  100 103 99 102 105 102 102 96*   CO2 30 31 29 30 30 30 32 26   BUN 55* 41* 67* 46* 25* 35* 31* 72*   CREATININE 6.32* 5.30* 8.50* 6.86* 5.10* 5.76* 6.00* 11.20*   CALCIUM 8.2* 8.4 8.4 8.5 8.3* 7.9* 8.0* 8.2*   MG  --   --  2.4  --   --  2.2 2.2 2.7*   ALBUMIN 3.1*  --   --   --  2.7* 3.1* 2.7*  --      Imaging:  US VASC UPPER EXTREMITY SEGMENTAL PRESSURES BILATERAL   Final Result       Limited exam as brachial pressures were not obtained.     1.  Right digits are noncompressible with segmental pressures greater than   254.   2.  Segmental pressures of the left forearm and digits are within normal   limits, however unable to determine if there is a smallbore proximal   stenosis is the brachial artery pressure was not obtained.         Electronically Signed by: DASHAWN JONES MD    Signed on: 3/28/2023 8:05 AM    Workstation ID: [id number]      XR HAND 2 VIEWS RIGHT   Final Result   FINDINGS/IMPRESSION:   No acute fracture or dislocation.  No destructive osseous lesions. Moderate   osteoarthrosis at the thumb carpometacarpal joint.  Minimal osteoarthrosis   at the interphalangeal joints.  Marked calcified atherosclerosis.  Soft   tissue ulceration at the radial margin of the tip of the index finger.    Underlying distal phalanx is intact.      Electronically Signed by: LAN SIMS MD    Signed on: 3/27/2023 7:40 AM    Workstation ID: [id number]      CTA ABDOMINAL AORTA ILIOFEMORAL BILATERAL RUNOFF   Final Result      1.   Extensive atherosclerotic plaque. Bilateral femoropopliteal stenoses   as above. Limited evaluation of the calf arteries due to extensive   calcification with suspected stenoses and occlusions as above.   2.   Cholelithiasis.   3.   Absent native kidneys. Failed right iliac fossa renal allograft.   4.   Pleural-parenchymal scarring at the lung bases compatible with old   healed infection with or without remote hemorrhage.      Electronically Signed by: ANGELINE CORREIA M.D.    Signed on:  3/26/2023 12:09 PM    Workstation ID: [id number]      XR FOOT 3 OR MORE VIEWS LEFT   Final Result   Findings/impression:       3 views left foot demonstrate decreased osseous mineralization. The   alignment is unremarkable. No definite fractures.  Degenerative changes of   the 1st MTP joint is noted.  No gross osseous erosive changes.  Vascular   calcifications are noted. .      Electronically Signed by: KAYY DAMON MD    Signed on: 3/25/2023 8:12 AM    Workstation ID: [id number]      US Ankle/Brachial 1 or 2 Level Extremity   Final Result    Findings congestive calcified vessels at the level of the ankles.       Digital ABIs indicating moderate to severe disease.      Reference for JOSE:   Normal: Greater than or equal to 1.0    Borderline: 0.91-0.99    Mild disease: 0.81-0.90    Moderate disease: 0.51-.80    Moderate to severe disease: 0.31-0.50    Severe disease: Less than or equal to 0.30    Calcified/noncompressible arteries: greater than 1.4        TOE PRESSURES/TBI LEVELS (interpretation):    Toe systolic pressure greater than 30 mmHg may indicate healing potential   of foot ulcers.    The normal range for TBI is > 0.65    If TBI is < 0.65, there is evidence of arterial occlusive disease.       Electronically Signed by: KAYY DAMON MD    Signed on: 3/24/2023 1:32 PM    Workstation ID: [id number]      US VASC LOWER EXTREMITY ARTERIES DUPLEX BILATERAL   Final Result   Diffuse calcific atherosclerosis in both lower extremities.        On the right, there is single vessel to the foot flow via the posterior   tibial artery.        On the left, significant stenosis is suspected in the popliteal artery.    There is straight-line flow to the foot in all three trifurcation vessels.      Electronically Signed by: FRANCIS DOWNING MD    Signed on: 3/23/2023 2:20 PM    Workstation ID: [id number]      FL GUIDANCE WITHOUT REPORT   Final Result      Cath/PV Case    (Results Pending)        Impression and Plan:  Patient is a 71 year old yo male with:     ESRD on HD MWF @ [facility] under Dr. Goldy Whyte.  -S/p HD today (Wednesday). Next HD on Friday.     Dialysis Access  RUE HeRO graft.   -Admitted with recurrent bleeding from RUE graft ulceration.  -Fistulogram per IR (3/17/23; [facility]): \"Severe aneurysmal degeneration of proximal (arterial) limb access segment underlying area of ulceration at skin, successfully excluded with covered stent-graft (Viabahn 8x100 mm). Stented segment marked on overlying skin to be avoided by dialysis access over next 1 month.\"  -S/p open revision of RUE AVG with jump graft interposition, and excision of aneurysmal and ulcerated AV graft segment (3/22/23; Dr. Thakkar). This is currently being used for HD.  -Vascular Surgery on consult.  -Maintain limb precautions.    PVD  Right 2nd digit dry gangrene  Left 1st toe ulcer  -CTA (3/26/23): Extensive atherosclerotic plaque. Bilateral femoropopliteal stenoses as above. Limited evaluation of the calf arteries due to extensive  calcification with suspected stenoses and occlusions as above.  -Podiatry on consult - no intervention. Local wound care.   -Planned for LLE angiogram per Vascular Surgery on 3/29/23.    Bradycardia, Pauses on telemetry  - Cardiology on consult - planned for 30-day event monitor on discharge.     Hyperkalemia - resolved  -Con't Renal diet.      HTN  Has h/o Intradialytic hypotension - takes Midodrine prn on dialysis days.  -2D Echo (2/3/23): LVEF 50-55%, Mild TR.  -Bps are now running high - start on Amlodipine 5mg (hold for SBP <120).    Acute on chronic Anemia  -S/p 1u PRBCs (3/27/23).  A) Acute blood loss due to bleeding from AVG.   B) CKD/AOCD contributing.  -Con't Epogen 4000 units Q MWF.      Chronic Hypoglycemia   -Possible adrenal insufficiency  -Was seen per Endocrine in Dec 2021 ([facility]) where work up showed normal TSH and Cortisol (17). Insulin level was normal but C-peptide was elevated. He was started on  Prednisone.   -On Prednisone.  -Started on D10 infusion (3/29/23) given hypoglycemic episodes in setting of NPO status.     Secondary HyperPTH of CKD  -On Sensipar 180mg daily.  -Not on a binder. Phos 3.6 (3/27/23).    Thrombocytopenia     A. Fib, Eliquis currently on hold.   CVA with residual left sided weakness  Hep C positive.   H/o Opioid dependence on Methadone  H/o failed renal transplant  COVID-19 negative (3/15/23).      Discussed with nursing staff.     Bryanna Anderson MD  [facility]  3/29/2023

## 2023-08-02 DIAGNOSIS — E03.9 ACQUIRED HYPOTHYROIDISM: ICD-10-CM

## 2023-08-02 RX ORDER — LEVOTHYROXINE SODIUM 75 UG/1
75 TABLET ORAL DAILY
Qty: 90 TABLET | Refills: 0 | Status: SHIPPED | OUTPATIENT
Start: 2023-08-02 | End: 2023-11-06

## 2023-08-02 NOTE — TELEPHONE ENCOUNTER
Prescription approved per [facility] Refill Protocol.  Nicky Fitzpatrick, RN  [facility] Triage Nurse

## 2023-09-13 ENCOUNTER — PATIENT OUTREACH (OUTPATIENT)
Dept: CARE COORDINATION | Facility: CLINIC | Age: 88
End: 2023-09-13
Payer: COMMERCIAL

## 2023-09-27 ENCOUNTER — PATIENT OUTREACH (OUTPATIENT)
Dept: CARE COORDINATION | Facility: CLINIC | Age: 88
End: 2023-09-27
Payer: COMMERCIAL

## 2023-11-06 DIAGNOSIS — E03.9 ACQUIRED HYPOTHYROIDISM: ICD-10-CM

## 2023-11-06 RX ORDER — LEVOTHYROXINE SODIUM 75 UG/1
75 TABLET ORAL DAILY
Qty: 90 TABLET | Refills: 0 | Status: SHIPPED | OUTPATIENT
Start: 2023-11-06 | End: 2024-02-15

## 2023-11-17 ENCOUNTER — IMMUNIZATION (OUTPATIENT)
Dept: NURSING | Facility: CLINIC | Age: 88
End: 2023-11-17
Payer: COMMERCIAL

## 2023-11-17 PROCEDURE — 90480 ADMN SARSCOV2 VAC 1/ONLY CMP: CPT

## 2023-11-17 PROCEDURE — 91320 SARSCV2 VAC 30MCG TRS-SUC IM: CPT

## 2024-01-14 ENCOUNTER — HEALTH MAINTENANCE LETTER (OUTPATIENT)
Age: 89
End: 2024-01-14

## 2024-02-10 DIAGNOSIS — E03.9 ACQUIRED HYPOTHYROIDISM: ICD-10-CM

## 2024-02-12 RX ORDER — LEVOTHYROXINE SODIUM 75 UG/1
75 TABLET ORAL DAILY
Qty: 90 TABLET | Refills: 0 | OUTPATIENT
Start: 2024-02-12

## 2024-02-12 NOTE — TELEPHONE ENCOUNTER
He is overdue for his annual wellness exam. Please ask him to schedule this appointment ASAP. Can refill medication temporarily if he anticipates running out prior to scheduled appointment.     Thank you.     ---    May use any virtual or virtual release spot for an in-person visit.     Patient may also be seen at noon (arrival time 11:40am) Mondays, Wednesdays, Thursdays, and Fridays OR at 1:00pm (arrival time 12:40pm) on Thursdays (during the huddle).    Please do not schedule in a same day, next day, or hospital follow-up slot.    Okay to double book lunch slot (but patient should still arrive by 11:40am).

## 2024-02-13 ENCOUNTER — TELEPHONE (OUTPATIENT)
Dept: INTERNAL MEDICINE | Facility: CLINIC | Age: 89
End: 2024-02-13
Payer: COMMERCIAL

## 2024-02-13 NOTE — TELEPHONE ENCOUNTER
Reason for Call:  Appointment Request    Patient requesting this type of appt: Chronic Diease Management/Medication/Follow-Up    Requested provider:  anyone available    Reason patient unable to be scheduled: Not within requested timeframe    When does patient want to be seen/preferred time: 3-7 days    Comments: needs to be seen for mediation re-fill    Could we [facility] this [facility] you [facility] or would you prefer to receive a phone call?:   Patient would prefer a phone call   Okay to leave a detailed message?: Yes at Home number on file [phone] (home)    Call taken on 2/13/2024 at 12:48 PM by Sania Romo

## 2024-02-13 NOTE — TELEPHONE ENCOUNTER
May use any virtual or virtual release spot for an in-person visit.     Patient may also be seen at noon (arrival time 11:40am) Mondays, Wednesdays, Thursdays, and Fridays OR at 1:00pm (arrival time 12:40pm) on Thursdays (during the huddle).    Please do not schedule in a same day, next day, or hospital follow-up slot.    Okay to double book lunch slot (but patient should still arrive by 11:40am).     Patient

## 2024-02-14 PROBLEM — I77.810 AORTIC ROOT DILATION (H): Status: ACTIVE | Noted: 2024-02-14

## 2024-02-14 PROBLEM — E03.9 HYPOTHYROIDISM: Status: ACTIVE | Noted: 2024-02-14

## 2024-02-14 PROBLEM — E78.5 HYPERLIPIDEMIA LDL GOAL <100: Status: RESOLVED | Noted: 2018-07-03 | Resolved: 2024-02-14

## 2024-02-15 ENCOUNTER — OFFICE VISIT (OUTPATIENT)
Dept: INTERNAL MEDICINE | Facility: CLINIC | Age: 89
End: 2024-02-15
Payer: COMMERCIAL

## 2024-02-15 VITALS
HEART RATE: 74 BPM | BODY MASS INDEX: 28.39 KG/M2 | HEIGHT: 69 IN | WEIGHT: 191.7 LBS | SYSTOLIC BLOOD PRESSURE: 116 MMHG | OXYGEN SATURATION: 95 % | DIASTOLIC BLOOD PRESSURE: 64 MMHG | TEMPERATURE: 97.4 F

## 2024-02-15 DIAGNOSIS — I25.10 CORONARY ARTERY DISEASE INVOLVING NATIVE CORONARY ARTERY OF NATIVE HEART WITHOUT ANGINA PECTORIS: ICD-10-CM

## 2024-02-15 DIAGNOSIS — I77.810 AORTIC ROOT DILATION (H): ICD-10-CM

## 2024-02-15 DIAGNOSIS — Z00.00 MEDICARE ANNUAL WELLNESS VISIT, SUBSEQUENT: Primary | ICD-10-CM

## 2024-02-15 DIAGNOSIS — E03.4 HYPOTHYROIDISM DUE TO ACQUIRED ATROPHY OF THYROID: ICD-10-CM

## 2024-02-15 DIAGNOSIS — I48.0 PAF (PAROXYSMAL ATRIAL FIBRILLATION) (H): ICD-10-CM

## 2024-02-15 DIAGNOSIS — Z13.1 SCREENING FOR DIABETES MELLITUS: ICD-10-CM

## 2024-02-15 DIAGNOSIS — R73.01 IMPAIRED FASTING GLUCOSE: ICD-10-CM

## 2024-02-15 PROBLEM — R41.3 SHORT-TERM MEMORY LOSS: Status: ACTIVE | Noted: 2024-02-15

## 2024-02-15 LAB
CHOLEST SERPL-MCNC: 111 MG/DL
FASTING STATUS PATIENT QL REPORTED: NO
HBA1C MFR BLD: 5.6 % (ref 0–5.6)
HDLC SERPL-MCNC: 39 MG/DL
LDLC SERPL CALC-MCNC: 52 MG/DL
NONHDLC SERPL-MCNC: 72 MG/DL
TRIGL SERPL-MCNC: 102 MG/DL
TSH SERPL DL<=0.005 MIU/L-ACNC: 1.44 UIU/ML (ref 0.3–4.2)

## 2024-02-15 PROCEDURE — G0439 PPPS, SUBSEQ VISIT: HCPCS | Performed by: INTERNAL MEDICINE

## 2024-02-15 PROCEDURE — 80053 COMPREHEN METABOLIC PANEL: CPT | Performed by: INTERNAL MEDICINE

## 2024-02-15 PROCEDURE — 80061 LIPID PANEL: CPT | Performed by: INTERNAL MEDICINE

## 2024-02-15 PROCEDURE — 84443 ASSAY THYROID STIM HORMONE: CPT | Performed by: INTERNAL MEDICINE

## 2024-02-15 PROCEDURE — 36415 COLL VENOUS BLD VENIPUNCTURE: CPT | Performed by: INTERNAL MEDICINE

## 2024-02-15 PROCEDURE — 83036 HEMOGLOBIN GLYCOSYLATED A1C: CPT | Performed by: INTERNAL MEDICINE

## 2024-02-15 RX ORDER — LEVOTHYROXINE SODIUM 75 UG/1
75 TABLET ORAL DAILY
Qty: 90 TABLET | Refills: 3 | Status: SHIPPED | OUTPATIENT
Start: 2024-02-15

## 2024-02-15 SDOH — HEALTH STABILITY: PHYSICAL HEALTH: ON AVERAGE, HOW MANY DAYS PER WEEK DO YOU ENGAGE IN MODERATE TO STRENUOUS EXERCISE (LIKE A BRISK WALK)?: 1 DAY

## 2024-02-15 SDOH — HEALTH STABILITY: PHYSICAL HEALTH: ON AVERAGE, HOW MANY MINUTES DO YOU ENGAGE IN EXERCISE AT THIS LEVEL?: 10 MIN

## 2024-02-15 ASSESSMENT — ANXIETY QUESTIONNAIRES
IF YOU CHECKED OFF ANY PROBLEMS ON THIS QUESTIONNAIRE, HOW DIFFICULT HAVE THESE PROBLEMS MADE IT FOR YOU TO DO YOUR WORK, TAKE CARE OF THINGS AT HOME, OR GET ALONG WITH OTHER PEOPLE: NOT DIFFICULT AT ALL
3. WORRYING TOO MUCH ABOUT DIFFERENT THINGS: NOT AT ALL
8. IF YOU CHECKED OFF ANY PROBLEMS, HOW DIFFICULT HAVE THESE MADE IT FOR YOU TO DO YOUR WORK, TAKE CARE OF THINGS AT HOME, OR GET ALONG WITH OTHER PEOPLE?: NOT DIFFICULT AT ALL
4. TROUBLE RELAXING: NOT AT ALL
GAD7 TOTAL SCORE: 0
7. FEELING AFRAID AS IF SOMETHING AWFUL MIGHT HAPPEN: NOT AT ALL
1. FEELING NERVOUS, ANXIOUS, OR ON EDGE: NOT AT ALL
GAD7 TOTAL SCORE: 0
GAD7 TOTAL SCORE: 0
6. BECOMING EASILY ANNOYED OR IRRITABLE: NOT AT ALL
2. NOT BEING ABLE TO STOP OR CONTROL WORRYING: NOT AT ALL
5. BEING SO RESTLESS THAT IT IS HARD TO SIT STILL: NOT AT ALL
7. FEELING AFRAID AS IF SOMETHING AWFUL MIGHT HAPPEN: NOT AT ALL

## 2024-02-15 ASSESSMENT — SOCIAL DETERMINANTS OF HEALTH (SDOH): HOW OFTEN DO YOU GET TOGETHER WITH FRIENDS OR RELATIVES?: PATIENT DECLINED

## 2024-02-15 NOTE — PROGRESS NOTES
ASSESSMENT/PLAN                                                       (Z00.00) Medicare annual wellness visit, subsequent  (primary encounter diagnosis)  Comment: PMH, PSH, FH, SH, medications, allergies, immunizations, and preventative health measures reviewed and updated as appropriate.  Plan: see below for plans.      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     (I48.0) PAF (paroxysmal atrial fibrillation) (H)  Comment: rate controlled on Toprol XL; not on chronic AC due to gait instability/high risk for falls.     (I77.810) Aortic root dilation (H24)  Plan: Echocardiogram Complete ordered - patient will be contacted to schedule.      Appropriate preventive services were discussed with this patient, including applicable screening as appropriate for cardiovascular disease, diabetes, osteopenia/osteoporosis, and glaucoma.  As appropriate for age/gender, discussed screening for colorectal cancer, prostate cancer, breast cancer, and cervical cancer. Checklist reviewing preventive services available has been given to the patient.    Reviewed patients plan of care. The Basic Care Plan (routine screening as documented in Health Maintenance) for Korey Pearson meets the Care Plan requirement. This Care Plan has been established and reviewed with the Patient and spouse.    Anais Pickett MD   [address] [address]  T: [phone], F: [phone]    SUBJECTIVE                                                      Korey Pearson is a very pleasant 89 year old male who presents for his subsequent AWV:    Current providers (other than myself): Amanda (cardiology)    PMH, PSH, FH, SH, medications, allergies, immunizations,  preventative health, and health risk assessment reviewed.     Past Medical History:   Diagnosis Date    Benign essential hypertension     CAD (coronary artery disease)     s/p PCI to LAD in 2017    VISHAL (generalized anxiety disorder)     History of stroke     cerebellar    Hypothyroidism     PAF (paroxysmal atrial fibrillation) (H)     Peripheral polyneuropathy     Restless legs syndrome (RLS)     Short-term memory loss      Past Surgical History:   Procedure Laterality Date    APPENDECTOMY      CATARACT IOL, RT/LT      INGUINAL HERNIA REPAIR Left     TURP       Family History   Problem Relation Age of Onset    Cerebrovascular Disease Mother 40    Hypertension Mother     Heart Disease Father         details unknown    Prostate Cancer No family hx of     Colon Cancer No family hx of      Social History     Occupational History    Occupation: Retired -    Tobacco Use    Smoking status: Former     Packs/day: 1.50     Years: 10.00     Additional pack years: 0.00     Total pack years: 15.00     Types: Cigarettes     Quit date: 1957     Years since quittin.1    Smokeless tobacco: Never   Vaping Use    Vaping Use: Never used   Substance and Sexual Activity    Alcohol use: Yes     Comment: rare    Drug use: No    Sexual activity: Not Currently   Social History Narrative    .    Lives at home with wife.     4 adult children.    6 grandchildren.    7 great grandchildren.     Allergies   Allergen Reactions    Ropinirole Anxiety    Rotigotine Other (See Comments)     Neupro patch; dizziness + imbalance    Zolpidem Tartrate Other (See Comments)     tinnitus     Current Outpatient Medications   Medication Sig    aspirin 81 MG tablet Take 81 mg by mouth daily     atorvastatin (LIPITOR) 20 MG tablet Take 1 tablet (20 mg) by mouth daily    Cholecalciferol (VITAMIN D) 50 MCG (2000) CAPS Take 1 capsule by mouth daily    Doxylamine Succinate, Sleep, (UNISOM PO)     isosorbide mononitrate (IMDUR) 30 MG  24 hr tablet Take 1 tablet (30 mg) by mouth daily In the morning.    levothyroxine (SYNTHROID/LEVOTHROID) 75 MCG tablet Take 1 tablet (75 mcg) by mouth daily    magnesium 100 MG CAPS     metoprolol succinate ER (TOPROL XL) 25 MG 24 hr tablet Take 0.5 tablets (12.5 mg) by mouth daily    tamsulosin (FLOMAX) 0.4 MG capsule Take 0.4 mg by mouth daily (with dinner)      Immunization History   Administered Date(s) Administered    COVID-19 12+ (2023-24) (Pfizer) 11/17/2023    COVID-19 Bivalent 12+ (Pfizer) 10/13/2022    COVID-19 MONOVALENT 12+ (Pfizer) 02/22/2021, 03/15/2021, 12/07/2021    Influenza Vaccine 65+ (Fluzone HD) 10/27/2020, 02/22/2022, 10/13/2022    Pneumococcal 23 valent 03/07/2014    TDAP Vaccine (Adacel) 06/25/2013     PREVENTATIVE HEALTH                                                      BMI: overweight  Blood pressure: well-controlled on current regimen   Prostate CA Screening: screening no longer indicated  Colon CA screening: screening no longer indicated  Lung CA screening: patient does not meet screening criteria  AAA screening: patient does not meet screening criteria  Screening cholesterol: n/a - already being treated for this condition  Screening diabetes: DUE  Alcohol misuse screening: alcohol use reviewed - no intervention indicated at this time  Immunizations: reviewed;  flu shot and Prevnar 20 DUE - patient declines; Shingrix series and tetanus booster not covered by Medicare (may obtain in the pharmacy if desired)    HEALTH RISK ASSESSMENT                                                      In general, how would you rate your overall physical health? fair  Outside of work, how many days during the week do you exercise? 1 day/week  Outside of work, approximately how many minutes a day do you exercise? less than 15 minutes    If you drink alcohol do you typically have >3 drinks per day or >7 drinks per week? No     Assistance with daily activities: YES - assistance with transportation and  "medications    Safety concerns: No    Fall risk assessment: completed today (see ambulatory assessments)    Hearing concerns: No    In the past 6 months, have you been bothered by leaking of urine: No    Do you have a current opioid prescription? No  Do you use any other controlled substances or medications that are not prescribed by a provider? None    PHQ-2/PHQ-9 assessment: completed today (see ambulatory assessments)    Additional concerns today: No    OBJECTIVE                                                      /64   Pulse 74   Temp 97.4  F (36.3  C) (Temporal)   Ht 1.753 m (5' 9\")   Wt 87 kg (191 lb 11.2 oz)   SpO2 95%   BMI 28.31 kg/m    Constitutional: well-appearing  Head, Ears, and Eyes: normocephalic; normal external auditory canal and pinna; tympanic membranes visualized and normal; normal lids and conjunctivae  Neck: supple, symmetric, no thyromegaly or lymphadenopathy  Respiratory: normal respiratory effort; clear to auscultation bilaterally  Cardiovascular: regular rate and rhythm; no edema  Gastrointestinal: soft, non-tender, and non-distended; no organomegaly or masses  Musculoskeletal: normal gait and station  Psych: normal judgment and insight; normal mood and affect    Cognitive Impairment noted: YES - short term memory loss noted    ---    (Note was completed, in part, with [employer] voice-recognition software. Documentation was reviewed, but some grammatical, spelling, and word errors may remain.)    "

## 2024-02-16 LAB
ALBUMIN SERPL BCG-MCNC: 4.2 G/DL (ref 3.5–5.2)
ALP SERPL-CCNC: 104 U/L (ref 40–150)
ALT SERPL W P-5'-P-CCNC: 24 U/L (ref 0–70)
ANION GAP SERPL CALCULATED.3IONS-SCNC: 10 MMOL/L (ref 7–15)
AST SERPL W P-5'-P-CCNC: 26 U/L (ref 0–45)
BILIRUB SERPL-MCNC: 0.6 MG/DL
BUN SERPL-MCNC: 12.6 MG/DL (ref 8–23)
CALCIUM SERPL-MCNC: 9.4 MG/DL (ref 8.2–9.6)
CHLORIDE SERPL-SCNC: 97 MMOL/L (ref 98–107)
CREAT SERPL-MCNC: 0.83 MG/DL (ref 0.67–1.17)
DEPRECATED HCO3 PLAS-SCNC: 25 MMOL/L (ref 22–29)
EGFRCR SERPLBLD CKD-EPI 2021: 81 ML/MIN/1.73M2
GLUCOSE SERPL-MCNC: 93 MG/DL (ref 70–99)
POTASSIUM SERPL-SCNC: 4.5 MMOL/L (ref 3.4–5.3)
PROT SERPL-MCNC: 7.6 G/DL (ref 6.4–8.3)
SODIUM SERPL-SCNC: 132 MMOL/L (ref 135–145)

## 2024-03-14 DIAGNOSIS — E78.5 HYPERLIPIDEMIA LDL GOAL <130: ICD-10-CM

## 2024-03-14 DIAGNOSIS — I20.0 UNSTABLE ANGINA (H): ICD-10-CM

## 2024-03-14 RX ORDER — ATORVASTATIN CALCIUM 20 MG/1
20 TABLET, FILM COATED ORAL DAILY
Qty: 90 TABLET | Refills: 0 | Status: SHIPPED | OUTPATIENT
Start: 2024-03-14 | End: 2024-06-13

## 2024-03-21 DIAGNOSIS — I25.118 CORONARY ARTERY DISEASE OF NATIVE ARTERY OF NATIVE HEART WITH STABLE ANGINA PECTORIS (H): ICD-10-CM

## 2024-03-21 RX ORDER — ISOSORBIDE MONONITRATE 30 MG/1
30 TABLET, EXTENDED RELEASE ORAL DAILY
Qty: 90 TABLET | Refills: 1 | Status: SHIPPED | OUTPATIENT
Start: 2024-03-21 | End: 2024-09-18

## 2024-03-28 ENCOUNTER — HOSPITAL ENCOUNTER (OUTPATIENT)
Dept: CARDIOLOGY | Facility: CLINIC | Age: 89
Discharge: HOME | End: 2024-03-28
Attending: NURSE PRACTITIONER | Admitting: NURSE PRACTITIONER
Payer: COMMERCIAL

## 2024-03-28 DIAGNOSIS — I77.810 AORTIC ROOT DILATION (H): ICD-10-CM

## 2024-03-28 LAB — LVEF ECHO: NORMAL

## 2024-03-28 PROCEDURE — 93306 TTE W/DOPPLER COMPLETE: CPT | Mod: 26 | Performed by: INTERNAL MEDICINE

## 2024-03-28 PROCEDURE — 93306 TTE W/DOPPLER COMPLETE: CPT

## 2024-05-09 DIAGNOSIS — I10 BENIGN ESSENTIAL HYPERTENSION: ICD-10-CM

## 2024-05-09 RX ORDER — METOPROLOL SUCCINATE 25 MG/1
12.5 TABLET, EXTENDED RELEASE ORAL DAILY
Qty: 45 TABLET | Refills: 1 | Status: SHIPPED | OUTPATIENT
Start: 2024-05-09 | End: 2024-09-18

## 2024-06-13 DIAGNOSIS — I20.0 UNSTABLE ANGINA (H): ICD-10-CM

## 2024-06-13 DIAGNOSIS — E78.5 HYPERLIPIDEMIA LDL GOAL <130: ICD-10-CM

## 2024-06-13 RX ORDER — ATORVASTATIN CALCIUM 20 MG/1
20 TABLET, FILM COATED ORAL DAILY
Qty: 90 TABLET | Refills: 1 | Status: SHIPPED | OUTPATIENT
Start: 2024-06-13 | End: 2024-09-18

## 2024-09-18 ENCOUNTER — OFFICE VISIT (OUTPATIENT)
Dept: CARDIOLOGY | Facility: CLINIC | Age: 89
End: 2024-09-18
Payer: COMMERCIAL

## 2024-09-18 VITALS
BODY MASS INDEX: 28.44 KG/M2 | DIASTOLIC BLOOD PRESSURE: 68 MMHG | HEART RATE: 81 BPM | SYSTOLIC BLOOD PRESSURE: 123 MMHG | WEIGHT: 192 LBS | OXYGEN SATURATION: 93 % | HEIGHT: 69 IN

## 2024-09-18 DIAGNOSIS — I25.118 CORONARY ARTERY DISEASE OF NATIVE ARTERY OF NATIVE HEART WITH STABLE ANGINA PECTORIS (H): ICD-10-CM

## 2024-09-18 DIAGNOSIS — I10 BENIGN ESSENTIAL HYPERTENSION: ICD-10-CM

## 2024-09-18 DIAGNOSIS — E78.5 HYPERLIPIDEMIA LDL GOAL <130: ICD-10-CM

## 2024-09-18 DIAGNOSIS — I20.0 UNSTABLE ANGINA (H): ICD-10-CM

## 2024-09-18 PROCEDURE — 99214 OFFICE O/P EST MOD 30 MIN: CPT | Performed by: INTERNAL MEDICINE

## 2024-09-18 PROCEDURE — G2211 COMPLEX E/M VISIT ADD ON: HCPCS | Performed by: INTERNAL MEDICINE

## 2024-09-18 RX ORDER — CARVEDILOL 3.12 MG/1
3.12 TABLET ORAL 2 TIMES DAILY WITH MEALS
Qty: 180 TABLET | Refills: 3 | Status: SHIPPED | OUTPATIENT
Start: 2024-09-18

## 2024-09-18 RX ORDER — ROSUVASTATIN CALCIUM 10 MG/1
10 TABLET, COATED ORAL DAILY
Qty: 90 TABLET | Refills: 3 | Status: SHIPPED | OUTPATIENT
Start: 2024-09-18

## 2024-09-18 RX ORDER — METOPROLOL SUCCINATE 25 MG/1
12.5 TABLET, EXTENDED RELEASE ORAL DAILY
Qty: 45 TABLET | Refills: 3 | Status: CANCELLED | OUTPATIENT
Start: 2024-09-18

## 2024-09-18 RX ORDER — ATORVASTATIN CALCIUM 20 MG/1
20 TABLET, FILM COATED ORAL DAILY
Qty: 90 TABLET | Refills: 3 | Status: CANCELLED | OUTPATIENT
Start: 2024-09-18

## 2024-09-18 RX ORDER — ISOSORBIDE MONONITRATE 30 MG/1
30 TABLET, EXTENDED RELEASE ORAL DAILY
Qty: 90 TABLET | Refills: 3 | Status: SHIPPED | OUTPATIENT
Start: 2024-09-18

## 2024-09-18 NOTE — LETTER
9/18/2024    Anais Pickett MD  [address] [facility] [address]    RE: Korey Pearson       Dear Colleague,     I had the pleasure of seeing Koreyraymond Jerez Lili in the [facility].  Cardiology Progress Note          Assessment and Plan:       Coronary artery disease with history of PCI to the LAD 2016 and 17.  Stable by symptoms.  Continue current cardiovascular regimen except for adjustments as below.      Depression and anxiety  Trial of changing his atorvastatin and metoprolol to rosuvastatin and carvedilol in case this helps his mood with the hydrophilic medications over the lipophilic ones.    Follow-up in 6 months per patient request    30 minutes was spent with the patient, precharting and reviewing tests as well as post visit charting all done today..    This note was transcribed using electronic voice recognition software and there may be typographical errors present.     The longitudinal plan of care for the diagnosis(es)/condition(s) as documented were addressed during this visit. Due to the added complexity in care, I will continue to support Clifford in the subsequent management and with ongoing continuity of care.                    Interval History:     The patient is a very pleasant 89 year old whom I have been following for coronary artery disease.  He has more fatigue and mood issues.  He is on atorvastatin and metoprolol.  He would like to try adjusting medications to see if any improvement in his mood.  He has shortness of breath with exertion but stable.  He denies any exertional chest discomfort.                     Review of Systems:     Review of Systems:  Skin:  Negative bruising   Eyes:  Positive for glasses  ENT:  Positive for hearing loss  Respiratory:  Positive for dyspnea on exertion;sleep apnea  Cardiovascular:  chest pain;Negative;palpitations;lightheadedness;dizziness;edema Positive for;fatigue  Gastroenterology: not assessed abdominal  "pain  Genitourinary:  Negative    Musculoskeletal:  Negative arthritis  Neurologic:  Positive for numbness or tingling of feet (restless legs)  Psychiatric:  Positive for sleep disturbances  Heme/Lymph/Imm:  Negative allergies  Endocrine:  Negative thyroid disorder              Physical Exam:     Vitals: /68   Pulse 81   Ht 1.753 m (5' 9\")   Wt 87.1 kg (192 lb)   SpO2 93%   BMI 28.35 kg/m    Constitutional:  cooperative, alert and oriented, well developed, well nourished, in no acute distress   Hard of hearing    Skin:  warm and dry to the touch, no apparent skin lesions or masses noted        Head:  normocephalic, no masses or lesions        Eyes:  pupils equal and round, conjunctivae and lids unremarkable, sclera white, no xanthalasma, EOMS intact, no nystagmus        Chest:  clear to auscultation;normal symmetry        Cardiac: regular rhythm;normal S1 and S2;no murmurs, gallops or rubs detected   distant heart sounds              Extremities and Back:  no edema;no deformities, clubbing, cyanosis, erythema observed        Neurological:  no gross motor deficits;affect appropriate                 Medications:     Current Outpatient Medications   Medication Sig Dispense Refill     aspirin 81 MG tablet Take 81 mg by mouth daily        carvedilol (COREG) 3.125 MG tablet Take 1 tablet (3.125 mg) by mouth 2 times daily (with meals). 180 tablet 3     Cholecalciferol (VITAMIN D) 50 MCG (2000 UT) CAPS Take 1 capsule by mouth daily       Doxylamine Succinate, Sleep, (UNISOM PO)        isosorbide mononitrate (IMDUR) 30 MG 24 hr tablet Take 1 tablet (30 mg) by mouth daily. In the morning. 90 tablet 3     levothyroxine (SYNTHROID/LEVOTHROID) 75 MCG tablet Take 1 tablet (75 mcg) by mouth daily 90 tablet 3     magnesium 100 MG CAPS        rosuvastatin (CRESTOR) 10 MG tablet Take 1 tablet (10 mg) by mouth daily. 90 tablet 3     tamsulosin (FLOMAX) 0.4 MG capsule Take 0.4 mg by mouth daily (with dinner)  (Patient not " taking: Reported on 9/18/2024)  3                Data:   All laboratory data reviewed  No results found for this or any previous visit (from the past 24 hour(s)).    All laboratory data reviewed  Lab Results   Component Value Date    CHOL 111 02/15/2024    CHOL 106 10/27/2020     Lab Results   Component Value Date    HDL 39 02/15/2024    HDL 46 10/27/2020     Lab Results   Component Value Date    LDL 52 02/15/2024    LDL 45 10/27/2020     Lab Results   Component Value Date    TRIG 102 02/15/2024    TRIG 73 10/27/2020     Lab Results   Component Value Date    CHOLHDLRATIO 4.4 08/06/2014     TSH   Date Value Ref Range Status   02/15/2024 1.44 0.30 - 4.20 uIU/mL Final   10/13/2022 2.80 0.40 - 4.00 mU/L Final   10/27/2020 2.88 0.40 - 4.00 mU/L Final     Last Basic Metabolic Panel:  Lab Results   Component Value Date     02/15/2024     10/27/2020      Lab Results   Component Value Date    POTASSIUM 4.5 02/15/2024    POTASSIUM 4.0 10/13/2022    POTASSIUM 4.7 10/27/2020     Lab Results   Component Value Date    CHLORIDE 97 02/15/2024    CHLORIDE 98 10/13/2022    CHLORIDE 98 10/27/2020     Lab Results   Component Value Date    OMAR 9.4 02/15/2024    OMAR 9.1 10/27/2020     Lab Results   Component Value Date    CO2 25 02/15/2024    CO2 24 10/13/2022    CO2 28 10/27/2020     Lab Results   Component Value Date    BUN 12.6 02/15/2024    BUN 17 10/13/2022    BUN 20 10/27/2020     Lab Results   Component Value Date    CR 0.83 02/15/2024    CR 0.76 10/27/2020     Lab Results   Component Value Date    GLC 93 02/15/2024    GLC 86 10/13/2022    GLC 91 10/27/2020     Lab Results   Component Value Date    WBC 10.7 10/13/2022    WBC 9.5 10/27/2020     Lab Results   Component Value Date    RBC 4.62 10/13/2022    RBC 4.45 10/27/2020     Lab Results   Component Value Date    HGB 14.3 10/13/2022    HGB 13.6 10/27/2020     Lab Results   Component Value Date    HCT 42.5 10/13/2022    HCT 40.4 10/27/2020     Lab Results   Component  Value Date    MCV 92 10/13/2022    MCV 91 10/27/2020     Lab Results   Component Value Date    MCH 31.0 10/13/2022    MCH 30.6 10/27/2020     Lab Results   Component Value Date    MCHC 33.6 10/13/2022    MCHC 33.7 10/27/2020     Lab Results   Component Value Date    RDW 13.6 10/13/2022    RDW 13.4 10/27/2020     Lab Results   Component Value Date     10/13/2022     10/27/2020               Thank you for allowing me to participate in the care of your patient.      Sincerely,     Ramirez Patel MD     [facility]  cc:   Ramirez Patel MD  [address] [facility] [address]  [place] [address]

## 2024-09-18 NOTE — PROGRESS NOTES
Cardiology Progress Note          Assessment and Plan:       Coronary artery disease with history of PCI to the LAD 2016 and 17.  Stable by symptoms.  Continue current cardiovascular regimen except for adjustments as below.      Depression and anxiety  Trial of changing his atorvastatin and metoprolol to rosuvastatin and carvedilol in case this helps his mood with the hydrophilic medications over the lipophilic ones.    Follow-up in 6 months per patient request    30 minutes was spent with the patient, precharting and reviewing tests as well as post visit charting all done today..    This note was transcribed using electronic voice recognition software and there may be typographical errors present.     The longitudinal plan of care for the diagnosis(es)/condition(s) as documented were addressed during this visit. Due to the added complexity in care, I will continue to support Clifford in the subsequent management and with ongoing continuity of care.                    Interval History:     The patient is a very pleasant 89 year old whom I have been following for coronary artery disease.  He has more fatigue and mood issues.  He is on atorvastatin and metoprolol.  He would like to try adjusting medications to see if any improvement in his mood.  He has shortness of breath with exertion but stable.  He denies any exertional chest discomfort.                     Review of Systems:     Review of Systems:  Skin:  Negative bruising   Eyes:  Positive for glasses  ENT:  Positive for hearing loss  Respiratory:  Positive for dyspnea on exertion;sleep apnea  Cardiovascular:  chest pain;Negative;palpitations;lightheadedness;dizziness;edema Positive for;fatigue  Gastroenterology: not assessed abdominal pain  Genitourinary:  Negative    Musculoskeletal:  Negative arthritis  Neurologic:  Positive for numbness or tingling of feet (restless legs)  Psychiatric:  Positive for sleep disturbances  Heme/Lymph/Imm:  Negative  "allergies  Endocrine:  Negative thyroid disorder              Physical Exam:     Vitals: /68   Pulse 81   Ht 1.753 m (5' 9\")   Wt 87.1 kg (192 lb)   SpO2 93%   BMI 28.35 kg/m    Constitutional:  cooperative, alert and oriented, well developed, well nourished, in no acute distress   Hard of hearing    Skin:  warm and dry to the touch, no apparent skin lesions or masses noted        Head:  normocephalic, no masses or lesions        Eyes:  pupils equal and round, conjunctivae and lids unremarkable, sclera white, no xanthalasma, EOMS intact, no nystagmus        Chest:  clear to auscultation;normal symmetry        Cardiac: regular rhythm;normal S1 and S2;no murmurs, gallops or rubs detected   distant heart sounds              Extremities and Back:  no edema;no deformities, clubbing, cyanosis, erythema observed        Neurological:  no gross motor deficits;affect appropriate                 Medications:     Current Outpatient Medications   Medication Sig Dispense Refill    aspirin 81 MG tablet Take 81 mg by mouth daily       carvedilol (COREG) 3.125 MG tablet Take 1 tablet (3.125 mg) by mouth 2 times daily (with meals). 180 tablet 3    Cholecalciferol (VITAMIN D) 50 MCG (2000 UT) CAPS Take 1 capsule by mouth daily      Doxylamine Succinate, Sleep, (UNISOM PO)       isosorbide mononitrate (IMDUR) 30 MG 24 hr tablet Take 1 tablet (30 mg) by mouth daily. In the morning. 90 tablet 3    levothyroxine (SYNTHROID/LEVOTHROID) 75 MCG tablet Take 1 tablet (75 mcg) by mouth daily 90 tablet 3    magnesium 100 MG CAPS       rosuvastatin (CRESTOR) 10 MG tablet Take 1 tablet (10 mg) by mouth daily. 90 tablet 3    tamsulosin (FLOMAX) 0.4 MG capsule Take 0.4 mg by mouth daily (with dinner)  (Patient not taking: Reported on 9/18/2024)  3                Data:   All laboratory data reviewed  No results found for this or any previous visit (from the past 24 hour(s)).    All laboratory data reviewed  Lab Results   Component Value " Date    CHOL 111 02/15/2024    CHOL 106 10/27/2020     Lab Results   Component Value Date    HDL 39 02/15/2024    HDL 46 10/27/2020     Lab Results   Component Value Date    LDL 52 02/15/2024    LDL 45 10/27/2020     Lab Results   Component Value Date    TRIG 102 02/15/2024    TRIG 73 10/27/2020     Lab Results   Component Value Date    CHOLHDLRATIO 4.4 08/06/2014     TSH   Date Value Ref Range Status   02/15/2024 1.44 0.30 - 4.20 uIU/mL Final   10/13/2022 2.80 0.40 - 4.00 mU/L Final   10/27/2020 2.88 0.40 - 4.00 mU/L Final     Last Basic Metabolic Panel:  Lab Results   Component Value Date     02/15/2024     10/27/2020      Lab Results   Component Value Date    POTASSIUM 4.5 02/15/2024    POTASSIUM 4.0 10/13/2022    POTASSIUM 4.7 10/27/2020     Lab Results   Component Value Date    CHLORIDE 97 02/15/2024    CHLORIDE 98 10/13/2022    CHLORIDE 98 10/27/2020     Lab Results   Component Value Date    OMAR 9.4 02/15/2024    OMAR 9.1 10/27/2020     Lab Results   Component Value Date    CO2 25 02/15/2024    CO2 24 10/13/2022    CO2 28 10/27/2020     Lab Results   Component Value Date    BUN 12.6 02/15/2024    BUN 17 10/13/2022    BUN 20 10/27/2020     Lab Results   Component Value Date    CR 0.83 02/15/2024    CR 0.76 10/27/2020     Lab Results   Component Value Date    GLC 93 02/15/2024    GLC 86 10/13/2022    GLC 91 10/27/2020     Lab Results   Component Value Date    WBC 10.7 10/13/2022    WBC 9.5 10/27/2020     Lab Results   Component Value Date    RBC 4.62 10/13/2022    RBC 4.45 10/27/2020     Lab Results   Component Value Date    HGB 14.3 10/13/2022    HGB 13.6 10/27/2020     Lab Results   Component Value Date    HCT 42.5 10/13/2022    HCT 40.4 10/27/2020     Lab Results   Component Value Date    MCV 92 10/13/2022    MCV 91 10/27/2020     Lab Results   Component Value Date    MCH 31.0 10/13/2022    MCH 30.6 10/27/2020     Lab Results   Component Value Date    MCHC 33.6 10/13/2022    MCHC 33.7 10/27/2020      Lab Results   Component Value Date    RDW 13.6 10/13/2022    RDW 13.4 10/27/2020     Lab Results   Component Value Date     10/13/2022     10/27/2020

## 2025-01-16 ENCOUNTER — PATIENT OUTREACH (OUTPATIENT)
Dept: CARE COORDINATION | Facility: CLINIC | Age: OVER 89
End: 2025-01-16
Payer: COMMERCIAL

## 2025-01-30 ENCOUNTER — PATIENT OUTREACH (OUTPATIENT)
Dept: CARE COORDINATION | Facility: CLINIC | Age: OVER 89
End: 2025-01-30
Payer: COMMERCIAL

## 2025-02-20 DIAGNOSIS — E03.4 HYPOTHYROIDISM DUE TO ACQUIRED ATROPHY OF THYROID: ICD-10-CM

## 2025-02-20 RX ORDER — LEVOTHYROXINE SODIUM 75 UG/1
75 TABLET ORAL DAILY
Qty: 90 TABLET | Refills: 0 | Status: SHIPPED | OUTPATIENT
Start: 2025-02-20

## 2025-03-23 ENCOUNTER — HEALTH MAINTENANCE LETTER (OUTPATIENT)
Age: OVER 89
End: 2025-03-23

## 2025-05-18 PROBLEM — I77.810 AORTIC ROOT DILATION: Status: RESOLVED | Noted: 2024-02-14 | Resolved: 2025-05-18

## 2025-05-19 ENCOUNTER — RESULTS FOLLOW-UP (OUTPATIENT)
Dept: INTERNAL MEDICINE | Facility: CLINIC | Age: OVER 89
End: 2025-05-19

## 2025-05-19 ENCOUNTER — OFFICE VISIT (OUTPATIENT)
Dept: INTERNAL MEDICINE | Facility: CLINIC | Age: OVER 89
End: 2025-05-19
Payer: COMMERCIAL

## 2025-05-19 VITALS
HEIGHT: 69 IN | WEIGHT: 196.8 LBS | HEART RATE: 83 BPM | TEMPERATURE: 97.6 F | DIASTOLIC BLOOD PRESSURE: 52 MMHG | SYSTOLIC BLOOD PRESSURE: 108 MMHG | BODY MASS INDEX: 29.15 KG/M2

## 2025-05-19 DIAGNOSIS — I48.0 PAF (PAROXYSMAL ATRIAL FIBRILLATION) (H): ICD-10-CM

## 2025-05-19 DIAGNOSIS — R41.3 SHORT-TERM MEMORY LOSS: ICD-10-CM

## 2025-05-19 DIAGNOSIS — G25.81 RESTLESS LEGS SYNDROME (RLS): ICD-10-CM

## 2025-05-19 DIAGNOSIS — Z86.73 HISTORY OF STROKE: ICD-10-CM

## 2025-05-19 DIAGNOSIS — E03.4 HYPOTHYROIDISM DUE TO ACQUIRED ATROPHY OF THYROID: ICD-10-CM

## 2025-05-19 DIAGNOSIS — I25.10 CORONARY ARTERY DISEASE INVOLVING NATIVE CORONARY ARTERY OF NATIVE HEART WITHOUT ANGINA PECTORIS: ICD-10-CM

## 2025-05-19 DIAGNOSIS — Z13.1 SCREENING FOR DIABETES MELLITUS: ICD-10-CM

## 2025-05-19 DIAGNOSIS — I77.810 AORTIC ROOT DILATATION: ICD-10-CM

## 2025-05-19 DIAGNOSIS — Z00.00 MEDICARE ANNUAL WELLNESS VISIT, SUBSEQUENT: Primary | ICD-10-CM

## 2025-05-19 DIAGNOSIS — G62.9 PERIPHERAL POLYNEUROPATHY: ICD-10-CM

## 2025-05-19 DIAGNOSIS — I10 BENIGN ESSENTIAL HYPERTENSION: ICD-10-CM

## 2025-05-19 DIAGNOSIS — R73.01 IMPAIRED FASTING GLUCOSE: ICD-10-CM

## 2025-05-19 PROBLEM — F41.1 GAD (GENERALIZED ANXIETY DISORDER): Status: RESOLVED | Noted: 2022-01-27 | Resolved: 2025-05-19

## 2025-05-19 LAB
ALBUMIN SERPL BCG-MCNC: 4.2 G/DL (ref 3.5–5.2)
ALP SERPL-CCNC: 88 U/L (ref 40–150)
ALT SERPL W P-5'-P-CCNC: 19 U/L (ref 0–70)
ANION GAP SERPL CALCULATED.3IONS-SCNC: 10 MMOL/L (ref 7–15)
AST SERPL W P-5'-P-CCNC: 26 U/L (ref 0–45)
BILIRUB SERPL-MCNC: 0.7 MG/DL
BUN SERPL-MCNC: 16.2 MG/DL (ref 8–23)
CALCIUM SERPL-MCNC: 9.5 MG/DL (ref 8.8–10.4)
CHLORIDE SERPL-SCNC: 95 MMOL/L (ref 98–107)
CHOLEST SERPL-MCNC: 104 MG/DL
CREAT SERPL-MCNC: 0.94 MG/DL (ref 0.67–1.17)
EGFRCR SERPLBLD CKD-EPI 2021: 75 ML/MIN/1.73M2
EST. AVERAGE GLUCOSE BLD GHB EST-MCNC: 114 MG/DL
FASTING STATUS PATIENT QL REPORTED: NO
FASTING STATUS PATIENT QL REPORTED: NO
GLUCOSE SERPL-MCNC: 112 MG/DL (ref 70–99)
HBA1C MFR BLD: 5.6 % (ref 0–5.6)
HCO3 SERPL-SCNC: 26 MMOL/L (ref 22–29)
HDLC SERPL-MCNC: 45 MG/DL
LDLC SERPL CALC-MCNC: 41 MG/DL
NONHDLC SERPL-MCNC: 59 MG/DL
POTASSIUM SERPL-SCNC: 5 MMOL/L (ref 3.4–5.3)
PROT SERPL-MCNC: 7.5 G/DL (ref 6.4–8.3)
SODIUM SERPL-SCNC: 131 MMOL/L (ref 135–145)
T4 FREE SERPL-MCNC: 1.35 NG/DL (ref 0.9–1.7)
TRIGL SERPL-MCNC: 92 MG/DL
TSH SERPL DL<=0.005 MIU/L-ACNC: 4.95 UIU/ML (ref 0.3–4.2)

## 2025-05-19 PROCEDURE — 36415 COLL VENOUS BLD VENIPUNCTURE: CPT | Performed by: INTERNAL MEDICINE

## 2025-05-19 PROCEDURE — 84443 ASSAY THYROID STIM HORMONE: CPT | Performed by: INTERNAL MEDICINE

## 2025-05-19 PROCEDURE — 80061 LIPID PANEL: CPT | Performed by: INTERNAL MEDICINE

## 2025-05-19 PROCEDURE — 80053 COMPREHEN METABOLIC PANEL: CPT | Performed by: INTERNAL MEDICINE

## 2025-05-19 PROCEDURE — 3078F DIAST BP <80 MM HG: CPT | Performed by: INTERNAL MEDICINE

## 2025-05-19 PROCEDURE — 84439 ASSAY OF FREE THYROXINE: CPT | Performed by: INTERNAL MEDICINE

## 2025-05-19 PROCEDURE — G0439 PPPS, SUBSEQ VISIT: HCPCS | Performed by: INTERNAL MEDICINE

## 2025-05-19 PROCEDURE — 3074F SYST BP LT 130 MM HG: CPT | Performed by: INTERNAL MEDICINE

## 2025-05-19 PROCEDURE — 83036 HEMOGLOBIN GLYCOSYLATED A1C: CPT | Performed by: INTERNAL MEDICINE

## 2025-05-19 PROCEDURE — 99214 OFFICE O/P EST MOD 30 MIN: CPT | Mod: 25 | Performed by: INTERNAL MEDICINE

## 2025-05-19 SDOH — HEALTH STABILITY: PHYSICAL HEALTH: ON AVERAGE, HOW MANY MINUTES DO YOU ENGAGE IN EXERCISE AT THIS LEVEL?: 0 MIN

## 2025-05-19 SDOH — HEALTH STABILITY: PHYSICAL HEALTH: ON AVERAGE, HOW MANY DAYS PER WEEK DO YOU ENGAGE IN MODERATE TO STRENUOUS EXERCISE (LIKE A BRISK WALK)?: 0 DAYS

## 2025-05-19 ASSESSMENT — ANXIETY QUESTIONNAIRES
4. TROUBLE RELAXING: NOT AT ALL
GAD7 TOTAL SCORE: 6
1. FEELING NERVOUS, ANXIOUS, OR ON EDGE: MORE THAN HALF THE DAYS
GAD7 TOTAL SCORE: 6
6. BECOMING EASILY ANNOYED OR IRRITABLE: SEVERAL DAYS
7. FEELING AFRAID AS IF SOMETHING AWFUL MIGHT HAPPEN: SEVERAL DAYS
8. IF YOU CHECKED OFF ANY PROBLEMS, HOW DIFFICULT HAVE THESE MADE IT FOR YOU TO DO YOUR WORK, TAKE CARE OF THINGS AT HOME, OR GET ALONG WITH OTHER PEOPLE?: SOMEWHAT DIFFICULT
2. NOT BEING ABLE TO STOP OR CONTROL WORRYING: SEVERAL DAYS
5. BEING SO RESTLESS THAT IT IS HARD TO SIT STILL: NOT AT ALL
IF YOU CHECKED OFF ANY PROBLEMS ON THIS QUESTIONNAIRE, HOW DIFFICULT HAVE THESE PROBLEMS MADE IT FOR YOU TO DO YOUR WORK, TAKE CARE OF THINGS AT HOME, OR GET ALONG WITH OTHER PEOPLE: SOMEWHAT DIFFICULT
GAD7 TOTAL SCORE: 6
3. WORRYING TOO MUCH ABOUT DIFFERENT THINGS: SEVERAL DAYS
7. FEELING AFRAID AS IF SOMETHING AWFUL MIGHT HAPPEN: SEVERAL DAYS

## 2025-05-19 ASSESSMENT — SOCIAL DETERMINANTS OF HEALTH (SDOH): HOW OFTEN DO YOU GET TOGETHER WITH FRIENDS OR RELATIVES?: MORE THAN THREE TIMES A WEEK

## 2025-05-19 NOTE — PROGRESS NOTES
ASSESSMENT/PLAN                                                       (Z00.00) Medicare annual wellness visit, subsequent  (primary encounter diagnosis)  Comment: PMH, PSH, FH, SH, medications, allergies, immunizations, and preventative health measures reviewed and updated as appropriate.  Plan: see below for plans.      (I77.810) Aortic root dilatation  Comment: stable; followed by cardiology.    (G25.81) Restless legs syndrome (RLS)  Comment: currently untreated.    (Z86.73) History of stroke  Comment: cerebellar stroke; 2020; with residual gait instability.    (I48.0) PAF (paroxysmal atrial fibrillation) (H)  Comment: rate controlled on Coreg; chronic AC discontinued due to fall risk; on daily baby aspirin.    (I10) Benign essential hypertension  Comment: overly controlled on Imdur and Coreg.  Plan: STOP Imdur; CONTINUE Coreg without change; home care to monitor blood pressures and contact MD if they are outside of ideal range.    (G62.9) Peripheral polyneuropathy  Comment: currently untreated; contributes to gait instability.    (I25.10) Coronary artery disease involving native coronary artery of native heart without angina pectoris  Comment: s/p PCI to LAD in 2017; on aspirin, statin, and BB.  Plan: non-fasting lipid profile today.    (R41.3) Short-term memory loss  Comment: currently untreated.    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    Appropriate preventive services were discussed with this patient, including applicable screening as appropriate for cardiovascular disease, diabetes, osteopenia/osteoporosis, and glaucoma.  As appropriate for age/gender, discussed screening for colorectal cancer, prostate cancer, breast cancer, and cervical cancer. Checklist reviewing preventive services available has been given to the patient.    Reviewed patients plan of  care. The Basic Care Plan (routine screening as documented in Health Maintenance) for Kroey Pearson meets the Care Plan requirement. This Care Plan has been established and reviewed with the Patient and daughters.    Anais Pickett MD   [place] [address] [address] [address]  T: [phone], F: [phone]    SUBJECTIVE                                                      Korey Pearson is a very pleasant 89 year old male who presents for his subsequent AWV:    Accompanied by daughters.    Current providers (other than myself): Amanda/Jeffery (cardiology)    PMH, PSH, FH, SH, medications, allergies, immunizations, preventative health, and health risk assessment reviewed.     Past Medical History:   Diagnosis Date    Aortic root dilatation     Benign essential hypertension     CAD (coronary artery disease)     s/p PCI to LAD in 2017    VISHAL (generalized anxiety disorder)     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    Hypothyroidism     PAF (paroxysmal atrial fibrillation) (H)     Peripheral polyneuropathy     Restless legs syndrome (RLS)     Short-term memory loss      Past Surgical History:   Procedure Laterality Date    APPENDECTOMY      CATARACT IOL, RT/LT      INGUINAL HERNIA REPAIR Left     TURP       Family History   Problem Relation Age of Onset    Cerebrovascular Disease Mother 40    Hypertension Mother     Heart Disease Father         details unknown    Prostate Cancer No family hx of     Colon Cancer No family hx of      Social History     Occupational History    Occupation: Retired -    Tobacco Use    Smoking status: Former     Types: Cigarettes    Smokeless tobacco: Never    Tobacco comments:     Quit in 1957, smoked for 10 years; 1.5 ppd at his most.    Vaping Use    Vaping status: Never Used   Substance and Sexual Activity    Alcohol use: Yes     Comment: rare    Drug use: No    Sexual activity: Not Currently   Social History Narrative    .     Lives at home with wife.     4 children.    6 grandchildren.    10 great grandchildren.     Allergies   Allergen Reactions    Ropinirole Anxiety    Rotigotine Other (See Comments)     Neupro patch; dizziness + imbalance    Zolpidem Tartrate Other (See Comments)     tinnitus     Current Outpatient Medications   Medication Sig    carvedilol (COREG) 3.125 MG tablet Take 1 tablet (3.125 mg) by mouth 2 times daily (with meals).    Cholecalciferol (VITAMIN D) 50 MCG (2000 UT) CAPS Take 1 capsule by mouth daily    Doxylamine Succinate, Sleep, (UNISOM PO)     levothyroxine (SYNTHROID/LEVOTHROID) 75 MCG tablet TAKE ONE TABLET BY MOUTH ONE TIME DAILY    magnesium 100 MG CAPS     rosuvastatin (CRESTOR) 10 MG tablet Take 1 tablet (10 mg) by mouth daily.    aspirin 81 MG tablet Take 81 mg by mouth daily      Immunization History   Administered Date(s) Administered    COVID-19 12+ (Pfizer) 11/17/2023    COVID-19 Bivalent 12+ (Pfizer) 10/13/2022    COVID-19 MONOVALENT 12+ (Pfizer) 02/22/2021, 03/15/2021, 12/07/2021    Influenza Vaccine 65+ (Fluzone HD) 10/27/2020, 02/22/2022, 10/13/2022    Pneumococcal 23 valent 03/07/2014    TDAP Vaccine (Adacel) 06/25/2013     PREVENTATIVE HEALTH                                                      BMI: overweight  Blood pressure: overly controlled on current regimen  Prostate CA Screening: screening no longer indicated  Colon CA screening: screening no longer indicated  Lung CA screening: patient does not meet screening criteria  AAA screening: patient does not meet screening criteria  Screening cholesterol: n/a - already being treated for this condition  Screening diabetes: DUE  Alcohol misuse screening: alcohol use reviewed - no intervention indicated at this time  Immunizations: reviewed; Shingrix series DUE - not covered by Medicare (may obtain in pharmacy if desired) , tetanus booster DUE - not covered by Medicare (may obtain in pharmacy if desired) , and RSV vaccination DUE - not  "covered by Medicare (may obtain in pharmacy if desired)    HEALTH RISK ASSESSMENT                                                      In general, how would you rate your overall physical health? fair  Outside of work, how many days during the week do you exercise? None  Outside of work, approximately how many minutes a day do you exercise? n/a     If you drink alcohol do you typically have >3 drinks per day or >7 drinks per week? No     Assistance with daily activities: YES - assistance with transportation, medications, grocery shopping, meal preparation, bathing, money management, laundry, and house cleaning    Safety concerns: YES - no grab bars in bathroom    Fall risk assessment: completed today (see ambulatory assessments)    Hearing concerns: YES - needs to ask people to speak up or repeat themselves, it hard to follow a conversation in a noisy restaurant or crowded room, trouble understanding a speaker in a public meeting her [employer] service, trouble understanding softer whispered speech, and trouble understanding speech on the telephone    In the past 6 months, have you been bothered by leaking of urine: No    Do you have a current opioid prescription? No  Do you use any other controlled substances or medications that are not prescribed by a provider? None    PHQ-2/PHQ-9 assessment: completed today (see ambulatory assessments)    Additional concerns today: No    Have you ever done Advance Care Planning? (For example, a Health Directive, POLST, or a discussion with a medical provider or your loved ones about your wishes): Yes    OBJECTIVE                                                      /52   Pulse 83   Temp 97.6  F (36.4  C) (Temporal)   Ht 1.753 m (5' 9\")   Wt 89.3 kg (196 lb 12.8 oz)   BMI 29.06 kg/m    Constitutional: well-appearing  Head, Ears, and Eyes: normocephalic; normal external auditory canal and pinna; tympanic membranes visualized and normal; normal lids and conjunctivae  Neck: " supple, symmetric, no thyromegaly or lymphadenopathy  Respiratory: normal respiratory effort; clear to auscultation bilaterally  Cardiovascular: regular rate and rhythm; no edema  Gastrointestinal: soft, non-tender, and non-distended; no organomegaly or masses  Musculoskeletal: seated in wheelchair tation  Psych: flattened affect; poor recent memory     Cognitive Impairment noted: YES - known memory loss    ---    (Note was completed, in part, with [employer] voice-recognition software. Documentation was reviewed, but some grammatical, spelling, and word errors may remain.)

## 2025-05-21 ENCOUNTER — TELEPHONE (OUTPATIENT)
Dept: INTERNAL MEDICINE | Facility: CLINIC | Age: OVER 89
End: 2025-05-21
Payer: COMMERCIAL

## 2025-05-21 NOTE — TELEPHONE ENCOUNTER
Home Care is calling regarding an established patient with [facility].  Requesting orders from: Anais Pickett  PROVIDER AUTHORIZATION REQUIRED: RN unable to provide verbal approval for orders for OT due to more than 7 day delay. See below for additional information.  RN will contact Home care with information after provider review.  Is this a request for a temporary pause in the home care episode?  No    Alexia PEREZ wanted to let provider know for PT they will be doing energy conservation training.     Wanted to let provider know that Magnesium is precribed 100 mg daily in medication list but patient has been taking 400 mg daily. Please advise what does patient should be taking of the Magnesium.     Alexia RN also would like weight parameters since patient does has history of BLE edema.  Today's weight is 193 lbs and would like the weight parameter to be of 188-198 lbs. Please advise.       Orders Requested    Skilled Nursing  Request for initial certification (first set of orders)   Frequency: 1x a week for 1 week,   2x a week for 1 week,   1x a week for 4 weeks,  Every other for 2 weeks   And 1x a week for 1 week   RN gave verbal order: Yes      Physical Therapy  Request for initial evaluation and treatment (one time)   RN gave verbal order: Yes      Occupational Therapy  Request for delay in care, service is not able to be provided within 7 days of previously scheduled day.   RN gave verbal order: No: more than 7 days   FYI: Service rescheduled to First week of June- Verbal order were not given, waiting for providers response.           Contacts       Contact Date/Time Type Contact Phone/Fax    05/21/2025 03:44 PM CDT Phone (Incoming) [facility] [phone]     Secure line          Rebeca Manzo RN

## 2025-05-21 NOTE — TELEPHONE ENCOUNTER
Okay to approve.    Agree with energy conservation training.     Patient may continue magnesium 400mg daily as long as it is not giving him the runs. Medication list updated.      Agree with weight parameter to be of 188-198 lbs.

## 2025-05-27 ENCOUNTER — TELEPHONE (OUTPATIENT)
Dept: INTERNAL MEDICINE | Facility: CLINIC | Age: OVER 89
End: 2025-05-27
Payer: COMMERCIAL

## 2025-05-27 NOTE — TELEPHONE ENCOUNTER
Home Care is calling regarding an established patient with [facility].  Requesting orders from: Anais Pickett  RN APPROVED: RN able to provide verbal orders.  Home Care will send orders for signature.  RN will close encounter.  Is this a request for a temporary pause in the home care episode?  No    Orders Requested    Physical Therapy  Request for initial certification (first set of orders)   Frequency: 1x/wk for 4wk + 1x/2wk for 4wk -- for gait, balance transfer, strength  RN gave verbal order: Yes      Phone number Home Care can be reached at: [phone]   Okay to leave a detailed message?: Yes      Maureen Chinchilla RN

## 2025-05-28 ENCOUNTER — TELEPHONE (OUTPATIENT)
Dept: INTERNAL MEDICINE | Facility: CLINIC | Age: OVER 89
End: 2025-05-28
Payer: COMMERCIAL

## 2025-05-28 NOTE — TELEPHONE ENCOUNTER
Forms/Letter Request    Type of form/letter: OTHER: POLST       Do we have the form/letter: Yes:     Who is the form from? Patient    Where did/will the form come from? Patient or family brought in       When is form/letter needed by: 5-7 DAYS    How would you like the form/letter returned:     Patient Notified form requests are processed in 5-7 business days:Yes    Could we send this information to you in [facility] or would you prefer to receive a phone call?:   No preference   Okay to leave a detailed message?: Yes at Other phone number:  [phone]

## 2025-05-29 NOTE — TELEPHONE ENCOUNTER
Forms/Letter ready for     Patient called and notified form/letter is ready for .  Current location of form: located behind the  for check in. On second floor in the IM clinic in .  Called patient and they will have her daughter Alexia come in tomorrow to pick the POLST.  Leticia Moreno, TC

## 2025-06-04 DIAGNOSIS — E03.4 HYPOTHYROIDISM DUE TO ACQUIRED ATROPHY OF THYROID: ICD-10-CM

## 2025-06-04 RX ORDER — LEVOTHYROXINE SODIUM 75 UG/1
75 TABLET ORAL DAILY
Qty: 90 TABLET | Refills: 0 | Status: SHIPPED | OUTPATIENT
Start: 2025-06-04

## 2025-06-15 DIAGNOSIS — E03.4 HYPOTHYROIDISM DUE TO ACQUIRED ATROPHY OF THYROID: ICD-10-CM

## 2025-06-16 RX ORDER — LEVOTHYROXINE SODIUM 75 UG/1
75 TABLET ORAL DAILY
Qty: 90 TABLET | Refills: 0 | OUTPATIENT
Start: 2025-06-16

## 2025-06-17 ENCOUNTER — TELEPHONE (OUTPATIENT)
Dept: INTERNAL MEDICINE | Facility: CLINIC | Age: OVER 89
End: 2025-06-17
Payer: COMMERCIAL

## 2025-06-17 NOTE — TELEPHONE ENCOUNTER
Home Care is calling regarding an established patient with [facility].  Requesting orders from: Anais Pickett  RN APPROVED: RN able to provide verbal orders.  Home Care will send orders for signature.  RN will close encounter.  Is this a request for a temporary pause in the home care episode?  No    Orders Requested    Physical Therapy  Request for continuation of care with increase in frequency  Frequency: 1 visit a week for 2 weeks then 1 visit every other week for 2 weeks  RN gave verbal order: Yes      Contacts       Contact Date/Time Type Contact Phone/Fax    06/17/2025 11:49 AM CDT Phone (Incoming) DAMON [facility] [phone]     confidential          Carla Wilson RN

## 2025-06-20 DIAGNOSIS — Z53.9 DIAGNOSIS NOT YET DEFINED: Primary | ICD-10-CM

## 2025-06-23 ENCOUNTER — DOCUMENTATION ONLY (OUTPATIENT)
Dept: OTHER | Facility: CLINIC | Age: OVER 89
End: 2025-06-23
Payer: COMMERCIAL

## 2025-08-15 DIAGNOSIS — Z53.9 DIAGNOSIS NOT YET DEFINED: Primary | ICD-10-CM

## (undated) RX ORDER — NITROGLYCERIN 5 MG/ML
VIAL (ML) INTRAVENOUS
Status: DISPENSED
Start: 2017-08-21

## (undated) RX ORDER — FENTANYL CITRATE 50 UG/ML
INJECTION, SOLUTION INTRAMUSCULAR; INTRAVENOUS
Status: DISPENSED
Start: 2017-08-21

## (undated) RX ORDER — LIDOCAINE HYDROCHLORIDE 10 MG/ML
INJECTION, SOLUTION EPIDURAL; INFILTRATION; INTRACAUDAL; PERINEURAL
Status: DISPENSED
Start: 2017-08-21

## (undated) RX ORDER — HEPARIN SODIUM 1000 [USP'U]/ML
INJECTION, SOLUTION INTRAVENOUS; SUBCUTANEOUS
Status: DISPENSED
Start: 2017-08-21